# Patient Record
Sex: FEMALE | Race: WHITE | NOT HISPANIC OR LATINO | Employment: OTHER | URBAN - METROPOLITAN AREA
[De-identification: names, ages, dates, MRNs, and addresses within clinical notes are randomized per-mention and may not be internally consistent; named-entity substitution may affect disease eponyms.]

---

## 2017-01-09 ENCOUNTER — TELEPHONE (OUTPATIENT)
Dept: FAMILY MEDICINE | Facility: CLINIC | Age: 82
End: 2017-01-09

## 2017-01-09 DIAGNOSIS — N30.00 ACUTE CYSTITIS WITHOUT HEMATURIA: Primary | ICD-10-CM

## 2017-01-09 RX ORDER — SULFAMETHOXAZOLE/TRIMETHOPRIM 800-160 MG
1 TABLET ORAL 2 TIMES DAILY
Qty: 6 TABLET | Refills: 0 | Status: SHIPPED | OUTPATIENT
Start: 2017-01-09 | End: 2017-12-20

## 2017-01-09 NOTE — TELEPHONE ENCOUNTER
Confirm that symptoms resolved after abx from 12/30/16?  Confirm she tolerated bactrim.  Okay to send another script for bactrim 3 day.  If that doesn't resolve or symptoms get worse, she must go leave a ua somewhere in [place].  Carie Manley MD

## 2017-01-09 NOTE — TELEPHONE ENCOUNTER
Called and spoke to granddaughter, she confirmed with Ashlie that her symptoms did resolve after the last prescripton. RN resent for 3 more days per Dr. Manley instruction. They will go to a local urgent care if not improving.   Hodan Gould RN   January 9, 2017 9:04 AM  [facility] Triage   [phone]

## 2017-01-09 NOTE — TELEPHONE ENCOUNTER
Reason for call:  Patient reporting a symptom    Symptom or request: UTI    Have you been treated for this before? Yes    Additional comments: Patient was treated for this and seen on 12-30. Patient is in [place] and has symptoms again, granddaughter is with patient.    Pharmacy: [facility] [phone]    Phone Number patient can be reached at:  [place] [phone]    Best Time:  Anytime today    Can we leave a detailed message on this number:  YES    Call taken on 1/9/2017 at 7:38 AM by Valerie Escobedo

## 2017-01-09 NOTE — TELEPHONE ENCOUNTER
Spoke to patients Granddaughter, States Dailey was up during the night with symptoms of burning with urination. No frequency ,no fever, no blood, no odor just burning. She is increasing fluids and will try cranberry juice.She is  In [place] and has not established care there. She is wondering if she needs a repeat antibiotic or if she should just monitor symptoms. Routing to PCP for advise.  Lizy Mercado,Clinic Rn  [facility] [place]

## 2017-02-22 DIAGNOSIS — I10 HYPERTENSION GOAL BP (BLOOD PRESSURE) < 140/90: ICD-10-CM

## 2017-04-06 ENCOUNTER — TELEPHONE (OUTPATIENT)
Dept: FAMILY MEDICINE | Facility: CLINIC | Age: 82
End: 2017-04-06

## 2017-04-06 DIAGNOSIS — J84.10 FIBROSIS, LUNG (H): ICD-10-CM

## 2017-04-07 RX ORDER — CODEINE SULFATE 30 MG/1
30 TABLET ORAL 2 TIMES DAILY
Qty: 60 TABLET | Refills: 0 | Status: SHIPPED | OUTPATIENT
Start: 2017-05-07 | End: 2017-07-12

## 2017-04-07 RX ORDER — CODEINE SULFATE 30 MG/1
30 TABLET ORAL 2 TIMES DAILY
Qty: 60 TABLET | Refills: 0 | Status: SHIPPED | OUTPATIENT
Start: 2017-06-06 | End: 2017-09-11

## 2017-04-07 RX ORDER — CODEINE SULFATE 30 MG/1
30 TABLET ORAL
Qty: 60 TABLET | Refills: 0 | Status: SHIPPED | OUTPATIENT
Start: 2017-04-07 | End: 2017-07-10

## 2017-07-10 ENCOUNTER — TELEPHONE (OUTPATIENT)
Dept: FAMILY MEDICINE | Facility: CLINIC | Age: 82
End: 2017-07-10

## 2017-07-10 DIAGNOSIS — J84.10 FIBROSIS, LUNG (H): ICD-10-CM

## 2017-07-10 RX ORDER — CODEINE SULFATE 30 MG/1
30 TABLET ORAL
Qty: 60 TABLET | Refills: 0 | Status: SHIPPED | OUTPATIENT
Start: 2017-07-10 | End: 2017-09-18

## 2017-07-10 NOTE — TELEPHONE ENCOUNTER
Codeine 30mg      Last Written Prescription Date:  4/7/17  Last Fill Quantity: 60,   # refills: 0  Last Office Visit with [facility] prescribing provider: 12/30/16  Future Office visit:       Routing refill request to provider for review/approval because:  Drug not on the [facility] refill protocol or controlled substance      Thank you,  Kasey Khan CPhT  On behalf of [facility]

## 2017-07-12 RX ORDER — CODEINE SULFATE 30 MG/1
30 TABLET ORAL 2 TIMES DAILY
Qty: 60 TABLET | Refills: 0 | Status: SHIPPED | OUTPATIENT
Start: 2017-08-07 | End: 2017-09-18

## 2017-07-12 NOTE — TELEPHONE ENCOUNTER
Walked script for August to our pharmacy next door.    Called patient and spoke to spouse.  Spouse said he would drive patient to clinic and have her schedule an appointment w/ Dr Manley.    Will postpone and see that patient scheduled.    Jayne Porter

## 2017-07-12 NOTE — TELEPHONE ENCOUNTER
Signed script for August as well.  Please schedule her a visit for August/September, chronic care.  Carie Manley MD

## 2017-07-27 NOTE — TELEPHONE ENCOUNTER
Called patient and left a VM message to call clinic and schedule a Chronic Care follow up w/ Dr Manley in August or September.    Jayne Porter

## 2017-08-15 ENCOUNTER — OFFICE VISIT (OUTPATIENT)
Dept: PODIATRY | Facility: CLINIC | Age: 82
End: 2017-08-15
Payer: COMMERCIAL

## 2017-08-15 VITALS
HEIGHT: 58 IN | BODY MASS INDEX: 31.28 KG/M2 | WEIGHT: 149 LBS | RESPIRATION RATE: 13 BRPM | OXYGEN SATURATION: 94 % | HEART RATE: 78 BPM

## 2017-08-15 DIAGNOSIS — M72.2 PLANTAR FASCIAL FIBROMATOSIS: Primary | ICD-10-CM

## 2017-08-15 PROCEDURE — 99203 OFFICE O/P NEW LOW 30 MIN: CPT | Performed by: PODIATRIST

## 2017-08-15 NOTE — LETTER
8/15/2017         RE: Ashlie Brantley  [address] [place] [address]        Dear Colleague,    Thank you for referring your patient, Ashlie Brantley, to the [facility]. Please see a copy of my visit note below.    Subjective:    Patient is seen today as a new pt self referral with a 5 day(s) hx of left heel pain.  Points to the plantarmedial calcaneal tubercle.  Most painful upon rising in a.m. or after prolonged sitting.  Aggravated by activity and relieved by rest.  Has tried changing shoewear, OTC inserts, without much success.  Retired.  Non smoker.  Does water aerobics.  Wears slip on shoes around the house.    ROS:  denies numbness, denies edema, denies weakness. Denies ecchymosis or trauma.       Allergies   Allergen Reactions     Nkda [No Known Drug Allergies]        Current Outpatient Prescriptions   Medication Sig Dispense Refill     codeine 30 MG tablet Take 1 tablet (30 mg) by mouth 2 times daily May fill on or after 8/7/17 60 tablet 0     codeine 30 MG tablet Take 1 tablet (30 mg) by mouth 2 times daily May fill on or after 7/7/17 60 tablet 0     codeine 30 MG tablet Take 1 tablet (30 mg) by mouth 2 times daily May fill on or after 6/6/17 60 tablet 0     sulfamethoxazole-trimethoprim (BACTRIM DS/SEPTRA DS) 800-160 MG per tablet Take 1 tablet by mouth 2 times daily 6 tablet 0     albuterol (PROAIR HFA/PROVENTIL HFA/VENTOLIN HFA) 108 (90 BASE) MCG/ACT Inhaler Inhale 2 puffs into the lungs every 6 hours as needed for shortness of breath / dyspnea 1 Inhaler 3     levothyroxine (SYNTHROID) 88 MCG tablet Take 1 tablet (88 mcg) by mouth daily 90 tablet 3     losartan (COZAAR) 100 MG tablet Take 1 tablet (100 mg) by mouth daily 90 tablet 3     simvastatin (ZOCOR) 40 MG tablet Take 1 tablet (40 mg) by mouth At Bedtime 90 tablet 3     neomycin-polymyxin-dexamethasone (MAXITROL) 3.5-42082-3.1 OINT ophthalmic ointment Place 1 Application into both eyes At Bedtime Apply a small  squirt into each eye at bedtime 1 Tube 6     albuterol (2.5 MG/3ML) 0.083% nebulizer solution Take 1 vial (2.5 mg) by nebulization every 6 hours as needed for shortness of breath / dyspnea or wheezing 1 Box 1     order for DME Equipment being ordered: Oxygen 2 liters NC at night time 1 Device 0     order for DME Equipment being ordered: Please provide night time oxygen at 2L/min via nasal canula 1 Units 0     botulinum toxin type A (BOTOX) 100 UNITS injection Inject into the muscle once       ORDER FOR DME Equipment being ordered: Oxygen 2 liters NC at HS 1 each 0     VITAMIN E COMPLEX PO Take  by mouth.       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     Hoarseness     Laryngeal dystonia     Spasmodic dysphonia     Hyperlipidemia LDL goal <130     Anxiety     Hypertension goal BP (blood pressure) < 140/90     LVH (left ventricular hypertrophy)     Advance Care Planning     Cough     Dermatochalasis     Health Care Home     Trochanteric bursitis - bilateral     PVD (posterior vitreous detachment) OU     Pseudophakia, OU     IPF (idiopathic pulmonary fibrosis) (H)     Hypothyroidism due to acquired atrophy of thyroid     Chronic pain syndrome       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     sees [facility]     Cataract     IOL both     DJD (degenerative joint disease)     KNEES     Elevated cholesterol      GERD (gastroesophageal reflux disease)      Hoarseness      HTN (hypertension)      Hypothyroid      Laryngeal dystonia      LVH (left ventricular hypertrophy) due to hypertensive disease      Osteopenia     MILD      "Urinary stress incontinence        Past Surgical History:   Procedure Laterality Date     C NONSPECIFIC PROCEDURE      BACK SURGERY     C NONSPECIFIC PROCEDURE      VOCAL CORD POLYP     C SHOULDER SURG PROC UNLISTED       C STOMACH SURGERY PROCEDURE UNLISTED       C TOTAL KNEE ARTHROPLASTY      LT 1998  /  RT 2001     HC REMOVAL GALLBLADDER       HERNIA REPAIR, INGUINAL RT/LT      RT     HYSTERECTOMY, CERVIX STATUS UNKNOWN      DUB     PHACOEMULSIFICATION WITH STANDARD INTRAOCULAR LENS IMPLANT  Rt 6/11/09, Lt 8/3/09    both eyes Dr. Barnett     ROTATOR CUFF REPAIR RT/LT      RT       Family History   Problem Relation Age of Onset     DIABETES Mother      C.A.D. Father      CEREBROVASCULAR DISEASE Brother        Social History   Substance Use Topics     Smoking status: Never Smoker     Smokeless tobacco: Never Used     Alcohol use 0.0 oz/week     0 Standard drinks or equivalent per week      Comment: 1-2 drinks/month         Objective:    Pulse 78  Resp 13  Ht 1.473 m (4' 10\")  Wt 67.6 kg (149 lb)  SpO2 94%  BMI 31.14 kg/m2.  Good historian.  A&O X3 Pulses are palpable +2/4 DP & PT bilateral.  CRT < 3 seconds X 10 digits.  Bilateral lower extremity edema or varicosities noted.  Sensation to light touch is intact.  Achilles reflex 2/4 bilaterally.  Skin has normal texture and turgor bilaterally.  Pronated arch with weightbearing.   Muscle strength and ROM is within normal limits.  Negative tinel's sign.  No side to side compression pain of the calcaneus.  Pain upon palpation to the left plantarmedial  of the calcaneus.  No erythema, edema ecchymosis, or subcutaneous masses noted.  No pain on palpation or stressing any tendons.  X-rays normal.  Wearing very poor shoes.        Assessment:  Plantar Fasciitis left foot     Plan:  X-rays from past left foot personally reviewed.  Discussed etiology and treatment options with the patient.  The potential causes and nature of plantar fasciitis were discussed with the " patient.  We reviewed the natural history/prognosis of the condition and risks if left untreated.  These include chronic pain, other sites of pain due to gait changes, and potential plantar fascial rupture.      We discussed possible causes of the condition as it relates to the patients specific situation.      Conservative treatment options were reviewed:  appropriate shoes, avoidance of barefoot walking, inserts/orthoses, stretching, ice, massage, immobilization and NSAIDs.     We also reviewed the options of injection therapy and surgery.  However, it was made clear that surgery is only considered when conservative therapy fails.  The risks and benefits of injection therapy, and surgery were discussed.  Dispensed handout.       After thorough discussion and answering all questions, the patient elected to modifying activities, supportive shoes, ice, stretching, and not going barefoot.  Good house shoes at all times and I made recommendations.    RTC in 4 weeks if not better otherwise prn.     Clif Saldana DPM, FACFAS      Again, thank you for allowing me to participate in the care of your patient.        Sincerely,        Clif Saldana DPM

## 2017-08-15 NOTE — PROGRESS NOTES
Subjective:    Patient is seen today as a new pt self referral with a 5 day(s) hx of left heel pain.  Points to the plantarmedial calcaneal tubercle.  Most painful upon rising in a.m. or after prolonged sitting.  Aggravated by activity and relieved by rest.  Has tried changing shoewear, OTC inserts, without much success.  Retired.  Non smoker.  Does water aerobics.  Wears slip on shoes around the house.    ROS:  denies numbness, denies edema, denies weakness. Denies ecchymosis or trauma.       Allergies   Allergen Reactions     Nkda [No Known Drug Allergies]        Current Outpatient Prescriptions   Medication Sig Dispense Refill     codeine 30 MG tablet Take 1 tablet (30 mg) by mouth 2 times daily May fill on or after 8/7/17 60 tablet 0     codeine 30 MG tablet Take 1 tablet (30 mg) by mouth 2 times daily May fill on or after 7/7/17 60 tablet 0     codeine 30 MG tablet Take 1 tablet (30 mg) by mouth 2 times daily May fill on or after 6/6/17 60 tablet 0     sulfamethoxazole-trimethoprim (BACTRIM DS/SEPTRA DS) 800-160 MG per tablet Take 1 tablet by mouth 2 times daily 6 tablet 0     albuterol (PROAIR HFA/PROVENTIL HFA/VENTOLIN HFA) 108 (90 BASE) MCG/ACT Inhaler Inhale 2 puffs into the lungs every 6 hours as needed for shortness of breath / dyspnea 1 Inhaler 3     levothyroxine (SYNTHROID) 88 MCG tablet Take 1 tablet (88 mcg) by mouth daily 90 tablet 3     losartan (COZAAR) 100 MG tablet Take 1 tablet (100 mg) by mouth daily 90 tablet 3     simvastatin (ZOCOR) 40 MG tablet Take 1 tablet (40 mg) by mouth At Bedtime 90 tablet 3     neomycin-polymyxin-dexamethasone (MAXITROL) 3.5-37591-7.1 OINT ophthalmic ointment Place 1 Application into both eyes At Bedtime Apply a small squirt into each eye at bedtime 1 Tube 6     albuterol (2.5 MG/3ML) 0.083% nebulizer solution Take 1 vial (2.5 mg) by nebulization every 6 hours as needed for shortness of breath / dyspnea or wheezing 1 Box 1     order for DME Equipment being ordered:  Oxygen 2 liters NC at night time 1 Device 0     order for DME Equipment being ordered: Please provide night time oxygen at 2L/min via nasal canula 1 Units 0     botulinum toxin type A (BOTOX) 100 UNITS injection Inject into the muscle once       ORDER FOR DME Equipment being ordered: Oxygen 2 liters NC at HS 1 each 0     VITAMIN E COMPLEX PO Take  by mouth.       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     Hoarseness     Laryngeal dystonia     Spasmodic dysphonia     Hyperlipidemia LDL goal <130     Anxiety     Hypertension goal BP (blood pressure) < 140/90     LVH (left ventricular hypertrophy)     Advance Care Planning     Cough     Dermatochalasis     Health Care Home     Trochanteric bursitis - bilateral     PVD (posterior vitreous detachment) OU     Pseudophakia, OU     IPF (idiopathic pulmonary fibrosis) (H)     Hypothyroidism due to acquired atrophy of thyroid     Chronic pain syndrome       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     sees [facility]     Cataract     IOL both     DJD (degenerative joint disease)     KNEES     Elevated cholesterol      GERD (gastroesophageal reflux disease)      Hoarseness      HTN (hypertension)      Hypothyroid      Laryngeal dystonia      LVH (left ventricular hypertrophy) due to hypertensive disease      Osteopenia     MILD     Urinary stress incontinence        Past Surgical History:   Procedure Laterality Date     C NONSPECIFIC PROCEDURE      BACK SURGERY     C NONSPECIFIC PROCEDURE      VOCAL CORD POLYP     C SHOULDER SURG PROC UNLISTED       C STOMACH SURGERY PROCEDURE  "UNLISTED       C TOTAL KNEE ARTHROPLASTY      LT 1998  /  RT 2001     HC REMOVAL GALLBLADDER       HERNIA REPAIR, INGUINAL RT/LT      RT     HYSTERECTOMY, CERVIX STATUS UNKNOWN      DUB     PHACOEMULSIFICATION WITH STANDARD INTRAOCULAR LENS IMPLANT  Rt 6/11/09, Lt 8/3/09    both eyes Dr. Barnett     ROTATOR CUFF REPAIR RT/LT      RT       Family History   Problem Relation Age of Onset     DIABETES Mother      C.A.D. Father      CEREBROVASCULAR DISEASE Brother        Social History   Substance Use Topics     Smoking status: Never Smoker     Smokeless tobacco: Never Used     Alcohol use 0.0 oz/week     0 Standard drinks or equivalent per week      Comment: 1-2 drinks/month         Objective:    Pulse 78  Resp 13  Ht 1.473 m (4' 10\")  Wt 67.6 kg (149 lb)  SpO2 94%  BMI 31.14 kg/m2.  Good historian.  A&O X3 Pulses are palpable +2/4 DP & PT bilateral.  CRT < 3 seconds X 10 digits.  Bilateral lower extremity edema or varicosities noted.  Sensation to light touch is intact.  Achilles reflex 2/4 bilaterally.  Skin has normal texture and turgor bilaterally.  Pronated arch with weightbearing.   Muscle strength and ROM is within normal limits.  Negative tinel's sign.  No side to side compression pain of the calcaneus.  Pain upon palpation to the left plantarmedial  of the calcaneus.  No erythema, edema ecchymosis, or subcutaneous masses noted.  No pain on palpation or stressing any tendons.  X-rays normal.  Wearing very poor shoes.        Assessment:  Plantar Fasciitis left foot     Plan:  X-rays from past left foot personally reviewed.  Discussed etiology and treatment options with the patient.  The potential causes and nature of plantar fasciitis were discussed with the patient.  We reviewed the natural history/prognosis of the condition and risks if left untreated.  These include chronic pain, other sites of pain due to gait changes, and potential plantar fascial rupture.      We discussed possible causes of the condition " as it relates to the patients specific situation.      Conservative treatment options were reviewed:  appropriate shoes, avoidance of barefoot walking, inserts/orthoses, stretching, ice, massage, immobilization and NSAIDs.     We also reviewed the options of injection therapy and surgery.  However, it was made clear that surgery is only considered when conservative therapy fails.  The risks and benefits of injection therapy, and surgery were discussed.  Dispensed handout.       After thorough discussion and answering all questions, the patient elected to modifying activities, supportive shoes, ice, stretching, and not going barefoot.  Good house shoes at all times and I made recommendations.    RTC in 4 weeks if not better otherwise prn.     Clif Saldana DPM, FACFAS

## 2017-08-15 NOTE — MR AVS SNAPSHOT
After Visit Summary   8/15/2017    Ashlie Brantley    MRN: [id number]           Patient Information     Date Of Birth          2/24/1931        Visit Information        Provider Department      8/15/2017 5:45 PM Clif Saldana, NICOLE [facility]        Today's Diagnoses     Plantar fascial fibromatosis    -  1      Care Instructions    Weight management plan: Patient was referred to their PCP to discuss a diet and exercise plan.            Follow-ups after your visit        Your next 10 appointments already scheduled     Sep 18, 2017 11:20 AM CDT   SHORT with Carie Manley MD   [facility] ([facility]    [address] [place] [id number] [phone]           Your Arrival times is X, We need you to be here at this time to get checked in and have the assistant get you ready for the provider, which can take about 15 minutes. Your appointment time with your provider is at  X. Thank you.              Who to contact     If you have questions or need follow up information about today's clinic visit or your schedule please contact [facility] directly at [phone].  Normal or non-critical lab and imaging results will be communicated to you by MyChart, letter or phone within 4 business days after the clinic has received the results. If you do not hear from us within 7 days, please contact the clinic through [employer] or phone. If you have a critical or abnormal lab result, we will notify you by phone as soon as possible.  Submit refill requests through [employer] or call your pharmacy and they will forward the refill request to us. Please allow 3 business days for your refill to be completed.          Additional Information About Your Visit        MyChart Information     [employer] gives you secure access to your electronic health record. If you see a primary care provider, you can also send messages to your care team and make  "appointments. If you have questions, please call your primary care clinic.  If you do not have a primary care provider, please call [phone] and they will assist you.        Care EveryWhere ID     This is your Care EveryWhere ID. This could be used by other organizations to access your [facility] medical records  [id number]        Your Vitals Were     Pulse Respirations Height Pulse Oximetry BMI (Body Mass Index)       78 13 1.473 m (4' 10\") 94% 31.14 kg/m2        Blood Pressure from Last 3 Encounters:   12/30/16 126/76   12/21/16 128/76   12/02/16 158/83    Weight from Last 3 Encounters:   08/15/17 67.6 kg (149 lb)   12/30/16 71.2 kg (157 lb)   12/21/16 72.1 kg (159 lb)              Today, you had the following     No orders found for display       Primary Care Provider Office Phone # Fax #    Carie Manley [phone]       [facility] [address]        Equal Access to Services     [facility]: Hadii aad ku hadasho Soomaali, waaxda luqadaha, qaybta kaalmada adeegyada, waxay abilio hayfartun bynum . So [facility] [phone].    ATENCIÓN: Si habla español, tiene a leon disposición servicios gratuitos de asistencia lingüística. Llame al [phone].    We comply with applicable federal civil rights laws and [place] laws. We do not discriminate on the basis of race, color, national origin, age, disability sex, sexual orientation or gender identity.            Thank you!     Thank you for choosing [facility]  for your care. Our goal is always to provide you with excellent care. Hearing back from our patients is one way we can continue to improve our services. Please take a few minutes to complete the written survey that you may receive in the mail after your visit with us. Thank you!             Your Updated Medication List - Protect others around you: Learn how to safely use, store and throw away your medicines at www.disposemymeds.org.        "   This list is accurate as of: 8/15/17  6:08 PM.  Always use your most recent med list.                   Brand Name Dispense Instructions for use Diagnosis    * albuterol (2.5 MG/3ML) 0.083% neb solution     1 Box    Take 1 vial (2.5 mg) by nebulization every 6 hours as needed for shortness of breath / dyspnea or wheezing    IPF (idiopathic pulmonary fibrosis) (H), Cough       * albuterol 108 (90 BASE) MCG/ACT Inhaler    PROAIR HFA/PROVENTIL HFA/VENTOLIN HFA    1 Inhaler    Inhale 2 puffs into the lungs every 6 hours as needed for shortness of breath / dyspnea    Fibrosis, lung (H)       aspirin 81 MG tablet     1 Tab    ONE DAILY    HTN (hypertension), Elevated cholesterol       BOTOX 100 UNITS injection   Generic drug:  botulinum toxin type A      Inject into the muscle once        calcium + D 600-200 MG-UNIT Tabs   Generic drug:  calcium carbonate-vitamin D     0    ONE TABLET TWICE A DAY WITH MEALS    Osteopenia       * codeine 30 MG tablet     60 tablet    Take 1 tablet (30 mg) by mouth 2 times daily May fill on or after 6/6/17    Fibrosis, lung (H)       * codeine 30 MG tablet     60 tablet    Take 1 tablet (30 mg) by mouth 2 times daily May fill on or after 7/7/17    Fibrosis, lung (H)       * codeine 30 MG tablet     60 tablet    Take 1 tablet (30 mg) by mouth 2 times daily May fill on or after 8/7/17    Fibrosis, lung (H)       levothyroxine 88 MCG tablet    SYNTHROID    90 tablet    Take 1 tablet (88 mcg) by mouth daily    Hypothyroidism due to acquired atrophy of thyroid       losartan 100 MG tablet    COZAAR    90 tablet    Take 1 tablet (100 mg) by mouth daily    Hypertension goal BP (blood pressure) < 140/90       neomycin-polymyxin-dexamethasone 3.5-64042-9.1 Oint ophthalmic ointment    MAXITROL    1 Tube    Place 1 Application into both eyes At Bedtime Apply a small squirt into each eye at bedtime    Dermatochalasis, unspecified laterality       OMEGA-3 COMPLEX PO      1 capsule daily        order  for DME     1 each    Equipment being ordered: Oxygen 2 liters NC at HS    Oxygen decrease, IPF (idiopathic pulmonary fibrosis) (H)       * order for DME     1 Units    Equipment being ordered: Please provide night time oxygen at 2L/min via nasal canula    IPF (idiopathic pulmonary fibrosis) (H), Hypoxia       * order for DME     1 Device    Equipment being ordered: Oxygen 2 liters NC at night time    IPF (idiopathic pulmonary fibrosis) (H)       simvastatin 40 MG tablet    ZOCOR    90 tablet    Take 1 tablet (40 mg) by mouth At Bedtime    Hyperlipidemia LDL goal <130       sulfamethoxazole-trimethoprim 800-160 MG per tablet    BACTRIM DS/SEPTRA DS    6 tablet    Take 1 tablet by mouth 2 times daily    Acute cystitis without hematuria       * vitamin D3 1000 UNITS Caps      1 CAPSULE DAILY        * vitamin D 2000 UNITS tablet     0    1 tablet daily    Osteopenia       VITAMIN E COMPLEX PO      Take  by mouth.        * Notice:  This list has 9 medication(s) that are the same as other medications prescribed for you. Read the directions carefully, and ask your doctor or other care provider to review them with you.

## 2017-08-15 NOTE — NURSING NOTE
"Chief Complaint   Patient presents with     Musculoskeletal Problem     left foot pain since friday       Initial Pulse 78  Resp 13  Ht 4' 10\" (1.473 m)  Wt 149 lb (67.6 kg)  SpO2 94%  BMI 31.14 kg/m2 Estimated body mass index is 31.14 kg/(m^2) as calculated from the following:    Height as of this encounter: 4' 10\" (1.473 m).    Weight as of this encounter: 149 lb (67.6 kg).  Medication Reconciliation: complete     Paula Francis. MA      "

## 2017-09-11 DIAGNOSIS — J84.10 FIBROSIS, LUNG (H): ICD-10-CM

## 2017-09-11 RX ORDER — CODEINE SULFATE 30 MG/1
30 TABLET ORAL 2 TIMES DAILY
Qty: 60 TABLET | Refills: 0 | Status: CANCELLED | OUTPATIENT
Start: 2017-09-11

## 2017-09-11 RX ORDER — CODEINE SULFATE 30 MG/1
30 TABLET ORAL 2 TIMES DAILY
Qty: 60 TABLET | Refills: 0 | Status: SHIPPED | OUTPATIENT
Start: 2017-09-11 | End: 2017-09-18

## 2017-09-11 NOTE — TELEPHONE ENCOUNTER
Codeine Sulfate 30 mg tablet     Last Written Prescription Date:  08/07/17  Last Fill Quantity: 60,   # refills: 0  Last Office Visit with [facility], [facility] or [facility] prescribing provider: 12/30/16  Future Office visit:    Next 5 appointments (look out 90 days)     Sep 18, 2017 11:20 AM CDT   SHORT with [facility]ie Manley MD   [facility]    [address] [place] [id number] [phone]                   Routing refill request to provider for review/approval because:  Drug not on the [facility], [facility] or [facility] refill protocol or controlled substance    Raine Cotto CPhT  On Behalf of [facility]

## 2017-09-11 NOTE — TELEPHONE ENCOUNTER
Please ask another provider to sign this script in my absence.  Patient seeing me this month.  Carie Manley MD

## 2017-09-18 ENCOUNTER — OFFICE VISIT (OUTPATIENT)
Dept: FAMILY MEDICINE | Facility: CLINIC | Age: 82
End: 2017-09-18
Payer: COMMERCIAL

## 2017-09-18 VITALS
TEMPERATURE: 98.6 F | BODY MASS INDEX: 30.64 KG/M2 | HEIGHT: 59 IN | SYSTOLIC BLOOD PRESSURE: 135 MMHG | WEIGHT: 152 LBS | DIASTOLIC BLOOD PRESSURE: 74 MMHG | HEART RATE: 82 BPM

## 2017-09-18 DIAGNOSIS — E03.4 HYPOTHYROIDISM DUE TO ACQUIRED ATROPHY OF THYROID: ICD-10-CM

## 2017-09-18 DIAGNOSIS — J84.10 FIBROSIS, LUNG (H): Primary | ICD-10-CM

## 2017-09-18 DIAGNOSIS — R32 URINARY INCONTINENCE, UNSPECIFIED TYPE: ICD-10-CM

## 2017-09-18 DIAGNOSIS — E78.5 HYPERLIPIDEMIA LDL GOAL <130: ICD-10-CM

## 2017-09-18 DIAGNOSIS — I10 HYPERTENSION GOAL BP (BLOOD PRESSURE) < 140/90: ICD-10-CM

## 2017-09-18 DIAGNOSIS — F41.9 ANXIETY: ICD-10-CM

## 2017-09-18 PROCEDURE — 99214 OFFICE O/P EST MOD 30 MIN: CPT | Performed by: FAMILY MEDICINE

## 2017-09-18 RX ORDER — CODEINE SULFATE 30 MG/1
30 TABLET ORAL 2 TIMES DAILY
Qty: 60 TABLET | Refills: 0 | Status: SHIPPED | OUTPATIENT
Start: 2017-11-10 | End: 2017-12-20

## 2017-09-18 RX ORDER — CODEINE SULFATE 30 MG/1
30 TABLET ORAL 2 TIMES DAILY
Qty: 60 TABLET | Refills: 0 | Status: SHIPPED | OUTPATIENT
Start: 2017-12-10 | End: 2017-12-20

## 2017-09-18 RX ORDER — CODEINE SULFATE 30 MG/1
30 TABLET ORAL
Qty: 60 TABLET | Refills: 0 | Status: SHIPPED | OUTPATIENT
Start: 2017-10-11 | End: 2017-12-20

## 2017-09-18 NOTE — PROGRESS NOTES
SUBJECTIVE:   Ashlie Brantley is a 86 year old female who presents to clinic today for the following health issues:      Medication Followup of codeine for lung fibrosis    Taking Medication as prescribed: yes    Side Effects:  None    Medication Helping Symptoms:  yes     Is doing okay since her daughter's death.    Urinary incontinence for many years.  She is interested in getting another opinion as it is effecting her daily life. Tried 2 botox injections.    Problem list and histories reviewed & adjusted, as indicated.  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     Hoarseness     Laryngeal dystonia     Spasmodic dysphonia     Hyperlipidemia LDL goal <130     Anxiety     Hypertension goal BP (blood pressure) < 140/90     LVH (left ventricular hypertrophy)     Advance Care Planning     Cough     Dermatochalasis     Health Care Home     Trochanteric bursitis - bilateral     PVD (posterior vitreous detachment) OU     Pseudophakia, OU     IPF (idiopathic pulmonary fibrosis) (H)     Hypothyroidism due to acquired atrophy of thyroid     Chronic pain syndrome     Urinary incontinence, unspecified type     Past Surgical History:   Procedure Laterality Date     C NONSPECIFIC PROCEDURE      BACK SURGERY     C NONSPECIFIC PROCEDURE      VOCAL CORD POLYP     C SHOULDER SURG PROC UNLISTED       C STOMACH SURGERY PROCEDURE UNLISTED       C TOTAL KNEE ARTHROPLASTY      LT 1998  /  RT 2001     HC REMOVAL GALLBLADDER       HERNIA REPAIR, INGUINAL RT/LT      RT     HYSTERECTOMY, CERVIX STATUS UNKNOWN      DUB     PHACOEMULSIFICATION WITH STANDARD INTRAOCULAR LENS IMPLANT  Rt 6/11/09, Lt 8/3/09    both eyes Dr. Barnett     ROTATOR CUFF REPAIR RT/LT      RT       Social History   Substance Use Topics     Smoking status: Never Smoker     Smokeless tobacco: Never Used     Alcohol use 0.0  oz/week     0 Standard drinks or equivalent per week      Comment: 1-2 drinks/month     Family History   Problem Relation Age of Onset     DIABETES Mother      C.A.D. Father      CEREBROVASCULAR DISEASE Brother          Current Outpatient Prescriptions   Medication Sig Dispense Refill     [START ON 12/10/2017] codeine 30 MG tablet Take 1 tablet (30 mg) by mouth 2 times daily May fill on or after 12/10/17 60 tablet 0     [START ON 11/10/2017] codeine 30 MG tablet Take 1 tablet (30 mg) by mouth 2 times daily May fill on or after 11/10/17 60 tablet 0     [START ON 10/11/2017] codeine 30 MG tablet Take 1 tablet (30 mg) by mouth 2 times daily May fill on or after 10/11/17 60 tablet 0     sulfamethoxazole-trimethoprim (BACTRIM DS/SEPTRA DS) 800-160 MG per tablet Take 1 tablet by mouth 2 times daily 6 tablet 0     albuterol (PROAIR HFA/PROVENTIL HFA/VENTOLIN HFA) 108 (90 BASE) MCG/ACT Inhaler Inhale 2 puffs into the lungs every 6 hours as needed for shortness of breath / dyspnea 1 Inhaler 3     levothyroxine (SYNTHROID) 88 MCG tablet Take 1 tablet (88 mcg) by mouth daily 90 tablet 3     losartan (COZAAR) 100 MG tablet Take 1 tablet (100 mg) by mouth daily 90 tablet 3     simvastatin (ZOCOR) 40 MG tablet Take 1 tablet (40 mg) by mouth At Bedtime 90 tablet 3     neomycin-polymyxin-dexamethasone (MAXITROL) 3.5-58931-9.1 OINT ophthalmic ointment Place 1 Application into both eyes At Bedtime Apply a small squirt into each eye at bedtime 1 Tube 6     albuterol (2.5 MG/3ML) 0.083% nebulizer solution Take 1 vial (2.5 mg) by nebulization every 6 hours as needed for shortness of breath / dyspnea or wheezing 1 Box 1     order for DME Equipment being ordered: Oxygen 2 liters NC at night time 1 Device 0     order for DME Equipment being ordered: Please provide night time oxygen at 2L/min via nasal canula 1 Units 0     botulinum toxin type A (BOTOX) 100 UNITS injection Inject into the muscle once       ORDER FOR DME Equipment being  "ordered: Oxygen 2 liters NC at HS 1 each 0     VITAMIN E COMPLEX PO Take  by mouth.       VITAMIN D3 1000 UNIT OR CAPS 1 CAPSULE DAILY       OMEGA-3 COMPLEX OR 1 capsule daily       ASPIRIN 81 MG OR TABS ONE DAILY 1 Tab 0     CALCIUM + D 600-200 MG-UNIT OR TABS ONE TABLET TWICE A DAY WITH MEALS 0 0     VITAMIN D 2000 UNIT OR TABS 1 tablet daily 0 0     Labs reviewed in EPIC      Reviewed and updated as needed this visit by clinical staffTobacco  Allergies  Meds  Med Hx       Reviewed and updated as needed this visit by Provider         ROS:  Constitutional, HEENT, cardiovascular, pulmonary, GI, , musculoskeletal, neuro, skin, endocrine and psych systems are negative, except as otherwise noted.      OBJECTIVE:   /74  Pulse 82  Temp 98.6  F (37  C) (Oral)  Ht 4' 10.54\" (1.487 m)  Wt 152 lb (68.9 kg)  BMI 31.18 kg/m2  Body mass index is 31.18 kg/(m^2).  GENERAL: healthy, alert and no distress  RESP: lungs clear to auscultation - no rales, rhonchi or wheezes  CV: regular rate and rhythm, normal S1 S2, no S3 or S4, no murmur, click or rub, no peripheral edema and peripheral pulses strong  PSYCH: mentation appears normal, affect normal/bright        ASSESSMENT/PLAN:   1. Fibrosis, lung (H)  Chronic, stable, well controlled, continue current medication, refill done if needed  Followed by pulmonology  - codeine 30 MG tablet; Take 1 tablet (30 mg) by mouth 2 times daily May fill on or after 12/10/17  Dispense: 60 tablet; Refill: 0  - codeine 30 MG tablet; Take 1 tablet (30 mg) by mouth 2 times daily May fill on or after 11/10/17  Dispense: 60 tablet; Refill: 0  - codeine 30 MG tablet; Take 1 tablet (30 mg) by mouth 2 times daily May fill on or after 10/11/17  Dispense: 60 tablet; Refill: 0    2. Urinary incontinence, unspecified type    - UROLOGY ADULT REFERRAL    3. Anxiety  Fairly well controlled, has good support system in place to help with grief    4. Hypertension goal BP (blood pressure) < " 140/90  Chronic, stable, well controlled, continue current medication, refill done if needed    - **Basic metabolic panel FUTURE anytime; Future    5. Hypothyroidism due to acquired atrophy of thyroid  Chronic, stable, well controlled, continue current medication, refill done if needed    - **TSH with free T4 reflex FUTURE anytime; Future    6. Hyperlipidemia LDL goal <130  Chronic, stable, well controlled, continue current medication, refill done if needed    - **Lipid panel reflex to direct LDL FUTURE anytime; Future    FUTURE APPOINTMENTS:       - Follow-up visit in December for wellness and fasting labs    Carie Manley MD  [facility]

## 2017-09-18 NOTE — NURSING NOTE
"Chief Complaint   Patient presents with     Recheck Medication       Initial /74  Pulse 82  Temp 98.6  F (37  C) (Oral)  Ht 4' 10.54\" (1.487 m)  Wt 152 lb (68.9 kg)  BMI 31.18 kg/m2 Estimated body mass index is 31.18 kg/(m^2) as calculated from the following:    Height as of this encounter: 4' 10.54\" (1.487 m).    Weight as of this encounter: 152 lb (68.9 kg).  Medication Reconciliation: complete     Alex Butt      "

## 2017-10-05 ENCOUNTER — RADIANT APPOINTMENT (OUTPATIENT)
Dept: MAMMOGRAPHY | Facility: CLINIC | Age: 82
End: 2017-10-05
Payer: COMMERCIAL

## 2017-10-05 DIAGNOSIS — Z12.31 VISIT FOR SCREENING MAMMOGRAM: ICD-10-CM

## 2017-10-05 PROCEDURE — G0202 SCR MAMMO BI INCL CAD: HCPCS | Mod: TC

## 2017-12-06 ENCOUNTER — OFFICE VISIT (OUTPATIENT)
Dept: PULMONOLOGY | Facility: CLINIC | Age: 82
End: 2017-12-06
Attending: INTERNAL MEDICINE
Payer: COMMERCIAL

## 2017-12-06 VITALS
RESPIRATION RATE: 18 BRPM | HEIGHT: 59 IN | OXYGEN SATURATION: 95 % | HEART RATE: 47 BPM | WEIGHT: 151.8 LBS | SYSTOLIC BLOOD PRESSURE: 135 MMHG | DIASTOLIC BLOOD PRESSURE: 82 MMHG | BODY MASS INDEX: 30.6 KG/M2

## 2017-12-06 DIAGNOSIS — J84.112 IPF (IDIOPATHIC PULMONARY FIBROSIS) (H): Primary | ICD-10-CM

## 2017-12-06 PROCEDURE — 99212 OFFICE O/P EST SF 10 MIN: CPT | Mod: ZF

## 2017-12-06 ASSESSMENT — ENCOUNTER SYMPTOMS
FEVER: 0
LOSS OF CONSCIOUSNESS: 0
PND: 0
DYSURIA: 0
BRUISES/BLEEDS EASILY: 0
NAUSEA: 0
STRIDOR: 0
HEARTBURN: 0
CHILLS: 0
HEMOPTYSIS: 0
SPUTUM PRODUCTION: 0
COUGH: 1
EYES NEGATIVE: 1
BACK PAIN: 1
WHEEZING: 0
NECK PAIN: 1
SINUS PAIN: 0
MYALGIAS: 1
SHORTNESS OF BREATH: 0
WEIGHT LOSS: 0

## 2017-12-06 ASSESSMENT — PAIN SCALES - GENERAL: PAINLEVEL: NO PAIN (0)

## 2017-12-06 NOTE — LETTER
12/6/2017       RE: Ashlie Brantley  [address] [place] [address]     Dear Colleague,    Thank you for referring your patient, Ashlie Brantley, to the [facility] at [facility]. Please see a copy of my visit note below.    Pulmonology Clinic Follow up Visit       Ashlie Brantley MRN# [id number]   YOB: 1931 Age: 86 year old       Reason for Visit: Routine ILD FU          Assessment and Plan:     Ashlie is a 86 F with ILD (NSIP), vocal cord dystonia, nocturnal O2 (2LNC) who is her for routine f/u     ## NSIP/ILD. Found incidentally on cxr/ct following URI in 2011. Previous w/u negative (HP panel, rheum panel, exposure Hx). PFT's have declined over the past year, though she has remained asymptomatic from respiratory standpoint other than 2LNC at night. She remains fairly active and is not limited at all by her breathing. CT in 2014 did show 5mm pulm nodule near minor fissure. She has declined to undergo further chest imaging. She is a non-smoker. She continues to be able to walk a flight of stairs and greater than one block without resting. She goes to the gym twice a week for water aerobics and lee at the far end of the parking lot so she can walk farther. Resting sats ok and six min walk ok at last visit.     - Annual followup  - UTD on immunizations  - repeat PFT and 6 MW on FU  - Overnight oxymetry ordered        ## Vocal cord dystonia. Has received speech therapy and tolerates this well. Expiration sounds halted on exam, likely due to vocal cord collapse. She has been extensively evaluated by ENT and speech therapy and has undergone multiple therapies without success. She is not interested in any further evaluation at this time.         RTC: 1 yr    Patient seen and discussed with Dr. Mallory Patel M.D.  Pulmonary & Critical Care Fellow  [phone]          History of Present Illness /  "Interval History:     Ashlie is a 86 F with ILD (NSIP), vocal cord dystonia, nocturnal O2 (2LNC) who is her for routine f/u    She has remained very stable since she was seen last year. No pulmonary exacerbations or pulmonary infections. Per her daughter, the patient and her  keep very busy.  She continues to engage in water aerobics twice a week and walks on a regular basis. She denies any dyspnea, though feels that she is \"slowing down\" overall due to her age.    She continues to use her oxygen overnight, though doesn't remember what the flow rate is.    She has a very rare cough, occasionally with scant yellow sputum.    She denies any changes to her general health and has not had any new skin or joint symptoms to suggest a developing autoimmune disorder (to be associated with the NSIP).            Review of Systems:     Review of Systems   Constitutional: Negative for chills, fever, malaise/fatigue and weight loss.   HENT: Negative for congestion, hearing loss, nosebleeds and sinus pain.    Eyes: Negative.    Respiratory: Positive for cough. Negative for hemoptysis, sputum production, shortness of breath, wheezing and stridor.    Cardiovascular: Negative for chest pain, leg swelling and PND.   Gastrointestinal: Negative for heartburn and nausea.   Genitourinary: Negative for dysuria.   Musculoskeletal: Positive for back pain, joint pain, myalgias and neck pain.   Skin: Negative for itching and rash.   Neurological: Negative for loss of consciousness.   Endo/Heme/Allergies: Does not bruise/bleed easily.              Physical Examination:     /82  Pulse (!) 47  Resp 18  Ht 1.486 m (4' 10.5\")  Wt 68.9 kg (151 lb 12.8 oz)  SpO2 95%  BMI 31.19 kg/m2    Physical Exam   Constitutional: She is oriented to person, place, and time and well-developed, well-nourished, and in no distress.   Speaks in a whisper and uses a writing board when needed.   HENT:   Head: Normocephalic and atraumatic.   Right Ear: " External ear normal.   Left Ear: External ear normal.   Mouth/Throat: Oropharynx is clear and moist.   Eyes: Conjunctivae and EOM are normal. Pupils are equal, round, and reactive to light. No scleral icterus.   Neck: Normal range of motion. No thyromegaly present.   Cardiovascular: Normal rate, regular rhythm and normal heart sounds.    No murmur heard.  Pulmonary/Chest: Effort normal.   Diffuse rales throughout   Abdominal: Soft.   Musculoskeletal: Normal range of motion. She exhibits no edema.   Lymphadenopathy:     She has no cervical adenopathy.   Neurological: She is alert and oriented to person, place, and time. Gait normal.   Skin: Skin is warm and dry. No rash noted.   Vitals reviewed.            Data:     PFT: 12/6/2017      Largely normal, though possible mid-size vessel obstruction. Compared to 12/2/16 the FVC has declined fy nearly 200ml.            Medications:     Current Outpatient Prescriptions   Medication     [START ON 12/10/2017] codeine 30 MG tablet     codeine 30 MG tablet     codeine 30 MG tablet     sulfamethoxazole-trimethoprim (BACTRIM DS/SEPTRA DS) 800-160 MG per tablet     albuterol (PROAIR HFA/PROVENTIL HFA/VENTOLIN HFA) 108 (90 BASE) MCG/ACT Inhaler     levothyroxine (SYNTHROID) 88 MCG tablet     losartan (COZAAR) 100 MG tablet     simvastatin (ZOCOR) 40 MG tablet     neomycin-polymyxin-dexamethasone (MAXITROL) 3.5-58583-8.1 OINT ophthalmic ointment     albuterol (2.5 MG/3ML) 0.083% nebulizer solution     order for DME     order for DME     botulinum toxin type A (BOTOX) 100 UNITS injection     ORDER FOR DME     VITAMIN E COMPLEX PO     VITAMIN D3 1000 UNIT OR CAPS     OMEGA-3 COMPLEX OR     ASPIRIN 81 MG OR TABS     CALCIUM + D 600-200 MG-UNIT OR TABS     VITAMIN D 2000 UNIT OR TABS     No current facility-administered medications for this visit.        Attending statement:    The patient was seen and examined by me with the pulmonary fellow, Dr Patel .  The case was discussed at  length.  Vitals, lab results and imaging from our visit were reviewed.  The note written by Dr Patel  above reflects our joint assessment and plan.    Davonte Tejada MD    Sincerely,    René Patel MD

## 2017-12-06 NOTE — MR AVS SNAPSHOT
After Visit Summary   12/6/2017    Ashlie Brantley    MRN: [id number]           Patient Information     Date Of Birth          2/24/1931        Visit Information        Provider Department      12/6/2017 12:55 PM René Patel MD [facility]        Today's Diagnoses     IPF (idiopathic pulmonary fibrosis) (H)    -  1       Follow-ups after your visit        Follow-up notes from your care team     Return in about 1 year (around 12/6/2018).      Your next 10 appointments already scheduled     Dec 13, 2017  8:45 AM CST   [facility]   [facility]    [address] [place] [id number]   [phone]           Please do not eat 10-12 hours before your appointment if you are coming in fasting for labs on lipids, cholesterol, or glucose (sugar). This does not apply to pregnant women. Water, hot tea and black coffee (with nothing added) are okay. Do not drink other fluids, diet soda or chew gum.            Dec 20, 2017  8:20 AM CST   PHYSICAL with Carie Manley MD   [facility]    [address] [place] [id number]   [phone]              Future tests that were ordered for you today     Open Future Orders        Priority Expected Expires Ordered    OP OXIMETRY (CONTINUOUS) Routine 12/6/2017 1/20/2018 12/6/2017    General PFT Lab (Please always keep checked) Routine 12/1/2018 12/6/2018 12/6/2017    Pulmonary Function Test Routine 12/1/2018 2/22/2027 12/6/2017    6 minute walk test Routine 12/1/2018 12/6/2018 12/6/2017            Who to contact     If you have questions or need follow up information about today's clinic visit or your schedule please contact [facility] directly at [phone].  Normal or non-critical lab and imaging results will be communicated to you by MyChart, letter or phone  "within 4 business days after the clinic has received the results. If you do not hear from us within 7 days, please contact the clinic through [employer] or phone. If you have a critical or abnormal lab result, we will notify you by phone as soon as possible.  Submit refill requests through [employer] or call your pharmacy and they will forward the refill request to us. Please allow 3 business days for your refill to be completed.          Additional Information About Your Visit        [employer] Information     [employer] gives you secure access to your electronic health record. If you see a primary care provider, you can also send messages to your care team and make appointments. If you have questions, please call your primary care clinic.  If you do not have a primary care provider, please call [phone] and they will assist you.        Care EveryWhere ID     This is your Care EveryWhere ID. This could be used by other organizations to access your [facility] medical records  [id number]        Your Vitals Were     Pulse Respirations Height Pulse Oximetry BMI (Body Mass Index)       47 18 1.486 m (4' 10.5\") 95% 31.19 kg/m2        Blood Pressure from Last 3 Encounters:   12/06/17 135/82   09/18/17 135/74   12/30/16 126/76    Weight from Last 3 Encounters:   12/06/17 68.9 kg (151 lb 12.8 oz)   09/18/17 68.9 kg (152 lb)   08/15/17 67.6 kg (149 lb)               Primary Care Provider Office Phone # Fax #    Carie Manley [phone]       [facility] [address]        Equal Access to Services     [facility]: Hadii aad ku hadasho Soomaali, waaxda luqadaha, qaybta kaalmada giovani hodge . So [facility] [phone].    ATENCIÓN: Si habla español, tiene a leon disposición servicios gratuitos de asistencia lingüística. Llame al [phone].    We comply with applicable federal civil rights laws and [place] laws. We do not discriminate on the basis of " race, color, national origin, age, disability, sex, sexual orientation, or gender identity.            Thank you!     Thank you for choosing [facility]  for your care. Our goal is always to provide you with excellent care. Hearing back from our patients is one way we can continue to improve our services. Please take a few minutes to complete the written survey that you may receive in the mail after your visit with us. Thank you!             Your Updated Medication List - Protect others around you: Learn how to safely use, store and throw away your medicines at www.disposemymeds.org.          This list is accurate as of: 12/6/17  1:45 PM.  Always use your most recent med list.                   Brand Name Dispense Instructions for use Diagnosis    * albuterol (2.5 MG/3ML) 0.083% neb solution     1 Box    Take 1 vial (2.5 mg) by nebulization every 6 hours as needed for shortness of breath / dyspnea or wheezing    IPF (idiopathic pulmonary fibrosis) (H), Cough       * albuterol 108 (90 BASE) MCG/ACT Inhaler    PROAIR HFA/PROVENTIL HFA/VENTOLIN HFA    1 Inhaler    Inhale 2 puffs into the lungs every 6 hours as needed for shortness of breath / dyspnea    Fibrosis, lung (H)       aspirin 81 MG tablet     1 Tab    ONE DAILY    HTN (hypertension), Elevated cholesterol       BOTOX 100 UNITS injection   Generic drug:  botulinum toxin type A      Inject into the muscle once        calcium + D 600-200 MG-UNIT Tabs   Generic drug:  calcium carbonate-vitamin D     0    ONE TABLET TWICE A DAY WITH MEALS    Osteopenia       * codeine 30 MG tablet     60 tablet    Take 1 tablet (30 mg) by mouth 2 times daily May fill on or after 10/11/17    Fibrosis, lung (H)       * codeine 30 MG tablet     60 tablet    Take 1 tablet (30 mg) by mouth 2 times daily May fill on or after 11/10/17    Fibrosis, lung (H)       * codeine 30 MG tablet   Start taking on:  12/10/2017     60 tablet    Take 1 tablet (30 mg) by  mouth 2 times daily May fill on or after 12/10/17    Fibrosis, lung (H)       levothyroxine 88 MCG tablet    SYNTHROID    90 tablet    Take 1 tablet (88 mcg) by mouth daily    Hypothyroidism due to acquired atrophy of thyroid       losartan 100 MG tablet    COZAAR    90 tablet    Take 1 tablet (100 mg) by mouth daily    Hypertension goal BP (blood pressure) < 140/90       neomycin-polymyxin-dexamethasone 3.5-40561-2.1 Oint ophthalmic ointment    MAXITROL    1 Tube    Place 1 Application into both eyes At Bedtime Apply a small squirt into each eye at bedtime    Dermatochalasis, unspecified laterality       OMEGA-3 COMPLEX PO      1 capsule daily        order for DME     1 each    Equipment being ordered: Oxygen 2 liters NC at HS    Oxygen decrease, IPF (idiopathic pulmonary fibrosis) (H)       * order for DME     1 Units    Equipment being ordered: Please provide night time oxygen at 2L/min via nasal canula    IPF (idiopathic pulmonary fibrosis) (H), Hypoxia       * order for DME     1 Device    Equipment being ordered: Oxygen 2 liters NC at night time    IPF (idiopathic pulmonary fibrosis) (H)       simvastatin 40 MG tablet    ZOCOR    90 tablet    Take 1 tablet (40 mg) by mouth At Bedtime    Hyperlipidemia LDL goal <130       sulfamethoxazole-trimethoprim 800-160 MG per tablet    BACTRIM DS/SEPTRA DS    6 tablet    Take 1 tablet by mouth 2 times daily    Acute cystitis without hematuria       * vitamin D3 1000 UNITS Caps      1 CAPSULE DAILY        * vitamin D 2000 UNITS tablet     0    1 tablet daily    Osteopenia       VITAMIN E COMPLEX PO      Take  by mouth.        * Notice:  This list has 9 medication(s) that are the same as other medications prescribed for you. Read the directions carefully, and ask your doctor or other care provider to review them with you.

## 2017-12-06 NOTE — NURSING NOTE
Chief Complaint   Patient presents with     RECHECK     Follow up on Ashlie and her lung issues     Bernard Cortez CMA at 12:35 PM on 12/6/2017

## 2017-12-06 NOTE — PROGRESS NOTES
Pulmonology Clinic Follow up Visit       Ashlie Brantley MRN# [id number]   YOB: 1931 Age: 86 year old       Reason for Visit: Routine ILD FU          Assessment and Plan:     Ashlie is a 86 F with ILD (NSIP), vocal cord dystonia, nocturnal O2 (2LNC) who is her for routine f/u     ## NSIP/ILD. Found incidentally on cxr/ct following URI in 2011. Previous w/u negative (HP panel, rheum panel, exposure Hx). PFT's have declined over the past year, though she has remained asymptomatic from respiratory standpoint other than 2LNC at night. She remains fairly active and is not limited at all by her breathing. CT in 2014 did show 5mm pulm nodule near minor fissure. She has declined to undergo further chest imaging. She is a non-smoker. She continues to be able to walk a flight of stairs and greater than one block without resting. She goes to the gym twice a week for water aerobics and lee at the far end of the parking lot so she can walk farther. Resting sats ok and six min walk ok at last visit.     - Annual followup  - UTD on immunizations  - repeat PFT and 6 MW on FU  - Overnight oxymetry ordered        ## Vocal cord dystonia. Has received speech therapy and tolerates this well. Expiration sounds halted on exam, likely due to vocal cord collapse. She has been extensively evaluated by ENT and speech therapy and has undergone multiple therapies without success. She is not interested in any further evaluation at this time.         RTC: 1 yr    Patient seen and discussed with Dr. Mallory Patel M.D.  Pulmonary & Critical Care Fellow  [phone]          History of Present Illness / Interval History:     Ashlie is a 86 F with ILD (NSIP), vocal cord dystonia, nocturnal O2 (2LNC) who is her for routine f/u    She has remained very stable since she was seen last year. No pulmonary exacerbations or pulmonary infections. Per her daughter, the patient and her  keep very busy.  She continues  "to engage in water aerobics twice a week and walks on a regular basis. She denies any dyspnea, though feels that she is \"slowing down\" overall due to her age.    She continues to use her oxygen overnight, though doesn't remember what the flow rate is.    She has a very rare cough, occasionally with scant yellow sputum.    She denies any changes to her general health and has not had any new skin or joint symptoms to suggest a developing autoimmune disorder (to be associated with the NSIP).            Review of Systems:     Review of Systems   Constitutional: Negative for chills, fever, malaise/fatigue and weight loss.   HENT: Negative for congestion, hearing loss, nosebleeds and sinus pain.    Eyes: Negative.    Respiratory: Positive for cough. Negative for hemoptysis, sputum production, shortness of breath, wheezing and stridor.    Cardiovascular: Negative for chest pain, leg swelling and PND.   Gastrointestinal: Negative for heartburn and nausea.   Genitourinary: Negative for dysuria.   Musculoskeletal: Positive for back pain, joint pain, myalgias and neck pain.   Skin: Negative for itching and rash.   Neurological: Negative for loss of consciousness.   Endo/Heme/Allergies: Does not bruise/bleed easily.              Physical Examination:     /82  Pulse (!) 47  Resp 18  Ht 1.486 m (4' 10.5\")  Wt 68.9 kg (151 lb 12.8 oz)  SpO2 95%  BMI 31.19 kg/m2    Physical Exam   Constitutional: She is oriented to person, place, and time and well-developed, well-nourished, and in no distress.   Speaks in a whisper and uses a writing board when needed.   HENT:   Head: Normocephalic and atraumatic.   Right Ear: External ear normal.   Left Ear: External ear normal.   Mouth/Throat: Oropharynx is clear and moist.   Eyes: Conjunctivae and EOM are normal. Pupils are equal, round, and reactive to light. No scleral icterus.   Neck: Normal range of motion. No thyromegaly present.   Cardiovascular: Normal rate, regular rhythm and " normal heart sounds.    No murmur heard.  Pulmonary/Chest: Effort normal.   Diffuse rales throughout   Abdominal: Soft.   Musculoskeletal: Normal range of motion. She exhibits no edema.   Lymphadenopathy:     She has no cervical adenopathy.   Neurological: She is alert and oriented to person, place, and time. Gait normal.   Skin: Skin is warm and dry. No rash noted.   Vitals reviewed.            Data:     PFT: 12/6/2017      Largely normal, though possible mid-size vessel obstruction. Compared to 12/2/16 the FVC has declined fy nearly 200ml.            Medications:     Current Outpatient Prescriptions   Medication     [START ON 12/10/2017] codeine 30 MG tablet     codeine 30 MG tablet     codeine 30 MG tablet     sulfamethoxazole-trimethoprim (BACTRIM DS/SEPTRA DS) 800-160 MG per tablet     albuterol (PROAIR HFA/PROVENTIL HFA/VENTOLIN HFA) 108 (90 BASE) MCG/ACT Inhaler     levothyroxine (SYNTHROID) 88 MCG tablet     losartan (COZAAR) 100 MG tablet     simvastatin (ZOCOR) 40 MG tablet     neomycin-polymyxin-dexamethasone (MAXITROL) 3.5-77266-5.1 OINT ophthalmic ointment     albuterol (2.5 MG/3ML) 0.083% nebulizer solution     order for DME     order for DME     botulinum toxin type A (BOTOX) 100 UNITS injection     ORDER FOR DME     VITAMIN E COMPLEX PO     VITAMIN D3 1000 UNIT OR CAPS     OMEGA-3 COMPLEX OR     ASPIRIN 81 MG OR TABS     CALCIUM + D 600-200 MG-UNIT OR TABS     VITAMIN D 2000 UNIT OR TABS     No current facility-administered medications for this visit.        Attending statement:    The patient was seen and examined by me with the pulmonary fellow, Dr Patel .  The case was discussed at length.  Vitals, lab results and imaging from our visit were reviewed.  The note written by Dr Patel  above reflects our joint assessment and plan.    Davonte Tejada MD

## 2017-12-12 LAB
DLCOUNC-%PRED-PRE: 67 %
DLCOUNC-PRE: 11.07 ML/MIN/MMHG
DLCOUNC-PRED: 16.42 ML/MIN/MMHG
ERV-%PRED-PRE: 135 %
ERV-PRE: 0.16 L
ERV-PRED: 0.12 L
EXPTIME-PRE: 7.07 SEC
FEF2575-%PRED-PRE: 135 %
FEF2575-PRE: 1.57 L/SEC
FEF2575-PRED: 1.16 L/SEC
FEFMAX-%PRED-PRE: 145 %
FEFMAX-PRE: 4.8 L/SEC
FEFMAX-PRED: 3.29 L/SEC
FEV1-%PRED-PRE: 83 %
FEV1-PRE: 1.14 L
FEV1FEV6-PRE: 89 %
FEV1FEV6-PRED: 77 %
FEV1FVC-PRE: 89 %
FEV1FVC-PRED: 78 %
FEV1SVC-PRE: 81 %
FEV1SVC-PRED: 68 %
FIFMAX-PRE: 2.79 L/SEC
FVC-%PRED-PRE: 72 %
FVC-PRE: 1.28 L
FVC-PRED: 1.77 L
IC-%PRED-PRE: 65 %
IC-PRE: 1.23 L
IC-PRED: 1.89 L
VA-%PRED-PRE: 59 %
VA-PRE: 2.47 L
VC-%PRED-PRE: 69 %
VC-PRE: 1.4 L
VC-PRED: 2.01 L

## 2017-12-13 DIAGNOSIS — I10 HYPERTENSION GOAL BP (BLOOD PRESSURE) < 140/90: ICD-10-CM

## 2017-12-13 DIAGNOSIS — E78.5 HYPERLIPIDEMIA LDL GOAL <130: ICD-10-CM

## 2017-12-13 DIAGNOSIS — E03.4 HYPOTHYROIDISM DUE TO ACQUIRED ATROPHY OF THYROID: ICD-10-CM

## 2017-12-13 LAB
ANION GAP SERPL CALCULATED.3IONS-SCNC: 7 MMOL/L (ref 3–14)
BUN SERPL-MCNC: 19 MG/DL (ref 7–30)
CALCIUM SERPL-MCNC: 9.2 MG/DL (ref 8.5–10.1)
CHLORIDE SERPL-SCNC: 104 MMOL/L (ref 94–109)
CHOLEST SERPL-MCNC: 123 MG/DL
CO2 SERPL-SCNC: 29 MMOL/L (ref 20–32)
CREAT SERPL-MCNC: 0.67 MG/DL (ref 0.52–1.04)
GFR SERPL CREATININE-BSD FRML MDRD: 83 ML/MIN/1.7M2
GLUCOSE SERPL-MCNC: 88 MG/DL (ref 70–99)
HDLC SERPL-MCNC: 71 MG/DL
LDLC SERPL CALC-MCNC: 41 MG/DL
NONHDLC SERPL-MCNC: 52 MG/DL
POTASSIUM SERPL-SCNC: 4 MMOL/L (ref 3.4–5.3)
SODIUM SERPL-SCNC: 140 MMOL/L (ref 133–144)
TRIGL SERPL-MCNC: 53 MG/DL
TSH SERPL DL<=0.005 MIU/L-ACNC: 0.4 MU/L (ref 0.4–4)

## 2017-12-13 PROCEDURE — 80061 LIPID PANEL: CPT | Performed by: FAMILY MEDICINE

## 2017-12-13 PROCEDURE — 80048 BASIC METABOLIC PNL TOTAL CA: CPT | Performed by: FAMILY MEDICINE

## 2017-12-13 PROCEDURE — 36415 COLL VENOUS BLD VENIPUNCTURE: CPT | Performed by: FAMILY MEDICINE

## 2017-12-13 PROCEDURE — 84443 ASSAY THYROID STIM HORMONE: CPT | Performed by: FAMILY MEDICINE

## 2017-12-20 ENCOUNTER — OFFICE VISIT (OUTPATIENT)
Dept: FAMILY MEDICINE | Facility: CLINIC | Age: 82
End: 2017-12-20
Payer: COMMERCIAL

## 2017-12-20 VITALS
HEIGHT: 58 IN | TEMPERATURE: 98.3 F | BODY MASS INDEX: 31.91 KG/M2 | SYSTOLIC BLOOD PRESSURE: 130 MMHG | DIASTOLIC BLOOD PRESSURE: 71 MMHG | WEIGHT: 152 LBS | HEART RATE: 87 BPM

## 2017-12-20 DIAGNOSIS — Z00.01 ENCOUNTER FOR PREVENTATIVE ADULT HEALTH CARE EXAM WITH ABNORMAL FINDINGS: Primary | ICD-10-CM

## 2017-12-20 DIAGNOSIS — E78.5 HYPERLIPIDEMIA LDL GOAL <130: ICD-10-CM

## 2017-12-20 DIAGNOSIS — E03.4 HYPOTHYROIDISM DUE TO ACQUIRED ATROPHY OF THYROID: ICD-10-CM

## 2017-12-20 DIAGNOSIS — G47.00 INSOMNIA, UNSPECIFIED TYPE: ICD-10-CM

## 2017-12-20 DIAGNOSIS — I10 HYPERTENSION GOAL BP (BLOOD PRESSURE) < 140/90: ICD-10-CM

## 2017-12-20 DIAGNOSIS — J84.10 FIBROSIS, LUNG (H): ICD-10-CM

## 2017-12-20 DIAGNOSIS — Z91.81 AT RISK FOR FALLING: ICD-10-CM

## 2017-12-20 PROCEDURE — 99397 PER PM REEVAL EST PAT 65+ YR: CPT | Performed by: FAMILY MEDICINE

## 2017-12-20 RX ORDER — CODEINE SULFATE 30 MG/1
30 TABLET ORAL
Qty: 60 TABLET | Refills: 0 | Status: SHIPPED | OUTPATIENT
Start: 2018-01-09 | End: 2018-09-07

## 2017-12-20 RX ORDER — LOSARTAN POTASSIUM 100 MG/1
100 TABLET ORAL DAILY
Qty: 90 TABLET | Refills: 3 | Status: SHIPPED | OUTPATIENT
Start: 2017-12-20 | End: 2019-02-14

## 2017-12-20 RX ORDER — SIMVASTATIN 40 MG
40 TABLET ORAL AT BEDTIME
Qty: 90 TABLET | Refills: 3 | Status: SHIPPED | OUTPATIENT
Start: 2017-12-20 | End: 2018-12-28

## 2017-12-20 RX ORDER — ALBUTEROL SULFATE 90 UG/1
2 AEROSOL, METERED RESPIRATORY (INHALATION) EVERY 6 HOURS PRN
Qty: 1 INHALER | Refills: 3 | Status: CANCELLED | OUTPATIENT
Start: 2017-12-20

## 2017-12-20 RX ORDER — TRAZODONE HYDROCHLORIDE 50 MG/1
25-50 TABLET, FILM COATED ORAL
Qty: 30 TABLET | Refills: 0 | Status: SHIPPED | OUTPATIENT
Start: 2017-12-20 | End: 2018-04-24

## 2017-12-20 RX ORDER — LEVOTHYROXINE SODIUM 88 UG/1
88 TABLET ORAL DAILY
Qty: 90 TABLET | Refills: 3 | Status: SHIPPED | OUTPATIENT
Start: 2017-12-20 | End: 2018-07-10

## 2017-12-20 RX ORDER — CODEINE SULFATE 30 MG/1
30 TABLET ORAL 2 TIMES DAILY
Qty: 60 TABLET | Refills: 0 | Status: SHIPPED | OUTPATIENT
Start: 2018-03-10 | End: 2018-04-17

## 2017-12-20 RX ORDER — CODEINE SULFATE 30 MG/1
30 TABLET ORAL 2 TIMES DAILY
Qty: 60 TABLET | Refills: 0 | Status: SHIPPED | OUTPATIENT
Start: 2018-02-08 | End: 2018-04-24

## 2017-12-20 ASSESSMENT — ACTIVITIES OF DAILY LIVING (ADL)
CURRENT_FUNCTION: TELEPHONE REQUIRES ASSISTANCE
I_NEED_ASSISTANCE_FOR_THE_FOLLOWING_DAILY_ACTIVITIES:: TELEPHONE USE
CURRENT_FUNCTION: NO ASSISTANCE NEEDED
I_NEED_ASSISTANCE_FOR_THE_FOLLOWING_DAILY_ACTIVITIES:: NO ASSISTANCE IS NEEDED

## 2017-12-20 ASSESSMENT — PATIENT HEALTH QUESTIONNAIRE - PHQ9: SUM OF ALL RESPONSES TO PHQ QUESTIONS 1-9: 1

## 2017-12-20 NOTE — PROGRESS NOTES
SUBJECTIVE:   Ashlie Brantley is a 86 year old female who presents for Preventive Visit.      Are you in the first 12 months of your Medicare coverage?  No    Physical   Annual:     Getting at least 3 servings of Calcium per day::  Yes    Bi-annual eye exam::  Yes    Dental care twice a year::  Yes    Sleep apnea or symptoms of sleep apnea::  None    Frequency of exercise::  2-3 days/week    Duration of exercise::  N/A    Taking medications regularly::  Yes    Medication side effects::  None    Additional concerns today::  No    Ability to successfully perform activities of daily living: telephone requires assistance and no assistance needed  Home Safety:  Lack of grab bars in the bathroom  Hearing Impairment: no hearing concerns      Ability to successfully perform activities of daily living: No, needs assistance with: phone  Home safety:  lack of grab bars in the bathroom   Hearing impairment: No      Fall risk:  Fallen 2 or more times in the past year?: No  Any fall with injury in the past year?: No      COGNITIVE SCREEN  1) Repeat 3 items (Banana, Sunrise, Chair)    2) Clock draw: NORMAL  3) 3 item recall: Recalls 3 objects  Results: NORMAL clock, 3 items recalled: COGNITIVE IMPAIRMENT LESS LIKELY    Mini-CogTM Copyright S Jarett. Licensed by the author for use in [facility]; reprinted with permission (elvira@[facility]). All rights reserved.                  Reviewed and updated as needed this visit by clinical staffTobacco  Allergies  Meds  Med Hx  Surg Hx  Fam Hx  Soc Hx        Reviewed and updated as needed this visit by Provider        Social History   Substance Use Topics     Smoking status: Never Smoker     Smokeless tobacco: Never Used     Alcohol use 0.0 oz/week     0 Standard drinks or equivalent per week      Comment: 1-2 drinks/month       Alcohol Use 12/20/2017   If you drink alcohol, do you typically have greater than 3 drinks per day OR greater than 7 drinks per week?   Not  applicable           Today's PHQ-2 Score:   PHQ-2 ( 1999 [employer]) 12/20/2017   Q1: Little interest or pleasure in doing things 0   Q2: Feeling down, depressed or hopeless 0   PHQ-2 Score 0   Q1: Little interest or pleasure in doing things Not at all   Q2: Feeling down, depressed or hopeless Not at all   PHQ-2 Score 0       Do you feel safe in your environment - Yes    Current providers sharing in care for this patient include:   Patient Care Team:  Carie Manley MD as PCP - General  Jackson Camejo MD as MD (Internal Medicine)    The following health maintenance items are reviewed in Epic and correct as of today:  Health Maintenance   Topic Date Due     RASHMI QUESTIONNAIRE 1 YEAR  02/24/1949     PHQ-9 Q1YR  02/24/1949     WELLNESS VISIT Q1 YR  10/31/2015     EYE EXAM Q1 YEAR  10/31/2017     FALL RISK ASSESSMENT  12/21/2017     URINE DRUG SCREEN Q1 YR  12/21/2017     MAMMO Q1 YR  10/05/2018     TSH Q1 YEAR  12/13/2018     BMP Q1 YR  12/13/2018     LIPID MONITORING Q1 YEAR  12/13/2018     TETANUS IMMUNIZATION (SYSTEM ASSIGNED)  01/21/2020     ADVANCE DIRECTIVE PLANNING Q5 YRS  04/20/2020     INFLUENZA VACCINE (SYSTEM ASSIGNED)  Addressed     PNEUMOCOCCAL  Completed     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     Hoarseness     Laryngeal dystonia     Spasmodic dysphonia     Hyperlipidemia LDL goal <130     Anxiety     Hypertension goal BP (blood pressure) < 140/90     LVH (left ventricular hypertrophy)     Advance Care Planning     Cough     Dermatochalasis     Health Care Home     Trochanteric bursitis - bilateral     PVD (posterior vitreous detachment) OU     Pseudophakia, OU     IPF (idiopathic pulmonary fibrosis) (H)     Hypothyroidism due to acquired atrophy of thyroid     Chronic pain syndrome     Urinary incontinence, unspecified type     Past Surgical History:   Procedure Laterality Date      C NONSPECIFIC PROCEDURE      BACK SURGERY     C NONSPECIFIC PROCEDURE      VOCAL CORD POLYP     C SHOULDER SURG PROC UNLISTED       C STOMACH SURGERY PROCEDURE UNLISTED       C TOTAL KNEE ARTHROPLASTY      LT 1998  /  RT 2001     HC REMOVAL GALLBLADDER       HERNIA REPAIR, INGUINAL RT/LT      RT     HYSTERECTOMY, CERVIX STATUS UNKNOWN      DUB     PHACOEMULSIFICATION WITH STANDARD INTRAOCULAR LENS IMPLANT  Rt 6/11/09, Lt 8/3/09    both eyes Dr. Barnett     ROTATOR CUFF REPAIR RT/LT      RT       Social History   Substance Use Topics     Smoking status: Never Smoker     Smokeless tobacco: Never Used     Alcohol use 0.0 oz/week     0 Standard drinks or equivalent per week      Comment: 1-2 drinks/month     Family History   Problem Relation Age of Onset     DIABETES Mother      C.A.D. Father      CEREBROVASCULAR DISEASE Brother          Current Outpatient Prescriptions   Medication Sig Dispense Refill     codeine 30 MG tablet Take 1 tablet (30 mg) by mouth 2 times daily May fill on or after 12/10/17 60 tablet 0     codeine 30 MG tablet Take 1 tablet (30 mg) by mouth 2 times daily May fill on or after 11/10/17 60 tablet 0     codeine 30 MG tablet Take 1 tablet (30 mg) by mouth 2 times daily May fill on or after 10/11/17 60 tablet 0     albuterol (PROAIR HFA/PROVENTIL HFA/VENTOLIN HFA) 108 (90 BASE) MCG/ACT Inhaler Inhale 2 puffs into the lungs every 6 hours as needed for shortness of breath / dyspnea 1 Inhaler 3     levothyroxine (SYNTHROID) 88 MCG tablet Take 1 tablet (88 mcg) by mouth daily 90 tablet 3     losartan (COZAAR) 100 MG tablet Take 1 tablet (100 mg) by mouth daily 90 tablet 3     simvastatin (ZOCOR) 40 MG tablet Take 1 tablet (40 mg) by mouth At Bedtime 90 tablet 3     neomycin-polymyxin-dexamethasone (MAXITROL) 3.5-16618-9.1 OINT ophthalmic ointment Place 1 Application into both eyes At Bedtime Apply a small squirt into each eye at bedtime 1 Tube 6     albuterol (2.5 MG/3ML) 0.083% nebulizer solution Take 1  "vial (2.5 mg) by nebulization every 6 hours as needed for shortness of breath / dyspnea or wheezing 1 Box 1     order for DME Equipment being ordered: Oxygen 2 liters NC at night time 1 Device 0     order for DME Equipment being ordered: Please provide night time oxygen at 2L/min via nasal canula 1 Units 0     ORDER FOR DME Equipment being ordered: Oxygen 2 liters NC at HS 1 each 0     VITAMIN E COMPLEX PO Take  by mouth.       VITAMIN D3 1000 UNIT OR CAPS 1 CAPSULE DAILY       OMEGA-3 COMPLEX OR 1 capsule daily       ASPIRIN 81 MG OR TABS ONE DAILY 1 Tab 0     CALCIUM + D 600-200 MG-UNIT OR TABS ONE TABLET TWICE A DAY WITH MEALS 0 0     VITAMIN D 2000 UNIT OR TABS 1 tablet daily 0 0     botulinum toxin type A (BOTOX) 100 UNITS injection Inject into the muscle once       Allergies   Allergen Reactions     Nkda [No Known Drug Allergies]        Review of Systems  Constitutional, HEENT, cardiovascular, pulmonary, GI, , musculoskeletal, neuro, skin, endocrine and psych systems are negative, except as otherwise noted.      OBJECTIVE:   /71  Pulse 87  Temp 98.3  F (36.8  C) (Oral)  Ht 4' 10.25\" (1.48 m)  Wt 152 lb (68.9 kg)  Breastfeeding? No  BMI 31.5 kg/m2 Estimated body mass index is 31.5 kg/(m^2) as calculated from the following:    Height as of this encounter: 4' 10.25\" (1.48 m).    Weight as of this encounter: 152 lb (68.9 kg).     Physical Exam  GENERAL APPEARANCE: healthy, alert and no distress  HENT: ear canals and TM's normal, nose and mouth without ulcers or lesions, oropharynx clear and oral mucous membranes moist  NECK: no adenopathy, no asymmetry, masses, or scars and thyroid normal to palpation  RESP: lungs clear to auscultation - no rales, rhonchi or wheezes  BREAST: normal without masses, tenderness or nipple discharge and no palpable axillary masses or adenopathy  CV: regular rate and rhythm, normal S1 S2, no S3 or S4, no murmur, click or rub, no peripheral edema and peripheral pulses " strong  ABDOMEN: soft, nontender, no hepatosplenomegaly, no masses and bowel sounds normal  MS: no musculoskeletal defects are noted and gait is age appropriate without ataxia  SKIN: no suspicious lesions or rashes  NEURO: Normal strength and tone, sensory exam grossly normal, mentation intact and speech normal  PSYCH: mentation appears normal and affect normal/bright    ASSESSMENT / PLAN:   1. Encounter for preventative adult health care exam with abnormal findings      2. Fibrosis, lung (H)  Followed by pulmonology  Has been on codeine for chronic cough related to fibrosis  She certainly can try going without to see if it is still helpful  - codeine 30 MG tablet; Take 1 tablet (30 mg) by mouth 2 times daily May fill on or after 3/10/18  Dispense: 60 tablet; Refill: 0  - codeine 30 MG tablet; Take 1 tablet (30 mg) by mouth 2 times daily May fill on or after 2/8/18  Dispense: 60 tablet; Refill: 0  - codeine 30 MG tablet; Take 1 tablet (30 mg) by mouth 2 times daily May fill on or after 1/9/18  Dispense: 60 tablet; Refill: 0  - Drug Abuse Screen Panel 13, Urine (Pain Care Package)    3. Hypothyroidism due to acquired atrophy of thyroid  Chronic, stable, well controlled, continue current medication, refill done if needed    - levothyroxine (SYNTHROID) 88 MCG tablet; Take 1 tablet (88 mcg) by mouth daily  Dispense: 90 tablet; Refill: 3    4. Hypertension goal BP (blood pressure) < 140/90  Chronic, stable, well controlled, continue current medication, refill done if needed    - losartan (COZAAR) 100 MG tablet; Take 1 tablet (100 mg) by mouth daily  Dispense: 90 tablet; Refill: 3    5. Hyperlipidemia LDL goal <130  Chronic, stable, well controlled, continue current medication, refill done if needed    - simvastatin (ZOCOR) 40 MG tablet; Take 1 tablet (40 mg) by mouth At Bedtime  Dispense: 90 tablet; Refill: 3    6. At risk for falling    7.  Insomnia-uses trazodone once in awhile and it can help.  Still has pills left  "over from script done one year ago        End of Life Planning:  Patient currently has an advanced directive: Yes.  Practitioner is supportive of decision.    COUNSELING:  Reviewed preventive health counseling, as reflected in patient instructions       Regular exercise       Healthy diet/nutrition       Vision screening       Hearing screening       Dental care               Osteoporosis Prevention/Bone Health       The ASCVD Risk score (Real SYLVESTER Jr, et al., 2013) failed to calculate for the following reasons:    The 2013 ASCVD risk score is only valid for ages 40 to 79       Advanced Planning           Estimated body mass index is 31.5 kg/(m^2) as calculated from the following:    Height as of this encounter: 4' 10.25\" (1.48 m).    Weight as of this encounter: 152 lb (68.9 kg).  Weight management plan: Discussed healthy diet and exercise guidelines and patient will follow up in 12 months in clinic to re-evaluate.   reports that she has never smoked. She has never used smokeless tobacco.        Appropriate preventive services were discussed with this patient, including applicable screening as appropriate for cardiovascular disease, diabetes, osteopenia/osteoporosis, and glaucoma.  As appropriate for age/gender, discussed screening for colorectal cancer, prostate cancer, breast cancer, and cervical cancer. Checklist reviewing preventive services available has been given to the patient.    Reviewed patients plan of care and provided an AVS. The Basic Care Plan (routine screening as documented in Health Maintenance) for Ashlie meets the Care Plan requirement. This Care Plan has been established and reviewed with the Patient.    Counseling Resources:  ATP IV Guidelines  Pooled Cohorts Equation Calculator  Breast Cancer Risk Calculator  FRAX Risk Assessment  ICSI Preventive Guidelines  Dietary Guidelines for Americans, 2010  USDA's MyPlate  ASA Prophylaxis  Lung CA Screening    Carie Manley MD  [facility] " [facility]AnsSum[facility] Campuss for HPI/ROS submitted by the patient on 12/20/2017   PHQ-2 Score: 0

## 2017-12-20 NOTE — PATIENT INSTRUCTIONS
Preventive Health Recommendations    Female Ages 65 +    Yearly exam:     See your health care provider every year in order to  o Review health changes.   o Discuss preventive care.    o Review your medicines if your doctor has prescribed any.      You no longer need a yearly Pap test unless you've had an abnormal Pap test in the past 10 years. If you have vaginal symptoms, such as bleeding or discharge, be sure to talk with your provider about a Pap test.      Every 1 to 2 years, have a mammogram.  If you are over 69, talk with your health care provider about whether or not you want to continue having screening mammograms.      Every 10 years, have a colonoscopy. Or, have a yearly FIT test (stool test). These exams will check for colon cancer.       Have a cholesterol test every 5 years, or more often if your doctor advises it.       Have a diabetes test (fasting glucose) every three years. If you are at risk for diabetes, you should have this test more often.       At age 65, have a bone density scan (DEXA) to check for osteoporosis (brittle bone disease).    Shots:    Get a flu shot each year.    Get a tetanus shot every 10 years.    Talk to your doctor about your pneumonia vaccines. There are now two you should receive - Pneumovax (PPSV 23) and Prevnar (PCV 13).    Talk to your doctor about the shingles vaccine.    Talk to your doctor about the hepatitis B vaccine.    Nutrition:     Eat at least 5 servings of fruits and vegetables each day.      Eat whole-grain bread, whole-wheat pasta and brown rice instead of white grains and rice.      Talk to your provider about Calcium and Vitamin D.     Lifestyle    Exercise at least 150 minutes a week (30 minutes a day, 5 days a week). This will help you control your weight and prevent disease.      Limit alcohol to one drink per day.      No smoking.       Wear sunscreen to prevent skin cancer.       See your dentist twice a year for an exam and cleaning.      See your  eye doctor every 1 to 2 years to screen for conditions such as glaucoma, macular degeneration and cataracts.    [facility]   Discharged by : Mónica LANDAVERDE CMA ([facility])    Paper scripts provided to patient : Codeine 30 mg (3)     If you have any questions regarding your visit please contact your care team:     Team Gold                Clinic Hours Telephone Number     Dr. Hayley Rogers 7am-7pm  Monday - Thursday   7am-5pm  Fridays  [phone]   (Appointment scheduling available 24/7)     RN Line  [phone] option 2     [facility] [place] [facility] 11[facility]pm Monday-Friday Saturday-Sunday- 9am-5pm     [place] -   5pm-9pm Monday-Friday Saturday-Sunday- 9am-5pm    [phone] - [place]    [phone] - [place]       For a Price Quote for your services, please call our Consumer Price Line at [phone].     What options do I have for visits at the clinic other than the traditional office visit?     To expand how we care for you, many of our providers are utilizing electronic visits (e-visits) and telephone visits, when medically appropriate, for interactions with their patients rather than a visit in the clinic. We also offer nurse visits for many medical concerns. Just like any other service, we will bill your insurance company for this type of visit based on time spent on the phone with your provider. Not all insurance companies cover these visits. Please check with your medical insurance if this type of visit is covered. You will be responsible for any charges that are not paid by your insurance.   E-visits via [employer]: generally incur a $35.00 fee.     Telephone visits:   Time spent on the phone: *charged based on time that is spent on the phone in increments of 10 minutes. Estimated cost:   5-10 mins $30.00   11-20 mins. $59.00   21-30 mins. $85.00     Use MyChart (secure  email communication and access to your chart) to send your primary care provider a message or make an appointment. Ask someone on your Team how to sign up for [employer]â€žÂ¢ Interactive.     As always, Thank you for trusting us with your health care needs!      [facility]:    Scheduling Appointments  [place]  Call: [phone]    [place] [facility]  Call: [phone]    [facility]  Call: [phone]    For Gastroenterology referrals   [facility]enter[facility]   Clinics and Surgery Center, 4th Floor   [address] [place] [address]   Appointments: [phone]    WHERE TO GO FOR CARE?  Clinic    Make an appointment if you:       Are sick (cold, cough, flu, sore throat, earache or in pain).       Have a small injury (sprain, small cut, burn or broken bone).       Need a physical exam, Pap smear, vaccine or prescription refill.       Have questions about your health or medicines.    To reach us:      Call [phone] ([phone]). Open 24 hours every day. (For counseling services, call [phone].)    Log into [employer]Ã¢â€žÂ¢ Interactive at [email or web address]. (Visit [email or web address] to create an account.) Hospital emergency room    An emergency is a serious or life- threatening problem that must be treated right away.    Call 049 or get to the hospital if you have:      Very bad or sudden:            - Chest pain or pressure         - Bleeding         - Head or belly pain         - Dizziness or trouble seeing, walking or                          Speaking      Problems breathing      Blood in your vomit or you are coughing up blood      A major injury (knocked out, loss of a finger or limb, rape, broken bone protruding from skin)    A mental health crisis. (Or call the Mental Health Crisis line at 1-613.951.4994 or Suicide Prevention Hotline at 1-610.573.9990.)    Open 24 hours every day. You don't need an appointment.     Urgent  care    Visit urgent care for sickness or small injuries when the clinic is closed. You don't need an appointment. To check hours or find an urgent care near you, visit [email or web address]. Online care    Get online care from [facility] for more than 70 common problems, like colds, allergies and infections. Open 24 hours every day at:   [email or web address]   Need help deciding?    For advice about where to be seen, you may call your clinic and ask to speak with a nurse. We're here for you 24 hours every day.         If you are deaf or hard of hearing, please let us know. We provide many free services including sign language interpreters, oral interpreters, TTYs, telephone amplifiers, note takers and written materials.

## 2017-12-20 NOTE — MR AVS SNAPSHOT
After Visit Summary   12/20/2017    Ashlie Brantley    MRN: [id number]           Patient Information     Date Of Birth          2/24/1931        Visit Information        Provider Department      12/20/2017 8:20 AM Carie Manley MD [facility] [place]        Today's Diagnoses     Encounter for preventative adult health care exam with abnormal findings    -  1    Fibrosis, lung (H)        Hypothyroidism due to acquired atrophy of thyroid        Hypertension goal BP (blood pressure) < 140/90        Hyperlipidemia LDL goal <130        At risk for falling        Insomnia, unspecified type          Care Instructions      Preventive Health Recommendations    Female Ages 65 +    Yearly exam:     See your health care provider every year in order to  o Review health changes.   o Discuss preventive care.    o Review your medicines if your doctor has prescribed any.      You no longer need a yearly Pap test unless you've had an abnormal Pap test in the past 10 years. If you have vaginal symptoms, such as bleeding or discharge, be sure to talk with your provider about a Pap test.      Every 1 to 2 years, have a mammogram.  If you are over 69, talk with your health care provider about whether or not you want to continue having screening mammograms.      Every 10 years, have a colonoscopy. Or, have a yearly FIT test (stool test). These exams will check for colon cancer.       Have a cholesterol test every 5 years, or more often if your doctor advises it.       Have a diabetes test (fasting glucose) every three years. If you are at risk for diabetes, you should have this test more often.       At age 65, have a bone density scan (DEXA) to check for osteoporosis (brittle bone disease).    Shots:    Get a flu shot each year.    Get a tetanus shot every 10 years.    Talk to your doctor about your pneumonia vaccines. There are now two you should receive - Pneumovax (PPSV 23) and Prevnar (PCV 13).    Talk  to your doctor about the shingles vaccine.    Talk to your doctor about the hepatitis B vaccine.    Nutrition:     Eat at least 5 servings of fruits and vegetables each day.      Eat whole-grain bread, whole-wheat pasta and brown rice instead of white grains and rice.      Talk to your provider about Calcium and Vitamin D.     Lifestyle    Exercise at least 150 minutes a week (30 minutes a day, 5 days a week). This will help you control your weight and prevent disease.      Limit alcohol to one drink per day.      No smoking.       Wear sunscreen to prevent skin cancer.       See your dentist twice a year for an exam and cleaning.      See your eye doctor every 1 to 2 years to screen for conditions such as glaucoma, macular degeneration and cataracts.    [facility]   Discharged by : Mónica LANDAVERDE CMA ([facility])    Paper scripts provided to patient : Codeine 30 mg (3)     If you have any questions regarding your visit please contact your care team:     Team Gold                Clinic Hours Telephone Number     Dr. Hayley Rogers 7am-7pm  Monday - Thursday   7am-5pm  Fridays  [phone]   (Appointment scheduling available 24/7)     RN Line  [phone] option 2     [facility] [place] [facility] - 11am-9pm Monday-Friday Saturday-Sunday- 9am-5pm     [place] -   5pm-9pm Monday-Friday Saturday-Sunday- 9am-5pm    [phone] - [place]    [phone] - [place]       For a Price Quote for your services, please call our Consumer Price Line at [phone].     What options do I have for visits at the clinic other than the traditional office visit?     To expand how we care for you, many of our providers are utilizing electronic visits (e-visits) and telephone visits, when medically appropriate, for interactions with their patients rather than a visit in the clinic. We also offer nurse visits  for many medical concerns. Just like any other service, we will bill your insurance company for this type of visit based on time spent on the phone with your provider. Not all insurance companies cover these visits. Please check with your medical insurance if this type of visit is covered. You will be responsible for any charges that are not paid by your insurance.   E-visits via SoLatinahart: generally incur a $35.00 fee.     Telephone visits:   Time spent on the phone: *charged based on time that is spent on the phone in increments of 10 minutes. Estimated cost:   5-10 mins $30.00   11-20 mins. $59.00   21-30 mins. $85.00     Use Roadhop (secure email communication and access to your chart) to send your primary care provider a message or make an appointment. Ask someone on your Team how to sign up for Roadhop.     As always, Thank you for trusting us with your health care needs!      [facility]:    Scheduling Appointments  [facility]  Call: [phone]    [facility]  Call: [phone]    [facility]  Call: [phone]    For Gastroenterology referrals   [facility] Gastroenterology   Clinics and Surgery Center, 4th Floor   [address] [place] [address]   Appointments: [phone]    WHERE TO GO FOR CARE?  Clinic    Make an appointment if you:       Are sick (cold, cough, flu, sore throat, earache or in pain).       Have a small injury (sprain, small cut, burn or broken bone).       Need a physical exam, Pap smear, vaccine or prescription refill.       Have questions about your health or medicines.    To reach us:      Call [phone] ([phone]). Open 24 hours every day. (For counseling services, call [phone].)    Log into Roadhop at [email or web address]. (Visit [email or web address] to create an account.) Hospital emergency room    An emergency is a serious or life- threatening problem that must be  treated right away.    Call 911 or get to the hospital if you have:      Very bad or sudden:            - Chest pain or pressure         - Bleeding         - Head or belly pain         - Dizziness or trouble seeing, walking or                          Speaking      Problems breathing      Blood in your vomit or you are coughing up blood      A major injury (knocked out, loss of a finger or limb, rape, broken bone protruding from skin)    A mental health crisis. (Or call the Mental Health Crisis line at 1-409.662.8735 or Suicide Prevention Hotline at 1-930.462.4865.)    Open 24 hours every day. You don't need an appointment.     Urgent care    Visit urgent care for sickness or small injuries when the clinic is closed. You don't need an appointment. To check hours or find an urgent care near you, visit [email or web address]. Online care    Get online care from [facility] for more than 70 common problems, like colds, allergies and infections. Open 24 hours every day at:   [email or web address]   Need help deciding?    For advice about where to be seen, you may call your clinic and ask to speak with a nurse. We're here for you 24 hours every day.         If you are deaf or hard of hearing, please let us know. We provide many free services including sign language interpreters, oral interpreters, TTYs, telephone amplifiers, note takers and written materials.                   Follow-ups after your visit        Who to contact     If you have questions or need follow up information about today's clinic visit or your schedule please contact [facility] directly at [phone].  Normal or non-critical lab and imaging results will be communicated to you by MyChart, letter or phone within 4 business days after the clinic has received the results. If you do not hear from us within 7 days, please contact the clinic through MyChart or phone. If you have a critical or abnormal lab result, we will notify you by phone as soon as  "possible.  Submit refill requests through [employer] or call your pharmacy and they will forward the refill request to us. Please allow 3 business days for your refill to be completed.          Additional Information About Your Visit        [employer] Information     [employer] gives you secure access to your electronic health record. If you see a primary care provider, you can also send messages to your care team and make appointments. If you have questions, please call your primary care clinic.  If you do not have a primary care provider, please call [phone] and they will assist you.        Care EveryWhere ID     This is your Care EveryWhere ID. This could be used by other organizations to access your [place] medical records  [id number]        Your Vitals Were     Pulse Temperature Height Breastfeeding? BMI (Body Mass Index)       87 98.3  F (36.8  C) (Oral) 4' 10.25\" (1.48 m) No 31.5 kg/m2        Blood Pressure from Last 3 Encounters:   12/20/17 130/71   12/06/17 135/82   09/18/17 135/74    Weight from Last 3 Encounters:   12/20/17 152 lb (68.9 kg)   12/06/17 151 lb 12.8 oz (68.9 kg)   09/18/17 152 lb (68.9 kg)              We Performed the Following     Drug Abuse Screen Panel 13, Urine (Pain Care Package)          Today's Medication Changes          These changes are accurate as of: 12/20/17  9:02 AM.  If you have any questions, ask your nurse or doctor.               Start taking these medicines.        Dose/Directions    traZODone 50 MG tablet   Commonly known as:  DESYREL   Used for:  Insomnia, unspecified type   Started by:  Carie Manley MD        Dose:  25-50 mg   Take 0.5-1 tablets (25-50 mg) by mouth nightly as needed for sleep   Quantity:  30 tablet   Refills:  0         These medicines have changed or have updated prescriptions.        Dose/Directions    * codeine 30 MG tablet   This may have changed:  additional instructions   Used for:  Fibrosis, lung (H)   Changed by:  Carie Manley MD "        Dose:  30 mg   Start taking on:  1/9/2018   Take 1 tablet (30 mg) by mouth 2 times daily May fill on or after 1/9/18   Quantity:  60 tablet   Refills:  0       * codeine 30 MG tablet   This may have changed:  additional instructions   Used for:  Fibrosis, lung (H)   Changed by:  Carie Manley MD        Dose:  30 mg   Start taking on:  2/8/2018   Take 1 tablet (30 mg) by mouth 2 times daily May fill on or after 2/8/18   Quantity:  60 tablet   Refills:  0       * codeine 30 MG tablet   This may have changed:  additional instructions   Used for:  Fibrosis, lung (H)   Changed by:  Carie Manley MD        Dose:  30 mg   Start taking on:  3/10/2018   Take 1 tablet (30 mg) by mouth 2 times daily May fill on or after 3/10/18   Quantity:  60 tablet   Refills:  0       * Notice:  This list has 3 medication(s) that are the same as other medications prescribed for you. Read the directions carefully, and ask your doctor or other care provider to review them with you.      Stop taking these medicines if you haven't already. Please contact your care team if you have questions.     sulfamethoxazole-trimethoprim 800-160 MG per tablet   Commonly known as:  BACTRIM DS/SEPTRA DS   Stopped by:  Carie Manley MD                Where to get your medicines      These medications were sent to [facility] [address]  [address] [facility] [address]     Phone:  [phone]     levothyroxine 88 MCG tablet    losartan 100 MG tablet    simvastatin 40 MG tablet    traZODone 50 MG tablet         Some of these will need a paper prescription and others can be bought over the counter.  Ask your nurse if you have questions.     Bring a paper prescription for each of these medications     codeine 30 MG tablet    codeine 30 MG tablet    codeine 30 MG tablet                Primary Care Provider Office Phone # Fax #    Carie Manley [phone]  [phone]       [address] [facility] [address]        Equal Access to Services     JUNIOR MURPHY : Hadii aad ku hadnegritomelia Aliciapurnima, wadominicda lualmaadaha, qaybta kaalpeshda drisslissaziza, giovani knox liudmilatylor whitmorevandanadeb muller. So [facility] [phone].    ATENCIÓN: Si habla español, tiene a leon disposición servicios gratuitos de asistencia lingüística. Llame al [phone].    We comply with applicable federal civil rights laws and [place] laws. We do not discriminate on the basis of race, color, national origin, age, disability, sex, sexual orientation, or gender identity.            Thank you!     Thank you for choosing [facility]  for your care. Our goal is always to provide you with excellent care. Hearing back from our patients is one way we can continue to improve our services. Please take a few minutes to complete the written survey that you may receive in the mail after your visit with us. Thank you!             Your Updated Medication List - Protect others around you: Learn how to safely use, store and throw away your medicines at www.disposemymeds.org.          This list is accurate as of: 12/20/17  9:02 AM.  Always use your most recent med list.                   Brand Name Dispense Instructions for use Diagnosis    * albuterol (2.5 MG/3ML) 0.083% neb solution     1 Box    Take 1 vial (2.5 mg) by nebulization every 6 hours as needed for shortness of breath / dyspnea or wheezing    IPF (idiopathic pulmonary fibrosis) (H), Cough       * albuterol 108 (90 BASE) MCG/ACT Inhaler    PROAIR HFA/PROVENTIL HFA/VENTOLIN HFA    1 Inhaler    Inhale 2 puffs into the lungs every 6 hours as needed for shortness of breath / dyspnea    Fibrosis, lung (H)       aspirin 81 MG tablet     1 Tab    ONE DAILY    HTN (hypertension), Elevated cholesterol       BOTOX 100 UNITS injection   Generic drug:  botulinum toxin type A      Inject into the muscle once        calcium + D 600-200 MG-UNIT Tabs   Generic  drug:  calcium carbonate-vitamin D     0    ONE TABLET TWICE A DAY WITH MEALS    Osteopenia       * codeine 30 MG tablet   Start taking on:  1/9/2018     60 tablet    Take 1 tablet (30 mg) by mouth 2 times daily May fill on or after 1/9/18    Fibrosis, lung (H)       * codeine 30 MG tablet   Start taking on:  2/8/2018     60 tablet    Take 1 tablet (30 mg) by mouth 2 times daily May fill on or after 2/8/18    Fibrosis, lung (H)       * codeine 30 MG tablet   Start taking on:  3/10/2018     60 tablet    Take 1 tablet (30 mg) by mouth 2 times daily May fill on or after 3/10/18    Fibrosis, lung (H)       levothyroxine 88 MCG tablet    SYNTHROID    90 tablet    Take 1 tablet (88 mcg) by mouth daily    Hypothyroidism due to acquired atrophy of thyroid       losartan 100 MG tablet    COZAAR    90 tablet    Take 1 tablet (100 mg) by mouth daily    Hypertension goal BP (blood pressure) < 140/90       neomycin-polymyxin-dexamethasone 3.5-71105-6.1 Oint ophthalmic ointment    MAXITROL    1 Tube    Place 1 Application into both eyes At Bedtime Apply a small squirt into each eye at bedtime    Dermatochalasis, unspecified laterality       OMEGA-3 COMPLEX PO      1 capsule daily        order for DME     1 each    Equipment being ordered: Oxygen 2 liters NC at HS    Oxygen decrease, IPF (idiopathic pulmonary fibrosis) (H)       * order for DME     1 Units    Equipment being ordered: Please provide night time oxygen at 2L/min via nasal canula    IPF (idiopathic pulmonary fibrosis) (H), Hypoxia       * order for DME     1 Device    Equipment being ordered: Oxygen 2 liters NC at night time    IPF (idiopathic pulmonary fibrosis) (H)       simvastatin 40 MG tablet    ZOCOR    90 tablet    Take 1 tablet (40 mg) by mouth At Bedtime    Hyperlipidemia LDL goal <130       traZODone 50 MG tablet    DESYREL    30 tablet    Take 0.5-1 tablets (25-50 mg) by mouth nightly as needed for sleep    Insomnia, unspecified type       * vitamin D3 1000  UNITS Caps      1 CAPSULE DAILY        * vitamin D 2000 UNITS tablet     0    1 tablet daily    Osteopenia       VITAMIN E COMPLEX PO      Take  by mouth.        * Notice:  This list has 9 medication(s) that are the same as other medications prescribed for you. Read the directions carefully, and ask your doctor or other care provider to review them with you.

## 2017-12-28 ENCOUNTER — CARE COORDINATION (OUTPATIENT)
Dept: PULMONOLOGY | Facility: CLINIC | Age: 82
End: 2017-12-28

## 2017-12-28 NOTE — PROGRESS NOTES
Spoke with son, they still have not heard from [facility] regarding overnight ox. RN refaxed orders to [facility]. Pt also needs to be scheduled for follow up. Message sent to  to assist with this.   Leatha Moffett RN BSN

## 2018-01-22 LAB — EJECTION FRACTION: 33

## 2018-01-26 ENCOUNTER — TELEPHONE (OUTPATIENT)
Dept: FAMILY MEDICINE | Facility: CLINIC | Age: 83
End: 2018-01-26

## 2018-01-26 NOTE — TELEPHONE ENCOUNTER
This patient was discharged from [facility] on 01/25/2018.    Discharge Diagnosis: acute influenza syndrome with new onset atrial fibrillation and systolic cardiomyopathy    Follow-up instructions: Follow up with PCP within 5 days or less    A follow-up visit has not been scheduled.      Number of ED/ER visits in the last 12 months:  0     Please follow-up with patient.    Jayne Porter

## 2018-01-29 ENCOUNTER — OFFICE VISIT (OUTPATIENT)
Dept: FAMILY MEDICINE | Facility: CLINIC | Age: 83
End: 2018-01-29
Payer: COMMERCIAL

## 2018-01-29 VITALS
DIASTOLIC BLOOD PRESSURE: 74 MMHG | WEIGHT: 145 LBS | TEMPERATURE: 99.5 F | BODY MASS INDEX: 30.44 KG/M2 | HEART RATE: 68 BPM | SYSTOLIC BLOOD PRESSURE: 118 MMHG | HEIGHT: 58 IN

## 2018-01-29 DIAGNOSIS — J10.1 INFLUENZA A: ICD-10-CM

## 2018-01-29 DIAGNOSIS — I48.91 ATRIAL FIBRILLATION, UNSPECIFIED TYPE (H): ICD-10-CM

## 2018-01-29 DIAGNOSIS — Z09 HOSPITAL DISCHARGE FOLLOW-UP: Primary | ICD-10-CM

## 2018-01-29 PROCEDURE — 99495 TRANSJ CARE MGMT MOD F2F 14D: CPT | Performed by: FAMILY MEDICINE

## 2018-01-29 RX ORDER — WARFARIN SODIUM 5 MG/1
5 TABLET ORAL
COMMUNITY
Start: 2018-01-25 | End: 2018-03-26

## 2018-01-29 RX ORDER — OSELTAMIVIR PHOSPHATE 30 MG/1
30 CAPSULE ORAL
COMMUNITY
Start: 2018-01-25 | End: 2018-07-10

## 2018-01-29 RX ORDER — FUROSEMIDE 20 MG
20 TABLET ORAL
COMMUNITY
Start: 2018-01-26

## 2018-01-29 RX ORDER — GUAIFENESIN 600 MG/1
600 TABLET, EXTENDED RELEASE ORAL
COMMUNITY
Start: 2018-01-25 | End: 2018-02-08

## 2018-01-29 RX ORDER — CODEINE PHOSPHATE/GUAIFENESIN 10-100MG/5
5-10 LIQUID (ML) ORAL
COMMUNITY
Start: 2018-01-25 | End: 2018-07-10

## 2018-01-29 NOTE — PROGRESS NOTES
SUBJECTIVE:   Ashlie Brantley is a 86 year old female who presents to clinic today for the following health issues: She presents with her Daughter and     Medication-- Patient's daughter states that the patient went to refill her Vitamin D and was told by her pharmacy that she needs a prescription. Patient is scheduled for her INR tomorrow. Patient has a pill box where she keeps her medications. She denies any side effects to her new medications.     Additional information-- Patient states that she is doing great and she has not been having problem getting around the house or engaging in her daily activities.     ER Visit-- Patient was at the ER due to flu. She is still having lingering cough but her fever has been improved. She is having back ache due to her cough. It was also discovered that she has arterial fibrillation while at the ER and she was started on Coumadin. The patient is schedule to see a cardiologist. She started getting her voice back while she was at the ER. Her daughter states that her voice is usually more clear in the morning and starts breaking as the day goes on.     Hospital Follow-up Visit:    Hospital/Nursing Home/ Rehab Facility: [facility]  Date of Admission: 1/21/18  Date of Discharge: 1/25/18  Reason(s) for Admission: Atrial Fibrillation            Problems taking medications regularly:  None       Medication changes since discharge: None       Problems adhering to non-medication therapy:  None    Summary of hospitalization:  CareEverywhere information obtained and reviewed  Diagnostic Tests/Treatments reviewed.  Follow up needed: none  Other Healthcare Providers Involved in Patient s Care:         Specialist appointment - cardiology tomorrow  Update since discharge: improved.     Post Discharge Medication Reconciliation: discharge medications reconciled, continue medications without change.  Plan of care communicated with patient and family     Coding guidelines for this  visit:  Type of Medical   Decision Making Face-to-Face Visit       within 7 Days of discharge Face-to-Face Visit        within 14 days of discharge   Moderate Complexity [id number]   High Complexity [id number]            Hospital Summary: Ashlie Brantley is 86 y.o. with a past medical history notable for: Known previous pulmonary fibrosis on home oxygen, hypertension, hypothyroidism, dyslipidemia; was admitted because of worsening shortness of breath weakness also some cough productive of purulent sputum. Initial workup including influenza testing was positive for influenza A negative for PE, troponins were negative. BNP was negative. CT of chest was negative for pulmonary embolus notable cardiomegaly with alveolar edema consistent with CHF. Echocardiogram also showed severe systolic cardio myopathy with an EF of 30-35% with mild to moderate mitral regurgitation and tricuspid regurgitation and elevated right ventricular pressures consistent with right heart failure. 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 Patient is also agreeable to warfarin anticoagulation with Lovenox to overlap.  Much improved may will need home care after discharge however., Definitely close outpatient follow-up with cardiology        Problem list and histories reviewed & adjusted, as indicated.  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     Hoarseness     Laryngeal dystonia     Spasmodic dysphonia     Hyperlipidemia LDL goal <130     Anxiety     Hypertension goal BP (blood pressure) < 140/90     LVH (left ventricular hypertrophy)     Advance Care Planning     Cough     Dermatochalasis     Health Care Home     Trochanteric bursitis - bilateral     PVD (posterior vitreous detachment) OU     Pseudophakia, OU     IPF (idiopathic pulmonary  fibrosis) (H)     Hypothyroidism due to acquired atrophy of thyroid     Chronic pain syndrome     Urinary incontinence, unspecified type     Past Surgical History:   Procedure Laterality Date     C NONSPECIFIC PROCEDURE      BACK SURGERY     C NONSPECIFIC PROCEDURE      VOCAL CORD POLYP     C SHOULDER SURG PROC UNLISTED       C STOMACH SURGERY PROCEDURE UNLISTED       C TOTAL KNEE ARTHROPLASTY      LT 1998  /  RT 2001     HC REMOVAL GALLBLADDER       HERNIA REPAIR, INGUINAL RT/LT      RT     HYSTERECTOMY, CERVIX STATUS UNKNOWN      DUB     PHACOEMULSIFICATION WITH STANDARD INTRAOCULAR LENS IMPLANT  Rt 6/11/09, Lt 8/3/09    both eyes Dr. Barnett     ROTATOR CUFF REPAIR RT/LT      RT       Social History   Substance Use Topics     Smoking status: Never Smoker     Smokeless tobacco: Never Used     Alcohol use 0.0 oz/week     0 Standard drinks or equivalent per week      Comment: 1-2 drinks/month     Family History   Problem Relation Age of Onset     DIABETES Mother      C.A.D. Father      CEREBROVASCULAR DISEASE Brother          Current Outpatient Prescriptions   Medication Sig Dispense Refill     guaiFENesin-codeine (GUAIFENESIN AC) 100-10 MG/5ML SYRP syrup Take 5-10 mLs by mouth       guaiFENesin (MUCINEX) 600 MG 12 hr tablet Take 600 mg by mouth       oseltamivir (TAMIFLU) 30 MG capsule Take 30 mg by mouth       warfarin (COUMADIN) 5 MG tablet Take 5 mg by mouth       furosemide (LASIX) 20 MG tablet Take 20 mg by mouth       [START ON 3/10/2018] codeine 30 MG tablet Take 1 tablet (30 mg) by mouth 2 times daily May fill on or after 3/10/18 60 tablet 0     [START ON 2/8/2018] codeine 30 MG tablet Take 1 tablet (30 mg) by mouth 2 times daily May fill on or after 2/8/18 60 tablet 0     codeine 30 MG tablet Take 1 tablet (30 mg) by mouth 2 times daily May fill on or after 1/9/18 60 tablet 0     levothyroxine (SYNTHROID) 88 MCG tablet Take 1 tablet (88 mcg) by mouth daily 90 tablet 3     losartan (COZAAR) 100 MG tablet Take  1 tablet (100 mg) by mouth daily 90 tablet 3     simvastatin (ZOCOR) 40 MG tablet Take 1 tablet (40 mg) by mouth At Bedtime 90 tablet 3     traZODone (DESYREL) 50 MG tablet Take 0.5-1 tablets (25-50 mg) by mouth nightly as needed for sleep 30 tablet 0     albuterol (PROAIR HFA/PROVENTIL HFA/VENTOLIN HFA) 108 (90 BASE) MCG/ACT Inhaler Inhale 2 puffs into the lungs every 6 hours as needed for shortness of breath / dyspnea 1 Inhaler 3     neomycin-polymyxin-dexamethasone (MAXITROL) 3.5-48425-5.1 OINT ophthalmic ointment Place 1 Application into both eyes At Bedtime Apply a small squirt into each eye at bedtime 1 Tube 6     albuterol (2.5 MG/3ML) 0.083% nebulizer solution Take 1 vial (2.5 mg) by nebulization every 6 hours as needed for shortness of breath / dyspnea or wheezing 1 Box 1     order for DME Equipment being ordered: Oxygen 2 liters NC at night time 1 Device 0     order for DME Equipment being ordered: Please provide night time oxygen at 2L/min via nasal canula 1 Units 0     botulinum toxin type A (BOTOX) 100 UNITS injection Inject into the muscle once       ORDER FOR DME Equipment being ordered: Oxygen 2 liters NC at HS 1 each 0     VITAMIN D3 1000 UNIT OR CAPS 1 CAPSULE DAILY       ASPIRIN 81 MG OR TABS ONE DAILY 1 Tab 0     CALCIUM + D 600-200 MG-UNIT OR TABS ONE TABLET TWICE A DAY WITH MEALS 0 0     VITAMIN E COMPLEX PO Take  by mouth.       OMEGA-3 COMPLEX OR 1 capsule daily       VITAMIN D 2000 UNIT OR TABS 1 tablet daily (Patient not taking: No sig reported) 0 0     Labs reviewed in EPIC    Reviewed and updated as needed this visit by clinical staff  Tobacco  Allergies  Meds  Med Hx  Surg Hx  Fam Hx  Soc Hx      Reviewed and updated as needed this visit by Provider       ROS:  Constitutional, HEENT, cardiovascular, pulmonary, GI, , musculoskeletal, neuro, skin, endocrine and psych systems are negative, except as otherwise noted.    This document serves as a record of the services and decisions  "personally performed and made by Carie Starks MD. It was created on her behalf by Rochelle Trimble, a trained medical scribe. The creation of this document is based the provider's statements to the medical scribe.    Dari Trimble  3:21 PM, January 29, 2018    OBJECTIVE:     /74 (Cuff Size: Adult Large)  Pulse 68  Temp 99.5  F (37.5  C) (Oral)  Ht 1.48 m (4' 10.25\")  Wt 65.8 kg (145 lb)  BMI 30.05 kg/m2  Body mass index is 30.05 kg/(m^2).  GENERAL: healthy, alert and no distress  RESP: lungs clear to auscultation - no rales, rhonchi or wheezes. Coughing   CV: Irregularly irregular rate and rhythm, normal S1 S2, no S3 or S4, no murmur, click or rub, no peripheral edema and peripheral pulses strong  PSYCH: mentation appears normal, affect normal/bright    Diagnostic Test Results:  Results for orders placed or performed in visit on 12/13/17   **Lipid panel reflex to direct LDL FUTURE anytime   Result Value Ref Range    Cholesterol 123 <200 mg/dL    Triglycerides 53 <150 mg/dL    HDL Cholesterol 71 >49 mg/dL    LDL Cholesterol Calculated 41 <100 mg/dL    Non HDL Cholesterol 52 <130 mg/dL   **Basic metabolic panel FUTURE anytime   Result Value Ref Range    Sodium 140 133 - 144 mmol/L    Potassium 4.0 3.4 - 5.3 mmol/L    Chloride 104 94 - 109 mmol/L    Carbon Dioxide 29 20 - 32 mmol/L    Anion Gap 7 3 - 14 mmol/L    Glucose 88 70 - 99 mg/dL    Urea Nitrogen 19 7 - 30 mg/dL    Creatinine 0.67 0.52 - 1.04 mg/dL    GFR Estimate 83 >60 mL/min/1.7m2    GFR Estimate If Black >90 >60 mL/min/1.7m2    Calcium 9.2 8.5 - 10.1 mg/dL   **TSH with free T4 reflex FUTURE anytime   Result Value Ref Range    TSH 0.40 0.40 - 4.00 mU/L       ASSESSMENT/PLAN:   1. Hospital discharge follow-up  Reviewed care everywhere discharge summary     2. Atrial fibrillation, unspecified type (H)  Sees cardiologist tomorrow. She is on Coumadin and Beta blocker.  At this time planning on INR's at cardiology clinic.    3. Influenza " A  She will finish Tamiflu, is slowly getting better.      FUTURE APPOINTMENTS:       - Follow-up visit in 3-6 months      The information in this document, created by the medical scribe for me, accurately reflects the services I personally performed and the decisions made by me. I have reviewed and approved this document for accuracy prior to leaving the patient care area.  Carie Manley MD  3:21 PM, 01/29/18     Carie Manley MD  [facility]

## 2018-01-29 NOTE — MR AVS SNAPSHOT
"              After Visit Summary   1/29/2018    Ashlie Brantley    MRN: [id number]           Patient Information     Date Of Birth          2/24/1931        Visit Information        Provider Department      1/29/2018 2:00 PM Carie Manley MD [facility]        Today's Diagnoses     Hospital discharge follow-up    -  1    Atrial fibrillation, unspecified type (H)        Influenza A           Follow-ups after your visit        Who to contact     If you have questions or need follow up information about today's clinic visit or your schedule please contact [facility] directly at [phone].  Normal or non-critical lab and imaging results will be communicated to you by MyChart, letter or phone within 4 business days after the clinic has received the results. If you do not hear from us within 7 days, please contact the clinic through [employer] or phone. If you have a critical or abnormal lab result, we will notify you by phone as soon as possible.  Submit refill requests through [employer] or call your pharmacy and they will forward the refill request to us. Please allow 3 business days for your refill to be completed.          Additional Information About Your Visit        MyChart Information     [employer] gives you secure access to your electronic health record. If you see a primary care provider, you can also send messages to your care team and make appointments. If you have questions, please call your primary care clinic.  If you do not have a primary care provider, please call [phone] and they will assist you.        Care EveryWhere ID     This is your Care EveryWhere ID. This could be used by other organizations to access your [facility] medical records  [id number]        Your Vitals Were     Pulse Temperature Height BMI (Body Mass Index)          68 99.5  F (37.5  C) (Oral) 4' 10.25\" (1.48 m) 30.05 kg/m2         Blood Pressure from Last 3 Encounters:   01/29/18 118/74 "   12/20/17 130/71   12/06/17 135/82    Weight from Last 3 Encounters:   01/29/18 145 lb (65.8 kg)   12/20/17 152 lb (68.9 kg)   12/06/17 151 lb 12.8 oz (68.9 kg)              Today, you had the following     No orders found for display       Primary Care Provider Office Phone # Fax #    Carie Manley [phone]       [address] [facility] [address]        Equal Access to Services     JUNIOR MURPHY : Hadii aad ku hadasho Soomaali, waaxda luqadaha, qaybta kaalmada adeegyada, waxay idiin hayaan ademichele bynum . So [facility] [phone].    ATENCIÓN: Si habla español, tiene a leon disposición servicios gratuitos de asistencia lingüística. Llame al [phone].    We comply with applicable federal civil rights laws and [place] laws. We do not discriminate on the basis of race, color, national origin, age, disability, sex, sexual orientation, or gender identity.            Thank you!     Thank you for choosing [facility]  for your care. Our goal is always to provide you with excellent care. Hearing back from our patients is one way we can continue to improve our services. Please take a few minutes to complete the written survey that you may receive in the mail after your visit with us. Thank you!             Your Updated Medication List - Protect others around you: Learn how to safely use, store and throw away your medicines at www.disposemymeds.org.          This list is accurate as of 1/29/18  3:33 PM.  Always use your most recent med list.                   Brand Name Dispense Instructions for use Diagnosis    * albuterol (2.5 MG/3ML) 0.083% neb solution     1 Box    Take 1 vial (2.5 mg) by nebulization every 6 hours as needed for shortness of breath / dyspnea or wheezing    IPF (idiopathic pulmonary fibrosis) (H), Cough       * albuterol 108 (90 BASE) MCG/ACT Inhaler    PROAIR HFA/PROVENTIL HFA/VENTOLIN HFA    1 Inhaler    Inhale 2 puffs into the lungs every 6  hours as needed for shortness of breath / dyspnea    Fibrosis, lung (H)       aspirin 81 MG tablet     1 Tab    ONE DAILY    HTN (hypertension), Elevated cholesterol       BOTOX 100 UNITS injection   Generic drug:  botulinum toxin type A      Inject into the muscle once        calcium + D 600-200 MG-UNIT Tabs   Generic drug:  calcium carbonate-vitamin D     0    ONE TABLET TWICE A DAY WITH MEALS    Osteopenia       * codeine 30 MG tablet     60 tablet    Take 1 tablet (30 mg) by mouth 2 times daily May fill on or after 1/9/18    Fibrosis, lung (H)       * codeine 30 MG tablet   Start taking on:  2/8/2018     60 tablet    Take 1 tablet (30 mg) by mouth 2 times daily May fill on or after 2/8/18    Fibrosis, lung (H)       * codeine 30 MG tablet   Start taking on:  3/10/2018     60 tablet    Take 1 tablet (30 mg) by mouth 2 times daily May fill on or after 3/10/18    Fibrosis, lung (H)       furosemide 20 MG tablet    LASIX     Take 20 mg by mouth        guaiFENesin 600 MG 12 hr tablet    MUCINEX     Take 600 mg by mouth        guaiFENesin-codeine 100-10 MG/5ML Syrp syrup    guaiFENesin AC     Take 5-10 mLs by mouth        levothyroxine 88 MCG tablet    SYNTHROID    90 tablet    Take 1 tablet (88 mcg) by mouth daily    Hypothyroidism due to acquired atrophy of thyroid       losartan 100 MG tablet    COZAAR    90 tablet    Take 1 tablet (100 mg) by mouth daily    Hypertension goal BP (blood pressure) < 140/90       neomycin-polymyxin-dexamethasone 3.5-09543-7.1 Oint ophthalmic ointment    MAXITROL    1 Tube    Place 1 Application into both eyes At Bedtime Apply a small squirt into each eye at bedtime    Dermatochalasis, unspecified laterality       OMEGA-3 COMPLEX PO      1 capsule daily        order for DME     1 each    Equipment being ordered: Oxygen 2 liters NC at HS    Oxygen decrease, IPF (idiopathic pulmonary fibrosis) (H)       * order for DME     1 Units    Equipment being ordered: Please provide night time  oxygen at 2L/min via nasal canula    IPF (idiopathic pulmonary fibrosis) (H), Hypoxia       * order for DME     1 Device    Equipment being ordered: Oxygen 2 liters NC at night time    IPF (idiopathic pulmonary fibrosis) (H)       oseltamivir 30 MG capsule    TAMIFLU     Take 30 mg by mouth        simvastatin 40 MG tablet    ZOCOR    90 tablet    Take 1 tablet (40 mg) by mouth At Bedtime    Hyperlipidemia LDL goal <130       traZODone 50 MG tablet    DESYREL    30 tablet    Take 0.5-1 tablets (25-50 mg) by mouth nightly as needed for sleep    Insomnia, unspecified type       * vitamin D3 1000 UNITS Caps      1 CAPSULE DAILY        * vitamin D 2000 UNITS tablet     0    1 tablet daily    Osteopenia       VITAMIN E COMPLEX PO      Take  by mouth.        warfarin 5 MG tablet    COUMADIN     Take 5 mg by mouth        * Notice:  This list has 9 medication(s) that are the same as other medications prescribed for you. Read the directions carefully, and ask your doctor or other care provider to review them with you.

## 2018-01-29 NOTE — TELEPHONE ENCOUNTER
"ED/Discharge Protocol    \"Hi, my name is GA DEWITT, a registered nurse, and I am calling on behalf of Dr. Manley's office at [place].  I am calling to follow up and see how things are going for you after your recent visit.\"    \"I see that you were in the (ER/UC/IP) on 1/25/18.    How are you doing now that you are home?\"   \"I'm doing so much better now that the flu is gone.\"    Is patient experiencing symptoms that may require a hospital visit?  No  Difficult phone conversation, as she has had issues with her voice for the past 9 years.  She can only speak in short sentences and for short periods.     Discharge Instructions    \"Let's review your discharge instructions.  What is/are the follow-up recommendations?  She is aware that she needs a f/u appt with PCP and thought that her daughter had made one.  No appt seen in Epic, so writer assisted with scheduling for 2pm today.     \"Were you instructed to make a follow-up appointment?\"  Pt. Response: Yes.  Has appointment been made?   No.  \"Can I help you schedule that appointment?\" Yes; see above.      \"When you see the provider, I would recommend that you bring your discharge instructions with you.    Medications    \"How many new medications are you on since your hospitalization/ED visit?\"    Writer reviewed new meds briefly, but patient states she does not know the names, that her  manages this for her, and he is not home at this time.  He will bring her to f/u appt later today, and they will review any questions/concerns at that time.    Call Summary    \"Do you have any questions or concerns about your condition or care plan at the moment?\"    No  Triage nurse advice given: Instructed on watching for sx such as SOB, fever and swelling in legs with understanding voiced.     Patient was in ER 0 in the past year (assess appropriateness of ER visits.)      \"If you have questions or things don't continue to improve, we encourage you contact us through the " "main clinic number, [phone].  Even if the clinic is not open, triage nurses are available 24/7 to help you.     We would like you to know that our clinic has extended hours (provide information).  We also have urgent care (provide details on closest location and hours/contact info)\"      \"Thank you for your time and take care!\"        "

## 2018-01-30 ENCOUNTER — TRANSFERRED RECORDS (OUTPATIENT)
Dept: HEALTH INFORMATION MANAGEMENT | Facility: CLINIC | Age: 83
End: 2018-01-30

## 2018-01-30 RX ORDER — METOPROLOL TARTRATE 25 MG/1
25 TABLET, FILM COATED ORAL 2 TIMES DAILY
COMMUNITY
Start: 2018-01-25 | End: 2020-01-23 | Stop reason: DRUGHIGH

## 2018-02-21 ENCOUNTER — TELEPHONE (OUTPATIENT)
Dept: FAMILY MEDICINE | Facility: CLINIC | Age: 83
End: 2018-02-21

## 2018-02-21 NOTE — TELEPHONE ENCOUNTER
Reason for Call:  Medication or medication refill:    Do you use a [facility]?  Name of the pharmacy and phone number for the current request:      Name of the medication requested: metoprolol, furosemide     Other request: next appointment with the cardiologist is 03/07/2018    Can we leave a detailed message on this number? YES    Phone number patient can be reached at: Home number on file [phone] (home)    Best Time: anytime    Call taken on 2/21/2018 at 5:59 PM by Felipa Kc

## 2018-02-22 ENCOUNTER — TELEPHONE (OUTPATIENT)
Dept: FAMILY MEDICINE | Facility: CLINIC | Age: 83
End: 2018-02-22

## 2018-02-22 NOTE — TELEPHONE ENCOUNTER
Routing refill request to provider for review/approval because:  Medications are reported/historical    Jarod Weaver RN

## 2018-02-22 NOTE — TELEPHONE ENCOUNTER
Spoke with daughter, Mariela.  Scheduled appointment with Reji on 2-28-18, to establish care and to discuss having [facility] take over INR care.  Would like to discuss having in home INR care at appointment.    Needing refill of 3 meds, pharmacy pended.    Requested Prescriptions   Pending Prescriptions Disp Refills     furosemide (LASIX) 20 MG tablet 30 tablet      Sig: Take 1 tablet (20 mg) by mouth    There is no refill protocol information for this order        metoprolol tartrate (LOPRESSOR) 25 MG tablet 60 tablet      Sig: Take 1 tablet (25 mg) by mouth 2 times daily    There is no refill protocol information for this order        warfarin (COUMADIN) 5 MG tablet 30 tablet      Sig: Take 1 tablet (5 mg) by mouth daily    There is no refill protocol information for this order

## 2018-02-22 NOTE — TELEPHONE ENCOUNTER
Reason for Call:  Other appointment    Detailed comments: Daughter would like to discuss scheduling appointment to review medications    Phone Number Patient can be reached at: Other phone number:    Mariela (DAUGHTER) [phone]         Best Time:     Can we leave a detailed message on this number? Not Applicable    Call taken on 2/22/2018 at 9:18 AM by Kirsty Keller

## 2018-02-23 RX ORDER — METOPROLOL TARTRATE 25 MG/1
25 TABLET, FILM COATED ORAL 2 TIMES DAILY
Qty: 60 TABLET | OUTPATIENT
Start: 2018-02-23

## 2018-02-23 RX ORDER — FUROSEMIDE 20 MG
20 TABLET ORAL
Qty: 30 TABLET | OUTPATIENT
Start: 2018-02-23

## 2018-02-23 RX ORDER — WARFARIN SODIUM 5 MG/1
5 TABLET ORAL DAILY
Qty: 30 TABLET | OUTPATIENT
Start: 2018-02-23

## 2018-02-23 NOTE — TELEPHONE ENCOUNTER
My understanding is these medications are prescribed and followed by cardiology.  I believe it is [facility].  Carie Manley MD

## 2018-02-23 NOTE — TELEPHONE ENCOUNTER
Ally, patient sees [facility] & they are filling meds. Sent denied with note.  Miguelina Dunaway RN

## 2018-02-23 NOTE — TELEPHONE ENCOUNTER
Spoke with patient on the phone briefly, as she has issues with her voice.  Instructed that she should contact her cardiology regarding these medication refills; she voices understanding.   Migdalia Leslie RN

## 2018-02-23 NOTE — TELEPHONE ENCOUNTER
New patient to this provider, has future appoint with me.      patient seen recently 1/18 and pcp noted patient is being managed by cardiology. Please clarify if she has a cardiologist and discuss case with Dr Manley.  Tad Mendoza D.O.  .

## 2018-02-28 ENCOUNTER — OFFICE VISIT (OUTPATIENT)
Dept: FAMILY MEDICINE | Facility: CLINIC | Age: 83
End: 2018-02-28
Payer: COMMERCIAL

## 2018-02-28 VITALS
WEIGHT: 146 LBS | SYSTOLIC BLOOD PRESSURE: 124 MMHG | HEART RATE: 60 BPM | DIASTOLIC BLOOD PRESSURE: 76 MMHG | HEIGHT: 58 IN | TEMPERATURE: 98.2 F | BODY MASS INDEX: 30.64 KG/M2

## 2018-02-28 DIAGNOSIS — I48.20 CHRONIC ATRIAL FIBRILLATION (H): Primary | ICD-10-CM

## 2018-02-28 DIAGNOSIS — Z13.5 SCREENING FOR DIABETIC RETINOPATHY: ICD-10-CM

## 2018-02-28 PROCEDURE — 99214 OFFICE O/P EST MOD 30 MIN: CPT | Performed by: FAMILY MEDICINE

## 2018-02-28 RX ORDER — METOPROLOL TARTRATE 25 MG/1
25 TABLET, FILM COATED ORAL 2 TIMES DAILY
Qty: 60 TABLET | Status: CANCELLED | OUTPATIENT
Start: 2018-02-28

## 2018-02-28 RX ORDER — FUROSEMIDE 20 MG
20 TABLET ORAL
Qty: 30 TABLET | Status: CANCELLED | OUTPATIENT
Start: 2018-02-28

## 2018-02-28 NOTE — MR AVS SNAPSHOT
After Visit Summary   2/28/2018    Ashlie Brantley    MRN: [id number]           Patient Information     Date Of Birth          2/24/1931        Visit Information        Provider Department      2/28/2018 1:20 PM Tad Mendoza DO [facility]        Today's Diagnoses     Screening for diabetic retinopathy    -  1    Chronic atrial fibrillation (H)          Care Instructions    Follow up with INR clinic  Follow up with cards as planned  Tad Mendoza D.O.      [facility]   Discharged by : Lizzie Wright CMA     If you have any questions regarding your visit please contact your care team:     Team Gold                Clinic Hours Telephone Number     Dr. Hayley Rogers 7am-7pm  Monday - Thursday   7am-5pm  Fridays  [phone]   (Appointment scheduling available 24/7)     RN Line  [phone] option 2     Urgent Care - [place] and [place] - 11am-9pm Monday-Friday Saturday-Sunday- 9am-5pm     [place] -   5pm-9pm Monday-Friday Saturday-Sunday- 9am-5pm    [phone] - [place]    [phone] - [place]       For a Price Quote for your services, please call our Consumer Price Line at [phone].     What options do I have for visits at the clinic other than the traditional office visit?     To expand how we care for you, many of our providers are utilizing electronic visits (e-visits) and telephone visits, when medically appropriate, for interactions with their patients rather than a visit in the clinic. We also offer nurse visits for many medical concerns. Just like any other service, we will bill your insurance company for this type of visit based on time spent on the phone with your provider. Not all insurance companies cover these visits. Please check with your medical insurance if this type of visit is covered. You will be  responsible for any charges that are not paid by your insurance.   E-visits via Nfosharehart: generally incur a $35.00 fee.     Telephone visits:   Time spent on the phone: *charged based on time that is spent on the phone in increments of 10 minutes. Estimated cost:   5-10 mins $30.00   11-20 mins. $59.00   21-30 mins. $85.00     Use Nfosharehart (secure email communication and access to your chart) to send your primary care provider a message or make an appointment. Ask someone on your Team how to sign up for Sicubo.     As always, Thank you for trusting us with your health care needs!      [facility]:    Scheduling Appointments  [facility]  Call: [phone]    Macie Hunt [facility]  Call: [phone]    [facility]  Call: [phone]    For Gastroenterology referrals   [facility]   Clinics and Surgery Center, 4th Floor   [address] [place] [address]   Appointments: [phone]    WHERE TO GO FOR CARE?  Clinic    Make an appointment if you:       Are sick (cold, cough, flu, sore throat, earache or in pain).       Have a small injury (sprain, small cut, burn or broken bone).       Need a physical exam, Pap smear, vaccine or prescription refill.       Have questions about your health or medicines.    To reach us:      Call [phone] ([phone]). Open 24 hours every day. (For counseling services, call [phone].)    Log into Sicubo at [email or web address]. (Visit [email or web address] to create an account.) Hospital emergency room    An emergency is a serious or life- threatening problem that must be treated right away.    Call 401 or get to the hospital if you have:      Very bad or sudden:            - Chest pain or pressure         - Bleeding         - Head or belly pain         - Dizziness or trouble seeing, walking or                          Speaking      Problems breathing      Blood in  your vomit or you are coughing up blood      A major injury (knocked out, loss of a finger or limb, rape, broken bone protruding from skin)    A mental health crisis. (Or call the Mental Health Crisis line at 1-574.103.7364 or Suicide Prevention Hotline at 1-728.891.8909.)    Open 24 hours every day. You don't need an appointment.     Urgent care    Visit urgent care for sickness or small injuries when the clinic is closed. You don't need an appointment. To check hours or find an urgent care near you, visit [email or web address]. Online care    Get online care from [employer] for more than 70 common problems, like colds, allergies and infections. Open 24 hours every day at:   [email or web address]   Need help deciding?    For advice about where to be seen, you may call your clinic and ask to speak with a nurse. We're here for you 24 hours every day.         If you are deaf or hard of hearing, please let us know. We provide many free services including sign language interpreters, oral interpreters, TTYs, telephone amplifiers, note takers and written materials.                   Follow-ups after your visit        Additional Services     INR CLINIC REFERRAL       Your provider has referred you to INR Services.    Please be aware that coverage of these services is subject to the terms and limitations of your health insurance plan.  Call member services at your health plan with any benefit or coverage questions.    Indication for Anticoagulation: Atrial Fibrillation  If nonstandard INR is desired, indicate goal range and explanation: 2-3  Expected Duration of Therapy: Lifetime                  Who to contact     If you have questions or need follow up information about today's clinic visit or your schedule please contact [facility] directly at [phone].  Normal or non-critical lab and imaging results will be communicated to you by MyChart, letter or phone within 4 business days after the clinic has received the  "results. If you do not hear from us within 7 days, please contact the clinic through JobOn or phone. If you have a critical or abnormal lab result, we will notify you by phone as soon as possible.  Submit refill requests through JobOn or call your pharmacy and they will forward the refill request to us. Please allow 3 business days for your refill to be completed.          Additional Information About Your Visit        [employer] Information     JobOn gives you secure access to your electronic health record. If you see a primary care provider, you can also send messages to your care team and make appointments. If you have questions, please call your primary care clinic.  If you do not have a primary care provider, please call [phone] and they will assist you.        Care EveryWhere ID     This is your Care EveryWhere ID. This could be used by other organizations to access your [facility] medical records  [id number]        Your Vitals Were     Pulse Temperature Height Breastfeeding? BMI (Body Mass Index)       60 98.2  F (36.8  C) (Oral) 4' 10.25\" (1.48 m) No 30.25 kg/m2        Blood Pressure from Last 3 Encounters:   02/28/18 124/76   01/29/18 118/74   12/20/17 130/71    Weight from Last 3 Encounters:   02/28/18 146 lb (66.2 kg)   01/29/18 145 lb (65.8 kg)   12/20/17 152 lb (68.9 kg)              We Performed the Following     INR CLINIC REFERRAL        Primary Care Provider Office Phone # Fax #    [facility] [phone]       [address] [facility] [address]        Equal Access to Services     [facility]: Hadii viktor gaxiola hadasho Soomaali, waaxda luqadaha, qaybta kaalmada adegiovani castellanos . So [facility] [phone].    ATENCIÓN: Si habla español, tiene a leon disposición servicios gratuitos de asistencia lingüística. Llame al [phone].    We comply with applicable federal civil rights laws and [place] laws. We do not discriminate " on the basis of race, color, national origin, age, disability, sex, sexual orientation, or gender identity.            Thank you!     Thank you for choosing [facility]  for your care. Our goal is always to provide you with excellent care. Hearing back from our patients is one way we can continue to improve our services. Please take a few minutes to complete the written survey that you may receive in the mail after your visit with us. Thank you!             Your Updated Medication List - Protect others around you: Learn how to safely use, store and throw away your medicines at www.disposemymeds.org.          This list is accurate as of 2/28/18  2:07 PM.  Always use your most recent med list.                   Brand Name Dispense Instructions for use Diagnosis    * albuterol (2.5 MG/3ML) 0.083% neb solution     1 Box    Take 1 vial (2.5 mg) by nebulization every 6 hours as needed for shortness of breath / dyspnea or wheezing    IPF (idiopathic pulmonary fibrosis) (H), Cough       * albuterol 108 (90 BASE) MCG/ACT Inhaler    PROAIR HFA/PROVENTIL HFA/VENTOLIN HFA    1 Inhaler    Inhale 2 puffs into the lungs every 6 hours as needed for shortness of breath / dyspnea    Fibrosis, lung (H)       aspirin 81 MG tablet     1 Tab    ONE DAILY    HTN (hypertension), Elevated cholesterol       BOTOX 100 UNITS injection   Generic drug:  botulinum toxin type A      Inject into the muscle once        calcium + D 600-200 MG-UNIT Tabs   Generic drug:  calcium carbonate-vitamin D     0    ONE TABLET TWICE A DAY WITH MEALS    Osteopenia       * codeine 30 MG tablet     60 tablet    Take 1 tablet (30 mg) by mouth 2 times daily May fill on or after 1/9/18    Fibrosis, lung (H)       * codeine 30 MG tablet     60 tablet    Take 1 tablet (30 mg) by mouth 2 times daily May fill on or after 2/8/18    Fibrosis, lung (H)       * codeine 30 MG tablet   Start taking on:  3/10/2018     60 tablet    Take 1 tablet (30 mg) by  mouth 2 times daily May fill on or after 3/10/18    Fibrosis, lung (H)       furosemide 20 MG tablet    LASIX     Take 20 mg by mouth        guaiFENesin-codeine 100-10 MG/5ML Syrp syrup    guaiFENesin AC     Take 5-10 mLs by mouth        levothyroxine 88 MCG tablet    SYNTHROID    90 tablet    Take 1 tablet (88 mcg) by mouth daily    Hypothyroidism due to acquired atrophy of thyroid       losartan 100 MG tablet    COZAAR    90 tablet    Take 1 tablet (100 mg) by mouth daily    Hypertension goal BP (blood pressure) < 140/90       metoprolol tartrate 25 MG tablet    LOPRESSOR     Take 25 mg by mouth 2 times daily        neomycin-polymyxin-dexamethasone 3.5-56578-3.1 Oint ophthalmic ointment    MAXITROL    1 Tube    Place 1 Application into both eyes At Bedtime Apply a small squirt into each eye at bedtime    Dermatochalasis, unspecified laterality       OMEGA-3 COMPLEX PO      1 capsule daily        order for DME     1 each    Equipment being ordered: Oxygen 2 liters NC at HS    Oxygen decrease, IPF (idiopathic pulmonary fibrosis) (H)       * order for DME     1 Units    Equipment being ordered: Please provide night time oxygen at 2L/min via nasal canula    IPF (idiopathic pulmonary fibrosis) (H), Hypoxia       * order for DME     1 Device    Equipment being ordered: Oxygen 2 liters NC at night time    IPF (idiopathic pulmonary fibrosis) (H)       oseltamivir 30 MG capsule    TAMIFLU     Take 30 mg by mouth        simvastatin 40 MG tablet    ZOCOR    90 tablet    Take 1 tablet (40 mg) by mouth At Bedtime    Hyperlipidemia LDL goal <130       traZODone 50 MG tablet    DESYREL    30 tablet    Take 0.5-1 tablets (25-50 mg) by mouth nightly as needed for sleep    Insomnia, unspecified type       * vitamin D3 1000 UNITS Caps      1 CAPSULE DAILY        * vitamin D 2000 UNITS tablet     0    1 tablet daily    Osteopenia       VITAMIN E COMPLEX PO      Take  by mouth.        warfarin 5 MG tablet    COUMADIN     Take 5 mg by  mouth        * Notice:  This list has 9 medication(s) that are the same as other medications prescribed for you. Read the directions carefully, and ask your doctor or other care provider to review them with you.

## 2018-02-28 NOTE — PATIENT INSTRUCTIONS
Follow up with INR clinic  Follow up with cards as planned  Tad Mendoza D.O.      [facility]   Discharged by : Lizzie Wright CMA     If you have any questions regarding your visit please contact your care team:     Team Gold                Clinic Hours Telephone Number     Dr. Hayley Rogers 7am-7pm  Monday - Thursday   7am-5pm  Fridays  [phone]   (Appointment scheduling available 24/7)     RN Line  [phone] option 2     Urgent Care - [place] - 11am-9pm Monday-Friday Saturday-Sunday- 9am-5pm     [place] -   5pm-9pm Monday-Friday Saturday-Sunday- 9am-5pm    [phone] - [place]    [phone] - [place]       For a Price Quote for your services, please call our Consumer Price Line at [phone].     What options do I have for visits at the clinic other than the traditional office visit?     To expand how we care for you, many of our providers are utilizing electronic visits (e-visits) and telephone visits, when medically appropriate, for interactions with their patients rather than a visit in the clinic. We also offer nurse visits for many medical concerns. Just like any other service, we will bill your insurance company for this type of visit based on time spent on the phone with your provider. Not all insurance companies cover these visits. Please check with your medical insurance if this type of visit is covered. You will be responsible for any charges that are not paid by your insurance.   E-visits via [email or web address]: generally incur a $35.00 fee.     Telephone visits:   Time spent on the phone: *charged based on time that is spent on the phone in increments of 10 minutes. Estimated cost:   5-10 mins $30.00   11-20 mins. $59.00   21-30 mins. $85.00     Use [email or web address] (secure email communication and access to your chart) to send your primary care provider a  message or make an appointment. Ask someone on your Team how to sign up for GLSS.     As always, Thank you for trusting us with your health care needs!      [facility]:    Scheduling Appointments  [place]  Call: [phone]    Macie Hunt, [facility]  Call: [phone]    [facility]  Call: [phone]    For Gastroenterology referrals   [facility]   Clinics and Surgery [place], 4th Floor   [address] [place] [address]   Appointments: [phone]    WHERE TO GO FOR CARE?  Clinic    Make an appointment if you:       Are sick (cold, cough, flu, sore throat, earache or in pain).       Have a small injury (sprain, small cut, burn or broken bone).       Need a physical exam, Pap smear, vaccine or prescription refill.       Have questions about your health or medicines.    To reach us:      Call [phone] ([phone]). Open 24 hours every day. (For counseling services, call [phone].)    Log into GLSS at [email or web address]. (Visit [email or web address] to create an account.) Hospital emergency room    An emergency is a serious or life- threatening problem that must be treated right away.    Call [phone] or get to the hospital if you have:      Very bad or sudden:            - Chest pain or pressure         - Bleeding         - Head or belly pain         - Dizziness or trouble seeing, walking or                          Speaking      Problems breathing      Blood in your vomit or you are coughing up blood      A major injury (knocked out, loss of a finger or limb, rape, broken bone protruding from skin)    A mental health crisis. (Or call the Mental Health Crisis line at 1-185.854.1097 or Suicide Prevention Hotline at 1-324.522.8776.)    Open 24 hours every day. You don't need an appointment.     Urgent care    Visit urgent care for sickness or small injuries when the clinic is closed. You  don't need an appointment. To check hours or find an urgent care near you, visit [email or web address]. Online care    Get online care from OnCare for more than 70 common problems, like colds, allergies and infections. Open 24 hours every day at:   [email or web address]   Need help deciding?    For advice about where to be seen, you may call your clinic and ask to speak with a nurse. We're here for you 24 hours every day.         If you are deaf or hard of hearing, please let us know. We provide many free services including sign language interpreters, oral interpreters, TTYs, telephone amplifiers, note takers and written materials.

## 2018-02-28 NOTE — PROGRESS NOTES
SUBJECTIVE:   Ashlie Brantley is a 87 year old female who presents to clinic today for the following health issues:      * establish care   * discuss INR care     1. Hospital discharge follow-up  Reviewed care everywhere discharge summary      2. Atrial fibrillation, unspecified type (H)  Sees cardiologist tomorrow. She is on Coumadin and Beta blocker.  At this time planning on INR's at cardiology clinic.     3. Influenza A  She will finish Tamiflu, is slowly getting better.        FUTURE APPOINTMENTS:       - Follow-up visit in 3-6 months       The information in this document, created by the medical scribe for me, accurately reflects the services I personally performed and the decisions made by me. I have reviewed and approved this document for accuracy prior to leaving the patient care area.  Carie Manley MD  3:21 PM, 01/29/18      Carie Manley MD  [facility]      She has been going to cardiology off and on   Doing well   INR -she would like it done here from now on    Here with her daughter  She is using a board to write her information  No chest pain, no sob, no edema  No dizziness  Doing well with her current meds  Wants to switch inr check here  Sees cards, meds managed by them           Problem list and histories reviewed & adjusted, as indicated.  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     Hoarseness     Laryngeal dystonia     Spasmodic dysphonia     Hyperlipidemia LDL goal <130     Anxiety     Hypertension goal BP (blood pressure) < 140/90     LVH (left ventricular hypertrophy)     Advance Care Planning     Cough     Dermatochalasis     Health Care Home     Trochanteric bursitis - bilateral     PVD (posterior vitreous detachment) OU     Pseudophakia, OU     IPF (idiopathic pulmonary fibrosis) (H)     Hypothyroidism due  "to acquired atrophy of thyroid     Chronic pain syndrome     Urinary incontinence, unspecified type     Past Surgical History:   Procedure Laterality Date     C NONSPECIFIC PROCEDURE      BACK SURGERY     C NONSPECIFIC PROCEDURE      VOCAL CORD POLYP     C SHOULDER SURG PROC UNLISTED       C STOMACH SURGERY PROCEDURE UNLISTED       C TOTAL KNEE ARTHROPLASTY      LT 1998  /  RT 2001     HC REMOVAL GALLBLADDER       HERNIA REPAIR, INGUINAL RT/LT      RT     HYSTERECTOMY, CERVIX STATUS UNKNOWN      DUB     PHACOEMULSIFICATION WITH STANDARD INTRAOCULAR LENS IMPLANT  Rt 6/11/09, Lt 8/3/09    both eyes Dr. Barnett     ROTATOR CUFF REPAIR RT/LT      RT       Social History   Substance Use Topics     Smoking status: Never Smoker     Smokeless tobacco: Never Used     Alcohol use 0.0 oz/week     0 Standard drinks or equivalent per week      Comment: 1-2 drinks/month     Family History   Problem Relation Age of Onset     DIABETES Mother      C.A.D. Father      CEREBROVASCULAR DISEASE Brother            Reviewed and updated as needed this visit by clinical staff  Tobacco  Allergies  Meds  Med Hx  Surg Hx  Fam Hx  Soc Hx      Reviewed and updated as needed this visit by Provider         ROS:  Constitutional, HEENT, cardiovascular, pulmonary, GI, , musculoskeletal, neuro, skin, endocrine and psych systems are negative, except as otherwise noted.    OBJECTIVE:     /76  Pulse 60  Temp 98.2  F (36.8  C) (Oral)  Ht 4' 10.25\" (1.48 m)  Wt 146 lb (66.2 kg)  Breastfeeding? No  BMI 30.25 kg/m2  Body mass index is 30.25 kg/(m^2).  GENERAL: healthy, alert and no distress  NECK: no adenopathy, no asymmetry, masses, or scars and thyroid normal to palpation  RESP: lungs clear to auscultation - no rales, rhonchi or wheezes  CV: regular rate and rhythm, normal S1 S2, no S3 or S4, no murmur, click or rub, no peripheral edema and peripheral pulses strong  ABDOMEN: soft, nontender, no hepatosplenomegaly, no masses and bowel sounds " normal  MS: no gross musculoskeletal defects noted, no edema    Diagnostic Test Results:  none     ASSESSMENT/PLAN:         ICD-10-CM    1. Chronic atrial fibrillation (H) I48.2 INR CLINIC REFERRAL   2. Screening for diabetic retinopathy Z13.5    reviewed chart, notes, labs  cardiology following  Follow up with INR clinic here, referral placed  Follow up with cards as planned    Spent  25 min greater than 50% of counseling and coordination of care for the conditions documented above.      See Patient Instructions    DO MICHAEL Cervantes [facility]

## 2018-03-07 ENCOUNTER — TRANSFERRED RECORDS (OUTPATIENT)
Dept: HEALTH INFORMATION MANAGEMENT | Facility: CLINIC | Age: 83
End: 2018-03-07

## 2018-03-08 ENCOUNTER — ANTICOAGULATION THERAPY VISIT (OUTPATIENT)
Dept: NURSING | Facility: CLINIC | Age: 83
End: 2018-03-08
Payer: COMMERCIAL

## 2018-03-08 DIAGNOSIS — Z79.01 LONG-TERM (CURRENT) USE OF ANTICOAGULANTS: ICD-10-CM

## 2018-03-08 DIAGNOSIS — I48.91 ATRIAL FIBRILLATION (H): ICD-10-CM

## 2018-03-08 LAB — INR POINT OF CARE: 1.2 (ref 0.86–1.14)

## 2018-03-08 PROCEDURE — 36416 COLLJ CAPILLARY BLOOD SPEC: CPT

## 2018-03-08 PROCEDURE — 85610 PROTHROMBIN TIME: CPT | Mod: QW

## 2018-03-08 PROCEDURE — 99207 ZZC NO CHARGE NURSE ONLY: CPT

## 2018-03-08 NOTE — MR AVS SNAPSHOT
Ashlie Brantley   3/8/2018 9:15 AM   Anticoagulation Therapy Visit    Description:  87 year old female   Provider:  NAHEED HUNTER   Department:  Ne Nurse           INR as of 3/8/2018     Today's INR 1.2!      Anticoagulation Summary as of 3/8/2018     INR goal 2.0-3.0   Today's INR 1.2!   Full instructions 3/8: 5 mg; Otherwise 2.5 mg on Tue, Thu, Sat; 5 mg all other days   Next INR check 3/15/2018    Indications   Atrial fibrillation (H) [I48.91] [I48.91]  Long-term (current) use of anticoagulants [Z79.01] [Z79.01]         Your next Anticoagulation Clinic appointment(s)     Mar 15, 2018  8:15 AM CDT   Anticoagulation Visit with NE ANTI COAG   [facility]    [address] [place] [id number] [phone]              Contact Numbers     Please call [phone] to cancel and/or reschedule your appointment.  Please call [phone] with any problems or questions regarding your therapy          March 2018 Details    Sun Mon Tue Wed Thu Fri Sat         1               2               3                 4               5               6               7               8      5 mg   See details      9      5 mg         10      2.5 mg           11      5 mg         12      5 mg         13      2.5 mg         14      5 mg         15            16               17                 18               19               20               21               22               23               24                 25               26               27               28               29               30               31                Date Details   03/08 This INR check       Date of next INR:  3/15/2018         How to take your warfarin dose     To take:  2.5 mg Take 0.5 of a 5 mg tablet.    To take:  5 mg Take 1 of the 5 mg tablets.

## 2018-03-08 NOTE — PROGRESS NOTES
ANTICOAGULATION FOLLOW-UP CLINIC VISIT    Patient Name:  Ashlie Brantley  Date:  3/8/2018  Contact Type:  Face to Face    SUBJECTIVE:     Patient Findings     Positives Change in diet/appetite (Has eaten more greens this week. Discussed what a serving size is. One cup.), Initiation of therapy    Comments Patient has been on warfarin for a few weeks now. It was started by her cardiologist for Atrial Fib. We discussed diet today. She loves green vegetables. I explained to her that a consistent diet is what we are aiming for. Discussed how Vitamin K affects her INR readings.  Ashlie does not have a voice. She communicates with a board. She hears just fine. Her daughter Mariela is with her today.           OBJECTIVE    INR Protime   Date Value Ref Range Status   03/08/2018 1.2 (A) 0.86 - 1.14 Final       ASSESSMENT / PLAN  INR assessment SUB    Recheck INR In: 1 WEEK    INR Location Clinic      Anticoagulation Summary as of 3/8/2018     INR goal 2.0-3.0   Today's INR 1.2!   Maintenance plan 2.5 mg (5 mg x 0.5) on Tue, Thu, Sat; 5 mg (5 mg x 1) all other days   Full instructions 3/8: 5 mg; Otherwise 2.5 mg on Tue, Thu, Sat; 5 mg all other days   Weekly total 27.5 mg   Plan last modified Jarek Stephens RN (3/8/2018)   Next INR check 3/15/2018   Target end date Indefinite    Indications   Atrial fibrillation (H) [I48.91] [I48.91]  Long-term (current) use of anticoagulants [Z79.01] [Z79.01]         Anticoagulation Episode Summary     INR check location     Preferred lab     Send INR reminders to [facility]    Comments       Anticoagulation Care Providers     Provider Role Specialty Phone number    RejiTad lynne DO [facility] [phone]            See the Encounter Report to view Anticoagulation Flowsheet and Dosing Calendar (Go to Encounters tab in chart review, and find the Anticoagulation Therapy Visit)    Dosage adjustment made based on physician directed care plan.    Jarek  NAA Stephens, RN

## 2018-03-15 ENCOUNTER — ANTICOAGULATION THERAPY VISIT (OUTPATIENT)
Dept: NURSING | Facility: CLINIC | Age: 83
End: 2018-03-15
Payer: COMMERCIAL

## 2018-03-15 DIAGNOSIS — Z79.01 LONG-TERM (CURRENT) USE OF ANTICOAGULANTS: ICD-10-CM

## 2018-03-15 DIAGNOSIS — I48.91 ATRIAL FIBRILLATION (H): ICD-10-CM

## 2018-03-15 LAB — INR POINT OF CARE: 1.7 (ref 0.86–1.14)

## 2018-03-15 PROCEDURE — 36416 COLLJ CAPILLARY BLOOD SPEC: CPT

## 2018-03-15 PROCEDURE — 85610 PROTHROMBIN TIME: CPT | Mod: QW

## 2018-03-15 PROCEDURE — 99207 ZZC NO CHARGE NURSE ONLY: CPT

## 2018-03-15 NOTE — PROGRESS NOTES
ANTICOAGULATION FOLLOW-UP CLINIC VISIT    Patient Name:  Ashlie Brantley  Date:  3/15/2018  Contact Type:  Face to Face    SUBJECTIVE:     Patient Findings     Positives Initiation of therapy           OBJECTIVE    INR Protime   Date Value Ref Range Status   03/15/2018 1.7 (A) 0.86 - 1.14 Final       ASSESSMENT / PLAN  INR assessment SUB    Recheck INR In: 1 WEEK    INR Location Clinic      Anticoagulation Summary as of 3/15/2018     INR goal 2.0-3.0   Today's INR 1.7!   Maintenance plan 2.5 mg (5 mg x 0.5) on Tue, Thu; 5 mg (5 mg x 1) all other days   Full instructions 2.5 mg on Tue, Thu; 5 mg all other days   Weekly total 30 mg   Plan last modified Jarek Stephens RN (3/15/2018)   Next INR check 3/22/2018   Target end date Indefinite    Indications   Atrial fibrillation (H) [I48.91] [I48.91]  Long-term (current) use of anticoagulants [Z79.01] [Z79.01]         Anticoagulation Episode Summary     INR check location     Preferred lab     Send INR reminders to [facility]    Comments       Anticoagulation Care Providers     Provider Role Specialty Phone number    Tad Mendoza  [facility] [phone]            See the Encounter Report to view Anticoagulation Flowsheet and Dosing Calendar (Go to Encounters tab in chart review, and find the Anticoagulation Therapy Visit)    Dosage adjustment made based on physician directed care plan.    Jarek Stephens, RN

## 2018-03-15 NOTE — MR AVS SNAPSHOT
Ashlie Brantley   3/15/2018 8:15 AM   Anticoagulation Therapy Visit    Description:  87 year old female   Provider:  NE ANTI ELSA   Department:  Ne Nurse           INR as of 3/15/2018     Today's INR 1.7!      Anticoagulation Summary as of 3/15/2018     INR goal 2.0-3.0   Today's INR 1.7!   Full instructions 2.5 mg on Tue, Thu; 5 mg all other days   Next INR check 3/22/2018    Indications   Atrial fibrillation (H) [I48.91] [I48.91]  Long-term (current) use of anticoagulants [Z79.01] [Z79.01]         Your next Anticoagulation Clinic appointment(s)     Mar 22, 2018  8:45 AM CDT   [facility] with [facility]   [facility]    [address] [place] [id number] [phone]              Contact Numbers     Please call [phone] to cancel and/or reschedule your appointment.  Please call [phone] with any problems or questions regarding your therapy          March 2018 Details    Sun Mon Tue Wed Thu Fri Sat         1               2               3                 4               5               6               7               8               9               10                 11               12               13               14               15      2.5 mg   See details      16      5 mg         17      5 mg           18      5 mg         19      5 mg         20      2.5 mg         21      5 mg         22            23               24                 25               26               27               28               29               30               31                Date Details   03/15 This INR check       Date of next INR:  3/22/2018         How to take your warfarin dose     To take:  2.5 mg Take 0.5 of a 5 mg tablet.    To take:  5 mg Take 1 of the 5 mg tablets.

## 2018-03-22 ENCOUNTER — ANTICOAGULATION THERAPY VISIT (OUTPATIENT)
Dept: NURSING | Facility: CLINIC | Age: 83
End: 2018-03-22
Payer: COMMERCIAL

## 2018-03-22 DIAGNOSIS — I48.91 ATRIAL FIBRILLATION (H): ICD-10-CM

## 2018-03-22 DIAGNOSIS — Z79.01 LONG-TERM (CURRENT) USE OF ANTICOAGULANTS: ICD-10-CM

## 2018-03-22 LAB — INR POINT OF CARE: 1.6 (ref 0.86–1.14)

## 2018-03-22 PROCEDURE — 85610 PROTHROMBIN TIME: CPT | Mod: QW

## 2018-03-22 PROCEDURE — 36416 COLLJ CAPILLARY BLOOD SPEC: CPT

## 2018-03-22 PROCEDURE — 99207 ZZC NO CHARGE NURSE ONLY: CPT

## 2018-03-22 NOTE — PROGRESS NOTES
ANTICOAGULATION FOLLOW-UP CLINIC VISIT    Patient Name:  Ashlie Brantley  Date:  3/22/2018  Contact Type:  Face to Face    SUBJECTIVE:     Patient Findings     Positives Initiation of therapy, No Problem Findings           OBJECTIVE    INR Protime   Date Value Ref Range Status   03/22/2018 1.6 (A) 0.86 - 1.14 Final       ASSESSMENT / PLAN  INR assessment SUB    Recheck INR In: 1 WEEK    INR Location Clinic      Anticoagulation Summary as of 3/22/2018     INR goal 2.0-3.0   Today's INR 1.6!   Maintenance plan 5 mg (5 mg x 1) every day   Full instructions 5 mg every day   Weekly total 35 mg   Plan last modified Jarek Stephens RN (3/22/2018)   Next INR check 3/29/2018   Target end date Indefinite    Indications   Atrial fibrillation (H) [I48.91] [I48.91]  Long-term (current) use of anticoagulants [Z79.01] [Z79.01]         Anticoagulation Episode Summary     INR check location     Preferred lab     Send INR reminders to [facility]    Comments       Anticoagulation Care Providers     Provider Role Specialty Phone number    RejiTad lynne DO [facility] [phone]            See the Encounter Report to view Anticoagulation Flowsheet and Dosing Calendar (Go to Encounters tab in chart review, and find the Anticoagulation Therapy Visit)    Dosage adjustment made based on physician directed care plan.    Jarek Stephens, REJI

## 2018-03-22 NOTE — MR AVS SNAPSHOT
Ashlie Brantley   3/22/2018 10:15 AM   Anticoagulation Therapy Visit    Description:  87 year old female   Provider:  NAHEED HUNTER   Department:  Ne Nurse           INR as of 3/22/2018     Today's INR 1.6!      Anticoagulation Summary as of 3/22/2018     INR goal 2.0-3.0   Today's INR 1.6!   Full instructions 5 mg every day   Next INR check 3/29/2018    Indications   Atrial fibrillation (H) [I48.91] [I48.91]  Long-term (current) use of anticoagulants [Z79.01] [Z79.01]         Your next Anticoagulation Clinic appointment(s)     Mar 29, 2018  7:45 AM CDT   Anticoagulation Visit with NE ANTI COAG   [facility]    [address] [place] [id number] [phone]              Contact Numbers     Please call [phone] to cancel and/or reschedule your appointment.  Please call [phone] with any problems or questions regarding your therapy          March 2018 Details    Sun Mon Tue Wed Thu Fri Sat         1               2               3                 4               5               6               7               8               9               10                 11               12               13               14               15               16               17                 18               19               20               21               22      5 mg   See details      23      5 mg         24      5 mg           25      5 mg         26      5 mg         27      5 mg         28      5 mg         29            30               31                Date Details   03/22 This INR check       Date of next INR:  3/29/2018         How to take your warfarin dose     To take:  5 mg Take 1 of the 5 mg tablets.

## 2018-03-28 ENCOUNTER — OFFICE VISIT (OUTPATIENT)
Dept: PODIATRY | Facility: CLINIC | Age: 83
End: 2018-03-28
Payer: COMMERCIAL

## 2018-03-28 VITALS
BODY MASS INDEX: 31.28 KG/M2 | WEIGHT: 149 LBS | HEART RATE: 56 BPM | HEIGHT: 58 IN | SYSTOLIC BLOOD PRESSURE: 104 MMHG | TEMPERATURE: 98.5 F | DIASTOLIC BLOOD PRESSURE: 72 MMHG

## 2018-03-28 DIAGNOSIS — L84 CORN OR CALLUS: Primary | ICD-10-CM

## 2018-03-28 PROCEDURE — 99213 OFFICE O/P EST LOW 20 MIN: CPT | Performed by: PODIATRIST

## 2018-03-28 NOTE — LETTER
3/28/2018         RE: Ashlie Brantley  [address] [place] [address]        Dear Colleague,    Thank you for referring your patient, Ashlie Brantley, to the [facility]. Please see a copy of my visit note below.    Weight management plan: Patient was referred to their PCP to discuss a diet and exercise plan.      PATIENT HISTORY:  Ashlie Brantley is a 87 year old female who presents to clinic for a callus on her right 5th toe.  It was quite red and irritated, but some of it fell off the other day and it is doing better.  She has been using pads, which can help.  Worse with pressure.  Minimal pain today.  No injury.    Review of Systems:  Patient denies fever, chills, rash, wound, stiffness, limping, numbness, weakness, heart burn, blood in stool, chest pain with activity, calf pain when walking, shortness of breath with activity, chronic cough, easy bleeding/bruising, swelling of ankles, excessive thirst, fatigue, depression, anxiety.      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     sees [facility]     Cataract     IOL both     DJD (degenerative joint disease)     KNEES     Elevated cholesterol      GERD (gastroesophageal reflux disease)      Hoarseness      HTN (hypertension)      Hypothyroid      Laryngeal dystonia      LVH (left ventricular hypertrophy) due to hypertensive disease      Osteopenia     MILD     Urinary stress incontinence         PAST SURGICAL HISTORY:   Past Surgical History:   Procedure Laterality Date     C NONSPECIFIC PROCEDURE      BACK SURGERY     C NONSPECIFIC PROCEDURE      VOCAL CORD POLYP     C SHOULDER SURG PROC UNLISTED       C STOMACH SURGERY PROCEDURE UNLISTED       C TOTAL KNEE ARTHROPLASTY      LT 1998  /  RT 2001     HC REMOVAL GALLBLADDER       HERNIA REPAIR, INGUINAL RT/LT      RT     HYSTERECTOMY, CERVIX STATUS UNKNOWN      DUB     PHACOEMULSIFICATION WITH STANDARD INTRAOCULAR LENS IMPLANT  Rt 6/11/09,  Lt 8/3/09    both eyes Dr. Barnett     ROTATOR CUFF REPAIR RT/LT      RT        MEDICATIONS:   Current Outpatient Prescriptions:      warfarin (COUMADIN) 5 MG tablet, Take 1 tablet (5 mg) by mouth daily, Disp: 90 tablet, Rfl: 1     metoprolol tartrate (LOPRESSOR) 25 MG tablet, Take 25 mg by mouth 2 times daily, Disp: , Rfl:      guaiFENesin-codeine (GUAIFENESIN AC) 100-10 MG/5ML SYRP syrup, Take 5-10 mLs by mouth, Disp: , Rfl:      oseltamivir (TAMIFLU) 30 MG capsule, Take 30 mg by mouth, Disp: , Rfl:      furosemide (LASIX) 20 MG tablet, Take 20 mg by mouth, Disp: , Rfl:      codeine 30 MG tablet, Take 1 tablet (30 mg) by mouth 2 times daily May fill on or after 3/10/18, Disp: 60 tablet, Rfl: 0     codeine 30 MG tablet, Take 1 tablet (30 mg) by mouth 2 times daily May fill on or after 2/8/18, Disp: 60 tablet, Rfl: 0     codeine 30 MG tablet, Take 1 tablet (30 mg) by mouth 2 times daily May fill on or after 1/9/18, Disp: 60 tablet, Rfl: 0     levothyroxine (SYNTHROID) 88 MCG tablet, Take 1 tablet (88 mcg) by mouth daily, Disp: 90 tablet, Rfl: 3     losartan (COZAAR) 100 MG tablet, Take 1 tablet (100 mg) by mouth daily, Disp: 90 tablet, Rfl: 3     simvastatin (ZOCOR) 40 MG tablet, Take 1 tablet (40 mg) by mouth At Bedtime, Disp: 90 tablet, Rfl: 3     traZODone (DESYREL) 50 MG tablet, Take 0.5-1 tablets (25-50 mg) by mouth nightly as needed for sleep, Disp: 30 tablet, Rfl: 0     albuterol (PROAIR HFA/PROVENTIL HFA/VENTOLIN HFA) 108 (90 BASE) MCG/ACT Inhaler, Inhale 2 puffs into the lungs every 6 hours as needed for shortness of breath / dyspnea, Disp: 1 Inhaler, Rfl: 3     neomycin-polymyxin-dexamethasone (MAXITROL) 3.5-77324-7.1 OINT ophthalmic ointment, Place 1 Application into both eyes At Bedtime Apply a small squirt into each eye at bedtime, Disp: 1 Tube, Rfl: 6     albuterol (2.5 MG/3ML) 0.083% nebulizer solution, Take 1 vial (2.5 mg) by nebulization every 6 hours as needed for shortness of breath / dyspnea or  wheezing, Disp: 1 Box, Rfl: 1     order for DME, Equipment being ordered: Oxygen 2 liters NC at night time, Disp: 1 Device, Rfl: 0     order for DME, Equipment being ordered: Please provide night time oxygen at 2L/min via nasal canula, Disp: 1 Units, Rfl: 0     botulinum toxin type A (BOTOX) 100 UNITS injection, Inject into the muscle once, Disp: , Rfl:      ORDER FOR DME, Equipment being ordered: Oxygen 2 liters NC at HS, Disp: 1 each, Rfl: 0     VITAMIN E COMPLEX PO, Take  by mouth., Disp: , Rfl:      VITAMIN D3 1000 UNIT OR CAPS, 1 CAPSULE DAILY, Disp: , Rfl:      OMEGA-3 COMPLEX OR, 1 capsule daily, Disp: , Rfl:      ASPIRIN 81 MG OR TABS, ONE DAILY, Disp: 1 Tab, Rfl: 0     CALCIUM + D 600-200 MG-UNIT OR TABS, ONE TABLET TWICE A DAY WITH MEALS, Disp: 0, Rfl: 0     VITAMIN D 2000 UNIT OR TABS, 1 tablet daily, Disp: 0, Rfl: 0     ALLERGIES:    Allergies   Allergen Reactions     Nkda [No Known Drug Allergies]         SOCIAL HISTORY:   Social History     Social History     Marital status:      Spouse name: N/A     Number of children: 6     Years of education: N/A     Occupational History      Retired     Social History Main Topics     Smoking status: Never Smoker     Smokeless tobacco: Never Used     Alcohol use 0.0 oz/week     0 Standard drinks or equivalent per week      Comment: 1-2 drinks/month     Drug use: No     Sexual activity: Not Currently     Other Topics Concern      Service No     Blood Transfusions No     Caffeine Concern No     Occupational Exposure No     Hobby Hazards No     Sleep Concern No     Stress Concern No     Weight Concern Yes     Special Diet No     Back Care No     Exercise Yes     a litttle     Seat Belt Yes     Self-Exams Yes     Parent/Sibling W/ Cabg, Mi Or Angioplasty Before 65f 55m? No     Social History Narrative        FAMILY HISTORY:   Family History   Problem Relation Age of Onset     DIABETES Mother      C.A.D. Father      CEREBROVASCULAR DISEASE Brother        "  EXAM:Vitals: /72 (Cuff Size: Adult Regular)  Pulse 56  Temp 98.5  F (36.9  C) (Oral)  Ht 4' 10.25\" (1.48 m)  Wt 149 lb (67.6 kg)  BMI 30.87 kg/m2  BMI= Body mass index is 30.87 kg/(m^2).    General appearance: Patient is alert and fully cooperative with history & exam.  No sign of distress is noted during the visit.     Psychiatric: Affect is pleasant & appropriate.  Patient appears motivated to improve health.     Respiratory: Breathing is regular & unlabored while sitting.     HEENT: Hearing is intact to spoken word.  Speech volume is diminished.  Pt uses a tablet to write out questions as needed.  No gross evidence of visual impairment that would impact ambulation.     Dermatologic: Very minimal callusing to dorsolateral right 5th PIPJ.  No paronychia or evidence of soft tissue infection is noted.     Vascular: DP & PT pulses are intact & regular on the right.  Varicosities noted.  CFT and skin temperature are normal.     Neurologic: Lower extremity sensation is intact to light touch.  No evidence of weakness or contracture in the lower extremities.  No evidence of neuropathy.     Musculoskeletal: Patient is ambulatory without assistive device or brace.  No gross ankle deformity noted.  No foot or ankle joint effusion is noted.     ASSESSMENT: Right 5th toe callus     PLAN:  Reviewed patient's chart in epic.  Discussed condition and treatment options including pros and cons.    Discussed causes of calluses/corns.  They are due to areas of increased friction.  Discussed treatments such as using foot file, pumice stone, pads, wider shoes.  Advised avoiding medicated pads.     No debridement needed today.  List of routine foot care resources given.    F/u prn.         Jose Rafael De La Rosa DPM, FACFAS    Weight management plan: Patient was referred to their PCP to discuss a diet and exercise plan.        Again, thank you for allowing me to participate in the care of your patient.  "       Sincerely,        Jose Rafael De La Rosa DPM

## 2018-03-28 NOTE — MR AVS SNAPSHOT
After Visit Summary   3/28/2018    Ashlie Brantley    MRN: [id number]           Patient Information     Date Of Birth          2/24/1931        Visit Information        Provider Department      3/28/2018 7:45 AM Jose Rafael De La Rosa DPM [facility]        Today's Diagnoses     Corn or callus    -  1      Care Instructions    Calluses, Corns, IPKs, Porokeratosis    When there is excessive friction or pressure on the skin, the body responds by making the skin thicker to protect the deeper structures from becoming exposed.  While this works well to protect the deeper structures, the thickened skin can increase pressure and pain.    Flat, diffuse thickening are simple calluses and they are usually caused by friction.  Often these are the result of rubbing on a shoe or going barefoot.    Calluses with a central core between the toes are called corns.  These result from prominent joints on adjacent toes rubbing together.  Theses are a symptom of bone malalignment and will always recur unless the underlying bones are addressed surgically.    Calluses with a central core on the ball of the foot are usually IPKs (intractable plantar keratosis).  These are caused by excessive pressure from the metatarsals, the bones that make up the ball of the foot.  Often one of these bones is too long or too prominent.  Again, these will always recur unless the underlying bone issue is addressed.  There is no cure for these.  They will either go away by themselves, recur, or more could develop.    Regardless of the diagnosis, most of these lesions can be kept comfortable with routine maintenance.   1.This consists of filing them with a pumice stone or callus file a couple of times per week.    2. Lotion can be applied to soften the callus. A urea based cream such as Kerasal or Vanicream or thicker cream with shea butter are good options.  3. Toe spacers or toe covers can be used for corns, gel pads can be  used for other lesions on the bottom of the foot.   If there is a surgical pathology noted, such as a prominent bone, often this needs to be addressed surgically to minimize recurrence. However, sometimes the lesion simply migrates to another spot after surgery, so it is not a guaranteed cure.     Please call with any additional questions.         FOOT CARE NURSES  If you are interested in having a foot care nurse come out to your   home, please call one of these contacts for more information:  [facility]  [phone] [facility]  [phone]   [facility]  [phone]  [facility] [phone]  [place]   [facility]  [phone]  At [facility] [phone]                 Body Mass Index (BMI)  Many things can cause foot and ankle problems. Foot structure, activity level, foot mechanics and injuries are common causes of pain.  One very important issue that often goes unmentioned is body weight. Extra weight can cause increased stress on muscles, ligaments, bones and tendons. Sometimes just a few extra pounds is all it takes to put one over her/his threshold. Without reducing that stress, it can be difficult to alleviate pain.   Some people are uncomfortable addressing this issue, but we feel it is important for you to think about it. As Foot & Ankle specialists, our job is addressing the lower extremity problem and possible causes.   Regarding extra body weight, we encourage patients to discuss diet and weight management plans with their primary care doctors. It is this team approach that gives you the best opportunity for pain relief and getting you back on your feet.             Follow-ups after your visit        Follow-up notes from your care team     Return if symptoms worsen or fail to improve.      Your next 10 appointments already scheduled     Mar 29, 2018  7:45 AM CDT   Antico[facility] with [facility] ANTI COAG  "  [facility]    [address] [facility] [address] [phone]            Apr 04, 2018  8:15 AM CDT   [facility] René [facility]   [facility]    [address] [place] [address]  Maria Fernanda RALPH [id number] [phone]              Who to contact     If you have questions or need follow up information about today's clinic visit or your schedule please contact [facility] directly at [phone].  Normal or non-critical lab and imaging results will be communicated to you by [employer], letter or phone within 4 business days after the clinic has received the results. If you do not hear from us within 7 days, please contact the clinic through [employer] or phone. If you have a critical or abnormal lab result, we will notify you by phone as soon as possible.  Submit refill requests through [employer] or call your pharmacy and they will forward the refill request to us. Please allow 3 business days for your refill to be completed.          Additional Information About Your Visit        [employer] Information     [employer] gives you secure access to your electronic health record. If you see a primary care provider, you can also send messages to your care team and make appointments. If you have questions, please call your primary care clinic.  If you do not have a primary care provider, please call [phone] and they will assist you.        Care EveryWhere ID     This is your Care EveryWhere ID. This could be used by other organizations to access your [place] medical records  [id number]        Your Vitals Were     Pulse Temperature Height BMI (Body Mass Index)          56 98.5  F (36.9  C) (Oral) 4' 10.25\" (1.48 m) 30.87 kg/m2         Blood Pressure from Last 3 Encounters:   03/28/18 104/72   02/28/18 124/76   01/29/18 118/74    Weight from Last 3 Encounters:   03/28/18 149 lb (67.6 kg)   02/28/18 146 lb " (66.2 kg)   01/29/18 145 lb (65.8 kg)              Today, you had the following     No orders found for display       Primary Care Provider Office Phone # Fax #    Tad Mendoza [phone]       [address] [facility] [address]        Equal Access to Services     JUNIOR MURPHY : Hadii aad ku hadasho Soomaali, waaxda luqadaha, qaybta kaalmada adeegyada, waxay idiin hayaan adeeg khvandanash lavarunn . So [facility] [phone].    ATENCIÓN: Si habla español, tiene a leon disposición servicios gratuitos de asistencia lingüística. Llame al [phone].    We comply with applicable federal civil rights laws and [place] laws. We do not discriminate on the basis of race, color, national origin, age, disability, sex, sexual orientation, or gender identity.            Thank you!     Thank you for choosing [facility]  for your care. Our goal is always to provide you with excellent care. Hearing back from our patients is one way we can continue to improve our services. Please take a few minutes to complete the written survey that you may receive in the mail after your visit with us. Thank you!             Your Updated Medication List - Protect others around you: Learn how to safely use, store and throw away your medicines at www.disposemymeds.org.          This list is accurate as of 3/28/18  8:02 AM.  Always use your most recent med list.                   Brand Name Dispense Instructions for use Diagnosis    * albuterol (2.5 MG/3ML) 0.083% neb solution     1 Box    Take 1 vial (2.5 mg) by nebulization every 6 hours as needed for shortness of breath / dyspnea or wheezing    IPF (idiopathic pulmonary fibrosis) (H), Cough       * albuterol 108 (90 BASE) MCG/ACT Inhaler    PROAIR HFA/PROVENTIL HFA/VENTOLIN HFA    1 Inhaler    Inhale 2 puffs into the lungs every 6 hours as needed for shortness of breath / dyspnea    Fibrosis, lung (H)       aspirin 81 MG tablet     1 Tab    ONE DAILY     HTN (hypertension), Elevated cholesterol       BOTOX 100 UNITS injection   Generic drug:  botulinum toxin type A      Inject into the muscle once        calcium + D 600-200 MG-UNIT Tabs   Generic drug:  calcium carbonate-vitamin D     0    ONE TABLET TWICE A DAY WITH MEALS    Osteopenia       * codeine 30 MG tablet     60 tablet    Take 1 tablet (30 mg) by mouth 2 times daily May fill on or after 1/9/18    Fibrosis, lung (H)       * codeine 30 MG tablet     60 tablet    Take 1 tablet (30 mg) by mouth 2 times daily May fill on or after 2/8/18    Fibrosis, lung (H)       * codeine 30 MG tablet     60 tablet    Take 1 tablet (30 mg) by mouth 2 times daily May fill on or after 3/10/18    Fibrosis, lung (H)       furosemide 20 MG tablet    LASIX     Take 20 mg by mouth        guaiFENesin-codeine 100-10 MG/5ML Syrp syrup    guaiFENesin AC     Take 5-10 mLs by mouth        levothyroxine 88 MCG tablet    SYNTHROID    90 tablet    Take 1 tablet (88 mcg) by mouth daily    Hypothyroidism due to acquired atrophy of thyroid       losartan 100 MG tablet    COZAAR    90 tablet    Take 1 tablet (100 mg) by mouth daily    Hypertension goal BP (blood pressure) < 140/90       metoprolol tartrate 25 MG tablet    LOPRESSOR     Take 25 mg by mouth 2 times daily        neomycin-polymyxin-dexamethasone 3.5-29883-7.1 Oint ophthalmic ointment    MAXITROL    1 Tube    Place 1 Application into both eyes At Bedtime Apply a small squirt into each eye at bedtime    Dermatochalasis, unspecified laterality       OMEGA-3 COMPLEX PO      1 capsule daily        order for DME     1 each    Equipment being ordered: Oxygen 2 liters NC at HS    Oxygen decrease, IPF (idiopathic pulmonary fibrosis) (H)       * order for DME     1 Units    Equipment being ordered: Please provide night time oxygen at 2L/min via nasal canula    IPF (idiopathic pulmonary fibrosis) (H), Hypoxia       * order for DME     1 Device    Equipment being ordered: Oxygen 2 liters NC  at night time    IPF (idiopathic pulmonary fibrosis) (H)       oseltamivir 30 MG capsule    TAMIFLU     Take 30 mg by mouth        simvastatin 40 MG tablet    ZOCOR    90 tablet    Take 1 tablet (40 mg) by mouth At Bedtime    Hyperlipidemia LDL goal <130       traZODone 50 MG tablet    DESYREL    30 tablet    Take 0.5-1 tablets (25-50 mg) by mouth nightly as needed for sleep    Insomnia, unspecified type       * vitamin D3 1000 UNITS Caps      1 CAPSULE DAILY        * vitamin D 2000 UNITS tablet     0    1 tablet daily    Osteopenia       VITAMIN E COMPLEX PO      Take  by mouth.        warfarin 5 MG tablet    COUMADIN    90 tablet    Take 1 tablet (5 mg) by mouth daily    Persistent atrial fibrillation (H)       * Notice:  This list has 9 medication(s) that are the same as other medications prescribed for you. Read the directions carefully, and ask your doctor or other care provider to review them with you.

## 2018-03-28 NOTE — PATIENT INSTRUCTIONS
Calluses, Corns, IPKs, Porokeratosis    When there is excessive friction or pressure on the skin, the body responds by making the skin thicker to protect the deeper structures from becoming exposed.  While this works well to protect the deeper structures, the thickened skin can increase pressure and pain.    Flat, diffuse thickening are simple calluses and they are usually caused by friction.  Often these are the result of rubbing on a shoe or going barefoot.    Calluses with a central core between the toes are called corns.  These result from prominent joints on adjacent toes rubbing together.  Theses are a symptom of bone malalignment and will always recur unless the underlying bones are addressed surgically.    Calluses with a central core on the ball of the foot are usually IPKs (intractable plantar keratosis).  These are caused by excessive pressure from the metatarsals, the bones that make up the ball of the foot.  Often one of these bones is too long or too prominent.  Again, these will always recur unless the underlying bone issue is addressed.  There is no cure for these.  They will either go away by themselves, recur, or more could develop.    Regardless of the diagnosis, most of these lesions can be kept comfortable with routine maintenance.   1.This consists of filing them with a pumice stone or callus file a couple of times per week.    2. Lotion can be applied to soften the callus. A urea based cream such as Kerasal or Vanicream or thicker cream with shea butter are good options.  3. Toe spacers or toe covers can be used for corns, gel pads can be used for other lesions on the bottom of the foot.   If there is a surgical pathology noted, such as a prominent bone, often this needs to be addressed surgically to minimize recurrence. However, sometimes the lesion simply migrates to another spot after surgery, so it is not a guaranteed cure.     Please call with any additional questions.         FOOT CARE  NURSES  If you are interested in having a foot care nurse come out to your   home, please call one of these contacts for more information:  [facility]  [phone] [facility]  [phone]   [facility]  [phone]  [facility] [phone]  [place]   [facility] [facility]  [phone]  At [facility] [phone]                 Body Mass Index (BMI)  Many things can cause foot and ankle problems. Foot structure, activity level, foot mechanics and injuries are common causes of pain.  One very important issue that often goes unmentioned is body weight. Extra weight can cause increased stress on muscles, ligaments, bones and tendons. Sometimes just a few extra pounds is all it takes to put one over her/his threshold. Without reducing that stress, it can be difficult to alleviate pain.   Some people are uncomfortable addressing this issue, but we feel it is important for you to think about it. As Foot & Ankle specialists, our job is addressing the lower extremity problem and possible causes.   Regarding extra body weight, we encourage patients to discuss diet and weight management plans with their primary care doctors. It is this team approach that gives you the best opportunity for pain relief and getting you back on your feet.

## 2018-03-28 NOTE — PROGRESS NOTES
PATIENT HISTORY:  Ashlie Brantley is a 87 year old female who presents to clinic for a callus on her right 5th toe.  It was quite red and irritated, but some of it fell off the other day and it is doing better.  She has been using pads, which can help.  Worse with pressure.  Minimal pain today.  No injury.    Review of Systems:  Patient denies fever, chills, rash, wound, stiffness, limping, numbness, weakness, heart burn, blood in stool, chest pain with activity, calf pain when walking, shortness of breath with activity, chronic cough, easy bleeding/bruising, swelling of ankles, excessive thirst, fatigue, depression, anxiety.      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     sees [facility]     Cataract     IOL both     DJD (degenerative joint disease)     KNEES     Elevated cholesterol      GERD (gastroesophageal reflux disease)      Hoarseness      HTN (hypertension)      Hypothyroid      Laryngeal dystonia      LVH (left ventricular hypertrophy) due to hypertensive disease      Osteopenia     MILD     Urinary stress incontinence         PAST SURGICAL HISTORY:   Past Surgical History:   Procedure Laterality Date     C NONSPECIFIC PROCEDURE      BACK SURGERY     C NONSPECIFIC PROCEDURE      VOCAL CORD POLYP     C SHOULDER SURG PROC UNLISTED       C STOMACH SURGERY PROCEDURE UNLISTED       C TOTAL KNEE ARTHROPLASTY      LT 1998  /  RT 2001     HC REMOVAL GALLBLADDER       HERNIA REPAIR, INGUINAL RT/LT      RT     HYSTERECTOMY, CERVIX STATUS UNKNOWN      DUB     PHACOEMULSIFICATION WITH STANDARD INTRAOCULAR LENS IMPLANT  Rt 6/11/09, Lt 8/3/09    both eyes Dr. Barnett     ROTATOR CUFF REPAIR RT/LT      RT        MEDICATIONS:   Current Outpatient Prescriptions:      warfarin (COUMADIN) 5 MG tablet, Take 1 tablet (5 mg) by mouth daily, Disp: 90 tablet, Rfl: 1     metoprolol tartrate (LOPRESSOR) 25 MG tablet, Take 25 mg by mouth 2 times daily, Disp: , Rfl:      guaiFENesin-codeine  (GUAIFENESIN AC) 100-10 MG/5ML SYRP syrup, Take 5-10 mLs by mouth, Disp: , Rfl:      oseltamivir (TAMIFLU) 30 MG capsule, Take 30 mg by mouth, Disp: , Rfl:      furosemide (LASIX) 20 MG tablet, Take 20 mg by mouth, Disp: , Rfl:      codeine 30 MG tablet, Take 1 tablet (30 mg) by mouth 2 times daily May fill on or after 3/10/18, Disp: 60 tablet, Rfl: 0     codeine 30 MG tablet, Take 1 tablet (30 mg) by mouth 2 times daily May fill on or after 2/8/18, Disp: 60 tablet, Rfl: 0     codeine 30 MG tablet, Take 1 tablet (30 mg) by mouth 2 times daily May fill on or after 1/9/18, Disp: 60 tablet, Rfl: 0     levothyroxine (SYNTHROID) 88 MCG tablet, Take 1 tablet (88 mcg) by mouth daily, Disp: 90 tablet, Rfl: 3     losartan (COZAAR) 100 MG tablet, Take 1 tablet (100 mg) by mouth daily, Disp: 90 tablet, Rfl: 3     simvastatin (ZOCOR) 40 MG tablet, Take 1 tablet (40 mg) by mouth At Bedtime, Disp: 90 tablet, Rfl: 3     traZODone (DESYREL) 50 MG tablet, Take 0.5-1 tablets (25-50 mg) by mouth nightly as needed for sleep, Disp: 30 tablet, Rfl: 0     albuterol (PROAIR HFA/PROVENTIL HFA/VENTOLIN HFA) 108 (90 BASE) MCG/ACT Inhaler, Inhale 2 puffs into the lungs every 6 hours as needed for shortness of breath / dyspnea, Disp: 1 Inhaler, Rfl: 3     neomycin-polymyxin-dexamethasone (MAXITROL) 3.5-33335-0.1 OINT ophthalmic ointment, Place 1 Application into both eyes At Bedtime Apply a small squirt into each eye at bedtime, Disp: 1 Tube, Rfl: 6     albuterol (2.5 MG/3ML) 0.083% nebulizer solution, Take 1 vial (2.5 mg) by nebulization every 6 hours as needed for shortness of breath / dyspnea or wheezing, Disp: 1 Box, Rfl: 1     order for DME, Equipment being ordered: Oxygen 2 liters NC at night time, Disp: 1 Device, Rfl: 0     order for DME, Equipment being ordered: Please provide night time oxygen at 2L/min via nasal canula, Disp: 1 Units, Rfl: 0     botulinum toxin type A (BOTOX) 100 UNITS injection, Inject into the muscle once, Disp: ,  "Rfl:      ORDER FOR DME, Equipment being ordered: Oxygen 2 liters NC at HS, Disp: 1 each, Rfl: 0     VITAMIN E COMPLEX PO, Take  by mouth., Disp: , Rfl:      VITAMIN D3 1000 UNIT OR CAPS, 1 CAPSULE DAILY, Disp: , Rfl:      OMEGA-3 COMPLEX OR, 1 capsule daily, Disp: , Rfl:      ASPIRIN 81 MG OR TABS, ONE DAILY, Disp: 1 Tab, Rfl: 0     CALCIUM + D 600-200 MG-UNIT OR TABS, ONE TABLET TWICE A DAY WITH MEALS, Disp: 0, Rfl: 0     VITAMIN D 2000 UNIT OR TABS, 1 tablet daily, Disp: 0, Rfl: 0     ALLERGIES:    Allergies   Allergen Reactions     Nkda [No Known Drug Allergies]         SOCIAL HISTORY:   Social History     Social History     Marital status:      Spouse name: N/A     Number of children: 6     Years of education: N/A     Occupational History      Retired     Social History Main Topics     Smoking status: Never Smoker     Smokeless tobacco: Never Used     Alcohol use 0.0 oz/week     0 Standard drinks or equivalent per week      Comment: 1-2 drinks/month     Drug use: No     Sexual activity: Not Currently     Other Topics Concern      Service No     Blood Transfusions No     Caffeine Concern No     Occupational Exposure No     Hobby Hazards No     Sleep Concern No     Stress Concern No     Weight Concern Yes     Special Diet No     Back Care No     Exercise Yes     a litttle     Seat Belt Yes     Self-Exams Yes     Parent/Sibling W/ Cabg, Mi Or Angioplasty Before 65f 55m? No     Social History Narrative        FAMILY HISTORY:   Family History   Problem Relation Age of Onset     DIABETES Mother      C.A.D. Father      CEREBROVASCULAR DISEASE Brother         EXAM:Vitals: /72 (Cuff Size: Adult Regular)  Pulse 56  Temp 98.5  F (36.9  C) (Oral)  Ht 4' 10.25\" (1.48 m)  Wt 149 lb (67.6 kg)  BMI 30.87 kg/m2  BMI= Body mass index is 30.87 kg/(m^2).    General appearance: Patient is alert and fully cooperative with history & exam.  No sign of distress is noted during the visit.     Psychiatric: " Affect is pleasant & appropriate.  Patient appears motivated to improve health.     Respiratory: Breathing is regular & unlabored while sitting.     HEENT: Hearing is intact to spoken word.  Speech volume is diminished.  Pt uses a tablet to write out questions as needed.  No gross evidence of visual impairment that would impact ambulation.     Dermatologic: Very minimal callusing to dorsolateral right 5th PIPJ.  No paronychia or evidence of soft tissue infection is noted.     Vascular: DP & PT pulses are intact & regular on the right.  Varicosities noted.  CFT and skin temperature are normal.     Neurologic: Lower extremity sensation is intact to light touch.  No evidence of weakness or contracture in the lower extremities.  No evidence of neuropathy.     Musculoskeletal: Patient is ambulatory without assistive device or brace.  No gross ankle deformity noted.  No foot or ankle joint effusion is noted.     ASSESSMENT: Right 5th toe callus     PLAN:  Reviewed patient's chart in epic.  Discussed condition and treatment options including pros and cons.    Discussed causes of calluses/corns.  They are due to areas of increased friction.  Discussed treatments such as using foot file, pumice stone, pads, wider shoes.  Advised avoiding medicated pads.     No debridement needed today.  List of routine foot care resources given.    F/u prn.         Jose Rafael De La Rosa DPM, FACFAS    Weight management plan: Patient was referred to their PCP to discuss a diet and exercise plan.

## 2018-03-29 ENCOUNTER — ANTICOAGULATION THERAPY VISIT (OUTPATIENT)
Dept: NURSING | Facility: CLINIC | Age: 83
End: 2018-03-29
Payer: COMMERCIAL

## 2018-03-29 DIAGNOSIS — I48.91 ATRIAL FIBRILLATION (H): ICD-10-CM

## 2018-03-29 DIAGNOSIS — Z79.01 LONG-TERM (CURRENT) USE OF ANTICOAGULANTS: ICD-10-CM

## 2018-03-29 LAB — INR POINT OF CARE: 2 (ref 0.86–1.14)

## 2018-03-29 PROCEDURE — 36416 COLLJ CAPILLARY BLOOD SPEC: CPT

## 2018-03-29 PROCEDURE — 99207 ZZC NO CHARGE NURSE ONLY: CPT

## 2018-03-29 PROCEDURE — 85610 PROTHROMBIN TIME: CPT | Mod: QW

## 2018-03-29 NOTE — PROGRESS NOTES
ANTICOAGULATION FOLLOW-UP CLINIC VISIT    Patient Name:  Ashlie Brantley  Date:  3/29/2018  Contact Type:  Face to Face    SUBJECTIVE:     Patient Findings     Positives No Problem Findings           OBJECTIVE    INR Protime   Date Value Ref Range Status   03/29/2018 2.0 (A) 0.86 - 1.14 Final       ASSESSMENT / PLAN  INR assessment THER    Recheck INR In: 1 WEEK    INR Location Clinic      Anticoagulation Summary as of 3/29/2018     INR goal 2.0-3.0   Today's INR 2.0   Maintenance plan 5 mg (5 mg x 1) every day   Full instructions 5 mg every day   Weekly total 35 mg   No change documented Jarek Stephens RN   Plan last modified Jarek Stephens RN (3/22/2018)   Next INR check 4/5/2018   Target end date Indefinite    Indications   Atrial fibrillation (H) [I48.91] [I48.91]  Long-term (current) use of anticoagulants [Z79.01] [Z79.01]         Anticoagulation Episode Summary     INR check location     Preferred lab     Send INR reminders to [facility]    Comments       Anticoagulation Care Providers     Provider Role Specialty Phone number    RejiTad DO [facility] [phone]            See the Encounter Report to view Anticoagulation Flowsheet and Dosing Calendar (Go to Encounters tab in chart review, and find the Anticoagulation Therapy Visit)        Jarek Stephens, REJI

## 2018-03-29 NOTE — MR AVS SNAPSHOT
Ashlie Brantley   3/29/2018 7:45 AM   Anticoagulation Therapy Visit    Description:  87 year old female   Provider:  NAHEED HUNTER   Department:  Ne Nurse           INR as of 3/29/2018     Today's INR 2.0      Anticoagulation Summary as of 3/29/2018     INR goal 2.0-3.0   Today's INR 2.0   Full instructions 5 mg every day   Next INR check 4/5/2018    Indications   Atrial fibrillation (H) [I48.91] [I48.91]  Long-term (current) use of anticoagulants [Z79.01] [Z79.01]         Your next Anticoagulation Clinic appointment(s)     Apr 05, 2018 11:45 AM CDT   [facility] with [facility]   [facility]    [address] [place] [id number] [phone]              Contact Numbers     Please call [phone] to cancel and/or reschedule your appointment.  Please call [phone] with any problems or questions regarding your therapy          March 2018 Details    Sun Mon Tue Wed u Fri Sat         1               2               3                 4               5               6               7               8               9               10                 11               12               13               14               15               16               17                 18               19               20               21               22               23               24                 25               26               27               28               29      5 mg   See details      30      5 mg         31      5 mg          Date Details   03/29 This INR check               How to take your warfarin dose     To take:  5 mg Take 1 of the 5 mg tablets.           April 2018 Details    Sun Mon Tue Wed Thu Fri Sat     1      5 mg         2      5 mg         3      5 mg         4      5 mg         5            6               7                 8               9               10               11               12               13                14                 15               16               17               18               19               20               21                 22               23               24               25               26               27               28                 29               30                     Date Details   No additional details    Date of next INR:  4/5/2018         How to take your warfarin dose     To take:  5 mg Take 1 of the 5 mg tablets.

## 2018-04-05 ENCOUNTER — ANTICOAGULATION THERAPY VISIT (OUTPATIENT)
Dept: NURSING | Facility: CLINIC | Age: 83
End: 2018-04-05
Payer: COMMERCIAL

## 2018-04-05 DIAGNOSIS — I48.91 ATRIAL FIBRILLATION (H): ICD-10-CM

## 2018-04-05 DIAGNOSIS — Z79.01 LONG-TERM (CURRENT) USE OF ANTICOAGULANTS: ICD-10-CM

## 2018-04-05 LAB — INR POINT OF CARE: 2.3 (ref 0.86–1.14)

## 2018-04-05 PROCEDURE — 85610 PROTHROMBIN TIME: CPT | Mod: QW

## 2018-04-05 PROCEDURE — 99207 ZZC NO CHARGE NURSE ONLY: CPT

## 2018-04-05 PROCEDURE — 36416 COLLJ CAPILLARY BLOOD SPEC: CPT

## 2018-04-05 NOTE — MR AVS SNAPSHOT
Ashlie Brantley   4/5/2018 11:45 AM   Anticoagulation Therapy Visit    Description:  87 year old female   Provider:  NE ANTI ELSA   Department:  Ne Nurse           INR as of 4/5/2018     Today's INR 2.3      Anticoagulation Summary as of 4/5/2018     INR goal 2.0-3.0   Today's INR 2.3   Full instructions 5 mg every day   Next INR check 4/19/2018    Indications   Atrial fibrillation (H) [I48.91] [I48.91]  Long-term (current) use of anticoagulants [Z79.01] [Z79.01]         Your next Anticoagulation Clinic appointment(s)     Apr 19, 2018 11:45 AM CDT   [facility]agulation [facility] with NE ANTI COAG   [facility]    [address] [place] [id number] [phone]              Contact Numbers     Please call [phone] to cancel and/or reschedule your appointment.  Please call [phone] with any problems or questions regarding your therapy          April 2018 Details    Sun Mon Tue Wed Thu Fri Sat     1               2               3               4               5      5 mg   See details      6      5 mg         7      5 mg           8      5 mg         9      5 mg         10      5 mg         11      5 mg         12      5 mg         13      5 mg         14      5 mg           15      5 mg         16      5 mg         17      5 mg         18      5 mg         19            20               21                 22               23               24               25               26               27               28                 29               30                     Date Details   04/05 This INR check       Date of next INR:  4/19/2018         How to take your warfarin dose     To take:  5 mg Take 1 of the 5 mg tablets.

## 2018-04-05 NOTE — PROGRESS NOTES
ANTICOAGULATION FOLLOW-UP CLINIC VISIT    Patient Name:  Ashlie Brantley  Date:  4/5/2018  Contact Type:  Face to Face    SUBJECTIVE:     Patient Findings     Positives No Problem Findings           OBJECTIVE    INR Protime   Date Value Ref Range Status   04/05/2018 2.3 (A) 0.86 - 1.14 Final       ASSESSMENT / PLAN  INR assessment THER    Recheck INR In: 2 WEEKS    INR Location Clinic      Anticoagulation Summary as of 4/5/2018     INR goal 2.0-3.0   Today's INR 2.3   Maintenance plan 5 mg (5 mg x 1) every day   Full instructions 5 mg every day   Weekly total 35 mg   No change documented Jarek Stephens RN   Plan last modified Jarek Stephens RN (3/22/2018)   Next INR check 4/19/2018   Target end date Indefinite    Indications   Atrial fibrillation (H) [I48.91] [I48.91]  Long-term (current) use of anticoagulants [Z79.01] [Z79.01]         Anticoagulation Episode Summary     INR check location     Preferred lab     Send INR reminders to [facility]    Comments       Anticoagulation Care Providers     Provider Role Specialty Phone number    RejiTad DO [facility] [phone]            See the Encounter Report to view Anticoagulation Flowsheet and Dosing Calendar (Go to Encounters tab in chart review, and find the Anticoagulation Therapy Visit)        Jarek Stephens, REJI

## 2018-04-17 ENCOUNTER — TELEPHONE (OUTPATIENT)
Dept: FAMILY MEDICINE | Facility: CLINIC | Age: 83
End: 2018-04-17

## 2018-04-17 DIAGNOSIS — J84.10 FIBROSIS, LUNG (H): ICD-10-CM

## 2018-04-17 RX ORDER — CODEINE SULFATE 30 MG/1
30 TABLET ORAL 2 TIMES DAILY
Qty: 60 TABLET | Refills: 0 | Status: SHIPPED | OUTPATIENT
Start: 2018-04-17 | End: 2018-09-07

## 2018-04-17 NOTE — TELEPHONE ENCOUNTER
We have not talked about this med, refilled by PCP previously  Will refill for now, advised follow up in 3 months to review all her other meds and labs  Tad Mendoza D.O.

## 2018-04-17 NOTE — TELEPHONE ENCOUNTER
Patients elyse called and asked if we could get 3 months of this as her Grandmother can't speak so it's hard for her to call it in.

## 2018-04-17 NOTE — TELEPHONE ENCOUNTER
codeine 30 MG tablet      Last Written Prescription Date:  3/10/2018  Last Fill Quantity: 60 tablet,   # refills: 0  Last Office Visit: 2/28/2018  w/ ERON Mendoza    Future Office visit:       Routing refill request to provider for review/approval because:  Drug not on the [facility], [facility] or [facility] refill protocol or controlled substance

## 2018-04-18 NOTE — TELEPHONE ENCOUNTER
Called patient and talked to patients . Relayed Dr Mendoza message below.  He will discuss with patient and they will schedule sometime in the future.    Jayne Porter

## 2018-04-19 ENCOUNTER — ANTICOAGULATION THERAPY VISIT (OUTPATIENT)
Dept: NURSING | Facility: CLINIC | Age: 83
End: 2018-04-19
Payer: COMMERCIAL

## 2018-04-19 DIAGNOSIS — Z79.01 LONG-TERM (CURRENT) USE OF ANTICOAGULANTS: ICD-10-CM

## 2018-04-19 DIAGNOSIS — I48.91 ATRIAL FIBRILLATION (H): ICD-10-CM

## 2018-04-19 LAB — INR POINT OF CARE: 2.2 (ref 0.86–1.14)

## 2018-04-19 PROCEDURE — 36416 COLLJ CAPILLARY BLOOD SPEC: CPT

## 2018-04-19 PROCEDURE — 85610 PROTHROMBIN TIME: CPT | Mod: QW

## 2018-04-19 PROCEDURE — 99207 ZZC NO CHARGE NURSE ONLY: CPT

## 2018-04-19 NOTE — PROGRESS NOTES
ANTICOAGULATION FOLLOW-UP CLINIC VISIT    Patient Name:  Ashlie Brantley  Date:  4/19/2018  Contact Type:  Face to Face    SUBJECTIVE:     Patient Findings     Positives No Problem Findings           OBJECTIVE    INR Protime   Date Value Ref Range Status   04/19/2018 2.2 (A) 0.86 - 1.14 Final       ASSESSMENT / PLAN  INR assessment THER    Recheck INR In: 3 WEEKS    INR Location Clinic      Anticoagulation Summary as of 4/19/2018     INR goal 2.0-3.0   Today's INR 2.2   Maintenance plan 5 mg (5 mg x 1) every day   Full instructions 5 mg every day   Weekly total 35 mg   No change documented Jarek Stephens RN   Plan last modified Jarek Stephens RN (3/22/2018)   Next INR check 5/10/2018   Target end date Indefinite    Indications   Atrial fibrillation (H) [I48.91] [I48.91]  Long-term (current) use of anticoagulants [Z79.01] [Z79.01]         Anticoagulation Episode Summary     INR check location     Preferred lab     Send INR reminders to [facility]    Comments       Anticoagulation Care Providers     Provider Role Specialty Phone number    RejiTad DO [facility] [phone]            See the Encounter Report to view Anticoagulation Flowsheet and Dosing Calendar (Go to Encounters tab in chart review, and find the Anticoagulation Therapy Visit)        Jarek Stephens, REJI

## 2018-04-19 NOTE — MR AVS SNAPSHOT
Ashlie Brantley   4/19/2018 11:45 AM   Anticoagulation Therapy Visit    Description:  87 year old female   Provider:  NE ANTI ELSA   Department:  Ne Nurse           INR as of 4/19/2018     Today's INR 2.2      Anticoagulation Summary as of 4/19/2018     INR goal 2.0-3.0   Today's INR 2.2   Full instructions 5 mg every day   Next INR check 5/10/2018    Indications   Atrial fibrillation (H) [I48.91] [I48.91]  Long-term (current) use of anticoagulants [Z79.01] [Z79.01]         Your next Anticoagulation Clinic appointment(s)     May 10, 2018 11:30 AM CDT   Anticoagulation Visit with NE ANTI COAG   [facility]    [address] [place] [id number] [phone]              Contact Numbers     Please call [phone] to cancel and/or reschedule your appointment.  Please call [phone] with any problems or questions regarding your therapy          April 2018 Details    Sun Mon Tue Wed Thu Fri Sat     1               2               3               4               5               6               7                 8               9               10               11               12               13               14                 15               16               17               18               19      5 mg   See details      20      5 mg         21      5 mg           22      5 mg         23      5 mg         24      5 mg         25      5 mg         26      5 mg         27      5 mg         28      5 mg           29      5 mg         30      5 mg               Date Details   04/19 This INR check               How to take your warfarin dose     To take:  5 mg Take 1 of the 5 mg tablets.           May 2018 Details    Sun Mon Tue Wed Thu Fri Sat       1      5 mg         2      5 mg         3      5 mg         4      5 mg         5      5 mg           6      5 mg         7      5 mg         8      5 mg         9      5 mg         10            11                12                 13               14               15               16               17               18               19                 20               21               22               23               24               25               26                 27               28               29               30               31                  Date Details   No additional details    Date of next INR:  5/10/2018         How to take your warfarin dose     To take:  5 mg Take 1 of the 5 mg tablets.

## 2018-04-24 ENCOUNTER — OFFICE VISIT (OUTPATIENT)
Dept: FAMILY MEDICINE | Facility: CLINIC | Age: 83
End: 2018-04-24
Payer: COMMERCIAL

## 2018-04-24 VITALS
WEIGHT: 151 LBS | HEART RATE: 76 BPM | BODY MASS INDEX: 31.7 KG/M2 | DIASTOLIC BLOOD PRESSURE: 70 MMHG | HEIGHT: 58 IN | SYSTOLIC BLOOD PRESSURE: 105 MMHG | TEMPERATURE: 98.2 F

## 2018-04-24 DIAGNOSIS — J38.3 SPASMODIC DYSPHONIA: ICD-10-CM

## 2018-04-24 DIAGNOSIS — K21.9 GASTROESOPHAGEAL REFLUX DISEASE WITHOUT ESOPHAGITIS: ICD-10-CM

## 2018-04-24 DIAGNOSIS — E03.4 HYPOTHYROIDISM DUE TO ACQUIRED ATROPHY OF THYROID: ICD-10-CM

## 2018-04-24 DIAGNOSIS — Z13.5 SCREENING FOR DIABETIC RETINOPATHY: ICD-10-CM

## 2018-04-24 DIAGNOSIS — I48.91 ATRIAL FIBRILLATION, UNSPECIFIED TYPE (H): ICD-10-CM

## 2018-04-24 DIAGNOSIS — J84.10 FIBROSIS, LUNG (H): ICD-10-CM

## 2018-04-24 DIAGNOSIS — J38.3 LARYNGEAL DYSTONIA: ICD-10-CM

## 2018-04-24 DIAGNOSIS — I10 HYPERTENSION GOAL BP (BLOOD PRESSURE) < 140/90: Primary | ICD-10-CM

## 2018-04-24 LAB — HGB BLD-MCNC: 12.1 G/DL (ref 11.7–15.7)

## 2018-04-24 PROCEDURE — 85018 HEMOGLOBIN: CPT | Performed by: FAMILY MEDICINE

## 2018-04-24 PROCEDURE — 84443 ASSAY THYROID STIM HORMONE: CPT | Performed by: FAMILY MEDICINE

## 2018-04-24 PROCEDURE — 80048 BASIC METABOLIC PNL TOTAL CA: CPT | Performed by: FAMILY MEDICINE

## 2018-04-24 PROCEDURE — 99214 OFFICE O/P EST MOD 30 MIN: CPT | Performed by: FAMILY MEDICINE

## 2018-04-24 PROCEDURE — 36415 COLL VENOUS BLD VENIPUNCTURE: CPT | Performed by: FAMILY MEDICINE

## 2018-04-24 RX ORDER — CODEINE SULFATE 30 MG/1
30 TABLET ORAL 2 TIMES DAILY
Qty: 60 TABLET | Refills: 0 | Status: SHIPPED | OUTPATIENT
Start: 2018-04-24 | End: 2018-06-22

## 2018-04-24 NOTE — PROGRESS NOTES
SUBJECTIVE:   Ashlie Brantley is a 87 year old female who presents to clinic today for the following health issues:      Hypertension Follow-up      Outpatient blood pressures are not being checked.    Low Salt Diet: no added salt    Hypothyroidism Follow-up      Since last visit, patient describes the following symptoms: Weight stable, no hair loss, no skin changes, no constipation, no loose stools      Amount of exercise or physical activity: 2-3 days/week for an average of 45-60 minutes    Problems taking medications regularly: No    Medication side effects: none    Diet: regular (no restrictions)      Medication Followup of Codeine     Taking Medication as prescribed: yes    Side Effects:  None    Medication Helping Symptoms:  yes     No history of pain, has been on codeine 2 at night for many years for pulmonary fibrosis  No sob, no wheezes    History of pulm fibrosis-has o2 NC at night only 2 liters, does not use inhlaers at all    Cad/afib-sees cards, stable on coumadin and meds, she changed INR to do it here                Problem list and histories reviewed & adjusted, as indicated.  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     Hoarseness     Laryngeal dystonia     Spasmodic dysphonia     Hyperlipidemia LDL goal <130     Anxiety     Hypertension goal BP (blood pressure) < 140/90     LVH (left ventricular hypertrophy)     Advance Care Planning     Cough     Dermatochalasis     Health Care Home     Trochanteric bursitis - bilateral     PVD (posterior vitreous detachment) OU     Pseudophakia, OU     IPF (idiopathic pulmonary fibrosis) (H)     Hypothyroidism due to acquired atrophy of thyroid     Chronic pain syndrome     Urinary incontinence, unspecified type     Atrial fibrillation (H) [I48.91]     Long-term (current) use of anticoagulants [Z79.01]     Past  "Surgical History:   Procedure Laterality Date     C NONSPECIFIC PROCEDURE      BACK SURGERY     C NONSPECIFIC PROCEDURE      VOCAL CORD POLYP     C SHOULDER SURG PROC UNLISTED       C STOMACH SURGERY PROCEDURE UNLISTED       C TOTAL KNEE ARTHROPLASTY      LT 1998  /  RT 2001     HC REMOVAL GALLBLADDER       HERNIA REPAIR, INGUINAL RT/LT      RT     HYSTERECTOMY, CERVIX STATUS UNKNOWN      DUB     PHACOEMULSIFICATION WITH STANDARD INTRAOCULAR LENS IMPLANT  Rt 6/11/09, Lt 8/3/09    both eyes Dr. Barnett     ROTATOR CUFF REPAIR RT/LT      RT       Social History   Substance Use Topics     Smoking status: Never Smoker     Smokeless tobacco: Never Used     Alcohol use 0.0 oz/week     0 Standard drinks or equivalent per week      Comment: 1-2 drinks/month     Family History   Problem Relation Age of Onset     DIABETES Mother      C.A.D. Father      CEREBROVASCULAR DISEASE Brother            Reviewed and updated as needed this visit by clinical staff  Tobacco  Allergies  Meds  Med Hx  Surg Hx  Fam Hx  Soc Hx      Reviewed and updated as needed this visit by Provider         ROS:  Constitutional, HEENT, cardiovascular, pulmonary, GI, , musculoskeletal, neuro, skin, endocrine and psych systems are negative, except as otherwise noted.    OBJECTIVE:     /70  Pulse 76  Temp 98.2  F (36.8  C) (Oral)  Ht 4' 10.25\" (1.48 m)  Wt 151 lb (68.5 kg)  Breastfeeding? No  BMI 31.29 kg/m2  Body mass index is 31.29 kg/(m^2).  GENERAL: healthy, alert and no distress  NECK: no adenopathy, no asymmetry, masses, or scars and thyroid normal to palpation  RESP: lungs clear to auscultation - no rales, rhonchi or wheezes  CV: regular rate and rhythm, normal S1 S2, no S3 or S4, no murmur, click or rub, no peripheral edema and peripheral pulses strong  ABDOMEN: soft, nontender, no hepatosplenomegaly, no masses and bowel sounds normal  MS: no gross musculoskeletal defects noted, no edema    Diagnostic Test Results:  Results for orders " placed or performed in visit on 04/24/18 (from the past 24 hour(s))   Hemoglobin   Result Value Ref Range    Hemoglobin 12.1 11.7 - 15.7 g/dL       ASSESSMENT/PLAN:       ICD-10-CM    1. Hypertension goal BP (blood pressure) < 140/90 I10 Basic metabolic panel     Hemoglobin   2. Atrial fibrillation, unspecified type (H) I48.91    3. Hypothyroidism due to acquired atrophy of thyroid E03.4 TSH with free T4 reflex   4. Fibrosis, lung (H) J84.10 codeine 30 MG tablet   5. Gastroesophageal reflux disease without esophagitis K21.9    6. Laryngeal dystonia J38.3    7. Spasmodic dysphonia J38.3    8. Screening for diabetic retinopathy Z13.5      Relatively new to this provider  Reviewed history and meds  Hypothyroidism-stable,cont meds,check labs  A fib/htn-follows up with cards, stable , check labs  Lung fibrosis-progressive, sees pulm, per patient she was started on codeine for this condition but no records found for this, will send her pulmonary team a message, advised weaning if pulm agrees  laryngeal dystonia-worked up by ENT, she does not want more workup          See Patient Instructions    Tad Mendoza DO  [facility]

## 2018-04-24 NOTE — PATIENT INSTRUCTIONS
Get labs done today  Will contact you once I hear from pulmonary team.    [facility]   Discharged by : Mónica LANDAVERDE CMA (AAMA)    Paper scripts provided to patient : Codeine 30 mg     If you have any questions regarding your visit please contact your care team:     Team Gold                Clinic Hours Telephone Number     Dr. Hayley Miner NP 7am-7pm  Monday - Thursday   7am-5pm  Fridays  [phone]   (Appointment scheduling available 24/7)     RN Line  [phone] option 2     [facility] [place] [facility] - 11am-9pm Monday-Friday Saturday-Sunday- 9am-5pm     [place] -   5pm-9pm Monday-Friday Saturday-Sunday- 9am-5pm    [phone] - [place]    [phone] - [place]       For a Price Quote for your services, please call our Consumer Price Line at [phone].     What options do I have for visits at the clinic other than the traditional office visit?     To expand how we care for you, many of our providers are utilizing electronic visits (e-visits) and telephone visits, when medically appropriate, for interactions with their patients rather than a visit in the clinic. We also offer nurse visits for many medical concerns. Just like any other service, we will bill your insurance company for this type of visit based on time spent on the phone with your provider. Not all insurance companies cover these visits. Please check with your medical insurance if this type of visit is covered. You will be responsible for any charges that are not paid by your insurance.   E-visits via [facility]: generally incur a $35.00 fee.     Telephone visits:  Time spent on the phone: *charged based on time that is spent on the phone in increments of 10 minutes. Estimated cost:   5-10 mins $30.00   11-20 mins. $59.00   21-30 mins. $85.00       Use [facility] (secure email communication and access to your  chart) to send your primary care provider a message or make an appointment. Ask someone on your Team how to sign up for [employer].     As always, Thank you for trusting us with your health care needs!      [facility]:    Scheduling Appointments  [place] [place]  Call: [phone]    [facility] Hunt, [facility]  Call: [phone]    [facility]  Call: [phone]    For Gastroenterology referrals   [facility]   Clinics and Surgery [place], 4th Floor   [address] [place] [address]   Appointments: [phone]    WHERE TO GO FOR CARE?  Clinic    Make an appointment if you:       Are sick (cold, cough, flu, sore throat, earache or in pain).       Have a small injury (sprain, small cut, burn or broken bone).       Need a physical exam, Pap smear, vaccine or prescription refill.       Have questions about your health or medicines.    To reach us:      Call [phone] ([phone]). Open 24 hours every day. (For counseling services, call [phone].)    Log into [employer] at [email or web address]. (Visit [email or web address] to create an account.) Hospital emergency room    An emergency is a serious or life- threatening problem that must be treated right away.    Call 566 or get to the hospital if you have:      Very bad or sudden:            - Chest pain or pressure         - Bleeding         - Head or belly pain         - Dizziness or trouble seeing, walking or                          Speaking      Problems breathing      Blood in your vomit or you are coughing up blood      A major injury (knocked out, loss of a finger or limb, rape, broken bone protruding from skin)    A mental health crisis. (Or call the Mental Health Crisis line at 1-945.416.8730 or Suicide Prevention Hotline at 1-909.566.1920.)    Open 24 hours every day. You don't need an appointment.     Urgent care    Visit urgent care for sickness or  small injuries when the clinic is closed. You don't need an appointment. To check hours or find an urgent care near you, visit [email or web address]. Online care    Get online care from [facility] for more than 70 common problems, like colds, allergies and infections. Open 24 hours every day at:   [email or web address]   Need help deciding?    For advice about where to be seen, you may call your clinic and ask to speak with a nurse. We're here for you 24 hours every day.         If you are deaf or hard of hearing, please let us know. We provide many free services including sign language interpreters, oral interpreters, TTYs, telephone amplifiers, note takers and written materials.

## 2018-04-24 NOTE — MR AVS SNAPSHOT
After Visit Summary   4/24/2018    Ashlie Brantley    MRN: [id number]           Patient Information     Date Of Birth          2/24/1931        Visit Information        Provider Department      4/24/2018 1:00 PM Tad Mendoza DO [facility]        Today's Diagnoses     Screening for diabetic retinopathy    -  1    Hypertension goal BP (blood pressure) < 140/90        Atrial fibrillation, unspecified type (H)        Fibrosis, lung (H)        Gastroesophageal reflux disease without esophagitis        Laryngeal dystonia        Spasmodic dysphonia        Hypothyroidism due to acquired atrophy of thyroid          Care Instructions    Get labs done today  Will contact you once I hear from pulmonary team.    [facility]   Discharged by : Mónica LANDAVERDE CMA (AAMA)    Paper scripts provided to patient : Codeine 30 mg     If you have any questions regarding your visit please contact your care team:     Team Gold                Clinic Hours Telephone Number     Dr. Hayley Miner NP 7am-7pm  Monday - Thursday   7am-5pm  Fridays  [phone]   (Appointment scheduling available 24/7)     RN Line  [phone] option 2     Urgent Care - [place] - 11am-9pm Monday-Friday Saturday-Sunday- 9am-5pm     [place] -   5pm-9pm Monday-Friday Saturday-Sunday- 9am-5pm    [phone] - [place]    [phone] - [place]       For a Price Quote for your services, please call our Consumer Price Line at [phone].     What options do I have for visits at the clinic other than the traditional office visit?     To expand how we care for you, many of our providers are utilizing electronic visits (e-visits) and telephone visits, when medically appropriate, for interactions with their patients rather than a visit in the clinic. We also offer nurse visits for many  medical concerns. Just like any other service, we will bill your insurance company for this type of visit based on time spent on the phone with your provider. Not all insurance companies cover these visits. Please check with your medical insurance if this type of visit is covered. You will be responsible for any charges that are not paid by your insurance.   E-visits via [employer]art: generally incur a $35.00 fee.     Telephone visits:  Time spent on the phone: *charged based on time that is spent on the phone in increments of 10 minutes. Estimated cost:   5-10 mins $30.00   11-20 mins. $59.00   21-30 mins. $85.00       Use [employer] (secure email communication and access to your chart) to send your primary care provider a message or make an appointment. Ask someone on your Team how to sign up for [employer].     As always, Thank you for trusting us with your health care needs!      [facility]:    Scheduling Appointments  [facility]  Call: [phone]    [facility]  Call: [phone]    [facility]  Call: [phone]    For Gastroenterology referrals   [facility]   Clinics and Surgery Center, 4th Floor   [address] [place] [address]   Appointments: [phone]    WHERE TO GO FOR CARE?  Clinic    Make an appointment if you:       Are sick (cold, cough, flu, sore throat, earache or in pain).       Have a small injury (sprain, small cut, burn or broken bone).       Need a physical exam, Pap smear, vaccine or prescription refill.       Have questions about your health or medicines.    To reach us:      Call [phone] ([phone]). Open 24 hours every day. (For counseling services, call [phone].)    Log into [employer] at [email or web address]. (Visit [email or web address] to create an account.) Hospital emergency room    An emergency is a serious or life- threatening problem that must be treated  right away.    Call 911 or get to the hospital if you have:      Very bad or sudden:            - Chest pain or pressure         - Bleeding         - Head or belly pain         - Dizziness or trouble seeing, walking or                          Speaking      Problems breathing      Blood in your vomit or you are coughing up blood      A major injury (knocked out, loss of a finger or limb, rape, broken bone protruding from skin)    A mental health crisis. (Or call the Mental Health Crisis line at 1-746.513.4006 or Suicide Prevention Hotline at 1-596.350.9784.)    Open 24 hours every day. You don't need an appointment.     Urgent care    Visit urgent care for sickness or small injuries when the clinic is closed. You don't need an appointment. To check hours or find an urgent care near you, visit [email or web address]. Online care    Get online care from [facility] for more than 70 common problems, like colds, allergies and infections. Open 24 hours every day at:   [email or web address]   Need help deciding?    For advice about where to be seen, you may call your clinic and ask to speak with a nurse. We're here for you 24 hours every day.         If you are deaf or hard of hearing, please let us know. We provide many free services including sign language interpreters, oral interpreters, TTYs, telephone amplifiers, note takers and written materials.                   Follow-ups after your visit        Your next 10 appointments already scheduled     May 10, 2018 11:30 AM CDT   Anticoagulation Visit with NE ANTI COAG   [facility]    [address] [place] [address] [phone]              Who to contact     If you have questions or need follow up information about today's clinic visit or your schedule please contact [facility] directly at [phone].  Normal or non-critical lab and imaging results will be communicated to you by MyChart, letter or  "phone within 4 business days after the clinic has received the results. If you do not hear from us within 7 days, please contact the clinic through [employer]i or phone. If you have a critical or abnormal lab result, we will notify you by phone as soon as possible.  Submit refill requests through [employer] or call your pharmacy and they will forward the refill request to us. Please allow 3 business days for your refill to be completed.          Additional Information About Your Visit        [employer] Information     [employer] gives you secure access to your electronic health record. If you see a primary care provider, you can also send messages to your care team and make appointments. If you have questions, please call your primary care clinic.  If you do not have a primary care provider, please call [phone] and they will assist you.        Care EveryWhere ID     This is your Care EveryWhere ID. This could be used by other organizations to access your [facility] medical records  [id number]        Your Vitals Were     Pulse Temperature Height Breastfeeding? BMI (Body Mass Index)       76 98.2  F (36.8  C) (Oral) 4' 10.25\" (1.48 m) No 31.29 kg/m2        Blood Pressure from Last 3 Encounters:   04/24/18 105/70   03/28/18 104/72   02/28/18 124/76    Weight from Last 3 Encounters:   04/24/18 151 lb (68.5 kg)   03/28/18 149 lb (67.6 kg)   02/28/18 146 lb (66.2 kg)              We Performed the Following     Basic metabolic panel     Hemoglobin     TSH with free T4 reflex          Where to get your medicines      Some of these will need a paper prescription and others can be bought over the counter.  Ask your nurse if you have questions.     Bring a paper prescription for each of these medications     codeine 30 MG tablet         Information about OPIOIDS     PRESCRIPTION OPIOIDS: WHAT YOU NEED TO KNOW   You have a prescription for an opioid (narcotic) pain medicine. Opioids can cause addiction. If you have a history of " chemical dependency of any type, you are at a higher risk of becoming addicted to opioids. Only take this medicine after all other options have been tried. Take it for as short a time and as few doses as possible.     Do not:    Drive. If you drive while taking these medicines, you could be arrested for driving under the influence (DUI).    Operate heavy machinery    Do any other dangerous activities while taking these medicines.     Drink any alcohol while taking these medicines.      Take with any other medicines that contain acetaminophen. Read all labels carefully. Look for the word  acetaminophen  or  Tylenol.  Ask your pharmacist if you have questions or are unsure.    Store your pills in a secure place, locked if possible. We will not replace any lost or stolen medicine. If you don t finish your medicine, please throw away (dispose) as directed by your pharmacist. The [place] Pollution Control Agency has more information about safe disposal: https://www.pca.Counts include 234 beds at the [facility].[place].us/living-green/managing-unwanted-medications    All opioids tend to cause constipation. Drink plenty of water and eat foods that have a lot of fiber, such as fruits, vegetables, prune juice, apple juice and high-fiber cereal. Take a laxative (Miralax, milk of magnesia, Colace, Senna) if you don t move your bowels at least every other day.          Primary Care Provider Office Phone # Fax #    Tad MendozaDO [phone]       [facility] [address]        Equal Access to Services     JUNIOR MURPHY AH: Hadii viktor kenneyo Sopurnima, waaxda luqadaha, qaybta kaalmada ademicheleyada, giovani muller. So [facility] [phone].    ATENCIÓN: Si habla español, tiene a leon disposición servicios gratuitos de asistencia lingüística. Llame al [phone].    We comply with applicable federal civil rights laws and [place] laws. We do not discriminate on the basis of race, color, national origin,  age, disability, sex, sexual orientation, or gender identity.            Thank you!     Thank you for choosing [facility]  for your care. Our goal is always to provide you with excellent care. Hearing back from our patients is one way we can continue to improve our services. Please take a few minutes to complete the written survey that you may receive in the mail after your visit with us. Thank you!             Your Updated Medication List - Protect others around you: Learn how to safely use, store and throw away your medicines at www.disposemymeds.org.          This list is accurate as of 4/24/18  1:40 PM.  Always use your most recent med list.                   Brand Name Dispense Instructions for use Diagnosis    aspirin 81 MG tablet     1 Tab    ONE DAILY    HTN (hypertension), Elevated cholesterol       BOTOX 100 units injection   Generic drug:  botulinum toxin type A      Inject into the muscle once        calcium + D 600-200 MG-UNIT Tabs   Generic drug:  calcium carbonate-vitamin D     0    ONE TABLET TWICE A DAY WITH MEALS    Osteopenia       * codeine 30 MG tablet     60 tablet    Take 1 tablet (30 mg) by mouth 2 times daily May fill on or after 1/9/18    Fibrosis, lung (H)       * codeine 30 MG tablet     60 tablet    Take 1 tablet (30 mg) by mouth 2 times daily May fill on or after 3/10/18    Fibrosis, lung (H)       * codeine 30 MG tablet     60 tablet    Take 1 tablet (30 mg) by mouth 2 times daily May fill on or after 2/8/18    Fibrosis, lung (H)       furosemide 20 MG tablet    LASIX     Take 20 mg by mouth        guaiFENesin-codeine 100-10 MG/5ML Syrp syrup    guaiFENesin AC     Take 5-10 mLs by mouth        levothyroxine 88 MCG tablet    SYNTHROID    90 tablet    Take 1 tablet (88 mcg) by mouth daily    Hypothyroidism due to acquired atrophy of thyroid       losartan 100 MG tablet    COZAAR    90 tablet    Take 1 tablet (100 mg) by mouth daily    Hypertension goal BP (blood  pressure) < 140/90       metoprolol tartrate 25 MG tablet    LOPRESSOR     Take 25 mg by mouth 2 times daily        neomycin-polymyxin-dexamethasone 3.5-95314-9.1 Oint ophthalmic ointment    MAXITROL    1 Tube    Place 1 Application into both eyes At Bedtime Apply a small squirt into each eye at bedtime    Dermatochalasis, unspecified laterality       OMEGA-3 COMPLEX PO      1 capsule daily        order for DME     1 each    Equipment being ordered: Oxygen 2 liters NC at HS    Oxygen decrease, IPF (idiopathic pulmonary fibrosis) (H)       * order for DME     1 Units    Equipment being ordered: Please provide night time oxygen at 2L/min via nasal canula    IPF (idiopathic pulmonary fibrosis) (H), Hypoxia       * order for DME     1 Device    Equipment being ordered: Oxygen 2 liters NC at night time    IPF (idiopathic pulmonary fibrosis) (H)       oseltamivir 30 MG capsule    TAMIFLU     Take 30 mg by mouth        simvastatin 40 MG tablet    ZOCOR    90 tablet    Take 1 tablet (40 mg) by mouth At Bedtime    Hyperlipidemia LDL goal <130       * vitamin D3 1000 units Caps      1 CAPSULE DAILY        * vitamin D 2000 units tablet     0    1 tablet daily    Osteopenia       VITAMIN E COMPLEX PO      Take  by mouth.        warfarin 5 MG tablet    COUMADIN    90 tablet    Take 1 tablet (5 mg) by mouth daily    Persistent atrial fibrillation (H)       * Notice:  This list has 7 medication(s) that are the same as other medications prescribed for you. Read the directions carefully, and ask your doctor or other care provider to review them with you.

## 2018-04-24 NOTE — LETTER
[address] [address] [phone]                                                                                                May 15, 2018    Ashile Brantley  [address] [place] [place] [address]        Dear Ms. Brantley,    Your recent lab results were within normal limits. A copy of those results are included with this letter.      Sincerely,      Tad Mendoza, DO/hl    Results for orders placed or performed in visit on 04/24/18   TSH with free T4 reflex   Result Value Ref Range    TSH 0.42 0.40 - 4.00 mU/L   Basic metabolic panel   Result Value Ref Range    Sodium 139 133 - 144 mmol/L    Potassium 4.1 3.4 - 5.3 mmol/L    Chloride 101 94 - 109 mmol/L    Carbon Dioxide 34 (H) 20 - 32 mmol/L    Anion Gap 4 3 - 14 mmol/L    Glucose 97 70 - 99 mg/dL    Urea Nitrogen 29 7 - 30 mg/dL    Creatinine 0.77 0.52 - 1.04 mg/dL    GFR Estimate 71 >60 mL/min/1.7m2    GFR Estimate If Black 86 >60 mL/min/1.7m2    Calcium 9.0 8.5 - 10.1 mg/dL   Hemoglobin   Result Value Ref Range    Hemoglobin 12.1 11.7 - 15.7 g/dL

## 2018-04-25 LAB
ANION GAP SERPL CALCULATED.3IONS-SCNC: 4 MMOL/L (ref 3–14)
BUN SERPL-MCNC: 29 MG/DL (ref 7–30)
CALCIUM SERPL-MCNC: 9 MG/DL (ref 8.5–10.1)
CHLORIDE SERPL-SCNC: 101 MMOL/L (ref 94–109)
CO2 SERPL-SCNC: 34 MMOL/L (ref 20–32)
CREAT SERPL-MCNC: 0.77 MG/DL (ref 0.52–1.04)
GFR SERPL CREATININE-BSD FRML MDRD: 71 ML/MIN/1.7M2
GLUCOSE SERPL-MCNC: 97 MG/DL (ref 70–99)
POTASSIUM SERPL-SCNC: 4.1 MMOL/L (ref 3.4–5.3)
SODIUM SERPL-SCNC: 139 MMOL/L (ref 133–144)
TSH SERPL DL<=0.005 MIU/L-ACNC: 0.42 MU/L (ref 0.4–4)

## 2018-05-08 ENCOUNTER — CARE COORDINATION (OUTPATIENT)
Dept: PHYSICAL THERAPY | Facility: CLINIC | Age: 83
End: 2018-05-08
Payer: COMMERCIAL

## 2018-05-09 NOTE — PROGRESS NOTES
René Patel MD Dessie, Tad Chavarria, DO                     Hello,     I have only seen this patient a few times after I took over her care from another fellow in our clinic, but I was not aware of our service having started the codeine.   Codeine is not going to provide any therapeutic benefit for pulmonary fibrosis other than the obvious cough relief. Is she complaining of much cough? When I have seen her, she hasn't mentioned a cough.   So, as far as the pulmonary fibrosis goes, if she has trouble with cough, it's not an unreasonable med for symptom releife, but there is no other therapeutic reason to continue it.     I hope that answers your question, if not, feel free to shoot me another message or page me.     Thanks,     René Patel M.D.   Pulmonary & Critical Care Fellow   Pager [phone]             I told patient I will contact her after we hear from her pulmonary team  Please call patient and ask if she has cough as a symptom for her pulmonary fibrosis. If so can continue codeine.  Otherwise we can try to wean her codeine  Tad Mendoza D.O.

## 2018-05-10 ENCOUNTER — ANTICOAGULATION THERAPY VISIT (OUTPATIENT)
Dept: NURSING | Facility: CLINIC | Age: 83
End: 2018-05-10
Payer: COMMERCIAL

## 2018-05-10 DIAGNOSIS — Z79.01 LONG-TERM (CURRENT) USE OF ANTICOAGULANTS: ICD-10-CM

## 2018-05-10 DIAGNOSIS — I48.91 ATRIAL FIBRILLATION, UNSPECIFIED TYPE (H): ICD-10-CM

## 2018-05-10 LAB — INR POINT OF CARE: 2.1 (ref 0.86–1.14)

## 2018-05-10 PROCEDURE — 36416 COLLJ CAPILLARY BLOOD SPEC: CPT

## 2018-05-10 PROCEDURE — 99207 ZZC NO CHARGE NURSE ONLY: CPT

## 2018-05-10 PROCEDURE — 85610 PROTHROMBIN TIME: CPT | Mod: QW

## 2018-05-10 NOTE — MR AVS SNAPSHOT
Ashlie Brantley   5/10/2018 11:30 AM   Anticoagulation Therapy Visit    Description:  87 year old female   Provider:  NE ANTI COAG   Department:  Ne Nurse           INR as of 5/10/2018     Today's INR 2.1      Anticoagulation Summary as of 5/10/2018     INR goal 2.0-3.0   Today's INR 2.1   Full instructions 7.5 mg on Sun; 5 mg all other days   Next INR check 5/31/2018    Indications   Atrial fibrillation (H) [I48.91] [I48.91]  Long-term (current) use of anticoagulants [Z79.01] [Z79.01]         Instructions    Eat greens         Your next Anticoagulation Clinic appointment(s)     May 31, 2018 11:45 AM CDT   Anticoagulation Visit with NE ANTI COAG   [facility]    [address] [place] [address] [phone]              Contact Numbers     Please call [phone] to cancel and/or reschedule your appointment.  Please call [phone] with any problems or questions regarding your therapy          May 2018 Details    Sun Mon Tue Wed Thu Fri Sat       1               2               3               4               5                 6               7               8               9               10      5 mg   See details      11      5 mg         12      5 mg           13      7.5 mg         14      5 mg         15      5 mg         16      5 mg         17      5 mg         18      5 mg         19      5 mg           20      7.5 mg         21      5 mg         22      5 mg         23      5 mg         24      5 mg         25      5 mg         26      5 mg           27      7.5 mg         28      5 mg         29      5 mg         30      5 mg         31               Date Details   05/10 This INR check       Date of next INR:  5/31/2018         How to take your warfarin dose     To take:  5 mg Take 1 of the 5 mg tablets.    To take:  7.5 mg Take 1.5 of the 5 mg tablets.

## 2018-05-10 NOTE — PROGRESS NOTES
ANTICOAGULATION FOLLOW-UP CLINIC VISIT    Patient Name:  Ashlie Brantley  Date:  5/10/2018  Contact Type:  Face to Face    SUBJECTIVE:     Patient Findings     Positives Change in diet/appetite (wants to eat a few more green vegetables. Will increase her coumadin a little.), No Problem Findings           OBJECTIVE    INR Protime   Date Value Ref Range Status   05/10/2018 2.1 (A) 0.86 - 1.14 Final       ASSESSMENT / PLAN  INR assessment THER    Recheck INR In: 3 WEEKS    INR Location Clinic      Anticoagulation Summary as of 5/10/2018     INR goal 2.0-3.0   Today's INR 2.1   Maintenance plan 7.5 mg (5 mg x 1.5) on Sun; 5 mg (5 mg x 1) all other days   Full instructions 7.5 mg on Sun; 5 mg all other days   Weekly total 37.5 mg   Plan last modified Jarek Stephens RN (5/10/2018)   Next INR check 5/31/2018   Target end date Indefinite    Indications   Atrial fibrillation (H) [I48.91] [I48.91]  Long-term (current) use of anticoagulants [Z79.01] [Z79.01]         Anticoagulation Episode Summary     INR check location     Preferred lab     Send INR reminders to [facility]    Comments       Anticoagulation Care Providers     Provider Role Specialty Phone number    RejiTad lynne Aura,  [facility] [phone]            See the Encounter Report to view Anticoagulation Flowsheet and Dosing Calendar (Go to Encounters tab in chart review, and find the Anticoagulation Therapy Visit)    Dosage adjustment made based on physician directed care plan.    Jarek Stephens, RN

## 2018-05-31 ENCOUNTER — ANTICOAGULATION THERAPY VISIT (OUTPATIENT)
Dept: NURSING | Facility: CLINIC | Age: 83
End: 2018-05-31
Payer: COMMERCIAL

## 2018-05-31 DIAGNOSIS — I48.91 ATRIAL FIBRILLATION, UNSPECIFIED TYPE (H): ICD-10-CM

## 2018-05-31 DIAGNOSIS — Z79.01 LONG-TERM (CURRENT) USE OF ANTICOAGULANTS: ICD-10-CM

## 2018-05-31 LAB — INR POINT OF CARE: 2.8 (ref 0.86–1.14)

## 2018-05-31 PROCEDURE — 85610 PROTHROMBIN TIME: CPT | Mod: QW

## 2018-05-31 PROCEDURE — 99207 ZZC NO CHARGE NURSE ONLY: CPT

## 2018-05-31 PROCEDURE — 36416 COLLJ CAPILLARY BLOOD SPEC: CPT

## 2018-05-31 RX ORDER — WARFARIN SODIUM 7.5 MG/1
TABLET ORAL
Qty: 14 TABLET | Refills: 3 | Status: SHIPPED | OUTPATIENT
Start: 2018-05-31 | End: 2018-09-11

## 2018-05-31 NOTE — MR AVS SNAPSHOT
Ashlie Brantley   5/31/2018 11:45 AM   Anticoagulation Therapy Visit    Description:  87 year old female   Provider:  NE ANTI ELSA   Department:  Ne Nurse           INR as of 5/31/2018     Today's INR 2.8      Anticoagulation Summary as of 5/31/2018     INR goal 2.0-3.0   Today's INR 2.8   Full warfarin instructions 7.5 mg on Sun; 5 mg all other days   Next INR check 6/28/2018    Indications   Atrial fibrillation (H) [I48.91] [I48.91]  Long-term (current) use of anticoagulants [Z79.01] [Z79.01]         Your next Anticoagulation Clinic appointment(s)     Jun 28, 2018 11:45 AM CDT   [facility] NE ANTI [facility]AG   [facility]    [address] [place] [address] [phone]              Contact Numbers     Please call [phone] to cancel and/or reschedule your appointment.  Please call [phone] with any problems or questions regarding your therapy          May 2018 Details    Sun Mon Tue Wed Thu Fri Sat       1               2               3               4               5                 6               7               8               9               10               11               12                 13               14               15               16               17               18               19                 20               21               22               23               24               25               26                 27               28               29               30               31      5 mg   See details         Date Details   05/31 This INR check               How to take your warfarin dose     To take:  5 mg Take 1 of the 5 mg tablets.           June 2018 Details    Sun Mon Tue Wed Thu Fri Sat          1      5 mg         2      5 mg           3      7.5 mg         4      5 mg         5      5 mg         6      5 mg         7      5 mg         8      5 mg         9      5 mg           10      7.5  mg         11      5 mg         12      5 mg         13      5 mg         14      5 mg         15      5 mg         16      5 mg           17      7.5 mg         18      5 mg         19      5 mg         20      5 mg         21      5 mg         22      5 mg         23      5 mg           24      7.5 mg         25      5 mg         26      5 mg         27      5 mg         28            29               30                Date Details   No additional details    Date of next INR:  6/28/2018         How to take your warfarin dose     To take:  5 mg Take 1 of the 5 mg tablets.    To take:  7.5 mg Take 1.5 of the 5 mg tablets.

## 2018-05-31 NOTE — PROGRESS NOTES
ANTICOAGULATION FOLLOW-UP CLINIC VISIT    Patient Name:  Ashlie Brantley  Date:  5/31/2018  Contact Type:  Face to Face    SUBJECTIVE:     Patient Findings     Positives No Problem Findings           OBJECTIVE    INR Protime   Date Value Ref Range Status   05/31/2018 2.8 (A) 0.86 - 1.14 Final       ASSESSMENT / PLAN  INR assessment THER    Recheck INR In: 4 WEEKS    INR Location Clinic      Anticoagulation Summary as of 5/31/2018     INR goal 2.0-3.0   Today's INR 2.8   Warfarin maintenance plan 7.5 mg (5 mg x 1.5) on Sun; 5 mg (5 mg x 1) all other days   Full warfarin instructions 7.5 mg on Sun; 5 mg all other days   Weekly warfarin total 37.5 mg   No change documented Jarek Stephens RN   Plan last modified Jarek Stephens RN (5/10/2018)   Next INR check 6/28/2018   Target end date Indefinite    Indications   Atrial fibrillation (H) [I48.91] [I48.91]  Long-term (current) use of anticoagulants [Z79.01] [Z79.01]         Anticoagulation Episode Summary     INR check location     Preferred lab     Send INR reminders to [facility]    Comments       Anticoagulation Care Providers     Provider Role Specialty Phone number    RejiFede lynnemaximbrandt DO Aura [facility] [phone]            See the Encounter Report to view Anticoagulation Flowsheet and Dosing Calendar (Go to Encounters tab in chart review, and find the Anticoagulation Therapy Visit)        Jarek Stephens, REJI

## 2018-06-11 ENCOUNTER — TRANSFERRED RECORDS (OUTPATIENT)
Dept: HEALTH INFORMATION MANAGEMENT | Facility: CLINIC | Age: 83
End: 2018-06-11

## 2018-06-14 ENCOUNTER — OFFICE VISIT (OUTPATIENT)
Dept: UROLOGY | Facility: CLINIC | Age: 83
End: 2018-06-14
Payer: COMMERCIAL

## 2018-06-14 VITALS — DIASTOLIC BLOOD PRESSURE: 80 MMHG | OXYGEN SATURATION: 97 % | SYSTOLIC BLOOD PRESSURE: 139 MMHG | HEART RATE: 72 BPM

## 2018-06-14 DIAGNOSIS — N32.81 OAB (OVERACTIVE BLADDER): Primary | ICD-10-CM

## 2018-06-14 LAB
ALBUMIN UR-MCNC: NEGATIVE MG/DL
APPEARANCE UR: CLEAR
BILIRUB UR QL STRIP: NEGATIVE
COLOR UR AUTO: YELLOW
GLUCOSE UR STRIP-MCNC: NEGATIVE MG/DL
HGB UR QL STRIP: NEGATIVE
KETONES UR STRIP-MCNC: NEGATIVE MG/DL
LEUKOCYTE ESTERASE UR QL STRIP: ABNORMAL
NITRATE UR QL: NEGATIVE
PH UR STRIP: 6 PH (ref 5–7)
RBC #/AREA URNS AUTO: NORMAL /HPF
SOURCE: ABNORMAL
SP GR UR STRIP: 1.01 (ref 1–1.03)
UROBILINOGEN UR STRIP-ACNC: 0.2 EU/DL (ref 0.2–1)
WBC #/AREA URNS AUTO: NORMAL /HPF

## 2018-06-14 PROCEDURE — 52000 CYSTOURETHROSCOPY: CPT | Performed by: UROLOGY

## 2018-06-14 PROCEDURE — 99214 OFFICE O/P EST MOD 30 MIN: CPT | Mod: 25 | Performed by: UROLOGY

## 2018-06-14 PROCEDURE — 81001 URINALYSIS AUTO W/SCOPE: CPT | Performed by: UROLOGY

## 2018-06-14 RX ORDER — TOLTERODINE 4 MG/1
4 CAPSULE, EXTENDED RELEASE ORAL DAILY
Qty: 30 CAPSULE | Refills: 1 | Status: SHIPPED | OUTPATIENT
Start: 2018-06-14 | End: 2018-07-30

## 2018-06-14 NOTE — MR AVS SNAPSHOT
After Visit Summary   6/14/2018    Ashlie Brantley    MRN: [id number]           Patient Information     Date Of Birth          2/24/1931        Visit Information        Provider Department      6/14/2018 9:00 AM René Warner MD [facility]        Today's Diagnoses     OAB (overactive bladder)    -  1       Follow-ups after your visit        Follow-up notes from your care team     Return in about 2 months (around 8/14/2018).      Your next 10 appointments already scheduled     Jun 28, 2018 11:45 AM CDT   Anticoagulation Visit with NE ANTI COAG   [facility] ([facility]    [address] [place] [id number] [phone]            Aug 16, 2018  9:00 AM CDT   Return Visit with René Warner MD   [facility] ([facility]    [address] [place] [address] [place] [id number] [phone]              Who to contact     If you have questions or need follow up information about today's clinic visit or your schedule please contact [facility] directly at [phone].  Normal or non-critical lab and imaging results will be communicated to you by MyChart, letter or phone within 4 business days after the clinic has received the results. If you do not hear from us within 7 days, please contact the clinic through MyChart or phone. If you have a critical or abnormal lab result, we will notify you by phone as soon as possible.  Submit refill requests through [employer] or call your pharmacy and they will forward the refill request to us. Please allow 3 business days for your refill to be completed.          Additional Information About Your Visit        MyChart Information     [employer] gives you secure access to your electronic health record. If you see a primary care provider, you can also send messages to your care team and make appointments. If you have questions, please call your primary care clinic.   If you do not have a primary care provider, please call [phone] and they will assist you.        Care EveryWhere ID     This is your Care EveryWhere ID. This could be used by other organizations to access your [facility] medical records  [id number]        Your Vitals Were     Pulse Pulse Oximetry                72 97%           Blood Pressure from Last 3 Encounters:   06/14/18 139/80   04/24/18 105/70   03/28/18 104/72    Weight from Last 3 Encounters:   04/24/18 68.5 kg (151 lb)   03/28/18 67.6 kg (149 lb)   02/28/18 66.2 kg (146 lb)              We Performed the Following     CYSTOURETHROSCOPY (65554)     UA reflex to Microscopic     Urine Microscopic          Today's Medication Changes          These changes are accurate as of 6/14/18  9:57 AM.  If you have any questions, ask your nurse or doctor.               Start taking these medicines.        Dose/Directions    tolterodine 4 MG 24 hr capsule   Commonly known as:  DETROL LA   Started by:  René Warner MD        Dose:  4 mg   Take 1 capsule (4 mg) by mouth daily   Quantity:  30 capsule   Refills:  1            Where to get your medicines      These medications were sent to [facility] [address]  [address] [place] [address], [place] [address]     Phone:  [phone]     tolterodine 4 MG 24 hr capsule                Primary Care Provider Office Phone # Fax Marissa Mendoza [phone]       [address] [place] [address]        Equal Access to Services     JUNIOR MURPHY AH: Hadii viktor kenneyo Soamadoali, waaxda luqadaha, qaybta kaalmada adeegyada, giovani muller. So [facility] [phone].    ATENCIÓN: Si habla español, tiene a leon disposición servicios gratuitos de asistencia lingüística. Llame al [phone].    We comply with applicable federal civil rights laws and [place] laws. We do not discriminate on the basis of race, color,  national origin, age, disability, sex, sexual orientation, or gender identity.            Thank you!     Thank you for choosing [facility]  for your care. Our goal is always to provide you with excellent care. Hearing back from our patients is one way we can continue to improve our services. Please take a few minutes to complete the written survey that you may receive in the mail after your visit with us. Thank you!             Your Updated Medication List - Protect others around you: Learn how to safely use, store and throw away your medicines at www.disposemymeds.org.          This list is accurate as of 6/14/18  9:57 AM.  Always use your most recent med list.                   Brand Name Dispense Instructions for use Diagnosis    aspirin 81 MG tablet     1 Tab    ONE DAILY    HTN (hypertension), Elevated cholesterol       BOTOX 100 units injection   Generic drug:  botulinum toxin type A      Inject into the muscle once        calcium + D 600-200 MG-UNIT Tabs   Generic drug:  calcium carbonate-vitamin D     0    ONE TABLET TWICE A DAY WITH MEALS    Osteopenia       * codeine 30 MG tablet     60 tablet    Take 1 tablet (30 mg) by mouth 2 times daily May fill on or after 1/9/18    Fibrosis, lung (H)       * codeine 30 MG tablet     60 tablet    Take 1 tablet (30 mg) by mouth 2 times daily May fill on or after 3/10/18    Fibrosis, lung (H)       * codeine 30 MG tablet     60 tablet    Take 1 tablet (30 mg) by mouth 2 times daily May fill on or after 2/8/18    Fibrosis, lung (H)       furosemide 20 MG tablet    LASIX     Take 20 mg by mouth        guaiFENesin-codeine 100-10 MG/5ML Syrp syrup    guaiFENesin AC     Take 5-10 mLs by mouth        levothyroxine 88 MCG tablet    SYNTHROID    90 tablet    Take 1 tablet (88 mcg) by mouth daily    Hypothyroidism due to acquired atrophy of thyroid       losartan 100 MG tablet    COZAAR    90 tablet    Take 1 tablet (100 mg) by mouth daily    Hypertension goal BP  (blood pressure) < 140/90       metoprolol tartrate 25 MG tablet    LOPRESSOR     Take 25 mg by mouth 2 times daily        neomycin-polymyxin-dexamethasone 3.5-38655-7.1 Oint ophthalmic ointment    MAXITROL    1 Tube    Place 1 Application into both eyes At Bedtime Apply a small squirt into each eye at bedtime    Dermatochalasis, unspecified laterality       OMEGA-3 COMPLEX PO      1 capsule daily        order for DME     1 each    Equipment being ordered: Oxygen 2 liters NC at HS    Oxygen decrease, IPF (idiopathic pulmonary fibrosis) (H)       * order for DME     1 Units    Equipment being ordered: Please provide night time oxygen at 2L/min via nasal canula    IPF (idiopathic pulmonary fibrosis) (H), Hypoxia       * order for DME     1 Device    Equipment being ordered: Oxygen 2 liters NC at night time    IPF (idiopathic pulmonary fibrosis) (H)       oseltamivir 30 MG capsule    TAMIFLU     Take 30 mg by mouth        simvastatin 40 MG tablet    ZOCOR    90 tablet    Take 1 tablet (40 mg) by mouth At Bedtime    Hyperlipidemia LDL goal <130       tolterodine 4 MG 24 hr capsule    DETROL LA    30 capsule    Take 1 capsule (4 mg) by mouth daily        * vitamin D3 1000 units Caps      1 CAPSULE DAILY        * vitamin D 2000 units tablet     0    1 tablet daily    Osteopenia       VITAMIN E COMPLEX PO      Take  by mouth.        * warfarin 5 MG tablet    COUMADIN    90 tablet    Take 1 tablet (5 mg) by mouth daily    Persistent atrial fibrillation (H)       * warfarin 7.5 MG tablet    COUMADIN    14 tablet    Take 7.5 mg every Sunday. 5mg ROW    Atrial fibrillation, unspecified type (H), Long-term (current) use of anticoagulants       * Notice:  This list has 9 medication(s) that are the same as other medications prescribed for you. Read the directions carefully, and ask your doctor or other care provider to review them with you.

## 2018-06-14 NOTE — PROGRESS NOTES
Ashlie Brantley is a 87 year old female seen in consultation for incont. Consult from Tad Mendoza.    Seen by Dr Hendrickson in 11/15 for hypertonic bladder, underwent Botox x 1, not sure if it helped. Also underwent sling in the past, not certain if that helped.    Presently wears both pad and Depend, both day and nite. Feels as though urge component is greater bother than stress.    Denies dysuria, gross hematuria. Voids about q 90 minutes during the day, noc x 1.     No UTI's in many yrs. No other bladder surgeries. Hx 6 vag deliveries. Not on HRT. Denies fecal incont.    Drinks one caf coffee, one tea, 3 [place] per day. (Did not complete voiding diary). Retired.    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     sees [facility]     Cataract     IOL both     DJD (degenerative joint disease)     KNEES     Elevated cholesterol      GERD (gastroesophageal reflux disease)      Hoarseness      HTN (hypertension)      Hypothyroid      Laryngeal dystonia      LVH (left ventricular hypertrophy) due to hypertensive disease      Osteopenia     MILD     Urinary stress incontinence        Past Surgical History:   Procedure Laterality Date     C NONSPECIFIC PROCEDURE      BACK SURGERY     C NONSPECIFIC PROCEDURE      VOCAL CORD POLYP     C SHOULDER SURG PROC UNLISTED       C STOMACH SURGERY PROCEDURE UNLISTED       C TOTAL KNEE ARTHROPLASTY      LT 1998  /  RT 2001     HC REMOVAL GALLBLADDER       HERNIA REPAIR, INGUINAL RT/LT      RT     HYSTERECTOMY, CERVIX STATUS UNKNOWN      DUB     PHACOEMULSIFICATION WITH STANDARD INTRAOCULAR LENS IMPLANT  Rt 6/11/09, Lt 8/3/09    both eyes Dr. Barnett     ROTATOR CUFF REPAIR RT/LT      RT       Social History     Social History     Marital status:      Spouse name: N/A     Number of children: 6     Years of education: N/A     Occupational History       Retired     Social History Main Topics     Smoking status: Never Smoker     Smokeless tobacco: Never Used     Alcohol use 0.0 oz/week     0 Standard drinks or equivalent per week      Comment: 1-2 drinks/month     Drug use: No     Sexual activity: Not Currently     Other Topics Concern      Service No     Blood Transfusions No     Caffeine Concern No     Occupational Exposure No     Hobby Hazards No     Sleep Concern No     Stress Concern No     Weight Concern Yes     Special Diet No     Back Care No     Exercise Yes     a litttle     Seat Belt Yes     Self-Exams Yes     Parent/Sibling W/ Cabg, Mi Or Angioplasty Before 65f 55m? No     Social History Narrative       Current Outpatient Prescriptions   Medication Sig Dispense Refill     ASPIRIN 81 MG OR TABS ONE DAILY 1 Tab 0     botulinum toxin type A (BOTOX) 100 UNITS injection Inject into the muscle once       CALCIUM + D 600-200 MG-UNIT OR TABS ONE TABLET TWICE A DAY WITH MEALS 0 0     codeine 30 MG tablet Take 1 tablet (30 mg) by mouth 2 times daily May fill on or after 2/8/18 60 tablet 0     codeine 30 MG tablet Take 1 tablet (30 mg) by mouth 2 times daily May fill on or after 3/10/18 60 tablet 0     codeine 30 MG tablet Take 1 tablet (30 mg) by mouth 2 times daily May fill on or after 1/9/18 60 tablet 0     furosemide (LASIX) 20 MG tablet Take 20 mg by mouth       guaiFENesin-codeine (GUAIFENESIN AC) 100-10 MG/5ML SYRP syrup Take 5-10 mLs by mouth       levothyroxine (SYNTHROID) 88 MCG tablet Take 1 tablet (88 mcg) by mouth daily 90 tablet 3     losartan (COZAAR) 100 MG tablet Take 1 tablet (100 mg) by mouth daily 90 tablet 3     metoprolol tartrate (LOPRESSOR) 25 MG tablet Take 25 mg by mouth 2 times daily       neomycin-polymyxin-dexamethasone (MAXITROL) 3.5-69786-5.1 OINT ophthalmic ointment Place 1 Application into both eyes At Bedtime Apply a small squirt into each eye at bedtime 1 Tube 6     OMEGA-3 COMPLEX OR 1 capsule daily       order for DME  Equipment being ordered: Oxygen 2 liters NC at night time 1 Device 0     order for DME Equipment being ordered: Please provide night time oxygen at 2L/min via nasal canula 1 Units 0     ORDER FOR DME Equipment being ordered: Oxygen 2 liters NC at HS 1 each 0     oseltamivir (TAMIFLU) 30 MG capsule Take 30 mg by mouth       simvastatin (ZOCOR) 40 MG tablet Take 1 tablet (40 mg) by mouth At Bedtime 90 tablet 3     VITAMIN D 2000 UNIT OR TABS 1 tablet daily 0 0     VITAMIN D3 1000 UNIT OR CAPS 1 CAPSULE DAILY       VITAMIN E COMPLEX PO Take  by mouth.       warfarin (COUMADIN) 5 MG tablet Take 1 tablet (5 mg) by mouth daily 90 tablet 1     warfarin (COUMADIN) 7.5 MG tablet Take 7.5 mg every Sunday. 5mg ROW 14 tablet 3       Physical Exam:    GENL: NAD. Difficulty speaking.   ABD: Soft, non-tender, no masses.    EG: Poorly-estrogenized, no masses.    VAGINA: Poorly-estrogenized, no masses, forshortened.    BN HYPERMOBILITY: None.    CYSTOCELE: None.    APICAL PROLAPSE: None.    RECTOCELE: None.    BIMANUAL: No mass or tenderness.    Cysto:    (Informed consent obtained. Pause for cause performed)   Sterile prep.    17 Fr scope inserted through urethra. Systematic examination w 70 degree lens.   PVR: 5 cc   MUCOSA: Normal without lesion   ORIFICES: Normal location and morphology   CAPACITY: 250 cc; no pain with filling   Scope withdrawn without untoward effect.    Valsalva:   Minimal hypermobility, minimal leakage noted.    (Pt tolerated procedure without difficulty).        Results for orders placed or performed in visit on 06/14/18   UA reflex to Microscopic   Result Value Ref Range    Color Urine Yellow     Appearance Urine Clear     Glucose Urine Negative NEG^Negative mg/dL    Bilirubin Urine Negative NEG^Negative    Ketones Urine Negative NEG^Negative mg/dL    Specific Gravity Urine 1.010 1.003 - 1.035    Blood Urine Negative NEG^Negative    pH Urine 6.0 5.0 - 7.0 pH    Protein Albumin Urine Negative NEG^Negative  mg/dL    Urobilinogen Urine 0.2 0.2 - 1.0 EU/dL    Nitrite Urine Negative NEG^Negative    Leukocyte Esterase Urine Small (A) NEG^Negative    Source Midstream Urine    Urine Microscopic   Result Value Ref Range    WBC Urine 0 - 5 OTO5^0 - 5 /HPF    RBC Urine O - 2 OTO2^O - 2 /HPF       IMP:  1. OAB wet, very modest bladder capacity  2. BETO without hypermobility, looks like ISD component, s/p sling  3. Advanced maturity, other medical conditions      PLAN:  1. Discussed situation with patient in detail.  2. Consider trial Detrol LA4; discussed rationale, mech of action, potential side effect, etc; pt elects trial  3. RTC 2 mos to review progress  4. Set realistic expectations  5. 60 minutes spent with patient, more than 50% in counseling and coordination of care for OAB/BETO, which did not include time spent for the procedure.  6. Copy

## 2018-06-16 ENCOUNTER — MYC MEDICAL ADVICE (OUTPATIENT)
Dept: FAMILY MEDICINE | Facility: CLINIC | Age: 83
End: 2018-06-16

## 2018-06-16 DIAGNOSIS — G47.00 INSOMNIA, UNSPECIFIED TYPE: ICD-10-CM

## 2018-06-18 RX ORDER — TRAZODONE HYDROCHLORIDE 50 MG/1
25-50 TABLET, FILM COATED ORAL
Qty: 90 TABLET | Refills: 2 | Status: CANCELLED | OUTPATIENT
Start: 2018-06-18

## 2018-06-18 NOTE — TELEPHONE ENCOUNTER
Trazodone was stopped at 4/24 OV, but I am unsure about more information about it. Do you want an e-visit to restart?   Miguelina Dunaway RN

## 2018-06-18 NOTE — TELEPHONE ENCOUNTER
Left a VM stating a phone visit would be best at 11:20 and that we will hold the appt for a few minutes to give them a chance to call back and then will need to release the appt.   Miguelina Dunaway RN

## 2018-06-18 NOTE — TELEPHONE ENCOUNTER
Needs a visit as this is a new complaint for this provider and don't see med as active med. Telephone visit is better than e vist or office visit  Thanks  Tad Mendoza D.O.

## 2018-06-19 NOTE — TELEPHONE ENCOUNTER
Called  Bill since patient doesn't have a voice. Daughter Mariela usually helps with medical matters but her  is in the hospital right now. He will talk to her and call me back to schedule a phone visit.  Miguelina Dunaway RN

## 2018-06-22 ENCOUNTER — TELEPHONE (OUTPATIENT)
Dept: FAMILY MEDICINE | Facility: CLINIC | Age: 83
End: 2018-06-22

## 2018-06-22 DIAGNOSIS — J84.10 FIBROSIS, LUNG (H): ICD-10-CM

## 2018-06-22 RX ORDER — CODEINE SULFATE 30 MG/1
30 TABLET ORAL 2 TIMES DAILY
Qty: 60 TABLET | Refills: 0 | Status: SHIPPED | OUTPATIENT
Start: 2018-06-22 | End: 2018-08-03

## 2018-06-22 NOTE — TELEPHONE ENCOUNTER
Called patient back and spoke with  while patient was there.  Offered some same day appointments on 6/26/18 but patient wanted to keep her appointment on 07/10/2018 instead.    Jayne Porter

## 2018-06-22 NOTE — TELEPHONE ENCOUNTER
Attempt #3. Patient/family was instructed to return call to [facility] RN directly on the RN Call back line at [phone].   Miguelina Dunaway RN

## 2018-06-22 NOTE — TELEPHONE ENCOUNTER
Reason for Call:  Same Day Appointment, Requested Provider:  Tad Mendoza DO    PCP: Tad Mendoza    Reason for visit: med check    Duration of symptoms:     Have you been treated for this in the past? Yes    Additional comments: Patient's daughter came in to set up an appointment for her mom , but there was nothing available till 07/10/18.. The patient wants to be seen next week if she can be fitted in    Can we leave a detailed message on this number? YES    Phone number patient can be reached at: Home number on file [phone] (home)    Best Time: anytime    Call taken on 6/22/2018 at 12:07 PM by Felipa Kc

## 2018-06-28 ENCOUNTER — ANTICOAGULATION THERAPY VISIT (OUTPATIENT)
Dept: NURSING | Facility: CLINIC | Age: 83
End: 2018-06-28
Payer: COMMERCIAL

## 2018-06-28 ENCOUNTER — TELEPHONE (OUTPATIENT)
Dept: FAMILY MEDICINE | Facility: CLINIC | Age: 83
End: 2018-06-28

## 2018-06-28 DIAGNOSIS — Z79.01 LONG-TERM (CURRENT) USE OF ANTICOAGULANTS: ICD-10-CM

## 2018-06-28 DIAGNOSIS — I48.91 ATRIAL FIBRILLATION, UNSPECIFIED TYPE (H): ICD-10-CM

## 2018-06-28 LAB
INR POINT OF CARE: 6.3 (ref 0.86–1.14)
INR PPP: 5.24 (ref 0.86–1.14)

## 2018-06-28 PROCEDURE — 85610 PROTHROMBIN TIME: CPT | Performed by: FAMILY MEDICINE

## 2018-06-28 PROCEDURE — 36415 COLL VENOUS BLD VENIPUNCTURE: CPT | Performed by: FAMILY MEDICINE

## 2018-06-28 PROCEDURE — 99207 ZZC NO CHARGE NURSE ONLY: CPT

## 2018-06-28 NOTE — MR AVS SNAPSHOT
Ashlie Brantley   6/28/2018 11:45 AM   Anticoagulation Therapy Visit    Description:  87 year old female   Provider:  NAHEED HUNTER   Department:  [facility]           INR as of 6/28/2018     Today's INR 6.3!      Anticoagulation Summary as of 6/28/2018     INR goal 2.0-3.0   Today's INR 6.3!   Full warfarin instructions 6/28: Hold; 6/29: Hold; 7/1: 5 mg; 7/8: 5 mg; Otherwise 7.5 mg on Sun; 5 mg all other days   Next INR check 7/10/2018    Indications   Atrial fibrillation (H) [I48.91] [I48.91]  Long-term (current) use of anticoagulants [Z79.01] [Z79.01]         Your next Anticoagulation Clinic appointment(s)     Jul 10, 2018 11:00 AM CDT   Anticoagulation Visit with NE ANTI COAG   [facility] ([facility]    [address] [place] [id number] [phone]              Contact Numbers     Please call [phone] to cancel and/or reschedule your appointment.  Please call [phone] with any problems or questions regarding your therapy          June 2018 Details    Sun Mon Tue Wed Thu Fri Sat          1               2                 3               4               5               6               7               8               9                 10               11               12               13               14               15               16                 17               18               19               20               21               22               23                 24               25               26               27               28      Hold   See details      29      Hold         30      5 mg          Date Details   06/28 This INR check               How to take your warfarin dose     To take:  5 mg Take 1 of the 5 mg tablets.    Hold Do not take your warfarin dose. See the Details table to the right for additional instructions.                July 2018 Details    Sun Mon Tue Wed Thu Fri Sat     1      5 mg         2      5 mg         3       5 mg         4      5 mg         5      5 mg         6      5 mg         7      5 mg           8      5 mg         9      5 mg         10            11               12               13               14                 15               16               17               18               19               20               21                 22               23               24               25               26               27               28                 29               30               31                    Date Details   No additional details    Date of next INR:  7/10/2018         How to take your warfarin dose     To take:  5 mg Take 1 of the 5 mg tablets.

## 2018-06-28 NOTE — TELEPHONE ENCOUNTER
Lab notified writer that INR=5.24. Anticoagulation note from today says that INR=6.3 and patient was to hold her dose tomorrow and was given instructions for next week. Close.   Miguelina Dunaway RN

## 2018-06-28 NOTE — PROGRESS NOTES
ANTICOAGULATION FOLLOW-UP CLINIC VISIT    Patient Name:  Ashlie Brantley  Date:  6/28/2018  Contact Type:  Face to Face    SUBJECTIVE:     Patient Findings     Positives Change in medications (Started on Detrol 4 mg from urology.), Change in diet/appetite (Thinks that her Vitamin K intake has been low.), Unexplained INR or factor level change (Today's INR was 6.3 with [employer] machine. Patient will have a venous draw today to compare numbers.)           OBJECTIVE    INR Protime   Date Value Ref Range Status   06/28/2018 6.3 (A) 0.86 - 1.14 Final       ASSESSMENT / PLAN  INR assessment SUPRA    Recheck INR In: 10 DAYS Unable to come in next week because she is going on vacation.   INR Location Clinic      Anticoagulation Summary as of 6/28/2018     INR goal 2.0-3.0   Today's INR 6.3!   Warfarin maintenance plan 7.5 mg (5 mg x 1.5) on Sun; 5 mg (5 mg x 1) all other days   Full warfarin instructions 6/28: Hold; 6/29: Hold; 7/1: 5 mg; 7/8: 5 mg; Otherwise 7.5 mg on Sun; 5 mg all other days   Weekly warfarin total 37.5 mg   Plan last modified Jarek Stephens RN (5/10/2018)   Next INR check 7/10/2018   Target end date Indefinite    Indications   Atrial fibrillation (H) [I48.91] [I48.91]  Long-term (current) use of anticoagulants [Z79.01] [Z79.01]         Anticoagulation Episode Summary     INR check location     Preferred lab     Send INR reminders to [facility]    Comments       Anticoagulation Care Providers     Provider Role Specialty Phone number    RejiTad lynne DO [facility] [phone]            See the Encounter Report to view Anticoagulation Flowsheet and Dosing Calendar (Go to Encounters tab in chart review, and find the Anticoagulation Therapy Visit)    Dosage adjustment made based on physician directed care plan.    Jarek Stephens, RN

## 2018-07-10 ENCOUNTER — ANTICOAGULATION THERAPY VISIT (OUTPATIENT)
Dept: NURSING | Facility: CLINIC | Age: 83
End: 2018-07-10
Payer: COMMERCIAL

## 2018-07-10 ENCOUNTER — OFFICE VISIT (OUTPATIENT)
Dept: FAMILY MEDICINE | Facility: CLINIC | Age: 83
End: 2018-07-10
Payer: COMMERCIAL

## 2018-07-10 VITALS
SYSTOLIC BLOOD PRESSURE: 124 MMHG | WEIGHT: 145.6 LBS | HEART RATE: 76 BPM | HEIGHT: 58 IN | DIASTOLIC BLOOD PRESSURE: 76 MMHG | BODY MASS INDEX: 30.56 KG/M2 | TEMPERATURE: 98.6 F | OXYGEN SATURATION: 97 %

## 2018-07-10 DIAGNOSIS — H93.8X1 EAR FULLNESS, RIGHT: Primary | ICD-10-CM

## 2018-07-10 DIAGNOSIS — R32 URINARY INCONTINENCE, UNSPECIFIED TYPE: ICD-10-CM

## 2018-07-10 DIAGNOSIS — I10 HYPERTENSION GOAL BP (BLOOD PRESSURE) < 140/90: ICD-10-CM

## 2018-07-10 DIAGNOSIS — I48.91 ATRIAL FIBRILLATION (H): ICD-10-CM

## 2018-07-10 DIAGNOSIS — E03.4 HYPOTHYROIDISM DUE TO ACQUIRED ATROPHY OF THYROID: ICD-10-CM

## 2018-07-10 DIAGNOSIS — I48.20 CHRONIC ATRIAL FIBRILLATION (H): ICD-10-CM

## 2018-07-10 DIAGNOSIS — J84.112 IPF (IDIOPATHIC PULMONARY FIBROSIS) (H): ICD-10-CM

## 2018-07-10 DIAGNOSIS — F41.9 ANXIETY: ICD-10-CM

## 2018-07-10 DIAGNOSIS — Z79.01 LONG-TERM (CURRENT) USE OF ANTICOAGULANTS: ICD-10-CM

## 2018-07-10 DIAGNOSIS — G47.09 OTHER INSOMNIA: ICD-10-CM

## 2018-07-10 LAB — INR POINT OF CARE: 1 (ref 0.86–1.14)

## 2018-07-10 PROCEDURE — 85610 PROTHROMBIN TIME: CPT | Mod: QW

## 2018-07-10 PROCEDURE — 99207 ZZC NO CHARGE NURSE ONLY: CPT

## 2018-07-10 PROCEDURE — 69209 REMOVE IMPACTED EAR WAX UNI: CPT | Mod: RT | Performed by: FAMILY MEDICINE

## 2018-07-10 PROCEDURE — 36416 COLLJ CAPILLARY BLOOD SPEC: CPT

## 2018-07-10 PROCEDURE — 99214 OFFICE O/P EST MOD 30 MIN: CPT | Mod: 25 | Performed by: FAMILY MEDICINE

## 2018-07-10 RX ORDER — TOLTERODINE 4 MG/1
4 CAPSULE, EXTENDED RELEASE ORAL DAILY
Qty: 30 CAPSULE | Refills: 1 | Status: CANCELLED | OUTPATIENT
Start: 2018-07-10

## 2018-07-10 RX ORDER — LEVOTHYROXINE SODIUM 88 UG/1
88 TABLET ORAL DAILY
Qty: 90 TABLET | Refills: 3 | Status: SHIPPED | OUTPATIENT
Start: 2018-07-10 | End: 2019-01-03

## 2018-07-10 RX ORDER — TRAZODONE HYDROCHLORIDE 50 MG/1
50 TABLET, FILM COATED ORAL
Qty: 30 TABLET | Refills: 1 | Status: SHIPPED | OUTPATIENT
Start: 2018-07-10 | End: 2019-08-15

## 2018-07-10 ASSESSMENT — ANXIETY QUESTIONNAIRES
6. BECOMING EASILY ANNOYED OR IRRITABLE: NOT AT ALL
3. WORRYING TOO MUCH ABOUT DIFFERENT THINGS: SEVERAL DAYS
IF YOU CHECKED OFF ANY PROBLEMS ON THIS QUESTIONNAIRE, HOW DIFFICULT HAVE THESE PROBLEMS MADE IT FOR YOU TO DO YOUR WORK, TAKE CARE OF THINGS AT HOME, OR GET ALONG WITH OTHER PEOPLE: NOT DIFFICULT AT ALL
2. NOT BEING ABLE TO STOP OR CONTROL WORRYING: SEVERAL DAYS
1. FEELING NERVOUS, ANXIOUS, OR ON EDGE: SEVERAL DAYS
7. FEELING AFRAID AS IF SOMETHING AWFUL MIGHT HAPPEN: SEVERAL DAYS
GAD7 TOTAL SCORE: 6
5. BEING SO RESTLESS THAT IT IS HARD TO SIT STILL: SEVERAL DAYS

## 2018-07-10 ASSESSMENT — PATIENT HEALTH QUESTIONNAIRE - PHQ9: 5. POOR APPETITE OR OVEREATING: SEVERAL DAYS

## 2018-07-10 NOTE — PATIENT INSTRUCTIONS
Continue all meds  Restart trazadone  Follow up with specialist as planned  Wellness visit in December    [facility]   Discharged by : Lizzie Wright CMA  Paper scripts provided to patient : no     If you have any questions regarding your visit please contact your care team:     Team Gold                Clinic Hours Telephone Number     Dr. Hayley Ly  Anay Miner, CNP 7am-7pm  Monday - Thursday   7am-5pm  Fridays  [phone]   (Appointment scheduling available 24/7)     RN Line  [phone] option 2     [facility] 11am-9pm Monday-Friday Saturday-Sunday- 9am-5pm     [place] -   5pm-9pm Monday-Friday Saturday-Sunday- 9am-5pm    [phone] - [place] Medrano    [phone] - [place]       For a Price Quote for your services, please call our Consumer Price Line at [phone].     What options do I have for visits at the clinic other than the traditional office visit?     To expand how we care for you, many of our providers are utilizing electronic visits (e-visits) and telephone visits, when medically appropriate, for interactions with their patients rather than a visit in the clinic. We also offer nurse visits for many medical concerns. Just like any other service, we will bill your insurance company for this type of visit based on time spent on the phone with your provider. Not all insurance companies cover these visits. Please check with your medical insurance if this type of visit is covered. You will be responsible for any charges that are not paid by your insurance.   E-visits via [employer]: generally incur a $35.00 fee.     Telephone visits:  Time spent on the phone: *charged based on time that is spent on the phone in increments of 10 minutes. Estimated cost:   5-10 mins $30.00   11-20 mins. $59.00   21-30 mins. $85.00       Use [employer] (secure email communication and access to your  chart) to send your primary care provider a message or make an appointment. Ask someone on your Team how to sign up for [employer].     As always, Thank you for trusting us with your health care needs!      [facility]:    Scheduling Appointments  [place]  Call: [phone]    Macie Hunt, [facility]  Call: [phone]    [facility]  Call: [phone]    For Gastroenterology referrals   [facility]   Clinics and [facility] [place], 4th Floor   [address] [place] [address]   Appointments: [phone]    WHERE TO GO FOR CARE?  Clinic    Make an appointment if you:       Are sick (cold, cough, flu, sore throat, earache or in pain).       Have a small injury (sprain, small cut, burn or broken bone).       Need a physical exam, Pap smear, vaccine or prescription refill.       Have questions about your health or medicines.    To reach us:      Call [phone] ([phone]). Open 24 hours every day. (For counseling services, call [phone].)    Log into [employer] at [email or web address]. (Visit [email or web address] to create an account.) Hospital emergency room    An emergency is a serious or life- threatening problem that must be treated right away.    Call 540 or get to the hospital if you have:      Very bad or sudden:            - Chest pain or pressure         - Bleeding         - Head or belly pain         - Dizziness or trouble seeing, walking or                          Speaking      Problems breathing      Blood in your vomit or you are coughing up blood      A major injury (knocked out, loss of a finger or limb, rape, broken bone protruding from skin)    A mental health crisis. (Or call the Mental Health Crisis line at 1-988.768.8033 or Suicide Prevention Hotline at 1-183.832.4631.)    Open 24 hours every day. You don't need an appointment.     Urgent care    Visit urgent care for sickness or  small injuries when the clinic is closed. You don't need an appointment. To check hours or find an urgent care near you, visit [email or web address]. Online care    Get online care from [facility] for more than 70 common problems, like colds, allergies and infections. Open 24 hours every day at:   [email or web address]   Need help deciding?    For advice about where to be seen, you may call your clinic and ask to speak with a nurse. We're here for you 24 hours every day.         If you are deaf or hard of hearing, please let us know. We provide many free services including sign language interpreters, oral interpreters, TTYs, telephone amplifiers, note takers and written materials.

## 2018-07-10 NOTE — PROGRESS NOTES
SUBJECTIVE:   Ashlie Brantley is a 87 year old female who presents to clinic today for the following health issues:      Hyperlipidemia Follow-Up      Rate your low fat/cholesterol diet?: good    Taking statin?  Yes, no muscle aches from statin    Other lipid medications/supplements?:  none    Hypertension Follow-up      Outpatient blood pressures are not being checked.    Low Salt Diet: no added salt    Anxiety Follow-Up    Status since last visit: No change    Other associated symptoms:None    Complicating factors:   Significant life event: No   Current substance abuse: None  Depression symptoms: No  RASHMI-7 SCORE 7/10/2018   Total Score 6       RASHMI-7  Hypothyroidism Follow-up      Since last visit, patient describes the following symptoms: Weight stable, no hair loss, no skin changes, no constipation, no loose stools      Amount of exercise or physical activity: 2-3 days/week for an average of greater than 60 minutes    Problems taking medications regularly: No    Medication side effects: none    Diet: regular (no restrictions)    New to this provider  Reviewed meds and discussed with pulmonary  René Patel MD Dessie, DO Ricardo Moraes,     I have only seen this patient a few times after I took over her care from another fellow in our clinic, but I was not aware of our service having started the codeine.   Codeine is not going to provide any therapeutic benefit for pulmonary fibrosis other than the obvious cough relief. Is she complaining of much cough? When I have seen her, she hasn't mentioned a cough.   So, as far as the pulmonary fibrosis goes, if she has trouble with cough, it's not an unreasonable med for symptom releife, but there is no other therapeutic reason to continue it.     I hope that answers your question, if not, feel free to shoot me another message or page me.     Thanks,     René Patel M.D.   Pulmonary & Critical Care Fellow   Pager [phone]                  Problem list and histories reviewed & adjusted, as indicated.  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     Hoarseness     Laryngeal dystonia     Spasmodic dysphonia     Hyperlipidemia LDL goal <130     Anxiety     Hypertension goal BP (blood pressure) < 140/90     LVH (left ventricular hypertrophy)     Advance Care Planning     Cough     Dermatochalasis     Health Care Home     Trochanteric bursitis - bilateral     PVD (posterior vitreous detachment) OU     Pseudophakia, OU     IPF (idiopathic pulmonary fibrosis) (H)     Hypothyroidism due to acquired atrophy of thyroid     Chronic pain syndrome     Urinary incontinence, unspecified type     Atrial fibrillation (H) [I48.91]     Long-term (current) use of anticoagulants [Z79.01]     Past Surgical History:   Procedure Laterality Date     C NONSPECIFIC PROCEDURE      BACK SURGERY     C NONSPECIFIC PROCEDURE      VOCAL CORD POLYP     C SHOULDER SURG PROC UNLISTED       C STOMACH SURGERY PROCEDURE UNLISTED       C TOTAL KNEE ARTHROPLASTY      LT 1998  /  RT 2001     HC REMOVAL GALLBLADDER       HERNIA REPAIR, INGUINAL RT/LT      RT     HYSTERECTOMY, CERVIX STATUS UNKNOWN      DUB     PHACOEMULSIFICATION WITH STANDARD INTRAOCULAR LENS IMPLANT  Rt 6/11/09, Lt 8/3/09    both eyes Dr. Barnett     ROTATOR CUFF REPAIR RT/LT      RT       Social History   Substance Use Topics     Smoking status: Never Smoker     Smokeless tobacco: Never Used     Alcohol use 0.0 oz/week     0 Standard drinks or equivalent per week      Comment: 1-2 drinks/month     Family History   Problem Relation Age of Onset     Diabetes Mother      C.A.D. Father      Cerebrovascular Disease Brother            Reviewed and updated as needed this visit by clinical staff  Tobacco  Allergies  Med Hx  Surg Hx  Fam Hx  Soc Hx      Reviewed and  "updated as needed this visit by Provider         ROS:  Constitutional, HEENT, cardiovascular, pulmonary, GI, , musculoskeletal, neuro, skin, endocrine and psych systems are negative, except as otherwise noted.    OBJECTIVE:     /76 (BP Location: Right arm, Patient Position: Sitting, Cuff Size: Adult Regular)  Pulse 76  Temp 98.6  F (37  C) (Oral)  Ht 4' 10.25\" (1.48 m)  Wt 145 lb 9.6 oz (66 kg)  SpO2 97%  BMI 30.17 kg/m2  Body mass index is 30.17 kg/(m^2).  GENERAL: healthy, alert and no distress  HEENT-cerumen impacted, could not be easily removed with curette, unbable to remove with curret,  Removed with irrigation  With success and no side effects.   RESP: lungs clear to auscultation - no rales, rhonchi or wheezes  CV: regular rate and rhythm, normal S1 S2, no S3 or S4, no murmur, click or rub, no peripheral edema and peripheral pulses strong  ABDOMEN: soft, nontender, no hepatosplenomegaly, no masses and bowel sounds normal  MS: no gross musculoskeletal defects noted, no edema    Diagnostic Test Results:  none     ASSESSMENT/PLAN:       ICD-10-CM    1. Ear fullness, right H93.8X1 REMOVE IMPACTED CERUMEN   2. Hypothyroidism due to acquired atrophy of thyroid E03.4 levothyroxine (SYNTHROID) 88 MCG tablet   3. Hypertension goal BP (blood pressure) < 140/90 I10    4. Anxiety F41.9    5. Other insomnia G47.09 traZODone (DESYREL) 50 MG tablet   6. Chronic atrial fibrillation (H) I48.2    7. Urinary incontinence, unspecified type R32    8. IPF (idiopathic pulmonary fibrosis) (H) J84.112      Ear fullness-wax impacted, removed. Resolved sx   Hypothyroidism-stable, stable, cont meds  Htn/cad-stable, managed by cards  Incontinence-managed by urology, follow up with them  Anxiety -no meds, stable  Insomnia-per patient history of trazodone now, close follow up   pulm fibrosis-per pulmonary        See Patient Instructions    Tad Mendoza DO  [facility]    "

## 2018-07-10 NOTE — MR AVS SNAPSHOT
Ashlie Brantley   7/10/2018 11:00 AM   Anticoagulation Therapy Visit    Description:  87 year old female   Provider:  NAHEED HUNTER   Department:  [facility] Nurse           INR as of 7/10/2018     Today's INR 1.0!      Anticoagulation Summary as of 7/10/2018     INR goal 2.0-3.0   Today's INR 1.0!   Full warfarin instructions 7/10: 10 mg; 7/11: 10 mg; Otherwise 7.5 mg on Sun; 5 mg all other days   Next INR check 7/13/2018    Indications   Atrial fibrillation (H) [I48.91] [I48.91]  Long-term (current) use of anticoagulants [Z79.01] [Z79.01]         Your next Anticoagulation Clinic appointment(s)     Jul 13, 2018 10:45 AM CDT   [facility] with [facility]   [facility]    [address] [place] [id number] [phone]              Contact Numbers     Please call [phone] to cancel and/or reschedule your appointment.  Please call [phone] with any problems or questions regarding your therapy          July 2018 Details    Sun Mon Tue Wed Thu Fri Sat     1               2               3               4               5               6               7                 8               9               10      10 mg   See details      11      10 mg         12      5 mg         13            14                 15               16               17               18               19               20               21                 22               23               24               25               26               27               28                 29               30               31                    Date Details   07/10 This INR check       Date of next INR:  7/13/2018         How to take your warfarin dose     To take:  5 mg Take 1 of the 5 mg tablets.    To take:  10 mg Take 2 of the 5 mg tablets.

## 2018-07-10 NOTE — MR AVS SNAPSHOT
After Visit Summary   7/10/2018    Ashlie Brantley    MRN: [id number]           Patient Information     Date Of Birth          2/24/1931        Visit Information        Provider Department      7/10/2018 12:00 PM Tad Mendoza DO [facility]        Today's Diagnoses     Ear fullness, right    -  1    Hypothyroidism due to acquired atrophy of thyroid        Hypertension goal BP (blood pressure) < 140/90        Anxiety        Other insomnia          Care Instructions    Continue all meds  Restart trazadone  Follow up with specialist as planned  Wellness visit in December    [facility]   Discharged by : Lizzie Wright CMA  Paper scripts provided to patient : no     If you have any questions regarding your visit please contact your care team:     Team Gold                Clinic Hours Telephone Number     Dr. Hayley Miner, CNP 7am-7pm  Monday - Thursday   7am-5pm  Fridays  [phone]   (Appointment scheduling available 24/7)     RN Line  [phone] option 2     Urgent Care - [place] - 11am-9pm Monday-Friday Saturday-Sunday- 9am-5pm     [place] -   5pm-9pm Monday-Friday Saturday-Sunday- 9am-5pm    [phone] - [place]    [phone] - [place]       For a Price Quote for your services, please call our Consumer Price Line at [phone].     What options do I have for visits at the clinic other than the traditional office visit?     To expand how we care for you, many of our providers are utilizing electronic visits (e-visits) and telephone visits, when medically appropriate, for interactions with their patients rather than a visit in the clinic. We also offer nurse visits for many medical concerns. Just like any other service, we will bill your insurance company for this type of visit based on time spent on the phone with your provider. Not all  insurance companies cover these visits. Please check with your medical insurance if this type of visit is covered. You will be responsible for any charges that are not paid by your insurance.   E-visits via [employer]: generally incur a $35.00 fee.     Telephone visits:  Time spent on the phone: *charged based on time that is spent on the phone in increments of 10 minutes. Estimated cost:   5-10 mins $30.00   11-20 mins. $59.00   21-30 mins. $85.00       Use Track the Bet (secure email communication and access to your chart) to send your primary care provider a message or make an appointment. Ask someone on your Team how to sign up for Track the Bet.     As always, Thank you for trusting us with your health care needs!      [facility]:    Scheduling Appointments  [facility]  Call: [phone]    [facility]  Call: [phone]    [facility]  Call: [phone]    For Gastroenterology referrals   [facility] Gastroenterology   Clinics and Surgery Center, 4th Floor   [address] [place] [address]   Appointments: [phone]    WHERE TO GO FOR CARE?  Clinic    Make an appointment if you:       Are sick (cold, cough, flu, sore throat, earache or in pain).       Have a small injury (sprain, small cut, burn or broken bone).       Need a physical exam, Pap smear, vaccine or prescription refill.       Have questions about your health or medicines.    To reach us:      Call [phone] ([phone]). Open 24 hours every day. (For counseling services, call [phone].)    Log into Track the Bet at [email or web address]. (Visit [email or web address] to create an account.) Hospital emergency room    An emergency is a serious or life- threatening problem that must be treated right away.    Call 584 or get to the hospital if you have:      Very bad or sudden:            - Chest pain or pressure         - Bleeding         - Head or belly  pain         - Dizziness or trouble seeing, walking or                          Speaking      Problems breathing      Blood in your vomit or you are coughing up blood      A major injury (knocked out, loss of a finger or limb, rape, broken bone protruding from skin)    A mental health crisis. (Or call the Mental Health Crisis line at 1-305.289.5261 or Suicide Prevention Hotline at 1-495.330.1000.)    Open 24 hours every day. You don't need an appointment.     Urgent care    Visit urgent care for sickness or small injuries when the clinic is closed. You don't need an appointment. To check hours or find an urgent care near you, visit [email or web address]. Online care    Get online care from [facility] for more than 70 common problems, like colds, allergies and infections. Open 24 hours every day at:   [email or web address]   Need help deciding?    For advice about where to be seen, you may call your clinic and ask to speak with a nurse. We're here for you 24 hours every day.         If you are deaf or hard of hearing, please let us know. We provide many free services including sign language interpreters, oral interpreters, TTYs, telephone amplifiers, note takers and written materials.                   Follow-ups after your visit        Your next 10 appointments already scheduled     Jul 13, 2018 10:45 AM CDT   Anticoagulation Visit with NE ANTI COAG   [facility]    [address] [place] [address] [phone]            Aug 16, 2018  9:00 AM CDT   Return Visit with René Warner MD   [facility] ([address] [place] [id number] [phone]              Who to contact     If you have questions or need follow up information about today's clinic visit or your schedule please contact [facility] directly at [phone].  Normal or non-critical lab and imaging results will be  "communicated to you by [employer], letter or phone within 4 business days after the clinic has received the results. If you do not hear from us within 7 days, please contact the clinic through [employer] or phone. If you have a critical or abnormal lab result, we will notify you by phone as soon as possible.  Submit refill requests through [employer] or call your pharmacy and they will forward the refill request to us. Please allow 3 business days for your refill to be completed.          Additional Information About Your Visit        Ion Core Information     [employer]on [employer] gives you secure access to your electronic health record. If you see a primary care provider, you can also send messages to your care team and make appointments. If you have questions, please call your primary care clinic.  If you do not have a primary care provider, please call [phone] and they will assist you.        Care EveryWhere ID     This is your Care EveryWhere ID. This could be used by other organizations to access your [place] medical records  [id number]        Your Vitals Were     Pulse Temperature Height Pulse Oximetry BMI (Body Mass Index)       76 98.6  F (37  C) (Oral) 4' 10.25\" (1.48 m) 97% 30.17 kg/m2        Blood Pressure from Last 3 Encounters:   07/10/18 124/76   06/14/18 139/80   04/24/18 105/70    Weight from Last 3 Encounters:   07/10/18 145 lb 9.6 oz (66 kg)   04/24/18 151 lb (68.5 kg)   03/28/18 149 lb (67.6 kg)              We Performed the Following     PAF COMPLETED     REMOVE YOLETTE KUHN          Today's Medication Changes          These changes are accurate as of 7/10/18 12:16 PM.  If you have any questions, ask your nurse or doctor.               Start taking these medicines.        Dose/Directions    traZODone 50 MG tablet   Commonly known as:  DESYREL   Used for:  Other insomnia   Started by:  Tad Mendoza DO        Dose:  50 mg   Take 1 tablet (50 mg) by mouth nightly as needed for sleep   Quantity:  30 " tablet   Refills:  1            Where to get your medicines      These medications were [place] [facility] [place] [address]  [address] [facility] [address]     Phone:  [phone]     levothyroxine 88 MCG tablet    traZODone 50 MG tablet                Primary Care Provider Office Phone # Fax #    Tad Mendoza [phone]       [address] [place] [address]        Equal Access to Services     JUNIOR MURPHY : Hadii aad ku hadasho Soomaali, waaxda luqadaha, qaybta kaalmada adeegyada, waxay florencein haymartinan driss bynum . So [facility] [phone].    ATENCIÓN: Si habla español, tiene a leon disposición servicios gratuitos de asistencia lingüística. Justiname al [phone].    We comply with applicable federal civil rights laws and [place] laws. We do not discriminate on the basis of race, color, national origin, age, disability, sex, sexual orientation, or gender identity.            Thank you!     Thank you for choosing [facility]  for your care. Our goal is always to provide you with excellent care. Hearing back from our patients is one way we can continue to improve our services. Please take a few minutes to complete the written survey that you may receive in the mail after your visit with us. Thank you!             Your Updated Medication List - Protect others around you: Learn how to safely use, store and throw away your medicines at www.disposemymeds.org.          This list is accurate as of 7/10/18 12:16 PM.  Always use your most recent med list.                   Brand Name Dispense Instructions for use Diagnosis    aspirin 81 MG tablet     1 Tab    ONE DAILY    HTN (hypertension), Elevated cholesterol       calcium + D 600-200 MG-UNIT Tabs   Generic drug:  calcium carbonate-vitamin D     0    ONE TABLET TWICE A DAY WITH MEALS    Osteopenia       * codeine 30 MG tablet     60 tablet    Take 1  tablet (30 mg) by mouth 2 times daily May fill on or after 1/9/18    Fibrosis, lung (H)       * codeine 30 MG tablet     60 tablet    Take 1 tablet (30 mg) by mouth 2 times daily May fill on or after 3/10/18    Fibrosis, lung (H)       * codeine 30 MG tablet     60 tablet    Take 1 tablet (30 mg) by mouth 2 times daily May fill on or after 2/8/18    Fibrosis, lung (H)       furosemide 20 MG tablet    LASIX     Take 20 mg by mouth        levothyroxine 88 MCG tablet    SYNTHROID    90 tablet    Take 1 tablet (88 mcg) by mouth daily    Hypothyroidism due to acquired atrophy of thyroid       losartan 100 MG tablet    COZAAR    90 tablet    Take 1 tablet (100 mg) by mouth daily    Hypertension goal BP (blood pressure) < 140/90       metoprolol tartrate 25 MG tablet    LOPRESSOR     Take 25 mg by mouth 2 times daily        neomycin-polymyxin-dexamethasone 3.5-35071-0.1 Oint ophthalmic ointment    MAXITROL    1 Tube    Place 1 Application into both eyes At Bedtime Apply a small squirt into each eye at bedtime    Dermatochalasis, unspecified laterality       OMEGA-3 COMPLEX PO      1 capsule daily        order for DME     1 each    Equipment being ordered: Oxygen 2 liters NC at HS    Oxygen decrease, IPF (idiopathic pulmonary fibrosis) (H)       * order for DME     1 Units    Equipment being ordered: Please provide night time oxygen at 2L/min via nasal canula    IPF (idiopathic pulmonary fibrosis) (H), Hypoxia       * order for DME     1 Device    Equipment being ordered: Oxygen 2 liters NC at night time    IPF (idiopathic pulmonary fibrosis) (H)       simvastatin 40 MG tablet    ZOCOR    90 tablet    Take 1 tablet (40 mg) by mouth At Bedtime    Hyperlipidemia LDL goal <130       tolterodine 4 MG 24 hr capsule    DETROL LA    30 capsule    Take 1 capsule (4 mg) by mouth daily        traZODone 50 MG tablet    DESYREL    30 tablet    Take 1 tablet (50 mg) by mouth nightly as needed for sleep    Other insomnia       * vitamin  D3 1000 units Caps      1 CAPSULE DAILY        * vitamin D 2000 units tablet     0    1 tablet daily    Osteopenia       VITAMIN E COMPLEX PO      Take  by mouth.        * warfarin 5 MG tablet    COUMADIN    90 tablet    Take 1 tablet (5 mg) by mouth daily    Persistent atrial fibrillation (H)       * warfarin 7.5 MG tablet    COUMADIN    14 tablet    Take 7.5 mg every Sunday. 5mg ROW    Atrial fibrillation, unspecified type (H), Long-term (current) use of anticoagulants       * Notice:  This list has 9 medication(s) that are the same as other medications prescribed for you. Read the directions carefully, and ask your doctor or other care provider to review them with you.

## 2018-07-10 NOTE — PROGRESS NOTES
ANTICOAGULATION FOLLOW-UP CLINIC VISIT    Patient Name:  Ashlie Brantley  Date:  7/10/2018  Contact Type:  Face to Face    SUBJECTIVE: has been eating lots of greens due to last time she had elevated INR she will back off the greens gave her 2 day bump and will recheck Friday.     Patient Findings     Positives Change in medications (taking detrol for bladder symptoms fairly new medication for her), Change in diet/appetite (eating lots of greens since she had elevated level 12 days ago)           OBJECTIVE    INR Protime   Date Value Ref Range Status   07/10/2018 1.0 0.86 - 1.14 Final       ASSESSMENT / PLAN  INR assessment SUB    Recheck INR In: 3 DAYS    INR Location Clinic      Anticoagulation Summary as of 7/10/2018     INR goal 2.0-3.0   Today's INR 1.0!   Warfarin maintenance plan 7.5 mg (5 mg x 1.5) on Sun; 5 mg (5 mg x 1) all other days   Full warfarin instructions 7/10: 10 mg; 7/11: 10 mg; Otherwise 7.5 mg on Sun; 5 mg all other days   Weekly warfarin total 37.5 mg   Plan last modified Jarek Stephens RN (5/10/2018)   Next INR check 7/13/2018   Target end date Indefinite    Indications   Atrial fibrillation (H) [I48.91] [I48.91]  Long-term (current) use of anticoagulants [Z79.01] [Z79.01]         Anticoagulation Episode Summary     INR check location     Preferred lab     Send INR reminders to [facility] CLINIC    Comments       Anticoagulation Care Providers     Provider Role Specialty Phone number    RejiFede lynnealberto Chavarria DO [facility] [phone]            See the Encounter Report to view Anticoagulation Flowsheet and Dosing Calendar (Go to Encounters tab in chart review, and find the Anticoagulation Therapy Visit)        Lizy Mercado, REJI

## 2018-07-11 ASSESSMENT — ANXIETY QUESTIONNAIRES: GAD7 TOTAL SCORE: 6

## 2018-07-13 ENCOUNTER — ANTICOAGULATION THERAPY VISIT (OUTPATIENT)
Dept: NURSING | Facility: CLINIC | Age: 83
End: 2018-07-13
Payer: COMMERCIAL

## 2018-07-13 DIAGNOSIS — I48.19 PERSISTENT ATRIAL FIBRILLATION (H): ICD-10-CM

## 2018-07-13 DIAGNOSIS — I48.20 CHRONIC ATRIAL FIBRILLATION (H): ICD-10-CM

## 2018-07-13 DIAGNOSIS — Z79.01 LONG-TERM (CURRENT) USE OF ANTICOAGULANTS: ICD-10-CM

## 2018-07-13 LAB — INR POINT OF CARE: 2 (ref 0.86–1.14)

## 2018-07-13 PROCEDURE — 36416 COLLJ CAPILLARY BLOOD SPEC: CPT

## 2018-07-13 PROCEDURE — 99207 ZZC NO CHARGE NURSE ONLY: CPT

## 2018-07-13 PROCEDURE — 85610 PROTHROMBIN TIME: CPT | Mod: QW

## 2018-07-13 RX ORDER — WARFARIN SODIUM 5 MG/1
TABLET ORAL
Qty: 90 TABLET | Refills: 1 | Status: SHIPPED | OUTPATIENT
Start: 2018-07-13 | End: 2018-12-28

## 2018-07-13 NOTE — PROGRESS NOTES
"  ANTICOAGULATION FOLLOW-UP CLINIC VISIT    Patient Name:  Ashlie Brantley  Date:  7/13/2018  Contact Type:  Face to Face    SUBJECTIVE:     Patient Findings     Positives Change in diet/appetite (Discussed at length how Vit K affects the INR. She will try to be better about her intake. She mentioned something about a \"green vegetable powder\" and I advised her not to use it. Vit K should be only from foods.), Unexplained INR or factor level change           OBJECTIVE    INR Protime   Date Value Ref Range Status   07/13/2018 2.0 (A) 0.86 - 1.14 Final       ASSESSMENT / PLAN  INR assessment THER    Recheck INR In: 10 DAYS    INR Location Clinic      Anticoagulation Summary as of 7/13/2018     INR goal 2.0-3.0   Today's INR 2.0   Warfarin maintenance plan 7.5 mg (5 mg x 1.5) on Sun; 5 mg (5 mg x 1) all other days   Full warfarin instructions 7.5 mg on Sun; 5 mg all other days   Weekly warfarin total 37.5 mg   No change documented Jarek Stephens RN   Plan last modified Jarek Stephens RN (5/10/2018)   Next INR check 7/24/2018   Target end date Indefinite    Indications   Atrial fibrillation (H) [I48.91] [I48.91]  Long-term (current) use of anticoagulants [Z79.01] [Z79.01]         Anticoagulation Episode Summary     INR check location     Preferred lab     Send INR reminders to [facility] CL[facility]    Comments       Anticoagulation Care Providers     Provider Role Specialty Phone number    Tad MendozaDO [facility] [phone]            See the Encounter Report to view Anticoagulation Flowsheet and Dosing Calendar (Go to Encounters tab in chart review, and find the Anticoagulation Therapy Visit)    Dosage adjustment made based on physician directed care plan.    Jarek Stephens RN               "

## 2018-07-13 NOTE — MR AVS SNAPSHOT
Ashlie Brantley   7/13/2018 10:45 AM   Anticoagulation Therapy Visit    Description:  87 year old female   Provider:  NE ANTI ELSA   Department:  Ne Nurse           INR as of 7/13/2018     Today's INR 2.0      Anticoagulation Summary as of 7/13/2018     INR goal 2.0-3.0   Today's INR 2.0   Full warfarin instructions 7.5 mg on Sun; 5 mg all other days   Next INR check 7/24/2018    Indications   Atrial fibrillation (H) [I48.91] [I48.91]  Long-term (current) use of anticoagulants [Z79.01] [Z79.01]         Your next Anticoagulation Clinic appointment(s)     Jul 24, 2018  7:30 AM CDT   [facility]    [address] [place] [address] [phone]              Contact Numbers     Please call [phone] to cancel and/or reschedule your appointment.  Please call [phone] with any problems or questions regarding your therapy          July 2018 Details    Sun Mon Tue Wed Thu Fri Sat     1               2               3               4               5               6               7                 8               9               10               11               12               13      5 mg   See details      14      5 mg           15      7.5 mg         16      5 mg         17      5 mg         18      5 mg         19      5 mg         20      5 mg         21      5 mg           22      7.5 mg         23      5 mg         24            25               26               27               28                 29               30               31                    Date Details   07/13 This INR check       Date of next INR:  7/24/2018         How to take your warfarin dose     To take:  5 mg Take 1 of the 5 mg tablets.    To take:  7.5 mg Take 1.5 of the 5 mg tablets.

## 2018-07-24 ENCOUNTER — ANTICOAGULATION THERAPY VISIT (OUTPATIENT)
Dept: NURSING | Facility: CLINIC | Age: 83
End: 2018-07-24
Payer: COMMERCIAL

## 2018-07-24 DIAGNOSIS — I48.91 ATRIAL FIBRILLATION (H): ICD-10-CM

## 2018-07-24 DIAGNOSIS — Z79.01 LONG-TERM (CURRENT) USE OF ANTICOAGULANTS: ICD-10-CM

## 2018-07-24 LAB — INR POINT OF CARE: 4.1 (ref 0.86–1.14)

## 2018-07-24 PROCEDURE — 85610 PROTHROMBIN TIME: CPT | Mod: QW

## 2018-07-24 PROCEDURE — 36416 COLLJ CAPILLARY BLOOD SPEC: CPT

## 2018-07-24 PROCEDURE — 99207 ZZC NO CHARGE NURSE ONLY: CPT

## 2018-07-24 NOTE — PROGRESS NOTES
ANTICOAGULATION FOLLOW-UP CLINIC VISIT    Patient Name:  Ashlie Brantley  Date:  7/24/2018  Contact Type:  Face to Face    SUBJECTIVE:  INR is all over up and down she is trying to eat consistent with Greens. She is on Detrol for past month that has moderate risk in bleeding with warfarin. Took her extra half dose away on Sunday to see if we can get closer to mid range goal.     Patient Findings     Positives Unexplained INR or factor level change           OBJECTIVE    INR Protime   Date Value Ref Range Status   07/24/2018 4.1 (A) 0.86 - 1.14 Final       ASSESSMENT / PLAN  INR assessment SUPRA    Recheck INR In: 1 WEEK    INR Location Clinic      Anticoagulation Summary as of 7/24/2018     INR goal 2.0-3.0   Today's INR 4.1!   Warfarin maintenance plan 7.5 mg (5 mg x 1.5) on Sun; 5 mg (5 mg x 1) all other days   Full warfarin instructions 7/24: Hold; 7/29: 5 mg; Otherwise 7.5 mg on Sun; 5 mg all other days   Weekly warfarin total 37.5 mg   Plan last modified Jarek Stephens RN (5/10/2018)   Next INR check 8/3/2018   Target end date Indefinite    Indications   Atrial fibrillation (H) [I48.91] [I48.91]  Long-term (current) use of anticoagulants [Z79.01] [Z79.01]         Anticoagulation Episode Summary     INR check location     Preferred lab     Send INR reminders to [facility] CL[facility]    Comments       Anticoagulation Care Providers     Provider Role Specialty Phone number    Tad MendozaDO [facility] [phone]            See the Encounter Report to view Anticoagulation Flowsheet and Dosing Calendar (Go to Encounters tab in chart review, and find the Anticoagulation Therapy Visit)        Lizy Mercado RN

## 2018-07-24 NOTE — MR AVS SNAPSHOT
Ashlie Brantley   7/24/2018 7:30 AM   Anticoagulation Therapy Visit    Description:  87 year old female   Provider:  NAHEED HUNTER   Department:  Ne Nurse           INR as of 7/24/2018     Today's INR 4.1!      Anticoagulation Summary as of 7/24/2018     INR goal 2.0-3.0   Today's INR 4.1!   Full warfarin instructions 7/24: Hold; 7/29: 5 mg; Otherwise 7.5 mg on Sun; 5 mg all other days   Next INR check 8/3/2018    Indications   Atrial fibrillation (H) [I48.91] [I48.91]  Long-term (current) use of anticoagulants [Z79.01] [Z79.01]         Your next Anticoagulation Clinic appointment(s)     Aug 03, 2018 10:00 AM CDT   Anticoagulation Visit with NE ANTI COAG   [facility]    [address] [place] [address] [phone]              Contact Numbers     Please call [phone] to cancel and/or reschedule your appointment.  Please call [phone] with any problems or questions regarding your therapy          July 2018 Details    Sun Mon Tue Wed Thu Fri Sat     1               2               3               4               5               6               7                 8               9               10               11               12               13               14                 15               16               17               18               19               20               21                 22               23               24      Hold   See details      25      5 mg         26      5 mg         27      5 mg         28      5 mg           29      5 mg         30      5 mg         31      5 mg              Date Details   07/24 This INR check               How to take your warfarin dose     To take:  5 mg Take 1 of the 5 mg tablets.    Hold Do not take your warfarin dose. See the Details table to the right for additional instructions.                August 2018 Details    Sun Mon Tue Wed Thu Fri Sat        1      5 mg         2       5 mg         3            4                 5               6               7               8               9               10               11                 12               13               14               15               16               17               18                 19               20               21               22               23               24               25                 26               27               28               29               30               31                 Date Details   No additional details    Date of next INR:  8/3/2018         How to take your warfarin dose     To take:  5 mg Take 1 of the 5 mg tablets.

## 2018-07-30 DIAGNOSIS — N32.81 OVERACTIVE BLADDER: Primary | ICD-10-CM

## 2018-07-30 DIAGNOSIS — J84.10 FIBROSIS, LUNG (H): ICD-10-CM

## 2018-07-30 RX ORDER — TOLTERODINE 4 MG/1
4 CAPSULE, EXTENDED RELEASE ORAL DAILY
Qty: 30 CAPSULE | Refills: 1 | Status: SHIPPED | OUTPATIENT
Start: 2018-07-30 | End: 2019-08-15

## 2018-07-30 RX ORDER — CODEINE SULFATE 30 MG/1
30 TABLET ORAL
Qty: 60 TABLET | Refills: 0 | Status: CANCELLED | OUTPATIENT
Start: 2018-07-30

## 2018-07-30 NOTE — TELEPHONE ENCOUNTER
Signed Prescriptions:                        Disp   Refills    tolterodine (DETROL LA) 4 MG 24 hr capsule 30 cap*1        Sig: Take 1 capsule (4 mg) by mouth daily  Authorizing Provider: BEATRIZ RUIZ  Ordering User: MELISSA PÉREZ    Medication filled per [facility] protocol.     Anthony Pérez RN....7/30/2018 9:53 AM

## 2018-08-02 ENCOUNTER — OFFICE VISIT (OUTPATIENT)
Dept: OPHTHALMOLOGY | Facility: CLINIC | Age: 83
End: 2018-08-02
Payer: COMMERCIAL

## 2018-08-02 DIAGNOSIS — Z96.1 PSEUDOPHAKIA: ICD-10-CM

## 2018-08-02 DIAGNOSIS — Z01.01 ENCOUNTER FOR EXAMINATION OF EYES AND VISION WITH ABNORMAL FINDINGS: Primary | ICD-10-CM

## 2018-08-02 DIAGNOSIS — H52.12 MYOPIA OF LEFT EYE: ICD-10-CM

## 2018-08-02 DIAGNOSIS — H52.203 ASTIGMATISM OF BOTH EYES, UNSPECIFIED TYPE: ICD-10-CM

## 2018-08-02 DIAGNOSIS — H02.839 DERMATOCHALASIS, UNSPECIFIED LATERALITY: ICD-10-CM

## 2018-08-02 DIAGNOSIS — H43.813 PVD (POSTERIOR VITREOUS DETACHMENT), BILATERAL: ICD-10-CM

## 2018-08-02 DIAGNOSIS — H52.4 PRESBYOPIA: ICD-10-CM

## 2018-08-02 PROCEDURE — 92015 DETERMINE REFRACTIVE STATE: CPT | Performed by: STUDENT IN AN ORGANIZED HEALTH CARE EDUCATION/TRAINING PROGRAM

## 2018-08-02 PROCEDURE — 92014 COMPRE OPH EXAM EST PT 1/>: CPT | Performed by: STUDENT IN AN ORGANIZED HEALTH CARE EDUCATION/TRAINING PROGRAM

## 2018-08-02 ASSESSMENT — REFRACTION_WEARINGRX
OS_CYLINDER: SPHERE
OS_SPHERE: +1.50
OD_SPHERE: +1.50
SPECS_TYPE: OTC
OD_CYLINDER: SPHERE

## 2018-08-02 ASSESSMENT — VISUAL ACUITY
OS_CC: 2+
OD_SC+: -2
OD_CC: 2
OS_SC: 20/40
OD_SC: 20/20
OS_SC+: -2
METHOD: SNELLEN - LINEAR
OS_PH_SC: 25-2

## 2018-08-02 ASSESSMENT — EXTERNAL EXAM - RIGHT EYE: OD_EXAM: NORMAL

## 2018-08-02 ASSESSMENT — REFRACTION_MANIFEST
OD_SPHERE: -0.25
OS_SPHERE: -1.00
OS_CYLINDER: +1.25
OD_CYLINDER: +0.50
OD_AXIS: 167
OS_AXIS: 008
OD_ADD: +2.75
OS_ADD: +2.75

## 2018-08-02 ASSESSMENT — EXTERNAL EXAM - LEFT EYE: OS_EXAM: NORMAL

## 2018-08-02 ASSESSMENT — CUP TO DISC RATIO
OS_RATIO: 0.2
OD_RATIO: 0.3

## 2018-08-02 ASSESSMENT — TONOMETRY
OD_IOP_MMHG: 16
OS_IOP_MMHG: 15
IOP_METHOD: APPLANATION

## 2018-08-02 ASSESSMENT — SLIT LAMP EXAM - LIDS
COMMENTS: 3+ DERMATOCHALASIS - UPPER LID
COMMENTS: 3+ DERMATOCHALASIS - UPPER LID

## 2018-08-02 ASSESSMENT — CONF VISUAL FIELD
OD_NORMAL: 1
OS_NORMAL: 1

## 2018-08-02 NOTE — PATIENT INSTRUCTIONS
Ok to continue over the counter readers (increase the power to around +2.25 if small print gets harder to read)  Ok to continue Maxitrol ointment at bedtime as needed     Sarah Ryan MD  [phone]

## 2018-08-02 NOTE — PROGRESS NOTES
Current Eye Medications:  maxitrol ointment as needed at night for itching.      Subjective:  Comprehensive Eye Exam.  No vision changes or concerns.  She wears over-the-counter readers which are working well.   Patient is unable to speak, she is able to whisper, and she has a board that she writes on, to communicate.      Objective:  See Ophthalmology Exam.      Assessment:  Ashlie Brantley is a 87 year old female who presents with:     Pseudophakia, OU      PVD (posterior vitreous detachment), bilateral      Dermatochalasis, unspecified laterality      Plan:  Ok to continue over the counter readers (increase the power to around +2.25 if small print gets harder to read)  Ok to continue Maxitrol ointment at bedtime as needed     Sarah Ryan MD  [phone]

## 2018-08-02 NOTE — MR AVS SNAPSHOT
After Visit Summary   8/2/2018    Ashlie Brantley    MRN: [id number]           Patient Information     Date Of Birth          2/24/1931        Visit Information        Provider Department      8/2/2018 2:00 PM Sarah Ryan MD [facility]        Today's Diagnoses     Encounter for examination of eyes and vision with abnormal findings    -  1    Presbyopia        Astigmatism of both eyes, unspecified type        Myopia of left eye        Pseudophakia, OU        PVD (posterior vitreous detachment), bilateral        Dermatochalasis, unspecified laterality          Care Instructions    Ok to continue over the counter readers (increase the power to around +2.25 if small print gets harder to read)  Ok to continue Maxitrol ointment at bedtime as needed     Sarah Ryan MD  [phone]            Follow-ups after your visit        Follow-up notes from your care team     Return in about 1 year (around 8/2/2019) for Complete Exam.      Your next 10 appointments already scheduled     Aug 03, 2018 10:00 AM CDT   [facility] with NE ANTI COAG   [facility]    [address] [facility] [address] [phone]            Aug 09, 2018 11:15 AM CDT   Return Visit with eRné Warner MD   [facility] ([address] [address] [phone]            Aug 17, 2018  3:00 PM CDT   PFT VISIT with  PFL B   [facility] Pulmonary Function Testing ([facility])    [address]  3rd Floor  [facility] [place] [place] [address] [phone]            Aug 17, 2018  3:40 PM CDT   (Arrive by 3:25 PM)   Return Visit with Noemi Genao MD   [facility] ([facility])    [address]  [address] [place] [address] [phone]              Who to contact     If you have  questions or need follow up information about today's clinic visit or your schedule please contact [facility] directly at [phone].  Normal or non-critical lab and imaging results will be communicated to you by MyChart, letter or phone within 4 business days after the clinic has received the results. If you do not hear from us within 7 days, please contact the clinic through MyChart or phone. If you have a critical or abnormal lab result, we will notify you by phone as soon as possible.  Submit refill requests through [employer] or call your pharmacy and they will forward the refill request to us. Please allow 3 business days for your refill to be completed.          Additional Information About Your Visit        [employer] Information     [employer] gives you secure access to your electronic health record. If you see a primary care provider, you can also send messages to your care team and make appointments. If you have questions, please call your primary care clinic.  If you do not have a primary care provider, please call [phone] and they will assist you.        Care EveryWhere ID     This is your Care EveryWhere ID. This could be used by other organizations to access your [place] medical records  [id number]         Blood Pressure from Last 3 Encounters:   07/10/18 124/76   06/14/18 139/80   04/24/18 105/70    Weight from Last 3 Encounters:   07/10/18 66 kg (145 lb 9.6 oz)   04/24/18 68.5 kg (151 lb)   03/28/18 67.6 kg (149 lb)              We Performed the Following     EYE EXAM (SIMPLE-NONBILLABLE)     REFRACTIVE STATUS        Primary Care Provider Office Phone # Fax #    Tad Mendoza [phone]       [address] [facility] [facility] [address]        Equal Access to Services     JUNIOR MURPHY AH: Jose Mead, walaverne rosenbaum, qaybta kaalnahomy hodge, giovani Vargas [facility] [phone].    ATENCIÓN: Susan vang,  tiene a leon disposición servicios gratuitos de asistencia lingüística. Lynette yoo [phone].    We comply with applicable federal civil rights laws and [place] laws. We do not discriminate on the basis of race, color, national origin, age, disability, sex, sexual orientation, or gender identity.            Thank you!     Thank you for choosing [facility]  for your care. Our goal is always to provide you with excellent care. Hearing back from our patients is one way we can continue to improve our services. Please take a few minutes to complete the written survey that you may receive in the mail after your visit with us. Thank you!             Your Updated Medication List - Protect others around you: Learn how to safely use, store and throw away your medicines at www.disposemymeds.org.          This list is accurate as of 8/2/18  2:43 PM.  Always use your most recent med list.                   Brand Name Dispense Instructions for use Diagnosis    aspirin 81 MG tablet     1 Tab    ONE DAILY    HTN (hypertension), Elevated cholesterol       calcium + D 600-200 MG-UNIT Tabs   Generic drug:  calcium carbonate-vitamin D     0    ONE TABLET TWICE A DAY WITH MEALS    Osteopenia       * codeine 30 MG tablet     60 tablet    Take 1 tablet (30 mg) by mouth 2 times daily May fill on or after 1/9/18    Fibrosis, lung (H)       * codeine 30 MG tablet     60 tablet    Take 1 tablet (30 mg) by mouth 2 times daily May fill on or after 3/10/18    Fibrosis, lung (H)       * codeine 30 MG tablet     60 tablet    Take 1 tablet (30 mg) by mouth 2 times daily May fill on or after 2/8/18    Fibrosis, lung (H)       furosemide 20 MG tablet    LASIX     Take 20 mg by mouth        levothyroxine 88 MCG tablet    SYNTHROID    90 tablet    Take 1 tablet (88 mcg) by mouth daily    Hypothyroidism due to acquired atrophy of thyroid       losartan 100 MG tablet    COZAAR    90 tablet    Take 1 tablet (100 mg) by mouth daily     Hypertension goal BP (blood pressure) < 140/90       metoprolol tartrate 25 MG tablet    LOPRESSOR     Take 25 mg by mouth 2 times daily        neomycin-polymyxin-dexamethasone 3.5-97758-1.1 Oint ophthalmic ointment    MAXITROL    1 Tube    Place 1 Application into both eyes At Bedtime Apply a small squirt into each eye at bedtime    Dermatochalasis, unspecified laterality       OMEGA-3 COMPLEX PO      1 capsule daily        order for DME     1 each    Equipment being ordered: Oxygen 2 liters NC at HS    Oxygen decrease, IPF (idiopathic pulmonary fibrosis) (H)       * order for DME     1 Units    Equipment being ordered: Please provide night time oxygen at 2L/min via nasal canula    IPF (idiopathic pulmonary fibrosis) (H), Hypoxia       * order for DME     1 Device    Equipment being ordered: Oxygen 2 liters NC at night time    IPF (idiopathic pulmonary fibrosis) (H)       simvastatin 40 MG tablet    ZOCOR    90 tablet    Take 1 tablet (40 mg) by mouth At Bedtime    Hyperlipidemia LDL goal <130       tolterodine 4 MG 24 hr capsule    DETROL LA    30 capsule    Take 1 capsule (4 mg) by mouth daily    Overactive bladder       traZODone 50 MG tablet    DESYREL    30 tablet    Take 1 tablet (50 mg) by mouth nightly as needed for sleep    Other insomnia       * vitamin D3 1000 units Caps      1 CAPSULE DAILY        * vitamin D 2000 units tablet     0    1 tablet daily    Osteopenia       VITAMIN E COMPLEX PO      Take  by mouth.        * warfarin 7.5 MG tablet    COUMADIN    14 tablet    Take 7.5 mg every Sunday. 5mg ROW    Atrial fibrillation, unspecified type (H), Long-term (current) use of anticoagulants       * warfarin 5 MG tablet    COUMADIN    90 tablet    Take as directed by ACC. Current dose is 7.5 mg on Sunday and 5 mg the rest of the week.    Persistent atrial fibrillation (H)       * Notice:  This list has 9 medication(s) that are the same as other medications prescribed for you. Read the directions  carefully, and ask your doctor or other care provider to review them with you.

## 2018-08-02 NOTE — LETTER
8/2/2018         RE: Ashlie Brantley  [address] [place] [address]        Dear Colleague,    Thank you for referring your patient, Ashlie Brantley, to the [facility].    Her eye exam is stable.   Please see a copy of my visit note below.     Current Eye Medications:  maxitrol ointment as needed at night for itching.      Subjective:  Comprehensive Eye Exam.  No vision changes or concerns.  She wears over-the-counter readers which are working well.   Patient is unable to speak, she is able to whisper, and she has a board that she writes on, to communicate.      Objective:  See Ophthalmology Exam.      Assessment:  sAhlie Brantley is a 87 year old female who presents with:     Pseudophakia, OU      PVD (posterior vitreous detachment), bilateral      Dermatochalasis, unspecified laterality      Plan:  Ok to continue over the counter readers (increase the power to around +2.25 if small print gets harder to read)  Ok to continue Maxitrol ointment at bedtime as needed     Sarah Ryan MD  [phone]             Again, thank you for allowing me to participate in the care of your patient.        Sincerely,        Sarah Ryan MD

## 2018-08-03 ENCOUNTER — TELEPHONE (OUTPATIENT)
Dept: FAMILY MEDICINE | Facility: CLINIC | Age: 83
End: 2018-08-03

## 2018-08-03 ENCOUNTER — OFFICE VISIT (OUTPATIENT)
Dept: FAMILY MEDICINE | Facility: CLINIC | Age: 83
End: 2018-08-03
Payer: COMMERCIAL

## 2018-08-03 ENCOUNTER — ANTICOAGULATION THERAPY VISIT (OUTPATIENT)
Dept: NURSING | Facility: CLINIC | Age: 83
End: 2018-08-03
Payer: COMMERCIAL

## 2018-08-03 VITALS
WEIGHT: 145 LBS | HEART RATE: 72 BPM | TEMPERATURE: 98.2 F | SYSTOLIC BLOOD PRESSURE: 128 MMHG | HEIGHT: 59 IN | BODY MASS INDEX: 29.23 KG/M2 | DIASTOLIC BLOOD PRESSURE: 74 MMHG

## 2018-08-03 DIAGNOSIS — I48.91 ATRIAL FIBRILLATION (H): ICD-10-CM

## 2018-08-03 DIAGNOSIS — Z79.01 LONG-TERM (CURRENT) USE OF ANTICOAGULANTS: ICD-10-CM

## 2018-08-03 DIAGNOSIS — I48.19 PERSISTENT ATRIAL FIBRILLATION (H): ICD-10-CM

## 2018-08-03 DIAGNOSIS — R30.0 DYSURIA: Primary | ICD-10-CM

## 2018-08-03 DIAGNOSIS — R31.9 URINARY TRACT INFECTION WITH HEMATURIA, SITE UNSPECIFIED: ICD-10-CM

## 2018-08-03 DIAGNOSIS — N39.0 URINARY TRACT INFECTION WITH HEMATURIA, SITE UNSPECIFIED: ICD-10-CM

## 2018-08-03 DIAGNOSIS — L57.0 AK (ACTINIC KERATOSIS): ICD-10-CM

## 2018-08-03 LAB
ALBUMIN UR-MCNC: ABNORMAL MG/DL
APPEARANCE UR: ABNORMAL
BACTERIA #/AREA URNS HPF: ABNORMAL /HPF
BILIRUB UR QL STRIP: NEGATIVE
COLOR UR AUTO: YELLOW
GLUCOSE UR STRIP-MCNC: NEGATIVE MG/DL
HGB UR QL STRIP: ABNORMAL
INR POINT OF CARE: 4 (ref 0.86–1.14)
KETONES UR STRIP-MCNC: NEGATIVE MG/DL
LEUKOCYTE ESTERASE UR QL STRIP: ABNORMAL
NITRATE UR QL: POSITIVE
NON-SQ EPI CELLS #/AREA URNS LPF: ABNORMAL /LPF
PH UR STRIP: 6 PH (ref 5–7)
RBC #/AREA URNS AUTO: ABNORMAL /HPF
SOURCE: ABNORMAL
SP GR UR STRIP: 1.01 (ref 1–1.03)
UROBILINOGEN UR STRIP-ACNC: 0.2 EU/DL (ref 0.2–1)
WBC #/AREA URNS AUTO: >100 /HPF

## 2018-08-03 PROCEDURE — 36416 COLLJ CAPILLARY BLOOD SPEC: CPT

## 2018-08-03 PROCEDURE — 85610 PROTHROMBIN TIME: CPT | Mod: QW

## 2018-08-03 PROCEDURE — 87086 URINE CULTURE/COLONY COUNT: CPT | Performed by: FAMILY MEDICINE

## 2018-08-03 PROCEDURE — 99207 ZZC NO CHARGE NURSE ONLY: CPT

## 2018-08-03 PROCEDURE — 81001 URINALYSIS AUTO W/SCOPE: CPT | Performed by: FAMILY MEDICINE

## 2018-08-03 PROCEDURE — 99214 OFFICE O/P EST MOD 30 MIN: CPT | Performed by: FAMILY MEDICINE

## 2018-08-03 PROCEDURE — 87186 SC STD MICRODIL/AGAR DIL: CPT | Performed by: FAMILY MEDICINE

## 2018-08-03 PROCEDURE — 87088 URINE BACTERIA CULTURE: CPT | Performed by: FAMILY MEDICINE

## 2018-08-03 RX ORDER — SULFAMETHOXAZOLE/TRIMETHOPRIM 800-160 MG
1 TABLET ORAL 2 TIMES DAILY
Qty: 10 TABLET | Refills: 0 | Status: SHIPPED | OUTPATIENT
Start: 2018-08-03 | End: 2018-08-08

## 2018-08-03 NOTE — MR AVS SNAPSHOT
Ashlie Brantley   8/3/2018 10:00 AM   Anticoagulation Therapy Visit    Description:  87 year old female   Provider:  NAHEED HUNTER   Department:  Ne Nurse           INR as of 8/3/2018     Today's INR 4.0!      Anticoagulation Summary as of 8/3/2018     INR goal 2.0-3.0   Today's INR 4.0!   Full warfarin instructions 8/3: Hold; 8/4: Hold; 8/5: 5 mg; Otherwise 7.5 mg on Sun; 5 mg all other days   Next INR check 8/7/2018    Indications   Atrial fibrillation (H) [I48.91] [I48.91]  Long-term (current) use of anticoagulants [Z79.01] [Z79.01]         Your next Anticoagulation Clinic appointment(s)     Aug 03, 2018 10:00 AM CDT   Anticoagulation Visit with NE ANTI COAURBANO   [facility] ([address] [address] [phone]            Aug 06, 2018  9:00 AM CDT   Anticoagulation Visit with NE ANTI COAG   [facility] ([address] [address] [phone]              Contact Numbers     Please call [phone] to cancel and/or reschedule your appointment.  Please call [phone] with any problems or questions regarding your therapy          August 2018 Details    Sun Mon Tue Wed Thu Fri Sat        1               2               3      Hold   See details      4      Hold           5      5 mg         6      5 mg         7            8               9               10               11                 12               13               14               15               16               17               18                 19               20               21               22               23               24               25                 26               27               28               29               30               31                 Date Details   08/03 This INR check       Date of next INR:  8/7/2018         How to take your warfarin dose     To take:  5 mg  Take 1 of the 5 mg tablets.    Hold Do not take your warfarin dose. See the Details table to the right for additional instructions.

## 2018-08-03 NOTE — PATIENT INSTRUCTIONS
Begin antibiotics for UTI and return to clinic recheck in 6 weeks on Monday.   INR on monday  Follow up sooner if UTI symptoms persist or worsen .    Freezing of lesions on face today. Please follow up with INR recheck in 6 weeks for recheck on lesions.

## 2018-08-03 NOTE — PROGRESS NOTES
ANTICOAGULATION FOLLOW-UP CLINIC VISIT    Patient Name:  Ashlie Brantley  Date:  8/3/2018  Contact Type:  Face to Face    SUBJECTIVE:     Patient Findings     Positives Inflammation (dx UTI today), Antibiotic use or infection (Bactrim x 5 days (8/8/18))           OBJECTIVE    INR Protime   Date Value Ref Range Status   08/03/2018 4.0 (A) 0.86 - 1.14 Final       ASSESSMENT / PLAN  INR assessment SUPRA    Recheck INR In: 4 DAYS    INR Location Clinic      Anticoagulation Summary as of 8/3/2018     INR goal 2.0-3.0   Today's INR 4.0!   Warfarin maintenance plan 7.5 mg (5 mg x 1.5) on Sun; 5 mg (5 mg x 1) all other days   Full warfarin instructions 8/3: Hold; 8/4: Hold; 8/5: 5 mg; Otherwise 7.5 mg on Sun; 5 mg all other days   Weekly warfarin total 37.5 mg   Plan last modified Jarek Stephens RN (5/10/2018)   Next INR check 8/7/2018   Target end date Indefinite    Indications   Atrial fibrillation (H) [I48.91] [I48.91]  Long-term (current) use of anticoagulants [Z79.01] [Z79.01]         Anticoagulation Episode Summary     INR check location     Preferred lab     Send INR reminders to [facility]    Comments       Anticoagulation Care Providers     Provider Role Specialty Phone number    Tad Mendoza DO [facility] [phone]            See the Encounter Report to view Anticoagulation Flowsheet and Dosing Calendar (Go to Encounters tab in chart review, and find the Anticoagulation Therapy Visit)    Dosage adjustment made based on physician directed care plan.    Jarek Stephens, RJEI

## 2018-08-03 NOTE — PROGRESS NOTES
SUBJECTIVE:   Ashlie Brantley is a 87 year old female who presents to clinic today for the following health issues:      URINARY TRACT SYMPTOMS      Duration: a few days    Description  Painful and she is going often     Intensity:  Moderate to severe, she reports that she is miseranle    Accompanying signs and symptoms:  Fever/chills: no   Flank pain no   Nausea and vomiting: no   Vaginal symptoms: none  Abdominal/Pelvic Pain: NO    History  History of frequent UTI's: no   History of kidney stones: no   Sexually Active: no   Possibility of pregnancy: No    Precipitating or alleviating factors: None    Denies abdominal or back pain. She is experiencing dysuria.    Patient stated that Bactrim worked in the past.       Skin   Multiple scaly patches on face that is reported to be chronic located on the face.             Problem list and histories reviewed & adjusted, as indicated.  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     Hoarseness     Laryngeal dystonia     Spasmodic dysphonia     Hyperlipidemia LDL goal <130     Anxiety     Hypertension goal BP (blood pressure) < 140/90     LVH (left ventricular hypertrophy)     Advance Care Planning     Cough     Dermatochalasis     Health Care Home     Trochanteric bursitis - bilateral     PVD (posterior vitreous detachment) OU     Pseudophakia, OU     IPF (idiopathic pulmonary fibrosis) (H)     Hypothyroidism due to acquired atrophy of thyroid     Chronic pain syndrome     Urinary incontinence, unspecified type     Atrial fibrillation (H) [I48.91]     Long-term (current) use of anticoagulants [Z79.01]     Past Surgical History:   Procedure Laterality Date     C NONSPECIFIC PROCEDURE      BACK SURGERY     C NONSPECIFIC PROCEDURE      VOCAL CORD POLYP     C SHOULDER SURG PROC UNLISTED       C STOMACH SURGERY PROCEDURE  UNLISTED       C TOTAL KNEE ARTHROPLASTY      LT 1998  /  RT 2001     CATARACT IOL, RT/LT       HC REMOVAL GALLBLADDER       HERNIA REPAIR, INGUINAL RT/LT      RT     HYSTERECTOMY, CERVIX STATUS UNKNOWN      DUB     PHACOEMULSIFICATION WITH STANDARD INTRAOCULAR LENS IMPLANT  Rt 6/11/09, Lt 8/3/09    both eyes Dr. Barnett     ROTATOR CUFF REPAIR RT/LT      RT       Social History   Substance Use Topics     Smoking status: Never Smoker     Smokeless tobacco: Never Used     Alcohol use 0.0 oz/week     0 Standard drinks or equivalent per week      Comment: 1-2 drinks/month     Family History   Problem Relation Age of Onset     Diabetes Mother      C.A.D. Father      Cerebrovascular Disease Brother          Current Outpatient Prescriptions   Medication Sig Dispense Refill     ASPIRIN 81 MG OR TABS ONE DAILY 1 Tab 0     CALCIUM + D 600-200 MG-UNIT OR TABS ONE TABLET TWICE A DAY WITH MEALS 0 0     codeine 30 MG tablet Take 1 tablet (30 mg) by mouth 2 times daily May fill on or after 2/8/18 60 tablet 0     codeine 30 MG tablet Take 1 tablet (30 mg) by mouth 2 times daily May fill on or after 3/10/18 60 tablet 0     codeine 30 MG tablet Take 1 tablet (30 mg) by mouth 2 times daily May fill on or after 1/9/18 60 tablet 0     furosemide (LASIX) 20 MG tablet Take 20 mg by mouth       levothyroxine (SYNTHROID) 88 MCG tablet Take 1 tablet (88 mcg) by mouth daily 90 tablet 3     losartan (COZAAR) 100 MG tablet Take 1 tablet (100 mg) by mouth daily 90 tablet 3     metoprolol tartrate (LOPRESSOR) 25 MG tablet Take 25 mg by mouth 2 times daily       neomycin-polymyxin-dexamethasone (MAXITROL) 3.5-52358-1.1 OINT ophthalmic ointment Place 1 Application into both eyes At Bedtime Apply a small squirt into each eye at bedtime 1 Tube 6     OMEGA-3 COMPLEX OR 1 capsule daily       order for DME Equipment being ordered: Oxygen 2 liters NC at night time 1 Device 0     order for DME Equipment being ordered: Please provide night time oxygen at  2L/min via nasal canula 1 Units 0     ORDER FOR DME Equipment being ordered: Oxygen 2 liters NC at HS 1 each 0     simvastatin (ZOCOR) 40 MG tablet Take 1 tablet (40 mg) by mouth At Bedtime 90 tablet 3     tolterodine (DETROL LA) 4 MG 24 hr capsule Take 1 capsule (4 mg) by mouth daily 30 capsule 1     traZODone (DESYREL) 50 MG tablet Take 1 tablet (50 mg) by mouth nightly as needed for sleep 30 tablet 1     VITAMIN D 2000 UNIT OR TABS 1 tablet daily 0 0     VITAMIN D3 1000 UNIT OR CAPS 1 CAPSULE DAILY       VITAMIN E COMPLEX PO Take  by mouth.       warfarin (COUMADIN) 5 MG tablet Take as directed by ACC. Current dose is 7.5 mg on Sunday and 5 mg the rest of the week. 90 tablet 1     warfarin (COUMADIN) 7.5 MG tablet Take 7.5 mg every Sunday. 5mg ROW 14 tablet 3     Allergies   Allergen Reactions     Nkda [No Known Drug Allergies]      Recent Labs   Lab Test  04/24/18   1342  12/13/17   0852  12/21/16   0840  12/09/15   0917   03/26/12   1137   03/24/11   1028   LDL   --   41  53  74   < >  68   --   85   HDL   --   71  63  65   < >  48*   --   55   TRIG   --   53  101  82   < >  102   --   94   ALT   --    --    --    --    --   <6   --   15   CR  0.77  0.67  0.76  0.70   < >  0.83   < >  0.74   GFRESTIMATED  71  83  72  79   < >  66   < >  76   GFRESTBLACK  86  >90  87  >90   GFR Calc     < >  80   < >  >90   POTASSIUM  4.1  4.0  4.3  3.9   < >  3.8   < >  3.8   TSH  0.42  0.40  1.23  0.96   < >  0.47   --   0.62    < > = values in this interval not displayed.      BP Readings from Last 3 Encounters:   08/03/18 128/74   07/10/18 124/76   06/14/18 139/80    Wt Readings from Last 3 Encounters:   08/03/18 65.8 kg (145 lb)   07/10/18 66 kg (145 lb 9.6 oz)   04/24/18 68.5 kg (151 lb)                  Labs reviewed in EPIC    Reviewed and updated as needed this visit by clinical staff       Reviewed and updated as needed this visit by Provider         ROS:  Constitutional, HEENT, cardiovascular,  "pulmonary, GI, , musculoskeletal, neuro, skin, endocrine and psych systems are negative, except as otherwise noted.    This document serves as a record of the services and decisions personally performed and made by Tad Mendoza D.O. It was created on her behalf by Aaron Means, a trained medical scribe. The creation of this document is based on the provider's statements to the medical scribe.  Aaron Means August 3, 2018 10:06 AM      OBJECTIVE:     /74  Pulse 72  Temp 98.2  F (36.8  C) (Oral)  Ht 1.486 m (4' 10.5\")  Wt 65.8 kg (145 lb)  BMI 29.79 kg/m2  Body mass index is 29.79 kg/(m^2).  GENERAL: healthy, alert and no distress  EYES: Eyes grossly normal to inspection, PERRL and conjunctivae and sclerae normal  HENT: ear canals and TM's normal, nose and mouth without ulcers or lesions  NECK: no adenopathy, no asymmetry, masses, or scars and thyroid normal to palpation  RESP: lungs clear to auscultation - no rales, rhonchi or wheezes  CV: regular rate and rhythm, normal S1 S2, no S3 or S4, no murmur, click or rub, no peripheral edema and peripheral pulses strong  ABDOMEN: soft, nontender, no hepatosplenomegaly, no masses and bowel sounds normal  MS: no gross musculoskeletal defects noted, no edema  SKIN: 5 scaly  lesions on face. Freezing of lesions X 3 times  today with liquid nitrogen without complication.   PSYCH: mentation appears normal, affect normal/bright    Diagnostic Test Results:  Results for orders placed or performed in visit on 08/03/18 (from the past 24 hour(s))   *UA reflex to Microscopic   Result Value Ref Range    Color Urine Yellow     Appearance Urine Cloudy     Glucose Urine Negative NEG^Negative mg/dL    Bilirubin Urine Negative NEG^Negative    Ketones Urine Negative NEG^Negative mg/dL    Specific Gravity Urine 1.010 1.003 - 1.035    Blood Urine Small (A) NEG^Negative    pH Urine 6.0 5.0 - 7.0 pH    Protein Albumin Urine Trace (A) NEG^Negative mg/dL    Urobilinogen Urine 0.2 " "0.2 - 1.0 EU/dL    Nitrite Urine Positive (A) NEG^Negative    Leukocyte Esterase Urine Large (A) NEG^Negative    Source Midstream Urine    Urine Microscopic   Result Value Ref Range    WBC Urine >100 (A) OTO5^0 - 5 /HPF    RBC Urine 2-5 (A) OTO2^O - 2 /HPF    Squamous Epithelial /LPF Urine Few FEW^Few /LPF    Bacteria Urine Many (A) NEG^Negative /HPF     Results for orders placed or performed in visit on 08/03/18   *UA reflex to Microscopic   Result Value Ref Range    Color Urine Yellow     Appearance Urine Cloudy     Glucose Urine Negative NEG^Negative mg/dL    Bilirubin Urine Negative NEG^Negative    Ketones Urine Negative NEG^Negative mg/dL    Specific Gravity Urine 1.010 1.003 - 1.035    Blood Urine Small (A) NEG^Negative    pH Urine 6.0 5.0 - 7.0 pH    Protein Albumin Urine Trace (A) NEG^Negative mg/dL    Urobilinogen Urine 0.2 0.2 - 1.0 EU/dL    Nitrite Urine Positive (A) NEG^Negative    Leukocyte Esterase Urine Large (A) NEG^Negative    Source Midstream Urine    Urine Microscopic   Result Value Ref Range    WBC Urine >100 (A) OTO5^0 - 5 /HPF    RBC Urine 2-5 (A) OTO2^O - 2 /HPF    Squamous Epithelial /LPF Urine Few FEW^Few /LPF    Bacteria Urine Many (A) NEG^Negative /HPF       ASSESSMENT/PLAN:     Tobacco Cessation:   reports that she has never smoked. She has never used smokeless tobacco.      BMI:   Estimated body mass index is 29.79 kg/(m^2) as calculated from the following:    Height as of this encounter: 1.486 m (4' 10.5\").    Weight as of this encounter: 65.8 kg (145 lb).         1. Dysuria  Abnormal findings on lab. Patient will begin Bactrim as directed.   - *UA reflex to Microscopic  - Urine Microscopic    2. Urinary tract infection with hematuria, site unspecified  Abnormal findings on lab. Patient will begin Bactrim as directed. Patient will return to clinic for INR recheck in 6 weeks or sooner if symptoms persist or worsen .  - Urine Culture Aerobic Bacterial  - sulfamethoxazole-trimethoprim " (BACTRIM DS/SEPTRA DS) 800-160 MG per tablet; Take 1 tablet by mouth 2 times daily for 5 days  Dispense: 10 tablet; Refill: 0    3. AK (actinic keratosis)  Patient reports AK is chronic. Freezing of lesions on face today with liquid nitrogen. Patient will follow up in 6 weeks for recheck.     4. Long-term (current) use of anticoagulants [Z79.01]  5. Persistent atrial fibrillation (H)  INR today was 4. Patient will return to clinic for INR recheck in 6 weeks due to beginning antibiotics.   Patient will continue taking all medications as directed for Persistent atrial fibrillation.           Patient instructions discussed with patient    The information in this document, created by the medical scribe for me, accurately reflects the services I personally performed and the decisions made by me. I have reviewed and approved this document for accuracy prior to leaving the patient care area.  August 3, 2018 10:19 AM      Tad Mendoza DO  [facility]

## 2018-08-03 NOTE — MR AVS SNAPSHOT
After Visit Summary   8/3/2018    Ashlie Brantley    MRN: [id number]           Patient Information     Date Of Birth          2/24/1931        Visit Information        Provider Department      8/3/2018 10:20 AM Tad Mendoza DO [facility]        Today's Diagnoses     Dysuria    -  1    Urinary tract infection with hematuria, site unspecified        AK (actinic keratosis)        Long-term (current) use of anticoagulants [Z79.01]        Persistent atrial fibrillation (H)          Care Instructions    Begin antibiotics for UTI and return to clinic recheck in 6 weeks on Monday.   INR on monday  Follow up sooner if UTI symptoms persist or worsen .    Freezing of lesions on face today. Please follow up with INR recheck in 6 weeks for recheck on lesions.           Follow-ups after your visit        Your next 10 appointments already scheduled     Aug 06, 2018  9:00 AM CDT   Anticoagulation Visit with NE ANTI COAG   [facility] ([facility])    [address] [id number] [phone]            Aug 09, 2018 11:15 AM CDT   Return Visit with René Warner MD   [facility] ([facility])    [address] [place] [address]   [phone]            Aug 17, 2018  3:00 PM CDT   PFT VISIT with  PFL RODNEY   [facility] ([facility] [place])    [address]  3rd Floor  [facility] [id number] [phone]            Aug 17, 2018  3:40 PM CDT   (Arrive by 3:25 PM)   Return Visit with Noemi Genao MD   [facility] ([facility])    [address]  [address] [place] [id number] [phone]              Who to contact     If you have questions or need follow up information about today's clinic visit or your schedule please contact [facility] directly  "at [phone].  Normal or non-critical lab and imaging results will be communicated to you by [employer], letter or phone within 4 business days after the clinic has received the results. If you do not hear from us within 7 days, please contact the clinic through [email or web address] or phone. If you have a critical or abnormal lab result, we will notify you by phone as soon as possible.  Submit refill requests through [email or web address] or call your pharmacy and they will forward the refill request to us. Please allow 3 business days for your refill to be completed.          Additional Information About Your Visit        [employer] Information     [email or web address] gives you secure access to your electronic health record. If you see a primary care provider, you can also send messages to your care team and make appointments. If you have questions, please call your primary care clinic.  If you do not have a primary care provider, please call [phone] and they will assist you.        Care EveryWhere ID     This is your Care EveryWhere ID. This could be used by other organizations to access your [facility] medical records  [id number]        Your Vitals Were     Pulse Temperature Height BMI (Body Mass Index)          72 98.2  F (36.8  C) (Oral) 4' 10.5\" (1.486 m) 29.79 kg/m2         Blood Pressure from Last 3 Encounters:   08/03/18 128/74   07/10/18 124/76   06/14/18 139/80    Weight from Last 3 Encounters:   08/03/18 145 lb (65.8 kg)   07/10/18 145 lb 9.6 oz (66 kg)   04/24/18 151 lb (68.5 kg)              We Performed the Following     *UA reflex to Microscopic     Urine Culture Aerobic Bacterial     Urine Microscopic          Today's Medication Changes          These changes are accurate as of 8/3/18 10:21 AM.  If you have any questions, ask your nurse or doctor.               Start taking these medicines.        Dose/Directions    sulfamethoxazole-trimethoprim 800-160 MG per tablet   Commonly known as:  BACTRIM DS/SEPTRA DS   Used for:  Urinary " tract infection with hematuria, site unspecified   Started by:  Tad Mendoza DO        Dose:  1 tablet   Take 1 tablet by mouth 2 times daily for 5 days   Quantity:  10 tablet   Refills:  0            Where to get your medicines      These medications [place] [facility] [place] [address]  [address] [facility] [address]     Phone:  [phone]     sulfamethoxazole-trimethoprim 800-160 MG per tablet                Primary Care Provider Office Phone # Fax #    [place]t Aura Mendoza [phone]       [address] [facility] [address]        Equal Access to Services     JUNIOR MURPHY : Jose Mead, walaverne rosenbaum, qaybta kaalmada ayesha, giovani muller. So [facility] [phone].    ATENCIÓN: Si habla español, tiene a leon disposición servicios gratuitos de asistencia lingüística. Llame al [phone].    We comply with applicable federal civil rights laws and [place] laws. We do not discriminate on the basis of race, color, national origin, age, disability, sex, sexual orientation, or gender identity.            Thank you!     Thank you for choosing [facility]  for your care. Our goal is always to provide you with excellent care. Hearing back from our patients is one way we can continue to improve our services. Please take a few minutes to complete the written survey that you may receive in the mail after your visit with us. Thank you!             Your Updated Medication List - Protect others around you: Learn how to safely use, store and throw away your medicines at www.disposemymeds.org.          This list is accurate as of 8/3/18 10:21 AM.  Always use your most recent med list.                   Brand Name Dispense Instructions for use Diagnosis    aspirin 81 MG tablet     1 Tab    ONE DAILY    HTN (hypertension), Elevated cholesterol       calcium + D  600-200 MG-UNIT Tabs   Generic drug:  calcium carbonate-vitamin D     0    ONE TABLET TWICE A DAY WITH MEALS    Osteopenia       * codeine 30 MG tablet     60 tablet    Take 1 tablet (30 mg) by mouth 2 times daily May fill on or after 1/9/18    Fibrosis, lung (H)       * codeine 30 MG tablet     60 tablet    Take 1 tablet (30 mg) by mouth 2 times daily May fill on or after 3/10/18    Fibrosis, lung (H)       * codeine 30 MG tablet     60 tablet    Take 1 tablet (30 mg) by mouth 2 times daily May fill on or after 2/8/18    Fibrosis, lung (H)       furosemide 20 MG tablet    LASIX     Take 20 mg by mouth        levothyroxine 88 MCG tablet    SYNTHROID    90 tablet    Take 1 tablet (88 mcg) by mouth daily    Hypothyroidism due to acquired atrophy of thyroid       losartan 100 MG tablet    COZAAR    90 tablet    Take 1 tablet (100 mg) by mouth daily    Hypertension goal BP (blood pressure) < 140/90       metoprolol tartrate 25 MG tablet    LOPRESSOR     Take 25 mg by mouth 2 times daily        neomycin-polymyxin-dexamethasone 3.5-57810-4.1 Oint ophthalmic ointment    MAXITROL    1 Tube    Place 1 Application into both eyes At Bedtime Apply a small squirt into each eye at bedtime    Dermatochalasis, unspecified laterality       OMEGA-3 COMPLEX PO      1 capsule daily        order for DME     1 each    Equipment being ordered: Oxygen 2 liters NC at HS    Oxygen decrease, IPF (idiopathic pulmonary fibrosis) (H)       * order for DME     1 Units    Equipment being ordered: Please provide night time oxygen at 2L/min via nasal canula    IPF (idiopathic pulmonary fibrosis) (H), Hypoxia       * order for DME     1 Device    Equipment being ordered: Oxygen 2 liters NC at night time    IPF (idiopathic pulmonary fibrosis) (H)       simvastatin 40 MG tablet    ZOCOR    90 tablet    Take 1 tablet (40 mg) by mouth At Bedtime    Hyperlipidemia LDL goal <130       sulfamethoxazole-trimethoprim 800-160 MG per tablet    BACTRIM DS/SEPTRA  DS    10 tablet    Take 1 tablet by mouth 2 times daily for 5 days    Urinary tract infection with hematuria, site unspecified       tolterodine 4 MG 24 hr capsule    DETROL LA    30 capsule    Take 1 capsule (4 mg) by mouth daily    Overactive bladder       traZODone 50 MG tablet    DESYREL    30 tablet    Take 1 tablet (50 mg) by mouth nightly as needed for sleep    Other insomnia       * vitamin D3 1000 units Caps      1 CAPSULE DAILY        * vitamin D 2000 units tablet     0    1 tablet daily    Osteopenia       VITAMIN E COMPLEX PO      Take  by mouth.        * warfarin 7.5 MG tablet    COUMADIN    14 tablet    Take 7.5 mg every Sunday. 5mg ROW    Atrial fibrillation, unspecified type (H), Long-term (current) use of anticoagulants       * warfarin 5 MG tablet    COUMADIN    90 tablet    Take as directed by [facility]. Current dose is 7.5 mg on Sunday and 5 mg the rest of the week.    Persistent atrial fibrillation (H)       * Notice:  This list has 9 medication(s) that are the same as other medications prescribed for you. Read the directions carefully, and ask your doctor or other care provider to review them with you.

## 2018-08-04 LAB
BACTERIA SPEC CULT: ABNORMAL
SPECIMEN SOURCE: ABNORMAL

## 2018-08-05 ENCOUNTER — TELEPHONE (OUTPATIENT)
Dept: FAMILY MEDICINE | Facility: CLINIC | Age: 83
End: 2018-08-05

## 2018-08-05 DIAGNOSIS — R31.9 URINARY TRACT INFECTION WITH HEMATURIA, SITE UNSPECIFIED: Primary | ICD-10-CM

## 2018-08-05 DIAGNOSIS — N39.0 URINARY TRACT INFECTION WITH HEMATURIA, SITE UNSPECIFIED: Primary | ICD-10-CM

## 2018-08-05 RX ORDER — CODEINE SULFATE 30 MG/1
30 TABLET ORAL 2 TIMES DAILY
Qty: 60 TABLET | Refills: 0 | Status: SHIPPED | OUTPATIENT
Start: 2018-08-05 | End: 2018-09-07

## 2018-08-05 RX ORDER — CIPROFLOXACIN 250 MG/1
250 TABLET, FILM COATED ORAL 2 TIMES DAILY
Qty: 14 TABLET | Refills: 0 | Status: SHIPPED | OUTPATIENT
Start: 2018-08-05 | End: 2018-08-12

## 2018-08-06 ENCOUNTER — TELEPHONE (OUTPATIENT)
Dept: FAMILY MEDICINE | Facility: CLINIC | Age: 83
End: 2018-08-06

## 2018-08-06 ENCOUNTER — ANTICOAGULATION THERAPY VISIT (OUTPATIENT)
Dept: NURSING | Facility: CLINIC | Age: 83
End: 2018-08-06
Payer: COMMERCIAL

## 2018-08-06 DIAGNOSIS — Z79.01 LONG-TERM (CURRENT) USE OF ANTICOAGULANTS: ICD-10-CM

## 2018-08-06 DIAGNOSIS — I48.91 ATRIAL FIBRILLATION (H): ICD-10-CM

## 2018-08-06 LAB — INR POINT OF CARE: 2.2 (ref 0.86–1.14)

## 2018-08-06 PROCEDURE — 99207 ZZC NO CHARGE NURSE ONLY: CPT

## 2018-08-06 PROCEDURE — 36416 COLLJ CAPILLARY BLOOD SPEC: CPT

## 2018-08-06 PROCEDURE — 85610 PROTHROMBIN TIME: CPT | Mod: QW

## 2018-08-06 NOTE — TELEPHONE ENCOUNTER
Please call and let her know that her antibiotics is resistant to the antibiotics  We have to switch her antibiotics today

## 2018-08-06 NOTE — PROGRESS NOTES
ANTICOAGULATION FOLLOW-UP CLINIC VISIT    Patient Name:  Ashlie Brantley  Date:  8/6/2018  Contact Type:  Face to Face    SUBJECTIVE: has uti was on abx dulce held her 2 days. Abx changed per culture growth. Coumadin adjusted down to cover abx use. Will check in one week     Patient Findings     Positives Change in medications (will start cipro per ABX for positive UTI), No Problem Findings           OBJECTIVE    INR Protime   Date Value Ref Range Status   08/06/2018 2.2 (A) 0.86 - 1.14 Final       ASSESSMENT / PLAN  INR assessment THER    Recheck INR In: 1 WEEK    INR Location Clinic      Anticoagulation Summary as of 8/6/2018     INR goal 2.0-3.0   Today's INR 2.2   Warfarin maintenance plan 7.5 mg (5 mg x 1.5) on Sun; 5 mg (5 mg x 1) all other days   Full warfarin instructions 8/7: 2.5 mg; 8/9: 2.5 mg; 8/11: 2.5 mg; 8/12: 5 mg; Otherwise 7.5 mg on Sun; 5 mg all other days   Weekly warfarin total 37.5 mg   Plan last modified Dulce Stephens RN (5/10/2018)   Next INR check 8/13/2018   Target end date Indefinite    Indications   Atrial fibrillation (H) [I48.91] [I48.91]  Long-term (current) use of anticoagulants [Z79.01] [Z79.01]         Anticoagulation Episode Summary     INR check location     Preferred lab     Send INR reminders to [facility]    Comments       Anticoagulation Care Providers     Provider Role Specialty Phone number    RejiFede lynnealberto Chavarria DO [facility] [phone]            See the Encounter Report to view Anticoagulation Flowsheet and Dosing Calendar (Go to Encounters tab in chart review, and find the Anticoagulation Therapy Visit)        Lizy Mercado, RN

## 2018-08-06 NOTE — TELEPHONE ENCOUNTER
Patient/family was instructed to return call to [facility] RN directly on the RN Call back line at [phone].   Miguelina Dunaway RN

## 2018-08-06 NOTE — TELEPHONE ENCOUNTER
Reason for call:  Patient reporting a symptom    Symptom or request: UTI    Duration (how long have symptoms been present): since last week    Have you been treated for this before? Yes    Additional comments: Patient is here for her INR and stating her antibiotics is not helping her,    Phone Number patient can be reached at:  Home number on file [phone] (home)    Best Time:  anytime    Can we leave a detailed message on this number:  YES    Call taken on 8/6/2018 at 8:44 AM by Felipa Kc

## 2018-08-06 NOTE — MR AVS SNAPSHOT
Ashlie Brantley   8/6/2018 9:00 AM   Anticoagulation Therapy Visit    Description:  87 year old female   Provider:  NAHEED HUNTER   Department:  [facility] Nurse           INR as of 8/6/2018     Today's INR 2.2      Anticoagulation Summary as of 8/6/2018     INR goal 2.0-3.0   Today's INR 2.2   Full warfarin instructions 8/7: 2.5 mg; 8/9: 2.5 mg; 8/11: 2.5 mg; 8/12: 5 mg; Otherwise 7.5 mg on Sun; 5 mg all other days   Next INR check 8/13/2018    Indications   Atrial fibrillation (H) [I48.91] [I48.91]  Long-term (current) use of anticoagulants [Z79.01] [Z79.01]         Your next Anticoagulation Clinic appointment(s)     Aug 13, 2018 10:45 AM CDT   Anticoagulation Visit with NE ANTI COAG   [facility]    [address] [place] [id number] [phone]              Contact Numbers     Please call [phone] to cancel and/or reschedule your appointment.  Please call [phone] with any problems or questions regarding your therapy          August 2018 Details    Sun Mon Tue Wed Thu Fri Sat        1               2               3               4                 5               6      5 mg   See details      7      2.5 mg         8      5 mg         9      2.5 mg         10      5 mg         11      2.5 mg           12      5 mg         13            14               15               16               17               18                 19               20               21               22               23               24               25                 26               27               28               29               30               31                 Date Details   08/06 This INR check       Date of next INR:  8/13/2018         How to take your warfarin dose     To take:  2.5 mg Take 0.5 of a 5 mg tablet.    To take:  5 mg Take 1 of the 5 mg tablets.

## 2018-08-06 NOTE — TELEPHONE ENCOUNTER
See other encounter. Gave patient information verbally prior to INR appt and patient verbalized understanding.    Miguelina Dunaway RN

## 2018-08-09 ENCOUNTER — OFFICE VISIT (OUTPATIENT)
Dept: UROLOGY | Facility: CLINIC | Age: 83
End: 2018-08-09
Payer: COMMERCIAL

## 2018-08-09 VITALS — OXYGEN SATURATION: 95 % | SYSTOLIC BLOOD PRESSURE: 125 MMHG | DIASTOLIC BLOOD PRESSURE: 69 MMHG | HEART RATE: 58 BPM

## 2018-08-09 DIAGNOSIS — N32.81 OAB (OVERACTIVE BLADDER): Primary | ICD-10-CM

## 2018-08-09 LAB

## 2018-08-09 PROCEDURE — 99213 OFFICE O/P EST LOW 20 MIN: CPT | Performed by: UROLOGY

## 2018-08-09 PROCEDURE — 81001 URINALYSIS AUTO W/SCOPE: CPT | Performed by: UROLOGY

## 2018-08-09 NOTE — PROGRESS NOTES
"F/u OAB wet, 250 bladder cap, BETO, s/p sling, Detrol LA 4, lasix 20    Compliant.    States that she is doing \"really well.\"     Reports marked reduction in nocturia, now x2 (down from 4-5).    Denies dysuria, gross hematuria, frequency, UTI, drug side effect. Very pleased.        Current Outpatient Prescriptions   Medication     ASPIRIN 81 MG OR TABS     CALCIUM + D 600-200 MG-UNIT OR TABS     ciprofloxacin (CIPRO) 250 MG tablet     codeine 30 MG tablet     codeine 30 MG tablet     codeine 30 MG tablet     furosemide (LASIX) 20 MG tablet     levothyroxine (SYNTHROID) 88 MCG tablet     losartan (COZAAR) 100 MG tablet     metoprolol tartrate (LOPRESSOR) 25 MG tablet     neomycin-polymyxin-dexamethasone (MAXITROL) 3.5-57275-8.1 OINT ophthalmic ointment     OMEGA-3 COMPLEX OR     order for DME     order for DME     ORDER FOR DME     simvastatin (ZOCOR) 40 MG tablet     tolterodine (DETROL LA) 4 MG 24 hr capsule     traZODone (DESYREL) 50 MG tablet     VITAMIN D 2000 UNIT OR TABS     VITAMIN D3 1000 UNIT OR CAPS     VITAMIN E COMPLEX PO     warfarin (COUMADIN) 5 MG tablet     warfarin (COUMADIN) 7.5 MG tablet     No current facility-administered medications for this visit.        Results for orders placed or performed in visit on 08/09/18   UA reflex to Microscopic   Result Value Ref Range    Color Urine Yellow     Appearance Urine Clear     Glucose Urine Negative NEG^Negative mg/dL    Bilirubin Urine Negative NEG^Negative    Ketones Urine Negative NEG^Negative mg/dL    Specific Gravity Urine 1.010 1.003 - 1.035    Blood Urine Negative NEG^Negative    pH Urine 6.0 5.0 - 7.0 pH    Protein Albumin Urine Negative NEG^Negative mg/dL    Urobilinogen Urine 0.2 0.2 - 1.0 EU/dL    Nitrite Urine Negative NEG^Negative    Leukocyte Esterase Urine Trace (A) NEG^Negative    Source Midstream Urine    Urine Microscopic   Result Value Ref Range    WBC Urine 0 - 5 OTO5^0 - 5 /HPF    RBC Urine O - 2 OTO2^O - 2 /HPF    Squamous Epithelial " /LPF Urine Few FEW^Few /LPF    Bacteria Urine Few (A) NEG^Negative /HPF       IMP:  1. OAB wet, doing very well      PLAN:  1. Discussed situation with patient in detail.  2. Continue Detrol LA4  3. RTC 1 yr or sooner prn  4. 15 minutes spent with patient, more than 50% spent in counseling and coordination of care for OAB  5. Copy

## 2018-08-09 NOTE — MR AVS SNAPSHOT
After Visit Summary   8/9/2018    Ashlie Brantley    MRN: [id number]           Patient Information     Date Of Birth          2/24/1931        Visit Information        Provider Department      8/9/2018 11:15 AM René Warner MD [facility]        Today's Diagnoses     OAB (overactive bladder)    -  1       Follow-ups after your visit        Follow-up notes from your care team     Return in about 1 year (around 8/9/2019).      Your next 10 appointments already scheduled     Aug 13, 2018 10:45 AM CDT   Antico[facility] Visit with [facility] ANTI [facility]AG   [facility]    [address] [id number] [phone]            Aug 17, 2018  3:00 PM CDT   PFT VISIT with  PFL B   [facility] ([facility])    [address]  3rd Floor  [facility] [id number] [phone]            Aug 17, 2018  3:40 PM CDT   (Arrive by 3:25 PM)   Return Visit with Noemi Genao MD   [facility] ([facility])    [address]  [address] [place] [id number] [phone]              Who to contact     If you have questions or need follow up information about today's clinic visit or your schedule please contact [facility] directly at [phone].  Normal or non-critical lab and imaging results will be communicated to you by MyChart, letter or phone within 4 business days after the clinic has received the results. If you do not hear from us within 7 days, please contact the clinic through MyChart or phone. If you have a critical or abnormal lab result, we will notify you by phone as soon as possible.  Submit refill requests through [employer] or call your pharmacy and they will forward the refill request to us. Please allow 3 business days for your refill to be completed.          Additional Information About  Your Visit        [employer] Information     [employer] gives you secure access to your electronic health record. If you see a primary care provider, you can also send messages to your care team and make appointments. If you have questions, please call your primary care clinic.  If you do not have a primary care provider, please call [phone] and they will assist you.        Care EveryWhere ID     This is your Care EveryWhere ID. This could be used by other organizations to access your [facility] medical records  [id number]        Your Vitals Were     Pulse Pulse Oximetry                58 95%           Blood Pressure from Last 3 Encounters:   08/09/18 125/69   08/03/18 128/74   07/10/18 124/76    Weight from Last 3 Encounters:   08/03/18 65.8 kg (145 lb)   07/10/18 66 kg (145 lb 9.6 oz)   04/24/18 68.5 kg (151 lb)              We Performed the Following     UA reflex to Microscopic     Urine Microscopic          Today's Medication Changes          These changes are accurate as of 8/9/18 11:59 PM.  If you have any questions, ask your nurse or doctor.               These medicines have changed or have updated prescriptions.        Dose/Directions    * tolterodine 4 MG 24 hr capsule   Commonly known as:  DETROL LA   This may have changed:  Another medication with the same name was added. Make sure you understand how and when to take each.   Used for:  Overactive bladder   Changed by:  René Warner MD        Dose:  4 mg   Take 1 capsule (4 mg) by mouth daily   Quantity:  30 capsule   Refills:  1       * tolterodine 4 MG 24 hr capsule   Commonly known as:  DETROL LA   This may have changed:  You were already taking a medication with the same name, and this prescription was added. Make sure you understand how and when to take each.   Used for:  OAB (overactive bladder)   Changed by:  René Warner MD        Dose:  4 mg   Take 1 capsule (4 mg) by mouth daily   Quantity:  90 capsule   Refills:  3       *  Notice:  This list has 2 medication(s) that are the same as other medications prescribed for you. Read the directions carefully, and ask your doctor or other care provider to review them with you.         Where to get your medicines      These medications were sent [place] [facility] [place] [address]  [address] [facility] [address]     Phone:  [phone]     tolterodine 4 MG 24 hr capsule                Primary Care Provider Office Phone # Fax #    Tad Mendoza [phone]       [address] [address]        Equal Access to Services     CHANELL MURPHY : Hadii viktor kenneyo Sopurnima, waaxda luqadaha, qaybta kaalmada adejasmin, giovani muller. So [facility] [phone].    ATENCIÓN: Si habla español, tiene a leon disposición servicios gratuitos de asistencia lingüística. [facility] [phone].    We comply with applicable federal civil rights laws and [place] laws. We do not discriminate on the basis of race, color, national origin, age, disability, sex, sexual orientation, or gender identity.            Thank you!     Thank you for choosing [facility]  for your care. Our goal is always to provide you with excellent care. Hearing back from our patients is one way we can continue to improve our services. Please take a few minutes to complete the written survey that you may receive in the mail after your visit with us. Thank you!             Your Updated Medication List - Protect others around you: Learn how to safely use, store and throw away your medicines at www.disposemymeds.org.          This list is accurate as of 8/9/18 11:59 PM.  Always use your most recent med list.                   Brand Name Dispense Instructions for use Diagnosis    aspirin 81 MG tablet     1 Tab    ONE DAILY    HTN (hypertension), Elevated cholesterol       calcium + D 600-200 MG-UNIT Tabs   Generic  drug:  calcium carbonate-vitamin D     0    ONE TABLET TWICE A DAY WITH MEALS    Osteopenia       ciprofloxacin 250 MG tablet    CIPRO    14 tablet    Take 1 tablet (250 mg) by mouth 2 times daily for 7 days    Urinary tract infection with hematuria, site unspecified       * codeine 30 MG tablet     60 tablet    Take 1 tablet (30 mg) by mouth 2 times daily May fill on or after 1/9/18    Fibrosis, lung (H)       * codeine 30 MG tablet     60 tablet    Take 1 tablet (30 mg) by mouth 2 times daily May fill on or after 3/10/18    Fibrosis, lung (H)       * codeine 30 MG tablet     60 tablet    Take 1 tablet (30 mg) by mouth 2 times daily May fill on or after 2/8/18    Fibrosis, lung (H)       furosemide 20 MG tablet    LASIX     Take 20 mg by mouth        levothyroxine 88 MCG tablet    SYNTHROID    90 tablet    Take 1 tablet (88 mcg) by mouth daily    Hypothyroidism due to acquired atrophy of thyroid       losartan 100 MG tablet    COZAAR    90 tablet    Take 1 tablet (100 mg) by mouth daily    Hypertension goal BP (blood pressure) < 140/90       metoprolol tartrate 25 MG tablet    LOPRESSOR     Take 25 mg by mouth 2 times daily        neomycin-polymyxin-dexamethasone 3.5-01515-6.1 Oint ophthalmic ointment    MAXITROL    1 Tube    Place 1 Application into both eyes At Bedtime Apply a small squirt into each eye at bedtime    Dermatochalasis, unspecified laterality       OMEGA-3 COMPLEX PO      1 capsule daily        order for DME     1 each    Equipment being ordered: Oxygen 2 liters NC at HS    Oxygen decrease, IPF (idiopathic pulmonary fibrosis) (H)       * order for DME     1 Units    Equipment being ordered: Please provide night time oxygen at 2L/min via nasal canula    IPF (idiopathic pulmonary fibrosis) (H), Hypoxia       * order for DME     1 Device    Equipment being ordered: Oxygen 2 liters NC at night time    IPF (idiopathic pulmonary fibrosis) (H)       simvastatin 40 MG tablet    ZOCOR    90 tablet    Take 1  tablet (40 mg) by mouth At Bedtime    Hyperlipidemia LDL goal <130       * tolterodine 4 MG 24 hr capsule    DETROL LA    30 capsule    Take 1 capsule (4 mg) by mouth daily    Overactive bladder       * tolterodine 4 MG 24 hr capsule    DETROL LA    90 capsule    Take 1 capsule (4 mg) by mouth daily    OAB (overactive bladder)       traZODone 50 MG tablet    DESYREL    30 tablet    Take 1 tablet (50 mg) by mouth nightly as needed for sleep    Other insomnia       * vitamin D3 1000 units Caps      1 CAPSULE DAILY        * vitamin D 2000 units tablet     0    1 tablet daily    Osteopenia       VITAMIN E COMPLEX PO      Take  by mouth.        * warfarin 7.5 MG tablet    COUMADIN    14 tablet    Take 7.5 mg every Sunday. 5mg ROW    Atrial fibrillation, unspecified type (H), Long-term (current) use of anticoagulants       * warfarin 5 MG tablet    COUMADIN    90 tablet    Take as directed by ACC. Current dose is 7.5 mg on Sunday and 5 mg the rest of the week.    Persistent atrial fibrillation (H)       * Notice:  This list has 11 medication(s) that are the same as other medications prescribed for you. Read the directions carefully, and ask your doctor or other care provider to review them with you.

## 2018-08-10 RX ORDER — TOLTERODINE 4 MG/1
4 CAPSULE, EXTENDED RELEASE ORAL DAILY
Qty: 90 CAPSULE | Refills: 3 | Status: SHIPPED | OUTPATIENT
Start: 2018-08-10 | End: 2018-09-09

## 2018-08-13 ENCOUNTER — ANTICOAGULATION THERAPY VISIT (OUTPATIENT)
Dept: NURSING | Facility: CLINIC | Age: 83
End: 2018-08-13
Payer: COMMERCIAL

## 2018-08-13 DIAGNOSIS — I48.91 ATRIAL FIBRILLATION (H): ICD-10-CM

## 2018-08-13 DIAGNOSIS — Z79.01 LONG-TERM (CURRENT) USE OF ANTICOAGULANTS: ICD-10-CM

## 2018-08-13 LAB — INR POINT OF CARE: 2 (ref 0.86–1.14)

## 2018-08-13 PROCEDURE — 85610 PROTHROMBIN TIME: CPT | Mod: QW

## 2018-08-13 PROCEDURE — 36416 COLLJ CAPILLARY BLOOD SPEC: CPT

## 2018-08-13 PROCEDURE — 99207 ZZC NO CHARGE NURSE ONLY: CPT

## 2018-08-13 NOTE — MR AVS SNAPSHOT
Ashlie Brantley   8/13/2018 10:45 AM   Anticoagulation Therapy Visit    Description:  87 year old female   Provider:  NE ANTI COAURBANO   Department:  Ne Nurse           INR as of 8/13/2018     Today's INR 2.0      Anticoagulation Summary as of 8/13/2018     INR goal 2.0-3.0   Today's INR 2.0   Full warfarin instructions 7.5 mg on Sun; 5 mg all other days   Next INR check 9/4/2018    Indications   Atrial fibrillation (H) [I48.91] [I48.91]  Long-term (current) use of anticoagulants [Z79.01] [Z79.01]         Your next Anticoagulation Clinic appointment(s)     Sep 04, 2018  9:15 AM CDT   Anticoagulation Visit with NE ANTI COAG   [facility] ([facility])    [address] [place] [id number] [phone]              Contact Numbers     Please call [phone] to cancel and/or reschedule your appointment.  Please call [phone] with any problems or questions regarding your therapy          August 2018 Details    Sun Mon Tue Wed Thu Fri Sat        1               2               3               4                 5               6               7               8               9               10               11                 12               13      5 mg   See details      14      5 mg         15      5 mg         16      5 mg         17      5 mg         18      5 mg           19      7.5 mg         20      5 mg         21      5 mg         22      5 mg         23      5 mg         24      5 mg         25      5 mg           26      7.5 mg         27      5 mg         28      5 mg         29      5 mg         30      5 mg         31      5 mg           Date Details   08/13 This INR check               How to take your warfarin dose     To take:  5 mg Take 1 of the 5 mg tablets.    To take:  7.5 mg Take 1.5 of the 5 mg tablets.           September 2018 Details    Sun Mon Tue Wed Thu Fri Sat           1      5 mg           2      7.5 mg         3      5 mg          4            5               6               7               8                 9               10               11               12               13               14               15                 16               17               18               19               20               21               22                 23               24               25               26               27               28               29                 30                      Date Details   No additional details    Date of next INR:  9/4/2018         How to take your warfarin dose     To take:  5 mg Take 1 of the 5 mg tablets.    To take:  7.5 mg Take 1.5 of the 5 mg tablets.

## 2018-08-13 NOTE — PROGRESS NOTES
ANTICOAGULATION FOLLOW-UP CLINIC VISIT    Patient Name:  Ashlie Brantley  Date:  8/13/2018  Contact Type:  Face to Face    SUBJECTIVE: uti resolved symptoms gone. Last dose abx yesterday. She will go make on maintenance and recheck in a couple weeks.       Patient Findings     Positives No Problem Findings           OBJECTIVE    INR Protime   Date Value Ref Range Status   08/13/2018 2.0 (A) 0.86 - 1.14 Final       ASSESSMENT / PLAN  INR assessment THER    Recheck INR In: 4 WEEKS    INR Location Clinic      Anticoagulation Summary as of 8/13/2018     INR goal 2.0-3.0   Today's INR 2.0   Warfarin maintenance plan 7.5 mg (5 mg x 1.5) on Sun; 5 mg (5 mg x 1) all other days   Full warfarin instructions 7.5 mg on Sun; 5 mg all other days   Weekly warfarin total 37.5 mg   No change documented Lizy Mercado RN   Plan last modified Jarek Stephens RN (5/10/2018)   Next INR check 9/4/2018   Target end date Indefinite    Indications   Atrial fibrillation (H) [I48.91] [I48.91]  Long-term (current) use of anticoagulants [Z79.01] [Z79.01]         Anticoagulation Episode Summary     INR check location     Preferred lab     Send INR reminders to [facility]    Comments       Anticoagulation Care Providers     Provider Role Specialty Phone number    RejiTad DO [facility] [phone]            See the Encounter Report to view Anticoagulation Flowsheet and Dosing Calendar (Go to Encounters tab in chart review, and find the Anticoagulation Therapy Visit)        Lizy Mercado RN

## 2018-08-17 ENCOUNTER — OFFICE VISIT (OUTPATIENT)
Dept: PULMONOLOGY | Facility: CLINIC | Age: 83
End: 2018-08-17
Payer: COMMERCIAL

## 2018-08-17 VITALS
BODY MASS INDEX: 30.96 KG/M2 | RESPIRATION RATE: 18 BRPM | HEIGHT: 58 IN | HEART RATE: 61 BPM | OXYGEN SATURATION: 94 % | SYSTOLIC BLOOD PRESSURE: 138 MMHG | DIASTOLIC BLOOD PRESSURE: 82 MMHG | WEIGHT: 147.5 LBS

## 2018-08-17 DIAGNOSIS — J84.112 IPF (IDIOPATHIC PULMONARY FIBROSIS) (H): ICD-10-CM

## 2018-08-17 DIAGNOSIS — J84.112 IPF (IDIOPATHIC PULMONARY FIBROSIS) (H): Primary | ICD-10-CM

## 2018-08-17 DIAGNOSIS — J84.9 ILD (INTERSTITIAL LUNG DISEASE) (H): Primary | ICD-10-CM

## 2018-08-17 DIAGNOSIS — J84.9 ILD (INTERSTITIAL LUNG DISEASE) (H): ICD-10-CM

## 2018-08-17 LAB
6 MIN WALK (FT): 580 FT
6 MIN WALK (M): 177 M

## 2018-08-17 PROCEDURE — G0463 HOSPITAL OUTPT CLINIC VISIT: HCPCS | Mod: ZF

## 2018-08-17 ASSESSMENT — PAIN SCALES - GENERAL: PAINLEVEL: NO PAIN (0)

## 2018-08-17 NOTE — NURSING NOTE
Chief Complaint   Patient presents with     Consult     Follow up on Marco Cortez CMA at 3:20 PM on 8/17/2018

## 2018-08-17 NOTE — PROGRESS NOTES
[facility]    Name: Ashlie Brantley MRN: [id number]     Age: 87 year old   YOB: 1931       Reason for Followup:  ILD followup          HPI:   Ashlie Brantley is a 87 year old female with history of ILD (NSIP) on nocturnal O2 and vocal cord dystonia who presents for routine followup. Presents with her daughter today.    Patient was last seen 12/2017, patient was hospitalized at [facility] in January 2018 for acute shortness of breath.  Was found to have influenza and atrial fibrillation.  Patient was discharged after 5 days.  Did not require intubation.  Since then, feels that her breathing has not quite returned back to baseline.  Has really improved but is now only able to walk half a block compared to the one block in December.  Has some cough.  Feels like she is more tired more easily.  Is still doing water aerobics 2 times a week for an hour each but with less intensity than prior.  Is using oxygen with sleep at night and with napping during the day.    Patient does share that while she was in the hospital in January, there was several days where she was able to vocalize with increased volume.  This also occurred once before in her life when her daughter passed away from ALS.  They inquire if there is a known cause for this.    Patient is also wondering if she would qualify for portable oxygen concentrator.    10 point ROS performed and negative except for as noted in HPI.         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     sees [facility]     Cataract     IOL both     DJD (degenerative joint disease)     KNEES     Elevated cholesterol      GERD (gastroesophageal reflux disease)      Hoarseness      HTN (hypertension)      Hypothyroid      Laryngeal dystonia      LVH (left ventricular hypertrophy) due to hypertensive disease      Osteopenia     MILD     Urinary stress incontinence              Past Surgical History:      Past  Surgical History:   Procedure Laterality Date     C NONSPECIFIC PROCEDURE      BACK SURGERY     C NONSPECIFIC PROCEDURE      VOCAL CORD POLYP     C SHOULDER SURG PROC UNLISTED       C STOMACH SURGERY PROCEDURE UNLISTED       C TOTAL KNEE ARTHROPLASTY      LT 1998  /  RT 2001     CATARACT IOL, RT/LT       HC REMOVAL GALLBLADDER       HERNIA REPAIR, INGUINAL RT/LT      RT     HYSTERECTOMY, CERVIX STATUS UNKNOWN      DUB     PHACOEMULSIFICATION WITH STANDARD INTRAOCULAR LENS IMPLANT  Rt 6/11/09, Lt 8/3/09    both eyes Dr. Barnett     ROTATOR CUFF REPAIR RT/LT      RT             Social History:     Social History     Social History     Marital status:      Spouse name: N/A     Number of children: 6     Years of education: N/A     Occupational History      Retired     Social History Main Topics     Smoking status: Never Smoker     Smokeless tobacco: Never Used     Alcohol use 0.0 oz/week     0 Standard drinks or equivalent per week      Comment: 1-2 drinks/month     Drug use: No     Sexual activity: Not Currently     Other Topics Concern      Service No     Blood Transfusions No     Caffeine Concern No     Occupational Exposure No     Hobby Hazards No     Sleep Concern No     Stress Concern No     Weight Concern Yes     Special Diet No     Back Care No     Exercise Yes     a litttle     Seat Belt Yes     Self-Exams Yes     Parent/Sibling W/ Cabg, Mi Or Angioplasty Before 65f 55m? No     Social History Narrative            Family History:     Family History   Problem Relation Age of Onset     Diabetes Mother      C.A.D. Father      Cerebrovascular Disease Brother              Allergies:     Allergies   Allergen Reactions     Nkda [No Known Drug Allergies]              Medications:     Current Outpatient Prescriptions on File Prior to Visit:  ASPIRIN 81 MG OR TABS ONE DAILY   CALCIUM + D 600-200 MG-UNIT OR TABS ONE TABLET TWICE A DAY WITH MEALS   codeine 30 MG tablet Take 1 tablet (30 mg) by mouth 2 times  "daily May fill on or after 2/8/18   codeine 30 MG tablet Take 1 tablet (30 mg) by mouth 2 times daily May fill on or after 3/10/18   codeine 30 MG tablet Take 1 tablet (30 mg) by mouth 2 times daily May fill on or after 1/9/18   furosemide (LASIX) 20 MG tablet Take 20 mg by mouth   levothyroxine (SYNTHROID) 88 MCG tablet Take 1 tablet (88 mcg) by mouth daily   losartan (COZAAR) 100 MG tablet Take 1 tablet (100 mg) by mouth daily   metoprolol tartrate (LOPRESSOR) 25 MG tablet Take 25 mg by mouth 2 times daily   neomycin-polymyxin-dexamethasone (MAXITROL) 3.5-94248-6.1 OINT ophthalmic ointment Place 1 Application into both eyes At Bedtime Apply a small squirt into each eye at bedtime   OMEGA-3 COMPLEX OR 1 capsule daily   order for DME Equipment being ordered: Oxygen 2 liters NC at night time   order for DME Equipment being ordered: Please provide night time oxygen at 2L/min via nasal canula   ORDER FOR DME Equipment being ordered: Oxygen 2 liters NC at HS   simvastatin (ZOCOR) 40 MG tablet Take 1 tablet (40 mg) by mouth At Bedtime   tolterodine (DETROL LA) 4 MG 24 hr capsule Take 1 capsule (4 mg) by mouth daily   tolterodine (DETROL LA) 4 MG 24 hr capsule Take 1 capsule (4 mg) by mouth daily   traZODone (DESYREL) 50 MG tablet Take 1 tablet (50 mg) by mouth nightly as needed for sleep   VITAMIN D 2000 UNIT OR TABS 1 tablet daily   VITAMIN D3 1000 UNIT OR CAPS 1 CAPSULE DAILY   VITAMIN E COMPLEX PO Take  by mouth.   warfarin (COUMADIN) 5 MG tablet Take as directed by ACC. Current dose is 7.5 mg on Sunday and 5 mg the rest of the week.   warfarin (COUMADIN) 7.5 MG tablet Take 7.5 mg every Sunday. 5mg ROW     No current facility-administered medications on file prior to visit.            Exam:   /82  Pulse 61  Resp 18  Ht 1.473 m (4' 10\")  Wt 66.9 kg (147 lb 8 oz)  SpO2 94%  BMI 30.83 kg/m2 on RA  General: Sitting in a chair, no acute distress  HEENT: No scleral icterus  Heart: Normal rate, irregular " rhythm.  Lungs: Bilateral inspiratory crackles, expiratory phase is halted.  Skin: No rashes in exposed skin.    Psych: Normal affect  Neuro: Patient is unable to audibly vocalize her words above a bare whisper.         Data:     PFT: 8/17/18  FVC 1.34 L 76% predicted  FEV1 1.15 L 85% predicted  FEV1/FVC 86%  DLCO 57% predicted         Assessment and Plan:     Ashlie is a 86 F with ILD (NSIP) on nocturnal O2 (2L) and vocal cord dystonia who is here for routine followup.      NSIP  Found incidentally on cxr/ct following URI in 2011. Previous w/u negative (HP panel, rheum panel, exposure Hx). PFT's have slowly declined previously, but is stable in the past year despite hospitalization for influenza and increase in her shortness of breath. Remains active. CT in 2014 did show 5mm pulm nodule near minor fissure. She has declined to undergo further chest imaging. She is a non-smoker.   --6 minute walk test today to see if she qualifies for exertional oxygen.  --UTD on immunizations  --RTC in 6 months with PFTs.     Vocal Cord Dystonia   She has been extensively evaluated by ENT and speech therapy and has undergone multiple therapies without success. It's unknown why she was able to vocalize for several days while hospitalized. Encouraged patient to discuss with ENT or speech if interested in seeing if further treatment modalities are available.    Patient staffed with Dr. Ramos.     Noemi Genao  Pulmonary and Critical Care Fellow    Addendum: 6 minute walk test completed. No hypoxia at rest or with exertion noted. No need for portable oxygen with exertion.      Attending note:  Patient seen, examined and discussed with Dr. Genao. All data reviewed. Agree with assessment and plan as outlined in the above note.  History of NSIP and vocal cord dystonia.  Will obtain 6 minute walk test to assess for desaturation.    Samira Ramos MD  [phone]

## 2018-08-17 NOTE — MR AVS SNAPSHOT
After Visit Summary   8/17/2018    Ashlie Brantley    MRN: [id number]           Patient Information     Date Of Birth          2/24/1931        Visit Information        Provider Department      8/17/2018 3:40 PM Noemi Genao MD [facility]        Today's Diagnoses     ILD (interstitial lung disease) (H)    -  1       Follow-ups after your visit        Follow-up notes from your care team     Return in about 6 months (around 2/17/2019) for with PFT.      Your next 10 appointments already scheduled     Aug 17, 2018  4:30 PM CDT   Six Minute Walk with UC PFL 6 MINUTE WALK 1   [facility] Pulmonary Function Testing ([facility] [place] [facility])    [address]  3rd Floor  [facility] [id number] [phone]            Sep 04, 2018  9:15 AM CDT   Anticoagulation Visit with NE ANTI COAG   [facility] ([facility])    [address] [id number] [phone]            Feb 15, 2019  1:00 PM CST   (Arrive by 12:45 PM)   Return Visit with Noemi Genao MD   [facility] ([facility])    [address]  [address] [place] [id number] [phone]              Future tests that were ordered for you today     Open Future Orders        Priority Expected Expires Ordered    General PFT Lab (Please always keep checked) Routine 2/17/2019 8/17/2019 8/17/2018    Pulmonary Function Test Routine  8/17/2019 8/17/2018            Who to contact     If you have questions or need follow up information about today's clinic visit or your schedule please contact [facility] directly at [phone].  Normal or non-critical lab and imaging results will be communicated to you by MyChart, letter or phone within 4 business days after the clinic has received the results. If you do not hear from us within 7 days, please  "contact the clinic through [email or web address] or phone. If you have a critical or abnormal lab result, we will notify you by phone as soon as possible.  Submit refill requests through [email or web address] or call your pharmacy and they will forward the refill request to us. Please allow 3 business days for your refill to be completed.          Additional Information About Your Visit        [employer] Information     [email or web address] gives you secure access to your electronic health record. If you see a primary care provider, you can also send messages to your care team and make appointments. If you have questions, please call your primary care clinic.  If you do not have a primary care provider, please call [phone] and they will assist you.        Care EveryWhere ID     This is your Care EveryWhere ID. This could be used by other organizations to access your [facility] medical records  [id number]        Your Vitals Were     Pulse Respirations Height Pulse Oximetry BMI (Body Mass Index)       61 18 1.473 m (4' 10\") 94% 30.83 kg/m2        Blood Pressure from Last 3 Encounters:   08/17/18 138/82   08/09/18 125/69   08/03/18 128/74    Weight from Last 3 Encounters:   08/17/18 66.9 kg (147 lb 8 oz)   08/03/18 65.8 kg (145 lb)   07/10/18 66 kg (145 lb 9.6 oz)               Primary Care Provider Office Phone # Fax #    Tad Mendoza [phone]       [address] [facility] [address]        Equal Access to Services     JUNIOR MURPHY AH: Hadii aad ku hadasho Soomaali, waaxda luqadaha, qaybta kaalmada adeegyada, waxjerry abilio bynum . So [facility] [phone].    ATENCIÓN: Si habla español, tiene a leon disposición servicios gratuitos de asistencia lingüística. Llame al [phone].    We comply with applicable federal civil rights laws and [place] laws. We do not discriminate on the basis of race, color, national origin, age, disability, sex, sexual orientation, or gender identity.            Thank you!  "    Thank you for choosing [facility]  for your care. Our goal is always to provide you with excellent care. Hearing back from our patients is one way we can continue to improve our services. Please take a few minutes to complete the written survey that you may receive in the mail after your visit with us. Thank you!             Your Updated Medication List - Protect others around you: Learn how to safely use, store and throw away your medicines at www.disposemymeds.org.          This list is accurate as of 8/17/18  3:51 PM.  Always use your most recent med list.                   Brand Name Dispense Instructions for use Diagnosis    aspirin 81 MG tablet     1 Tab    ONE DAILY    HTN (hypertension), Elevated cholesterol       calcium + D 600-200 MG-UNIT Tabs   Generic drug:  calcium carbonate-vitamin D     0    ONE TABLET TWICE A DAY WITH MEALS    Osteopenia       * codeine 30 MG tablet     60 tablet    Take 1 tablet (30 mg) by mouth 2 times daily May fill on or after 1/9/18    Fibrosis, lung (H)       * codeine 30 MG tablet     60 tablet    Take 1 tablet (30 mg) by mouth 2 times daily May fill on or after 3/10/18    Fibrosis, lung (H)       * codeine 30 MG tablet     60 tablet    Take 1 tablet (30 mg) by mouth 2 times daily May fill on or after 2/8/18    Fibrosis, lung (H)       furosemide 20 MG tablet    LASIX     Take 20 mg by mouth        levothyroxine 88 MCG tablet    SYNTHROID    90 tablet    Take 1 tablet (88 mcg) by mouth daily    Hypothyroidism due to acquired atrophy of thyroid       losartan 100 MG tablet    COZAAR    90 tablet    Take 1 tablet (100 mg) by mouth daily    Hypertension goal BP (blood pressure) < 140/90       metoprolol tartrate 25 MG tablet    LOPRESSOR     Take 25 mg by mouth 2 times daily        neomycin-polymyxin-dexamethasone 3.5-37824-6.1 Oint ophthalmic ointment    MAXITROL    1 Tube    Place 1 Application into both eyes At Bedtime Apply a small squirt  into each eye at bedtime    Dermatochalasis, unspecified laterality       OMEGA-3 COMPLEX PO      1 capsule daily        order for DME     1 each    Equipment being ordered: Oxygen 2 liters NC at HS    Oxygen decrease, IPF (idiopathic pulmonary fibrosis) (H)       * order for DME     1 Units    Equipment being ordered: Please provide night time oxygen at 2L/min via nasal canula    IPF (idiopathic pulmonary fibrosis) (H), Hypoxia       * order for DME     1 Device    Equipment being ordered: Oxygen 2 liters NC at night time    IPF (idiopathic pulmonary fibrosis) (H)       simvastatin 40 MG tablet    ZOCOR    90 tablet    Take 1 tablet (40 mg) by mouth At Bedtime    Hyperlipidemia LDL goal <130       * tolterodine 4 MG 24 hr capsule    DETROL LA    30 capsule    Take 1 capsule (4 mg) by mouth daily    Overactive bladder       * tolterodine 4 MG 24 hr capsule    DETROL LA    90 capsule    Take 1 capsule (4 mg) by mouth daily    OAB (overactive bladder)       traZODone 50 MG tablet    DESYREL    30 tablet    Take 1 tablet (50 mg) by mouth nightly as needed for sleep    Other insomnia       * vitamin D3 1000 units Caps      1 CAPSULE DAILY        * vitamin D 2000 units tablet     0    1 tablet daily    Osteopenia       VITAMIN E COMPLEX PO      Take  by mouth.        * warfarin 7.5 MG tablet    COUMADIN    14 tablet    Take 7.5 mg every Sunday. 5mg ROW    Atrial fibrillation, unspecified type (H), Long-term (current) use of anticoagulants       * warfarin 5 MG tablet    COUMADIN    90 tablet    Take as directed by [facility]. Current dose is 7.5 mg on Sunday and 5 mg the rest of the week.    Persistent atrial fibrillation (H)       * Notice:  This list has 11 medication(s) that are the same as other medications prescribed for you. Read the directions carefully, and ask your doctor or other care provider to review them with you.

## 2018-08-22 ENCOUNTER — TELEPHONE (OUTPATIENT)
Dept: PULMONOLOGY | Facility: CLINIC | Age: 83
End: 2018-08-22

## 2018-08-28 ENCOUNTER — TELEPHONE (OUTPATIENT)
Dept: PULMONOLOGY | Facility: CLINIC | Age: 83
End: 2018-08-28

## 2018-08-28 ENCOUNTER — CARE COORDINATION (OUTPATIENT)
Dept: PULMONOLOGY | Facility: CLINIC | Age: 83
End: 2018-08-28

## 2018-08-28 NOTE — PROGRESS NOTES
Contacted pt's daughter, Malaika, as requested to discuss pt's oxygen.  Pt would like portable oxygen concentrator for use outside of the home as she becomes very short of breath on occasion.  Explained that pt only qualifies for nocturnal oxygen, 6MWT on 8/17 returned normal results, therefore insurance would not cover cost of portable concentrator.  Pt will investigate options for private purchase and return call with details, order will be placed subject to provider approval.

## 2018-08-28 NOTE — TELEPHONE ENCOUNTER
Rec'd MERVIN with pt's signature for daughter Malaika to communicate regarding Mother's care.  Sent to scanning.  Gregoria Laboy LPN

## 2018-09-04 ENCOUNTER — ANTICOAGULATION THERAPY VISIT (OUTPATIENT)
Dept: NURSING | Facility: CLINIC | Age: 83
End: 2018-09-04
Payer: COMMERCIAL

## 2018-09-04 DIAGNOSIS — Z79.01 LONG-TERM (CURRENT) USE OF ANTICOAGULANTS: ICD-10-CM

## 2018-09-04 DIAGNOSIS — I48.91 ATRIAL FIBRILLATION (H): ICD-10-CM

## 2018-09-04 LAB — INR POINT OF CARE: 5.9 (ref 0.86–1.14)

## 2018-09-04 PROCEDURE — 85610 PROTHROMBIN TIME: CPT | Mod: QW

## 2018-09-04 PROCEDURE — 99207 ZZC NO CHARGE NURSE ONLY: CPT

## 2018-09-04 PROCEDURE — 36416 COLLJ CAPILLARY BLOOD SPEC: CPT

## 2018-09-04 NOTE — MR AVS SNAPSHOT
Ashlie Brantley   9/4/2018 7:30 AM   Anticoagulation Therapy Visit    Description:  87 year old female   Provider:  NAHEED HUNTER   Department:  Ne Nurse           INR as of 9/4/2018     Today's INR 5.9!      Anticoagulation Summary as of 9/4/2018     INR goal 2.0-3.0   Today's INR 5.9!   Full warfarin instructions 9/4: Hold; 9/5: Hold; Otherwise 7.5 mg on Sun; 5 mg all other days   Next INR check 9/10/2018    Indications   Atrial fibrillation (H) [I48.91] [I48.91]  Long-term (current) use of anticoagulants [Z79.01] [Z79.01]         Your next Anticoagulation Clinic appointment(s)     Sep 10, 2018  9:00 AM CDT   Anticoagulation Visit with NE ANTI COAG   [facility] ([facility]    [address] [place] [id number] [phone]              Contact Numbers     Please call [phone] to cancel and/or reschedule your appointment.  Please call [phone] with any problems or questions regarding your therapy          September 2018 Details    Sun Mon Tue Wed Thu Fri Sat           1                 2               3               4      Hold   See details      5      Hold         6      5 mg         7      5 mg         8      5 mg           9      7.5 mg         10            11               12               13               14               15                 16               17               18               19               20               21               22                 23               24               25               26               27               28               29                 30                      Date Details   09/04 This INR check       Date of next INR:  9/10/2018         How to take your warfarin dose     To take:  5 mg Take 1 of the 5 mg tablets.    To take:  7.5 mg Take 1.5 of the 5 mg tablets.    Hold Do not take your warfarin dose. See the Details table to the right for additional instructions.

## 2018-09-04 NOTE — PROGRESS NOTES
ANTICOAGULATION FOLLOW-UP CLINIC VISIT    Patient Name:  Ashlie Brantley  Date:  9/4/2018  Contact Type:  Face to Face    SUBJECTIVE:     Patient Findings     Positives Inflammation (respitory inflamation had a bad weekend), Unexplained INR or factor level change           OBJECTIVE    INR Protime   Date Value Ref Range Status   09/04/2018 5.9 (A) 0.86 - 1.14 Final       ASSESSMENT / PLAN  INR assessment SUPRA    Recheck INR In: 1 WEEK    INR Location Clinic      Anticoagulation Summary as of 9/4/2018     INR goal 2.0-3.0   Today's INR 5.9!   Warfarin maintenance plan 7.5 mg (5 mg x 1.5) on Sun; 5 mg (5 mg x 1) all other days   Full warfarin instructions 9/4: Hold; 9/5: Hold; Otherwise 7.5 mg on Sun; 5 mg all other days   Weekly warfarin total 37.5 mg   Plan last modified Jarek Stephens RN (5/10/2018)   Next INR check 9/10/2018   Target end date Indefinite    Indications   Atrial fibrillation (H) [I48.91] [I48.91]  Long-term (current) use of anticoagulants [Z79.01] [Z79.01]         Anticoagulation Episode Summary     INR check location     Preferred lab     Send INR reminders to [facility]    Comments       Anticoagulation Care Providers     Provider Role Specialty Phone number    RejiTad lynne DO [facility] [phone]            See the Encounter Report to view Anticoagulation Flowsheet and Dosing Calendar (Go to Encounters tab in chart review, and find the Anticoagulation Therapy Visit)        Lizy Mercado RN

## 2018-09-07 ENCOUNTER — TELEPHONE (OUTPATIENT)
Dept: FAMILY MEDICINE | Facility: CLINIC | Age: 83
End: 2018-09-07

## 2018-09-07 ENCOUNTER — OFFICE VISIT (OUTPATIENT)
Dept: FAMILY MEDICINE | Facility: CLINIC | Age: 83
End: 2018-09-07
Payer: COMMERCIAL

## 2018-09-07 ENCOUNTER — RADIANT APPOINTMENT (OUTPATIENT)
Dept: GENERAL RADIOLOGY | Facility: CLINIC | Age: 83
End: 2018-09-07
Attending: FAMILY MEDICINE
Payer: COMMERCIAL

## 2018-09-07 VITALS
WEIGHT: 144 LBS | HEIGHT: 58 IN | SYSTOLIC BLOOD PRESSURE: 96 MMHG | OXYGEN SATURATION: 93 % | TEMPERATURE: 97.4 F | BODY MASS INDEX: 30.23 KG/M2 | DIASTOLIC BLOOD PRESSURE: 62 MMHG | HEART RATE: 74 BPM

## 2018-09-07 DIAGNOSIS — J98.01 ACUTE BRONCHOSPASM: ICD-10-CM

## 2018-09-07 DIAGNOSIS — J18.9 PNEUMONIA OF BOTH LUNGS DUE TO INFECTIOUS ORGANISM, UNSPECIFIED PART OF LUNG: ICD-10-CM

## 2018-09-07 DIAGNOSIS — R05.9 COUGH: ICD-10-CM

## 2018-09-07 DIAGNOSIS — J84.112 IPF (IDIOPATHIC PULMONARY FIBROSIS) (H): Primary | ICD-10-CM

## 2018-09-07 DIAGNOSIS — J18.9 PNEUMONIA DUE TO INFECTIOUS ORGANISM, UNSPECIFIED LATERALITY, UNSPECIFIED PART OF LUNG: ICD-10-CM

## 2018-09-07 DIAGNOSIS — J84.10 FIBROSIS, LUNG (H): ICD-10-CM

## 2018-09-07 DIAGNOSIS — R06.00 DYSPNEA, UNSPECIFIED TYPE: ICD-10-CM

## 2018-09-07 DIAGNOSIS — Z79.01 LONG TERM CURRENT USE OF ANTICOAGULANT THERAPY: ICD-10-CM

## 2018-09-07 PROCEDURE — 71046 X-RAY EXAM CHEST 2 VIEWS: CPT | Mod: FY

## 2018-09-07 PROCEDURE — 94640 AIRWAY INHALATION TREATMENT: CPT | Performed by: FAMILY MEDICINE

## 2018-09-07 PROCEDURE — 99214 OFFICE O/P EST MOD 30 MIN: CPT | Mod: 25 | Performed by: FAMILY MEDICINE

## 2018-09-07 RX ORDER — ALBUTEROL SULFATE 0.83 MG/ML
2.5 SOLUTION RESPIRATORY (INHALATION) EVERY 6 HOURS PRN
Qty: 1 BOX | Refills: 1
Start: 2018-09-07 | End: 2018-09-07

## 2018-09-07 RX ORDER — ALBUTEROL SULFATE 0.83 MG/ML
2.5 SOLUTION RESPIRATORY (INHALATION) EVERY 6 HOURS PRN
Qty: 3 ML | Refills: 0
Start: 2018-09-07 | End: 2020-11-06

## 2018-09-07 RX ORDER — ALBUTEROL SULFATE 0.83 MG/ML
2.5 SOLUTION RESPIRATORY (INHALATION) EVERY 6 HOURS PRN
Qty: 1 BOX | Refills: 1 | Status: SHIPPED | OUTPATIENT
Start: 2018-09-07 | End: 2019-12-04

## 2018-09-07 RX ORDER — CODEINE SULFATE 30 MG/1
30 TABLET ORAL 2 TIMES DAILY
Qty: 60 TABLET | Refills: 0 | Status: SHIPPED | OUTPATIENT
Start: 2018-09-07 | End: 2018-10-10

## 2018-09-07 RX ORDER — CEPHALEXIN 500 MG/1
500 CAPSULE ORAL 2 TIMES DAILY
Qty: 20 CAPSULE | Refills: 0 | Status: SHIPPED | OUTPATIENT
Start: 2018-09-07 | End: 2018-12-10

## 2018-09-07 NOTE — TELEPHONE ENCOUNTER
Reason for call:  Patient reporting a symptom    Symptom or request: trouble breathing/bronchitis/coughing a lot    Duration (how long have symptoms been present): for a couple of days    Have you been treated for this before? Yes    Additional comments: Please have an RN call Mariela to discuss and advise.    Phone Number patient can be reached at:  [phone]    Best Time:  anytime    Can we leave a detailed message on this number:  YES    Call taken on 9/7/2018 at 8:27 AM by Soha Petersen

## 2018-09-07 NOTE — PROGRESS NOTES
SUBJECTIVE:   Ashlie Brantley is a 87 year old female who presents to clinic today for the following health issues:      ENT Symptoms             Symptoms: cc Present Absent Comment   Fever/Chills  x  chills   Fatigue  x     Muscle Aches   x    Eye Irritation   x    Sneezing   x    Nasal Bear/Drg   x    Sinus Pressure/Pain   x    Loss of smell   x    Dental pain   x    Sore Throat   x    Swollen Glands   x    Ear Pain/Fullness   x    Cough x      Wheeze x      Chest Pain   x    Shortness of breath x      Rash   x    Other         Symptom duration:  maybe 1 week   Symptom severity:  moderate   Treatments tried:  none   Contacts:  none     Patient reports productive cough that feels as if it is coming from her chest. Patient reports she has not been talking for about 10 years. She denies fever, but reports some chills. Patient has pulmonary fibrosis, for which she saw pulmonology for on 8/17. They reported that she was doing well, and that she has vocal cord dystonia. Lungs were stable at that time. She is on oxygen at night. She denies being around any illness. Patient never smoked.     Patient's last INR 2 days ago was 5.9.     Problem list and histories reviewed & adjusted, as indicated.  Additional history: as documented    BP Readings from Last 3 Encounters:   09/07/18 96/62   08/17/18 138/82   08/09/18 125/69    Wt Readings from Last 3 Encounters:   09/07/18 65.3 kg (144 lb)   08/17/18 66.9 kg (147 lb 8 oz)   08/03/18 65.8 kg (145 lb)                    Reviewed and updated as needed this visit by clinical staff  Tobacco  Allergies  Meds  Med Hx  Surg Hx  Fam Hx  Soc Hx      Reviewed and updated as needed this visit by Provider         ROS:  Constitutional, HEENT, cardiovascular, pulmonary, gi and gu systems are negative, except as otherwise noted.    This document serves as a record of the services and decisions personally performed by TIFFANIE TANNER. It was created on his/her behalf by brandt Felix  "trained medical scribe. The creation of this document is based on the provider's statements to the medical scribe. Anny Hudson, September 7, 2018 12:36 PM    OBJECTIVE:     BP 96/62 (Cuff Size: Adult Regular)  Pulse 74  Temp 97.4  F (36.3  C) (Oral)  Ht 1.473 m (4' 10\")  Wt 65.3 kg (144 lb)  SpO2 93%  BMI 30.1 kg/m2  Body mass index is 30.1 kg/(m^2).  GENERAL: healthy, alert and no distress  HENT: ear canals and TM's normal, nose and mouth without ulcers or lesions  NECK: no adenopathy, no asymmetry, masses, or scars and thyroid normal to palpation  RESP: inspiratory and expiratory wheeze  CV: regular rate and rhythm, normal S1 S2, no S3 or S4, no murmur, click or rub, no peripheral edema and peripheral pulses strong  PSYCH: mentation appears normal, affect normal/bright    Diagnostic Test Results:  No results found for this or any previous visit (from the past 24 hour(s)).    ASSESSMENT/PLAN:       ICD-10-CM    1. IPF (idiopathic pulmonary fibrosis) (H) J84.112    2. Cough R05 XR Chest 2 Views   3. Acute bronchospasm J98.01 albuterol (2.5 MG/3ML) 0.083% neb solution     XR Chest 2 Views     ALBUTEROL UNIT DOSE, 1 MG   4. Dyspnea, unspecified type R06.00    5. Long term current use of anticoagulant therapy Z79.01    6. Pneumonia of both lungs due to infectious organism, unspecified part of lung J18.9    7. Pneumonia due to infectious organism, unspecified laterality, unspecified part of lung J18.9 cephALEXin (KEFLEX) 500 MG capsule     albuterol (2.5 MG/3ML) 0.083% neb solution       I prescribed the patient cephalexin for pneumonia and notified her INR nurses of this, they will contact the patient with a plan for her coumadin. I prescribed the patient albuterol nebulizer as she had one in clinic today and this increased airflow. I advised her to contact her pulmonologist as I believe she will need to be seen due to her IPF.     Patient Instructions   Let your lung doctor know that this is going on, she may " want to see you.     Use the albuterol nebulizer.     INR will contact you if changes in coumadin are needed due to starting antibiotic.    If you can, it is helpful if you fill out a survey about your clinic visit if it is mailed to your house in a few weeks.      Myah Rush, DO  [facility]    The information in this document, created by the medical scribe Anyn Hudson for me, accurately reflects the services I personally performed and the decisions made by me. I have reviewed and approved this document for accuracy prior to leaving the patient care area.

## 2018-09-07 NOTE — TELEPHONE ENCOUNTER
codeine 30 MG tablet      Last Written Prescription Date:  8/5/2018  Last Fill Quantity: 60 tablet,   # refills: 0  Last Office Visit:  8/3/2018  w/ ERON Mendoza    Future Office visit:       Routing refill request to provider for review/approval because:  Drug not on the [facility], [facility] or [facility] refill protocol or controlled substance

## 2018-09-07 NOTE — NURSING NOTE
The following nebulizer treatment was given:     MEDICATION: Albuterol Sulfate 2.5 mg  : [employer]  LOT #: [id number]  EXPIRATION DATE:  09/2019  NDC # 6070-1112-60   Nebulizer Start Time:  1:04pm  See Vital Signs Flowsheet  Alda Dunaway CMA

## 2018-09-07 NOTE — TELEPHONE ENCOUNTER
Patient's  is at  because he says that pharmacy never received nebulizer order. Reviewed chart and it looks like it was ordered as 'no print out'. Confirmed with Dr. Rush that this should be ordered to pharmacy. Re-ordered.    Monster Mora RN

## 2018-09-07 NOTE — MR AVS SNAPSHOT
After Visit Summary   9/7/2018    Ashlie Brantley    MRN: [id number]           Patient Information     Date Of Birth          2/24/1931        Visit Information        Provider Department      9/7/2018 12:20 PM Myah Rush DO [facility]        Today's Diagnoses     IPF (idiopathic pulmonary fibrosis) (H)    -  1    Cough        Acute bronchospasm        Dyspnea, unspecified type        Long term current use of anticoagulant therapy        Pneumonia of both lungs due to infectious organism, unspecified part of lung        Pneumonia due to infectious organism, unspecified laterality, unspecified part of lung          Care Instructions    Let your lung doctor know that this is going on, she may want to see you.     Use the albuterol nebulizer.     INR will contact you if changes in coumadin are needed due to starting antibiotic.    If you can, it is helpful if you fill out a survey about your clinic visit if it is mailed to your house in a few weeks.          Follow-ups after your visit        Your next 10 appointments already scheduled     Sep 24, 2018  9:15 AM CDT   Anticoagulation Visit with NE ANTI COAG   [facility]    [address] [address]   [phone]            Feb 15, 2019 12:30 PM CST   PFT VISIT with  PFL KENNETH   [facility]monary [facility] ([facility])    [address] [place] [id number] [phone]            Feb 15, 2019  1:00 PM CST   (Arrive by 12:45 PM)   Return Visit with Noemi Genao MD   [facility] ([facility])    [address] [place] [id number] [phone]              Who to contact     If you have questions or need follow up information about today's clinic visit or your schedule please contact [facility] directly at  "[phone].  Normal or non-critical lab and imaging results will be communicated to you by [employer], letter or phone within 4 business days after the clinic has received the results. If you do not hear from us within 7 days, please contact the clinic through [employer] or phone. If you have a critical or abnormal lab result, we will notify you by phone as soon as possible.  Submit refill requests through [employer] or call your pharmacy and they will forward the refill request to us. Please allow 3 business days for your refill to be completed.          Additional Information About Your Visit        [employer] Information     [employer] gives you secure access to your electronic health record. If you see a primary care provider, you can also send messages to your care team and make appointments. If you have questions, please call your primary care clinic.  If you do not have a primary care provider, please call [phone] and they will assist you.        Care EveryWhere ID     This is your Care EveryWhere ID. This could be used by other organizations to access your [facility] medical records  [id number]        Your Vitals Were     Pulse Temperature Height Pulse Oximetry BMI (Body Mass Index)       74 97.4  F (36.3  C) (Oral) 4' 10\" (1.473 m) 93% 30.1 kg/m2        Blood Pressure from Last 3 Encounters:   09/07/18 96/62   08/17/18 138/82   08/09/18 125/69    Weight from Last 3 Encounters:   09/07/18 144 lb (65.3 kg)   08/17/18 147 lb 8 oz (66.9 kg)   08/03/18 145 lb (65.8 kg)              We Performed the Following     ALBUTEROL UNIT DOSE, 1 MG          Today's Medication Changes          These changes are accurate as of 9/7/18 11:59 PM.  If you have any questions, ask your nurse or doctor.               Start taking these medicines.        Dose/Directions    * albuterol (2.5 MG/3ML) 0.083% neb solution   Used for:  Acute bronchospasm   Started by:  Myah Rush DO        Dose:  2.5 mg   Take 1 vial (2.5 mg) by nebulization " every 6 hours as needed for shortness of breath / dyspnea or wheezing   Quantity:  3 mL   Refills:  0       * albuterol (2.5 MG/3ML) 0.083% neb solution   Used for:  Pneumonia due to infectious organism, unspecified laterality, unspecified part of lung   Started by:  Myah Rush DO        Dose:  2.5 mg   Take 1 vial (2.5 mg) by nebulization every 6 hours as needed for shortness of breath / dyspnea or wheezing   Quantity:  1 Box   Refills:  1       cephALEXin 500 MG capsule   Commonly known as:  KEFLEX   Used for:  Pneumonia due to infectious organism, unspecified laterality, unspecified part of lung   Started by:  Myah Rush DO        Dose:  500 mg   Take 1 capsule (500 mg) by mouth 2 times daily   Quantity:  20 capsule   Refills:  0       * Notice:  This list has 2 medication(s) that are the same as other medications prescribed for you. Read the directions carefully, and ask your doctor or other care provider to review them with you.         Where to get your medicines      These medications were sent to [facility] [address].  [address] [place] [address]     Phone:  [phone]     albuterol (2.5 MG/3ML) 0.083% neb solution    cephALEXin 500 MG capsule         Some of these will need a paper prescription and others can be bought over the counter.  Ask your nurse if you have questions.     Bring a paper prescription for each of these medications     codeine 30 MG tablet       You don't need a prescription for these medications     albuterol (2.5 MG/3ML) 0.083% neb solution                Primary Care Provider Office Phone # Fax #    Tad Gilliamjaswinder Mendoza [phone]       [address] [place] [address]        Equal Access to Services     JUNIOR MURPHY : Jose Mead, xavi rosenbaum, giovani contreras [facility] [phone].    ATENCIÓN: Susan murrieta  español, tiene a leon disposición servicios gratuitos de asistencia lingüística. Lynette yoo [phone].    We comply with applicable federal civil rights laws and [place] laws. We do not discriminate on the basis of race, color, national origin, age, disability, sex, sexual orientation, or gender identity.            Thank you!     Thank you for choosing [facility]  for your care. Our goal is always to provide you with excellent care. Hearing back from our patients is one way we can continue to improve our services. Please take a few minutes to complete the written survey that you may receive in the mail after your visit with us. Thank you!             Your Updated Medication List - Protect others around you: Learn how to safely use, store and throw away your medicines at www.disposemymeds.org.          This list is accurate as of 9/7/18 11:59 PM.  Always use your most recent med list.                   Brand Name Dispense Instructions for use Diagnosis    * albuterol (2.5 MG/3ML) 0.083% neb solution     3 mL    Take 1 vial (2.5 mg) by nebulization every 6 hours as needed for shortness of breath / dyspnea or wheezing    Acute bronchospasm       * albuterol (2.5 MG/3ML) 0.083% neb solution     1 Box    Take 1 vial (2.5 mg) by nebulization every 6 hours as needed for shortness of breath / dyspnea or wheezing    Pneumonia due to infectious organism, unspecified laterality, unspecified part of lung       aspirin 81 MG tablet     1 Tab    ONE DAILY    HTN (hypertension), Elevated cholesterol       calcium + D 600-200 MG-UNIT Tabs   Generic drug:  calcium carbonate-vitamin D     0    ONE TABLET TWICE A DAY WITH MEALS    Osteopenia       cephALEXin 500 MG capsule    KEFLEX    20 capsule    Take 1 capsule (500 mg) by mouth 2 times daily    Pneumonia due to infectious organism, unspecified laterality, unspecified part of lung       codeine 30 MG tablet     60 tablet    Take 1 tablet (30 mg) by mouth 2  times daily May fill on or after 2/8/18    Fibrosis, lung (H)       furosemide 20 MG tablet    LASIX     Take 20 mg by mouth        levothyroxine 88 MCG tablet    SYNTHROID    90 tablet    Take 1 tablet (88 mcg) by mouth daily    Hypothyroidism due to acquired atrophy of thyroid       losartan 100 MG tablet    COZAAR    90 tablet    Take 1 tablet (100 mg) by mouth daily    Hypertension goal BP (blood pressure) < 140/90       metoprolol tartrate 25 MG tablet    LOPRESSOR     Take 25 mg by mouth 2 times daily        neomycin-polymyxin-dexamethasone 3.5-67659-2.1 Oint ophthalmic ointment    MAXITROL    1 Tube    Place 1 Application into both eyes At Bedtime Apply a small squirt into each eye at bedtime    Dermatochalasis, unspecified laterality       OMEGA-3 COMPLEX PO      1 capsule daily        order for DME     1 each    Equipment being ordered: Oxygen 2 liters NC at HS    Oxygen decrease, IPF (idiopathic pulmonary fibrosis) (H)       * order for DME     1 Units    Equipment being ordered: Please provide night time oxygen at 2L/min via nasal canula    IPF (idiopathic pulmonary fibrosis) (H), Hypoxia       * order for DME     1 Device    Equipment being ordered: Oxygen 2 liters NC at night time    IPF (idiopathic pulmonary fibrosis) (H)       simvastatin 40 MG tablet    ZOCOR    90 tablet    Take 1 tablet (40 mg) by mouth At Bedtime    Hyperlipidemia LDL goal <130       * tolterodine 4 MG 24 hr capsule    DETROL LA    30 capsule    Take 1 capsule (4 mg) by mouth daily    Overactive bladder       * tolterodine 4 MG 24 hr capsule    DETROL LA    90 capsule    Take 1 capsule (4 mg) by mouth daily    OAB (overactive bladder)       traZODone 50 MG tablet    DESYREL    30 tablet    Take 1 tablet (50 mg) by mouth nightly as needed for sleep    Other insomnia       * vitamin D3 1000 units Caps      1 CAPSULE DAILY        * vitamin D 2000 units tablet     0    1 tablet daily    Osteopenia       VITAMIN E COMPLEX PO      Take   by mouth.        warfarin 5 MG tablet    COUMADIN    90 tablet    Take as directed by [facility]. Current dose is 7.5 mg on Sunday and 5 mg the rest of the week.    Persistent atrial fibrillation (H)       * Notice:  This list has 8 medication(s) that are the same as other medications prescribed for you. Read the directions carefully, and ask your doctor or other care provider to review them with you.

## 2018-09-07 NOTE — PATIENT INSTRUCTIONS
Let your lung doctor know that this is going on, she may want to see you.     Use the albuterol nebulizer.     INR will contact you if changes in coumadin are needed due to starting antibiotic.    If you can, it is helpful if you fill out a survey about your clinic visit if it is mailed to your house in a few weeks.

## 2018-09-07 NOTE — TELEPHONE ENCOUNTER
Huddled, PCP is unable to see patient today. Scheduled with another provider today.  Miguelina Dunaway RN

## 2018-09-07 NOTE — TELEPHONE ENCOUNTER
Ashlie Brantley is a 87 year old female who calls with cough. Only wants to see PCP and denied an appt at .     NURSING ASSESSMENT:  Description: a little SOB, wheezing, congested, green sputum. Has oxygen for sleeping and is using that now.    Onset/duration:  3 days  Precip. factors:  No one else is sick  Associated symptoms:  Denies fever. Patient doesn't talk on the phone so she is unsure of other details.   Improves/worsens symptoms:  Rest, but coughs when she lies down.     Allergies:   Allergies   Allergen Reactions     Nkda [No Known Drug Allergies]        NURSING PLAN: Routed to provider Yes    RECOMMENDED DISPOSITION:    Will comply with recommendation: Yes  If further questions/concerns or if symptoms do not improve, worsen or new symptoms develop, call your PCP or [facility] as soon as possible.    Guideline used:  Telephone Triage Protocols for Nurses, Fifth Edition, Tanisha Dunaway RN

## 2018-09-10 DIAGNOSIS — Z79.01 LONG TERM CURRENT USE OF ANTICOAGULANT THERAPY: ICD-10-CM

## 2018-09-10 DIAGNOSIS — I51.7 LVH (LEFT VENTRICULAR HYPERTROPHY): Primary | ICD-10-CM

## 2018-09-10 DIAGNOSIS — I48.91 ATRIAL FIBRILLATION (H): ICD-10-CM

## 2018-09-10 LAB — INR PPP: 2.6 (ref 0.86–1.14)

## 2018-09-10 PROCEDURE — 36416 COLLJ CAPILLARY BLOOD SPEC: CPT | Performed by: FAMILY MEDICINE

## 2018-09-10 PROCEDURE — 85610 PROTHROMBIN TIME: CPT | Performed by: FAMILY MEDICINE

## 2018-09-11 ENCOUNTER — TELEPHONE (OUTPATIENT)
Dept: FAMILY MEDICINE | Facility: CLINIC | Age: 83
End: 2018-09-11

## 2018-09-11 NOTE — TELEPHONE ENCOUNTER
Reason for Call:  Other call back    Detailed comments: Mariela, the patients daughter called stating her mom had her INR done at the lab on 08/10/18 and would like to talk to the INR nurse .    Phone Number Patient can be reached at: Other phone number:  [phone]    Best Time: anytime    Can we leave a detailed message on this number? YES    Call taken on 9/11/2018 at 2:58 PM by Felipa Kc

## 2018-09-24 ENCOUNTER — ANTICOAGULATION THERAPY VISIT (OUTPATIENT)
Dept: NURSING | Facility: CLINIC | Age: 83
End: 2018-09-24
Payer: COMMERCIAL

## 2018-09-24 DIAGNOSIS — I48.91 ATRIAL FIBRILLATION (H): ICD-10-CM

## 2018-09-24 DIAGNOSIS — Z79.01 LONG TERM CURRENT USE OF ANTICOAGULANT THERAPY: ICD-10-CM

## 2018-09-24 LAB — INR POINT OF CARE: 2.6 (ref 0.86–1.14)

## 2018-09-24 PROCEDURE — 85610 PROTHROMBIN TIME: CPT | Mod: QW

## 2018-09-24 PROCEDURE — 36416 COLLJ CAPILLARY BLOOD SPEC: CPT

## 2018-09-24 PROCEDURE — 99207 ZZC NO CHARGE NURSE ONLY: CPT

## 2018-09-24 NOTE — PROGRESS NOTES
ANTICOAGULATION FOLLOW-UP CLINIC VISIT    Patient Name:  Ashlie Brantley  Date:  9/24/2018  Contact Type:  Face to Face    SUBJECTIVE:     Patient Findings     Positives No Problem Findings           OBJECTIVE    INR Protime   Date Value Ref Range Status   09/24/2018 2.6 (A) 0.86 - 1.14 Final       ASSESSMENT / PLAN  INR assessment THER    Recheck INR In: 4 WEEKS    INR Location Clinic      Anticoagulation Summary as of 9/24/2018     INR goal 2.0-3.0   Today's INR 2.6   Warfarin maintenance plan 7.5 mg (5 mg x 1.5) on Sun; 5 mg (5 mg x 1) all other days   Full warfarin instructions 7.5 mg on Sun; 5 mg all other days   Weekly warfarin total 37.5 mg   No change documented Lizy Mercado RN   Plan last modified Jarek Stephens RN (5/10/2018)   Next INR check 10/22/2018   Target end date Indefinite    Indications   Atrial fibrillation (H) [I48.91] [I48.91]  Long term current use of anticoagulant therapy [Z79.01]         Anticoagulation Episode Summary     INR check location     Preferred lab     Send INR reminders to [facility]    Comments       Anticoagulation Care Providers     Provider Role Specialty Phone number    Tad Mendoza CemaudreyDO [facility] [phone]            See the Encounter Report to view Anticoagulation Flowsheet and Dosing Calendar (Go to Encounters tab in chart review, and find the Anticoagulation Therapy Visit)        Lizy Mercado RN

## 2018-09-24 NOTE — MR AVS SNAPSHOT
Ashlie Brantley   9/24/2018 9:15 AM   Anticoagulation Therapy Visit    Description:  87 year old female   Provider:  NAHEED HUNTER   Department:  Ne Nurse           INR as of 9/24/2018     Today's INR 2.6      Anticoagulation Summary as of 9/24/2018     INR goal 2.0-3.0   Today's INR 2.6   Full warfarin instructions 7.5 mg on Sun; 5 mg all other days   Next INR check 10/22/2018    Indications   Atrial fibrillation (H) [I48.91] [I48.91]  Long term current use of anticoagulant therapy [Z79.01]         Your next Anticoagulation Clinic appointment(s)     Oct 22, 2018 11:00 AM CDT   Anticoagulation Visit with NE ANTI COAG   [facility]    [address] [place] [id number] [phone]              Contact Numbers     Please call [phone] to cancel and/or reschedule your appointment.  Please call [phone] with any problems or questions regarding your therapy          September 2018 Details    Sun Mon Tue Wed Thu Fri Sat           1                 2               3               4               5               6               7               8                 9               10               11               12               13               14               15                 16               17               18               19               20               21               22                 23               24      5 mg   See details      25      5 mg         26      5 mg         27      5 mg         28      5 mg         29      5 mg           30      7.5 mg                Date Details   09/24 This INR check               How to take your warfarin dose     To take:  5 mg Take 1 of the 5 mg tablets.    To take:  7.5 mg Take 1.5 of the 5 mg tablets.           October 2018 Details    Sun Mon Tue Wed Thu Fri Sat      1      5 mg         2      5 mg         3      5 mg         4      5 mg         5      5 mg         6      5 mg           7       7.5 mg         8      5 mg         9      5 mg         10      5 mg         11      5 mg         12      5 mg         13      5 mg           14      7.5 mg         15      5 mg         16      5 mg         17      5 mg         18      5 mg         19      5 mg         20      5 mg           21      7.5 mg         22            23               24               25               26               27                 28               29               30               31                   Date Details   No additional details    Date of next INR:  10/22/2018         How to take your warfarin dose     To take:  5 mg Take 1 of the 5 mg tablets.    To take:  7.5 mg Take 1.5 of the 5 mg tablets.

## 2018-09-25 ENCOUNTER — PATIENT OUTREACH (OUTPATIENT)
Dept: PULMONOLOGY | Facility: CLINIC | Age: 83
End: 2018-09-25

## 2018-09-25 DIAGNOSIS — R09.02 HYPOXIA: Primary | ICD-10-CM

## 2018-09-25 DIAGNOSIS — J84.9 ILD (INTERSTITIAL LUNG DISEASE) (H): ICD-10-CM

## 2018-09-25 LAB
DLCOUNC-%PRED-PRE: 57 %
DLCOUNC-PRE: 9.4 ML/MIN/MMHG
DLCOUNC-PRED: 16.34 ML/MIN/MMHG
ERV-%PRED-PRE: 90 %
ERV-PRE: 0.14 L
ERV-PRED: 0.15 L
EXPTIME-PRE: 7.96 SEC
FEF2575-%PRED-PRE: 117 %
FEF2575-PRE: 1.34 L/SEC
FEF2575-PRED: 1.14 L/SEC
FEFMAX-%PRED-PRE: 140 %
FEFMAX-PRE: 4.53 L/SEC
FEFMAX-PRED: 3.21 L/SEC
FEV1-%PRED-PRE: 85 %
FEV1-PRE: 1.15 L
FEV1FEV6-PRE: 85 %
FEV1FEV6-PRED: 76 %
FEV1FVC-PRE: 86 %
FEV1FVC-PRED: 72 %
FEV1SVC-PRE: 88 %
FEV1SVC-PRED: 68 %
FIFMAX-PRE: 2.59 L/SEC
FIO2-PRE: 21 %
FVC-%PRED-PRE: 76 %
FVC-PRE: 1.34 L
FVC-PRED: 1.76 L
IC-%PRED-PRE: 62 %
IC-PRE: 1.16 L
IC-PRED: 1.85 L
VA-%PRED-PRE: 56 %
VA-PRE: 2.34 L
VC-%PRED-PRE: 64 %
VC-PRE: 1.3 L
VC-PRED: 2 L

## 2018-09-25 NOTE — PROGRESS NOTES
[facility]:  Care Coordination Note     SITUATION   Ashlie Brantley is a 87 year old female who is receiving support for:  Clinic Care Coordination - Follow-up (Portable oxygen)  .    BACKGROUND     Contacted by pt's daughter, Malaika, to follow up on possibility of pt obtaining POC for ambulatory oxygen needs.  Pt is currently using 2L/min nocturnal, she does not qualify for daytime oxygen use, per 6MWT 8/17/18.  Pt's daughter states pt becomes very fatigued with exertion and would benefit from oxygen to have on hand, particularly when taking trips out side of the home.  In addition she was recently seen in Urgent care for symptoms of URI that have exacerbated her report of dyspnea. She is scheduled to follow up with us in clinic 2/15.                 PLAN     Follow-up plan: Will review with MD for repeat 6MWT at follow up appointment, and possibility of providing prescription for exertional oxygen if pt decides to purchase POC out of pocket.  Writer will return call to Malaika with update.    Following review with Dr. Genao, Malaika is advised that she does not recommend as needed oxygen in the absence of hypoxia on 6MWT.  It has not been shown to help with symptoms and can be burdensome and increase falls with equipment. It is OK to repeat 6MWT at follow up appointment.  If pt feels symptoms are not back to baseline after recent URI, and has concerns about de satting now, she can be seen in clinic sooner than scheduled follow up, with 6MWT at this time.  Malaika reports that pt was advised to follow up with Pulmonologist at her visit with urgent care, and would like to proceed with sooner appointment - Message will be sent to schedulers for sooner follow up.  Order placed for 6MWT per Dr. Genao.

## 2018-10-10 DIAGNOSIS — J84.10 FIBROSIS, LUNG (H): ICD-10-CM

## 2018-10-10 RX ORDER — CODEINE SULFATE 30 MG/1
30 TABLET ORAL 2 TIMES DAILY
Qty: 60 TABLET | Refills: 0 | Status: SHIPPED | OUTPATIENT
Start: 2018-10-10 | End: 2018-10-29

## 2018-10-10 NOTE — TELEPHONE ENCOUNTER
Medication Detail      Disp Refills Start End LIZBETH   codeine 30 MG tablet 60 tablet 0 9/7/2018  No   Sig - Route: Take 1 tablet (30 mg) by mouth 2 times daily May fill on or after 2/8/18 - Oral   Class: Local Print   Order: [id number]       Last Office Visit: 9/7/2018  Future Office visit:       Routing refill request to provider for review/approval because:  Drug not on the [facility] refill protocol or controlled substance

## 2018-10-15 ENCOUNTER — OFFICE VISIT (OUTPATIENT)
Dept: FAMILY MEDICINE | Facility: CLINIC | Age: 83
End: 2018-10-15
Payer: COMMERCIAL

## 2018-10-15 ENCOUNTER — RADIANT APPOINTMENT (OUTPATIENT)
Dept: GENERAL RADIOLOGY | Facility: CLINIC | Age: 83
End: 2018-10-15
Attending: INTERNAL MEDICINE
Payer: COMMERCIAL

## 2018-10-15 ENCOUNTER — TELEPHONE (OUTPATIENT)
Dept: FAMILY MEDICINE | Facility: CLINIC | Age: 83
End: 2018-10-15

## 2018-10-15 VITALS
HEART RATE: 95 BPM | RESPIRATION RATE: 16 BRPM | TEMPERATURE: 97.7 F | BODY MASS INDEX: 31.35 KG/M2 | OXYGEN SATURATION: 95 % | DIASTOLIC BLOOD PRESSURE: 58 MMHG | WEIGHT: 150 LBS | SYSTOLIC BLOOD PRESSURE: 106 MMHG

## 2018-10-15 DIAGNOSIS — M10.9 ACUTE GOUTY ARTHRITIS: ICD-10-CM

## 2018-10-15 DIAGNOSIS — Z23 NEED FOR PROPHYLACTIC VACCINATION AND INOCULATION AGAINST INFLUENZA: ICD-10-CM

## 2018-10-15 DIAGNOSIS — M10.9 ACUTE GOUTY ARTHRITIS: Primary | ICD-10-CM

## 2018-10-15 LAB
BASOPHILS # BLD AUTO: 0 10E9/L (ref 0–0.2)
BASOPHILS NFR BLD AUTO: 0.3 %
DIFFERENTIAL METHOD BLD: NORMAL
EOSINOPHIL # BLD AUTO: 0.4 10E9/L (ref 0–0.7)
EOSINOPHIL NFR BLD AUTO: 4.7 %
ERYTHROCYTE [DISTWIDTH] IN BLOOD BY AUTOMATED COUNT: 14.9 % (ref 10–15)
ERYTHROCYTE [SEDIMENTATION RATE] IN BLOOD BY WESTERGREN METHOD: 15 MM/H (ref 0–30)
HCT VFR BLD AUTO: 36.9 % (ref 35–47)
HGB BLD-MCNC: 12 G/DL (ref 11.7–15.7)
LYMPHOCYTES # BLD AUTO: 2.4 10E9/L (ref 0.8–5.3)
LYMPHOCYTES NFR BLD AUTO: 28.1 %
MCH RBC QN AUTO: 31.4 PG (ref 26.5–33)
MCHC RBC AUTO-ENTMCNC: 32.5 G/DL (ref 31.5–36.5)
MCV RBC AUTO: 97 FL (ref 78–100)
MONOCYTES # BLD AUTO: 0.8 10E9/L (ref 0–1.3)
MONOCYTES NFR BLD AUTO: 9.3 %
NEUTROPHILS # BLD AUTO: 5 10E9/L (ref 1.6–8.3)
NEUTROPHILS NFR BLD AUTO: 57.6 %
PLATELET # BLD AUTO: 180 10E9/L (ref 150–450)
RBC # BLD AUTO: 3.82 10E12/L (ref 3.8–5.2)
WBC # BLD AUTO: 8.6 10E9/L (ref 4–11)

## 2018-10-15 PROCEDURE — 90662 IIV NO PRSV INCREASED AG IM: CPT | Performed by: INTERNAL MEDICINE

## 2018-10-15 PROCEDURE — 73630 X-RAY EXAM OF FOOT: CPT | Mod: LT

## 2018-10-15 PROCEDURE — 86140 C-REACTIVE PROTEIN: CPT | Performed by: INTERNAL MEDICINE

## 2018-10-15 PROCEDURE — 36415 COLL VENOUS BLD VENIPUNCTURE: CPT | Performed by: INTERNAL MEDICINE

## 2018-10-15 PROCEDURE — 85025 COMPLETE CBC W/AUTO DIFF WBC: CPT | Performed by: INTERNAL MEDICINE

## 2018-10-15 PROCEDURE — 85652 RBC SED RATE AUTOMATED: CPT | Performed by: INTERNAL MEDICINE

## 2018-10-15 PROCEDURE — G0008 ADMIN INFLUENZA VIRUS VAC: HCPCS | Performed by: INTERNAL MEDICINE

## 2018-10-15 PROCEDURE — 84550 ASSAY OF BLOOD/URIC ACID: CPT | Performed by: INTERNAL MEDICINE

## 2018-10-15 PROCEDURE — 99213 OFFICE O/P EST LOW 20 MIN: CPT | Mod: 25 | Performed by: INTERNAL MEDICINE

## 2018-10-15 RX ORDER — PREDNISONE 20 MG/1
60 TABLET ORAL DAILY
Qty: 15 TABLET | Refills: 0 | Status: SHIPPED | OUTPATIENT
Start: 2018-10-15 | End: 2018-12-10

## 2018-10-15 ASSESSMENT — PAIN SCALES - GENERAL: PAINLEVEL: SEVERE PAIN (7)

## 2018-10-15 NOTE — MR AVS SNAPSHOT
After Visit Summary   10/15/2018    Ashlie Brantley    MRN: [id number]           Patient Information     Date Of Birth          2/24/1931        Visit Information        Provider Department      10/15/2018 11:50 AM Luis Enrique Ellis MD [facility]        Today's Diagnoses     Acute gouty arthritis    -  1    Need for prophylactic vaccination and inoculation against influenza          Care Instructions    Diet and Gout - Topic Overview  Purines (specific chemical compounds found in some foods) are broken down into uric acid. A diet rich in purines from certain sources can raise uric acid levels in the body, which sometimes leads to gout. Meat and seafood may increase your risk of gout. Dairy products may lower your risk.    Foods to limit (very high in purines):     Organ meats, such as liver, kidneys, sweetbreads, and brains    Meats, including bower, beef, pork, and lamb   Game meats   Any other meats in large amounts   Anchovies, sardines, herring, mackerel, and scallops    Gravy   Beer  Foods to eat occasionally (moderately high in purines, but may not raise your risk of gout):     Fish and seafood (other than high purine seafood)   Oatmeal, wheat bran, and wheat germ  Foods that are safe to eat (low in purines):     Green vegetables and tomatoes   Fruits and fruit juices   Breads and cereals that are not whole-grain   Butter, buttermilk, cheese, and eggs   Chocolate and cocoa   Coffee, tea, and carbonated beverages   Peanut butter and nuts  Dairy products that may lower your risk of gout:     Low-fat or nonfat milk   Low-fat yogurt  If you have experienced a gout attack or have high uric acid in your blood (hyperuricemia), it may help to reduce your intake of meat, seafood, and alcohol.1    Changing your diet may help lower your risk of having future attacks of gout. Doctors recommend that overweight people who have gout reach and stay at a healthy body weight by getting moderate exercise  daily and regulating their fat and caloric intake    [facility]    If you have any questions regarding to your visit please contact your care team:     Team Pink:   Clinic Hours Telephone Number   Internal Medicine:  Dr. Gregoria Bowens NP 7am-7pm  Monday - Thursday   7am-5pm  Fridays  [phone]  (Appointment scheduling available 24/7)   Urgent Care - [place] [facility] - 11am-9pm Monday-Friday Saturday-Sunday- 9am-5pm   [place] - 5pm-9pm Monday-Friday Saturday-Sunday- 9am-5pm  [phone] - [place]  [phone] - [place]       What options do I have for a visit other than an office visit? We offer electronic visits (e-visits) and telephone visits, when medically appropriate.  Please check with your medical insurance to see if these types of visits are covered, as you will be responsible for any charges that are not paid by your insurance.      You can use ClickDelivery (secure electronic communication) to access to your chart, send your primary care provider a message, or make an appointment. Ask a team member how to get started.     For a price quote for your services, please call our Consumer Price Line at [phone] or our Imaging Cost estimation line at [phone] (for imaging tests).  Tyesha RAM CMA ([facility])            Follow-ups after your visit        Your next 10 appointments already scheduled     Oct 22, 2018 11:00 AM CDT   Anticoagulation Visit with NE ANTI COAG   [facility]    [address] [facility] [address] [phone]            Oct 24, 2018  9:45 AM CDT   MA SCREENING DIGITAL BILATERAL with FKMA1   [facility]    [address] [place] [address] Ivon RALPH [address] [phone]           How do I prepare for my exam? (Food and drink instructions) No Food and Drink Restrictions.  How do I prepare for my exam? (Other  "instructions) Do not use any powder, lotion or deodorant under your arms or on your breast. If you do, we will ask you to remove it before your exam.  What should I wear: Wear comfortable, two-piece clothing.  How long does the exam take: Most scans will take 15 minutes.  What should I bring: Bring any previous mammograms from other facilities or have them mailed to the breast center.  Do I need a :  No  is needed.  What do I need to tell my doctor: If you have any allergies, tell your care team.  What should I do after the exam: No restrictions, You may resume normal activities.  What is this test: This test is an x-ray of the breast to look for breast disease. The breast is pressed between two plates to flatten and spread the tissue. An X-ray is taken of the breast from different angles.  Who should I call with questions: If you have any questions, please call the Imaging Department where you will have your exam. Directions, parking instructions, and other information is available on our website, [email or web address].  Other information about my exam Three-dimensional (3D) mammograms are available at [place] locations in [facility], [facility], [place], [facility] and [place]. [facility] locations include [place] and the [facility] in [place].  Benefits of 3D mammograms include * Improved rate of cancer detection * Decreases your chance of having to go back for more tests, which means fewer: * \"False-positive\" results (This means that there is an abnormal area but it isn't cancer.) * Invasive testing procedures, such as a biopsy or surgery * Can provide clearer images of the breast if you have dense breast tissue.  *3D mammography is an optional exam that anyone can have with a 2D mammogram. It doesn't replace or take the place of a 2D mammogram. 2D mammograms remain an effective screening test for all women.  Not all insurance companies cover " the cost of a 3D mammogram. Check with your insurance.            Nov 09, 2018  3:00 PM CST   PFT VISIT with UC PFL D   [facility] ([facility])    [address] [address]   [phone]            Nov 09, 2018  3:30 PM CST   Six Minute Walk with UC PFL 6 MINUTE WALK 1   [facility] ([facility])    [address] [place] [address]   [phone]            Nov 09, 2018  4:20 PM CST   (Arrive by 4:05 PM)   Return Visit with Noemi Genao MD   [facility] ([facility])    [address] [place] [address]  [address] [place] [address]   [phone]            Feb 15, 2019 12:30 PM CST   PFT VISIT with UC PFL A   [facility] ([facility])    [address] [place] [address]   [phone]            Feb 15, 2019  1:00 PM CST   (Arrive by 12:45 PM)   Return Visit with Noemi Genao MD   [facility] ([facility])    [address] [place] [address]  [address] [place] [address]   [phone]              Future tests that were ordered for you today     Open Future Orders        Priority Expected Expires Ordered    XR Foot Left G/E 3 Views Routine 10/15/2018 10/15/2019 10/15/2018    MA Screening Digital Bilateral Routine  10/15/2019 10/15/2018            Who to contact     If you have questions or need follow up information about today's clinic visit or your schedule please contact [facility] directly at [phone].  Normal or non-critical lab and imaging results will be communicated to you by MyChart, letter or phone within 4 business days after the clinic has received the results. If you do not hear from us within 7 days, please contact the clinic through MyChart or  phone. If you have a critical or abnormal lab result, we will notify you by phone as soon as possible.  Submit refill requests through Cloud[employer]ees or call your pharmacy and they will forward the refill request to us. Please allow 3 business days for your refill to be completed.          Additional Information About Your Visit        [employer] Information     CloudBees gives you secure access to your electronic health record. If you see a primary care provider, you can also send messages to your care team and make appointments. If you have questions, please call your primary care clinic.  If you do not have a primary care provider, please call [phone] and they will assist you.        Care EveryWhere ID     This is your Care EveryWhere ID. This could be used by other organizations to access your [facility] medical records  [id number]        Your Vitals Were     Pulse Temperature Respirations Pulse Oximetry BMI (Body Mass Index)       95 97.7  F (36.5  C) (Oral) 16 95% 31.35 kg/m2        Blood Pressure from Last 3 Encounters:   10/15/18 106/58   09/07/18 96/62   08/17/18 138/82    Weight from Last 3 Encounters:   10/15/18 150 lb (68 kg)   09/07/18 144 lb (65.3 kg)   08/17/18 147 lb 8 oz (66.9 kg)              We Performed the Following     CBC with platelets differential     CRP inflammation     Erythrocyte sedimentation rate auto     FLU VACCINE, INCREASED ANTIGEN, PRESV FREE, AGE 65+ [50649]     Uric acid     Vaccine Administration, Initial [62196]          Today's Medication Changes          These changes are accurate as of 10/15/18 12:14 PM.  If you have any questions, ask your nurse or doctor.               Start taking these medicines.        Dose/Directions    predniSONE 20 MG tablet   Commonly known as:  DELTASONE   Used for:  Acute gouty arthritis   Started by:  Luis Enrique Ellis MD        Dose:  60 mg   Take 3 tablets (60 mg) by mouth daily   Quantity:  15 tablet   Refills:  0            Where to get your medicines       These medications [place] [facility] [place] [address]  [address] [facility] [address]     Phone:  [phone]     predniSONE 20 MG tablet                Primary Care Provider Office Phone # Fax #    Tad Mendoza [phone]       [address] [facility] [address]        Equal Access to Services     JUNIOR MURPHY : Hadii aad ku hadasho Soomaali, waaxda luqadaha, qaybta kaalmada adeegyada, waxay idiin hayaan adeeg kharash lacandy muller. So [facility] [phone].    ATENCIÓN: Si habla español, tiene a leon disposición servicios gratuitos de asistencia lingüística. Justiname al [phone].    We comply with applicable federal civil rights laws and [place] laws. We do not discriminate on the basis of race, color, national origin, age, disability, sex, sexual orientation, or gender identity.            Thank you!     Thank you for choosing [facility]  for your care. Our goal is always to provide you with excellent care. Hearing back from our patients is one way we can continue to improve our services. Please take a few minutes to complete the written survey that you may receive in the mail after your visit with us. Thank you!             Your Updated Medication List - Protect others around you: Learn how to safely use, store and throw away your medicines at www.disposemymeds.org.          This list is accurate as of 10/15/18 12:14 PM.  Always use your most recent med list.                   Brand Name Dispense Instructions for use Diagnosis    * albuterol (2.5 MG/3ML) 0.083% neb solution     3 mL    Take 1 vial (2.5 mg) by nebulization every 6 hours as needed for shortness of breath / dyspnea or wheezing    Acute bronchospasm       * albuterol (2.5 MG/3ML) 0.083% neb solution     1 Box    Take 1 vial (2.5 mg) by nebulization every 6 hours as needed for shortness of breath / dyspnea or wheezing    Pneumonia due to  infectious organism, unspecified laterality, unspecified part of lung       aspirin 81 MG tablet     1 Tab    ONE DAILY    HTN (hypertension), Elevated cholesterol       calcium + D 600-200 MG-UNIT Tabs   Generic drug:  calcium carbonate-vitamin D     0    ONE TABLET TWICE A DAY WITH MEALS    Osteopenia       cephALEXin 500 MG capsule    KEFLEX    20 capsule    Take 1 capsule (500 mg) by mouth 2 times daily    Pneumonia due to infectious organism, unspecified laterality, unspecified part of lung       codeine 30 MG tablet     60 tablet    Take 1 tablet (30 mg) by mouth 2 times daily May fill on or after 2/8/18    Fibrosis, lung (H)       furosemide 20 MG tablet    LASIX     Take 20 mg by mouth        levothyroxine 88 MCG tablet    SYNTHROID    90 tablet    Take 1 tablet (88 mcg) by mouth daily    Hypothyroidism due to acquired atrophy of thyroid       losartan 100 MG tablet    COZAAR    90 tablet    Take 1 tablet (100 mg) by mouth daily    Hypertension goal BP (blood pressure) < 140/90       metoprolol tartrate 25 MG tablet    LOPRESSOR     Take 25 mg by mouth 2 times daily        neomycin-polymyxin-dexamethasone 3.5-53133-5.1 Oint ophthalmic ointment    MAXITROL    1 Tube    Place 1 Application into both eyes At Bedtime Apply a small squirt into each eye at bedtime    Dermatochalasis, unspecified laterality       OMEGA-3 COMPLEX PO      1 capsule daily        order for DME     1 each    Equipment being ordered: Oxygen 2 liters NC at HS    Oxygen decrease, IPF (idiopathic pulmonary fibrosis) (H)       * order for DME     1 Units    Equipment being ordered: Please provide night time oxygen at 2L/min via nasal canula    IPF (idiopathic pulmonary fibrosis) (H), Hypoxia       * order for DME     1 Device    Equipment being ordered: Oxygen 2 liters NC at night time    IPF (idiopathic pulmonary fibrosis) (H)       predniSONE 20 MG tablet    DELTASONE    15 tablet    Take 3 tablets (60 mg) by mouth daily    Acute gouty  arthritis       simvastatin 40 MG tablet    ZOCOR    90 tablet    Take 1 tablet (40 mg) by mouth At Bedtime    Hyperlipidemia LDL goal <130       tolterodine 4 MG 24 hr capsule    DETROL LA    30 capsule    Take 1 capsule (4 mg) by mouth daily    Overactive bladder       traZODone 50 MG tablet    DESYREL    30 tablet    Take 1 tablet (50 mg) by mouth nightly as needed for sleep    Other insomnia       * vitamin D3 1000 units Caps      1 CAPSULE DAILY        * vitamin D 2000 units tablet     0    1 tablet daily    Osteopenia       VITAMIN E COMPLEX PO      Take  by mouth.        * warfarin 5 MG tablet    COUMADIN    90 tablet    Take as directed by [facility]. Current dose is 7.5 mg on Sunday and 5 mg the rest of the week.    Persistent atrial fibrillation (H)       * warfarin 7.5 MG tablet    COUMADIN    14 tablet    Take 7.5 mg every Sunday. 5mg ROW    Atrial fibrillation, unspecified type (H), Long-term (current) use of anticoagulants       * Notice:  This list has 8 medication(s) that are the same as other medications prescribed for you. Read the directions carefully, and ask your doctor or other care provider to review them with you.

## 2018-10-15 NOTE — LETTER
[address] [place] [address]    October 16, 2018    Ashlie [address] [place] [place] [address]          Dear Ashlie,    Every single one of these tests is fine. With a normal uric acid level this is less supportive of a diagnosis of gout.    Enclosed is a copy of your results.     Results for orders placed or performed in visit on 10/15/18   Uric acid   Result Value Ref Range    Uric Acid 5.5 2.6 - 6.0 mg/dL   CBC with platelets differential   Result Value Ref Range    WBC 8.6 4.0 - 11.0 10e9/L    RBC Count 3.82 3.8 - 5.2 10e12/L    Hemoglobin 12.0 11.7 - 15.7 g/dL    Hematocrit 36.9 35.0 - 47.0 %    MCV 97 78 - 100 fl    MCH 31.4 26.5 - 33.0 pg    MCHC 32.5 31.5 - 36.5 g/dL    RDW 14.9 10.0 - 15.0 %    Platelet Count 180 150 - 450 10e9/L    % Neutrophils 57.6 %    % Lymphocytes 28.1 %    % Monocytes 9.3 %    % Eosinophils 4.7 %    % Basophils 0.3 %    Absolute Neutrophil 5.0 1.6 - 8.3 10e9/L    Absolute Lymphocytes 2.4 0.8 - 5.3 10e9/L    Absolute Monocytes 0.8 0.0 - 1.3 10e9/L    Absolute Eosinophils 0.4 0.0 - 0.7 10e9/L    Absolute Basophils 0.0 0.0 - 0.2 10e9/L    Diff Method Automated Method    Erythrocyte sedimentation rate auto   Result Value Ref Range    Sed Rate 15 0 - 30 mm/h   CRP inflammation   Result Value Ref Range    CRP Inflammation <2.9 0.0 - 8.0 mg/L       If you have any questions or concerns, please call myself or my nurse at [phone].      Sincerely,        Luis Enrique Ellis MD/mitch

## 2018-10-15 NOTE — TELEPHONE ENCOUNTER
Reason for call:  Patient reporting a symptom    Symptom or request: foot pain    Duration (how long have symptoms been present): yesterday    Have you been treated for this before? No    Additional comments: Patient's daughter calling to request a same day appointment with Dr. Mendoza    Phone Number patient can be reached at:  Other phone number:  [phone]* (Mariela)    Best Time:  asap    Can we leave a detailed message on this number:  YES    Call taken on 10/15/2018 at 10:10 AM by Monster Medellin

## 2018-10-15 NOTE — PATIENT INSTRUCTIONS
Diet and Gout - Topic Overview  Purines (specific chemical compounds found in some foods) are broken down into uric acid. A diet rich in purines from certain sources can raise uric acid levels in the body, which sometimes leads to gout. Meat and seafood may increase your risk of gout. Dairy products may lower your risk.    Foods to limit (very high in purines):     Organ meats, such as liver, kidneys, sweetbreads, and brains    Meats, including bower, beef, pork, and lamb   Game meats   Any other meats in large amounts   Anchovies, sardines, herring, mackerel, and scallops    Gravy   Beer  Foods to eat occasionally (moderately high in purines, but may not raise your risk of gout):     Fish and seafood (other than high purine seafood)   Oatmeal, wheat bran, and wheat germ  Foods that are safe to eat (low in purines):     Green vegetables and tomatoes   Fruits and fruit juices   Breads and cereals that are not whole-grain   Butter, buttermilk, cheese, and eggs   Chocolate and cocoa   Coffee, tea, and carbonated beverages   Peanut butter and nuts  Dairy products that may lower your risk of gout:     Low-fat or nonfat milk   Low-fat yogurt  If you have experienced a gout attack or have high uric acid in your blood (hyperuricemia), it may help to reduce your intake of meat, seafood, and alcohol.1    Changing your diet may help lower your risk of having future attacks of gout. Doctors recommend that overweight people who have gout reach and stay at a healthy body weight by getting moderate exercise daily and regulating their fat and caloric intake    [facility]    If you have any questions regarding to your visit please contact your care team:     Team Pink:   Clinic Hours Telephone Number   Internal Medicine:  Dr. Gregoria Bowens NP 7am-7pm  Monday - Thursday   7am-5pm  Fridays  [phone]  (Appointment scheduling available 24/7)   Urgent Care - [place] and  [place] - 11am-9pm Monday-Friday Saturday-Sunday- 9am-5pm   [place] - 5pm-9pm Monday-Friday Saturday-Sunday- 9am-5pm  [phone] - [place]  [phone] - [place]       What options do I have for a visit other than an office visit? We offer electronic visits (e-visits) and telephone visits, when medically appropriate.  Please check with your medical insurance to see if these types of visits are covered, as you will be responsible for any charges that are not paid by your insurance.      You can use [employer] (secure electronic communication) to access to your chart, send your primary care provider a message, or make an appointment. Ask a team member how to get started.     For a price quote for your services, please call our Consumer Price Line at [phone] or our Imaging Cost estimation line at [phone] (for imaging tests).  Tyesha RAM CMA ([facility])

## 2018-10-15 NOTE — TELEPHONE ENCOUNTER
Ashlie Brantley is a 87 year old female who calls with left top of foot pain.    NURSING ASSESSMENT:  Description:  She states the top of her foot starting hurting yesterday and states it is hard to walk. Denies injury, swelling or redness.   Onset/duration:  yesterday  Improves/worsens symptoms:  Ice, heat  Pain scale (0-10)   7/10    Allergies:   Allergies   Allergen Reactions     Nkda [No Known Drug Allergies]        NURSING PLAN: Nursing advice to patient be seen- scheduled at  in one hour.    RECOMMENDED DISPOSITION:    Will comply with recommendation: Yes  If further questions/concerns or if symptoms do not improve, worsen or new symptoms develop, call your PCP or [facility] as soon as possible.    Guideline used:  Telephone Triage Protocols for Nurses, Fifth Edition, Tanisha Dunaway RN

## 2018-10-15 NOTE — PROGRESS NOTES
SUBJECTIVE:   Ashlie Brantley is a 87 year old female who presents to clinic today for the following health issues:    Ashlie is present with her  today. She has some issues with speech and uses a writing board. She is in a wheelchair today.    Acute Gouty Arthritis of Left Foot Big Toe Joint  Last night Ashlie was awoken when she began to experience an acute pain in her left foot big toe joint. She has never had this sort of pain before and denies an injury. This was a spontaneous pain with no provoking factors whatsoever , no proceeding injury.    Problem list and histories reviewed & adjusted, as indicated.  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     Hoarseness     Laryngeal dystonia     Spasmodic dysphonia     Hyperlipidemia LDL goal <130     Anxiety     Hypertension goal BP (blood pressure) < 140/90     LVH (left ventricular hypertrophy)     Advance Care Planning     Cough     Dermatochalasis     Health Care Home     Trochanteric bursitis - bilateral     PVD (posterior vitreous detachment) OU     Pseudophakia, OU     IPF (idiopathic pulmonary fibrosis) (H)     Hypothyroidism due to acquired atrophy of thyroid     Chronic pain syndrome     Urinary incontinence, unspecified type     Atrial fibrillation (H) [I48.91]     Long term current use of anticoagulant therapy     Past Surgical History:   Procedure Laterality Date     C NONSPECIFIC PROCEDURE      BACK SURGERY     C NONSPECIFIC PROCEDURE      VOCAL CORD POLYP     C SHOULDER SURG PROC UNLISTED       C STOMACH SURGERY PROCEDURE UNLISTED       C TOTAL KNEE ARTHROPLASTY      LT 1998  /  RT 2001     CATARACT IOL, RT/LT       HC REMOVAL GALLBLADDER       HERNIA REPAIR, INGUINAL RT/LT      RT     HYSTERECTOMY, CERVIX STATUS UNKNOWN      DUB     PHACOEMULSIFICATION WITH STANDARD INTRAOCULAR LENS IMPLANT   Rt 6/11/09, Lt 8/3/09    both eyes Dr. Barnett     ROTATOR CUFF REPAIR RT/LT      RT       Social History   Substance Use Topics     Smoking status: Never Smoker     Smokeless tobacco: Never Used     Alcohol use 0.0 oz/week     0 Standard drinks or equivalent per week      Comment: 1-2 drinks/month     Family History   Problem Relation Age of Onset     Diabetes Mother      C.A.D. Father      Cerebrovascular Disease Brother          BP Readings from Last 3 Encounters:   10/15/18 106/58   09/07/18 96/62   08/17/18 138/82    Wt Readings from Last 3 Encounters:   10/15/18 68 kg (150 lb)   09/07/18 65.3 kg (144 lb)   08/17/18 66.9 kg (147 lb 8 oz)        Reviewed and updated as needed this visit by clinical staff  Tobacco  Allergies  Meds  Med Hx  Surg Hx  Fam Hx  Soc Hx      Reviewed and updated as needed this visit by Provider       ROS:  Constitutional, HEENT, cardiovascular, pulmonary, GI, , musculoskeletal, neuro, skin, endocrine and psych systems are negative, except as otherwise noted.    This document serves as a record of the services and decisions personally performed and made by Luis Enrique Ellis MD. It was created on his behalf by Devon Garcia, a trained medical scribe. The creation of this document is based on the provider's statements to the medical scribe.  Devon Garcia 12:02 PM October 15, 2018    OBJECTIVE:     /58  Pulse 95  Temp 97.7  F (36.5  C) (Oral)  Resp 16  Wt 68 kg (150 lb)  SpO2 95%  BMI 31.35 kg/m2  Body mass index is 31.35 kg/(m^2).  GENERAL: healthy, alert and no distress  EYES: Eyes grossly normal to inspection, PERRL and conjunctivae and sclerae normal  MS: there is some significant warmth to touch  And reddened quality with left great toe joint . She had exquisite tenderness to palpation and all these things convince me that this is an acute gout flare-up  SKIN: no suspicious lesions or rashes to visible skin  NEURO: Normal strength and tone, mentation intact and speech  normal  PSYCH: mentation appears normal, affect normal/bright    Diagnostic Test Results:  No results found for this or any previous visit (from the past 24 hour(s)).    ASSESSMENT/PLAN:     (M10.9) Acute gouty arthritis  (primary encounter diagnosis)  Comment: we reviewed her case and discussed the diagnosis. Because she's on coumadin the best treatment is a course of prednisone in tapering doses. Gout diet reviewed . Uric acid level  And further follow up depending on the test results   Plan: Uric acid, CBC with platelets differential,         Erythrocyte sedimentation rate auto, CRP         inflammation, XR Foot Left G/E 3 Views,         predniSONE (DELTASONE) 20 MG tablet            (Z23) Need for prophylactic vaccination and inoculation against influenza  Comment: administered   Plan: FLU VACCINE, INCREASED ANTIGEN, PRESV FREE, AGE        65+ [00787], Vaccine Administration, Initial         [08461]             See Patient Instructions    The information in this document, created by the medical scribe for me, accurately reflects the services I personally performed and the decisions made by me. I have reviewed and approved this document for accuracy prior to leaving the patient care area.  October 15, 2018 12:02 PM    Luis Enrique Ellis MD  [facility]      Injectable Influenza Immunization Documentation    1.  Is the person to be vaccinated sick today?   No    2. Does the person to be vaccinated have an allergy to a component   of the vaccine?   No  Egg Allergy Algorithm Link    3. Has the person to be vaccinated ever had a serious reaction   to influenza vaccine in the past?   No    4. Has the person to be vaccinated ever had Guillain-Barré syndrome?   No    Form completed by Tyesha RAM CMA ([facility])

## 2018-10-16 LAB
CRP SERPL-MCNC: <2.9 MG/L (ref 0–8)
URATE SERPL-MCNC: 5.5 MG/DL (ref 2.6–6)

## 2018-10-22 ENCOUNTER — ANTICOAGULATION THERAPY VISIT (OUTPATIENT)
Dept: NURSING | Facility: CLINIC | Age: 83
End: 2018-10-22
Payer: COMMERCIAL

## 2018-10-22 DIAGNOSIS — I48.91 ATRIAL FIBRILLATION (H): ICD-10-CM

## 2018-10-22 DIAGNOSIS — Z79.01 LONG TERM CURRENT USE OF ANTICOAGULANT THERAPY: ICD-10-CM

## 2018-10-22 LAB
INR POINT OF CARE: 8 (ref 0.86–1.14)
INR PPP: 6.86 (ref 0.86–1.14)

## 2018-10-22 PROCEDURE — 85610 PROTHROMBIN TIME: CPT | Mod: QW

## 2018-10-22 PROCEDURE — 36416 COLLJ CAPILLARY BLOOD SPEC: CPT

## 2018-10-22 PROCEDURE — 85610 PROTHROMBIN TIME: CPT | Performed by: FAMILY MEDICINE

## 2018-10-22 PROCEDURE — 99207 ZZC NO CHARGE NURSE ONLY: CPT

## 2018-10-22 NOTE — PROGRESS NOTES
ANTICOAGULATION FOLLOW-UP CLINIC VISIT    Patient Name:  Ashlie Brantley  Date:  10/22/2018  Contact Type:  Face to Face   STAT Lab came back 6.8 notified patient to hold 3 days and return Friday. Signs and symptoms of bleeding reviewed with patient with her verbalizing when to seek medcial attention.    SUBJECTIVE:   sent to lab for elevated Inr, holding tonight dose, had gout flare up with pain, inflamation, steroid dose, and taking Tylenol more for pain.     OBJECTIVE    INR Protime   Date Value Ref Range Status   10/22/2018 8.0 (A) 0.86 - 1.14 Final       ASSESSMENT / PLAN  INR assessment SUPRA    Recheck INR In: 4 DAYS    INR Location Clinic      Anticoagulation Summary as of 10/22/2018     INR goal 2.0-3.0   Today's INR 8.0!   Warfarin maintenance plan 7.5 mg (5 mg x 1.5) on Sun; 5 mg (5 mg x 1) all other days   Full warfarin instructions 10/22: Hold; Otherwise 7.5 mg on Sun; 5 mg all other days   Weekly warfarin total 37.5 mg   Plan last modified Jarek Stephens, RN (5/10/2018)   Next INR check 10/26/2018   Target end date Indefinite    Indications   Atrial fibrillation (H) [I48.91] [I48.91]  Long term current use of anticoagulant therapy [Z79.01]         Anticoagulation Episode Summary     INR check location     Preferred lab     Send INR reminders to [facility]    Comments       Anticoagulation Care Providers     Provider Role Specialty Phone number    Tad Mendoza Cemaudrey  [facility] [phone]            See the Encounter Report to view Anticoagulation Flowsheet and Dosing Calendar (Go to Encounters tab in chart review, and find the Anticoagulation Therapy Visit)    Some signs and symptoms of bleeding include: Nose bleed or cut that does not stop bleeding in 10 minutes, bleeding of the gums, vomiting (will look like coffee grounds) or coughing up blood, unusual, easy or large areas of bruising, increased or unexpected vaginal bleeding or increased menstrual flow, red or  black stools, red or orange urine, prolonged or severe headache, pale skin, unusual or constant tiredness.  If you have these please call 911 or seek medical care immediately.       Lizy Mercado RN

## 2018-10-22 NOTE — MR AVS SNAPSHOT
Ashlie Brantley   10/22/2018 11:00 AM   Anticoagulation Therapy Visit    Description:  87 year old female   Provider:  NAHEED HUNTER   Department:  Ne Nurse           INR as of 10/22/2018     Today's INR 8.0!      Anticoagulation Summary as of 10/22/2018     INR goal 2.0-3.0   Today's INR 8.0!   Full warfarin instructions 10/22: Hold; Otherwise 7.5 mg on Sun; 5 mg all other days   Next INR check 10/26/2018    Indications   Atrial fibrillation (H) [I48.91] [I48.91]  Long term current use of anticoagulant therapy [Z79.01]         Your next Anticoagulation Clinic appointment(s)     Oct 26, 2018  1:00 PM CDT   Anticoagulation Visit with NE ANTI COAG   [facility]    [address] [place] [address] [phone]              Contact Numbers     Please call [phone] to cancel and/or reschedule your appointment.  Please call [phone] with any problems or questions regarding your therapy          October 2018 Details    Sun Mon Tue Wed Thu Fri Sat      1               2               3               4               5               6                 7               8               9               10               11               12               13                 14               15               16               17               18               19               20                 21               22      Hold   See details      23      5 mg         24      5 mg         25      5 mg         26            27                 28               29               30               31                   Date Details   10/22 This INR check       Date of next INR:  10/26/2018         How to take your warfarin dose     To take:  5 mg Take 1 of the 5 mg tablets.    Hold Do not take your warfarin dose. See the Details table to the right for additional instructions.

## 2018-10-23 ENCOUNTER — TELEPHONE (OUTPATIENT)
Dept: FAMILY MEDICINE | Facility: CLINIC | Age: 83
End: 2018-10-23

## 2018-10-23 ENCOUNTER — TRANSFERRED RECORDS (OUTPATIENT)
Dept: HEALTH INFORMATION MANAGEMENT | Facility: CLINIC | Age: 83
End: 2018-10-23

## 2018-10-23 NOTE — TELEPHONE ENCOUNTER
Ashlie Brantley is a 87 year old female whose daughter calls reporting bleeding.    NURSING ASSESSMENT:  Description:  They believe patient scraped her R ear with her fingernail today.  This occurred earlier today, and her son has cleaned her ear a few times, but then it starts bleeding again. He cannot really see the scratch, as it is somewhat in the ear canal, so isn't sure how deep, etc.  Patient takes warfarin and had an INR of 8 yesterday. They deny severe bleeding, other sx bleeding, dizziness.  Onset/duration:  Today  Precip. factors:  Patient takes warfarin  Associated symptoms:  NA  Improves/worsens symptoms:  NA    Allergies:   Allergies   Allergen Reactions     Nkda [No Known Drug Allergies]      NURSING PLAN: Nursing advice to patient : See provider today.    RECOMMENDED DISPOSITION: Recommended urgent care and provided with information for [facility].  They state they will probably take patient to [place], as it is close to them, and patient has been there before.    Will comply with recommendation: Yes  If further questions/concerns or if symptoms do not improve, worsen or new symptoms develop, call your PCP or [facility] as soon as possible.      AG DEWITT, RN

## 2018-10-24 ENCOUNTER — ALLIED HEALTH/NURSE VISIT (OUTPATIENT)
Dept: NURSING | Facility: CLINIC | Age: 83
End: 2018-10-24
Payer: COMMERCIAL

## 2018-10-24 ENCOUNTER — PATIENT OUTREACH (OUTPATIENT)
Dept: CARE COORDINATION | Facility: CLINIC | Age: 83
End: 2018-10-24

## 2018-10-24 ENCOUNTER — RADIANT APPOINTMENT (OUTPATIENT)
Dept: MAMMOGRAPHY | Facility: CLINIC | Age: 83
End: 2018-10-24
Attending: FAMILY MEDICINE
Payer: COMMERCIAL

## 2018-10-24 VITALS — SYSTOLIC BLOOD PRESSURE: 94 MMHG | HEART RATE: 64 BPM | DIASTOLIC BLOOD PRESSURE: 64 MMHG | TEMPERATURE: 99.1 F

## 2018-10-24 DIAGNOSIS — H92.21 EAR BLEEDING, RIGHT: ICD-10-CM

## 2018-10-24 DIAGNOSIS — Z12.31 VISIT FOR SCREENING MAMMOGRAM: ICD-10-CM

## 2018-10-24 PROCEDURE — 77067 SCR MAMMO BI INCL CAD: CPT | Mod: TC

## 2018-10-24 ASSESSMENT — PAIN SCALES - GENERAL: PAINLEVEL: NO PAIN (0)

## 2018-10-24 NOTE — PROGRESS NOTES
"  SUBJECTIVE:   Ashlie Brantley is a 87 year old female who walked into clinic today for the following health issues:    Right ear bleeding. Patient states she needs follow up appointment with PCP after ER visit yesterday.  No active bleeding. See previous notes. Holding coumadin as advised.  Was seen in [place] ER yesterday. INR in ER was 4 per patient -- this is down from 8.  ER provider stated that they could see a scratch deep in her ear canal. This is from patient's fingernail per patient.  Advised in ER to refrain from putting her finger in her ear, avoid getting her ear wet for 1 week.  No further bleeding after ER visit yesterday.  Patient states her ear feels \"plugged\" with dried blood. Dried blood noted in external ear canal.  Denies dizziness, lightheadedness, hearing changes, blood in stool or other concerns.    Problem list and histories reviewed & adjusted, as indicated.  Additional history: none    BP Readings from Last 3 Encounters:   10/24/18 94/64   10/15/18 106/58   09/07/18 96/62    Wt Readings from Last 3 Encounters:   10/15/18 150 lb (68 kg)   09/07/18 144 lb (65.3 kg)   08/17/18 147 lb 8 oz (66.9 kg)              Reviewed and updated as needed this visit by clinical staff  Tobacco  Allergies  Meds       Reviewed and updated as needed this visit by Provider         OBJECTIVE:     BP 94/64 (BP Location: Right arm, Patient Position: Chair, Cuff Size: Adult Regular)  Pulse 64  Temp 99.1  F (37.3  C) (Oral)  There is no height or weight on file to calculate BMI.  GENERAL: healthy, alert and no distress      ASSESSMENT/PLAN:     No active bleeding. INR improving while holding Coumadin.     Do not disrupt scabs/dried blood that may be present.  Follow up as planned with INR recheck on Friday and Dr. Mendoza next week.    Jarod Weaver RN  [facility] Nurse  [facility]    "

## 2018-10-24 NOTE — PROGRESS NOTES
Clinic Care Coordination Contact  [facility]/Voicemail    Referral Source: Non[facility]  Clinical Data: Care Coordinator Outreach  Patient was at [place] ED 10/23/18 for bleeding from ear, supra therapeutic INR  Outreach attempted x 1.  Left message on voicemail with call back information and requested return call.  Plan: Care Coordinator will mail out care coordination introduction letter with care coordinator contact information and explanation of care coordination services. Care Coordinator will try to reach patient again in 1-2 business days.    Melissa Behl BSN, RN, PHN  [facility] Care Coordinator  [phone]

## 2018-10-24 NOTE — LETTER
Health Care Home - Access Care Plan    About Me  Patient Name:  Ashlie Carter    YOB: 1931  Age:                             87 year old   Ratna MRN:            [id number] Telephone Information:     Home Phone [phone]   Mobile [phone]   Mobile [phone]       Address:    [address] [place] [address] Email address:  [email or web address]      Emergency Contact(s)  Name Relationship Lgl Grd Work Phone Home Phone Mobile Phone   1. CHARLOTTE RODRIGUEZ* Daughter   [phone]    2. JACKY CARTER Spouse   [phone]   3. COCO GRIGSBY* Grandchild   [phone]    4. MIK LAKHANI Daughter    [phone]             Health Maintenance: Routine Health maintenance Reviewed: Due/Overdue   Health Maintenance Due   Topic Date Due     URINE DRUG SCREEN Q1 YR  12/21/2017     MAMMO Q1 YR  10/05/2018        My Access Plan  Medical Emergency 911   Questions or concerns during clinic hours Primary Clinic Line, I will call the clinic directly: [facility] [phone]   24 Hour Appointment Line [phone] or  [phone]) (toll free)   24 Hour Nurse Line [phone] (toll free)   Questions or concerns outside clinic hours 24 Hour Appointment Line, I will call the after-hours on-call line:   [facility] [phone] or [phone]) (toll-free)   Preferred Urgent Care     Preferred Hospital     Preferred Pharmacy No Pharmacies Listed   Behavioral Health Crisis Line The National Suicide Prevention Lifeline at 1-422.453.3359 or 911     My Care Team Members  Patient Care Team       Relationship Specialty Notifications Start End    Tad Mendoza DO PCP - General Family Practice  3/8/18     Phone: [phone] Fax: [phone]         [address] [facility] [address]    Jackson Camejo MD MD Internal Medicine  11/22/15     Phone: [phone] Fax: [phone]          [address] [facility] [address] [facility] [address]    Minerva Hunt, RN Specialty Care Coordinator Pulmonary  9/25/18     Phone: [phone] Pager: [phone]        Behl, Melissa K, RN Clinic Care Coordinator Primary Care - CC Admissions 10/24/18     Phone: [phone] Fax: [phone]               My Medical and Care Information  Problem List   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     Hoarseness     Laryngeal dystonia     Spasmodic dysphonia     Hyperlipidemia LDL goal <130     Anxiety     Hypertension goal BP (blood pressure) < 140/90     LVH (left ventricular hypertrophy)     Advance Care Planning     Cough     Dermatochalasis     Health Care Home     Trochanteric bursitis - bilateral     PVD (posterior vitreous detachment) OU     Pseudophakia, OU     IPF (idiopathic pulmonary fibrosis) (H)     Hypothyroidism due to acquired atrophy of thyroid     Chronic pain syndrome     Urinary incontinence, unspecified type     Atrial fibrillation (H) [I48.91]     Long term current use of anticoagulant therapy     Ear bleeding, right      Current Medications and Allergies:  See printed Medication Report

## 2018-10-24 NOTE — LETTER
[place] CARE COORDINATION  [address] [facility] [address]     October 24, 2018    Ashlie Brantley  [address] [place] [address]      Dear Ashlie,    I am a clinic care coordinator who works with Tad Mendoza DO at [facility].  I wanted to introduce myself and provide you with my contact information so that you can call me with questions or concerns about your health care. Below is a description of clinic care coordination and how I can further assist you.     The clinic care coordinator is a registered nurse and/or  who understand the health care system. The goal of clinic care coordination is to help you manage your health and improve access to the [facility] system in the most efficient manner. The registered nurse can assist you in meeting your health care goals by providing education, coordinating services, and strengthening the communication among your providers. The  can assist you with financial, behavioral, psychosocial, chemical dependency, counseling, and/or psychiatric resources.    Please feel free to contact me at [phone], with any questions or concerns. We at [facility] are focused on providing you with the highest-quality healthcare experience possible and that all starts with you.     Sincerely,     Melissa Behl BSN, RN, PHN  [facility] Care Coordinator  [phone]     Enclosed: I have enclosed a copy of a 24 Hour Access Plan. This has helpful phone numbers for you to call when needed. Please keep this in an easy to access place to use as needed.

## 2018-10-25 NOTE — PROGRESS NOTES
"Clinic Care Coordination Contact    Clinic Care Coordination Contact  OUTREACH    Referral Information:  Referral Source: Non[facility]    Primary Diagnosis: ENT    Chief Complaint   Patient presents with     Clinic Care Coordination - Post Hospital     DC'd from [facility] on 10/23/18 to home self care         Universal Utilization: Patient utilizes [facility].  Clinic Utilization  Difficulty keeping appointments:: No  Compliance Concerns: No  No-Show Concerns: No  No PCP office visit in Past Year: No  Utilization    Last refreshed: 10/25/2018  7:22 AM:  No Show Count (past year) 1       Last refreshed: 10/25/2018  7:22 AM:  ED visits 0       Last refreshed: 10/25/2018  7:22 AM:  Hospital admissions 0          Current as of: 10/25/2018  7:22 AM             Clinical Concerns:  Current Medical Concerns:  Patient was at [place] ED 10/23/18 due to bleeding from right ear and supra therapeutic INR.  Patient was seen in the INR clinic yesterday.  Patient denies any needs or questions from care coordination.  \"I'm good.\"  Patient informed writer she had a difficult time talking and requested not to talk with care coordination any longer.      Current Behavioral Concerns: Patient has diagnosis of anxiety and is managed by PCP with medication.    Education Provided to patient: RN CC reviewed ED discharge instructions and informed patient of care coordination services.     Pain  Pain (GOAL):: No  Health Maintenance Reviewed: Due/Overdue   Health Maintenance Due   Topic Date Due     URINE DRUG SCREEN Q1 YR  12/21/2017      Clinical Pathway: None    Medication Management:  Patient states she takes her medications \"just fine.\"  But declined to talk further due to her difficulty with speaking.  Patient declined for RN CC to talk with anyone else in her home, \"I'm fine.\"    Functional Status:       Living Situation:  Current living arrangement:: I live in a private home  Type of residence:: Private home - " stairs    Diet/Exercise/Sleep:  Diet:: Regular  Inadequate nutrition (GOAL):: No  Food Insecurity: No  Tube Feeding: No  Exercise:: Currently not exercising  Inadequate activity/exercise (GOAL):: No  Significant changes in sleep pattern (GOAL): No    Transportation:  Transportation concerns (GOAL):: No  Transportation means:: Regular car, Family     Psychosocial:  Worship or spiritual beliefs that impact treatment:: No  Mental health DX:: No  Mental health management concern (GOAL):: No  Informal Support system:: Children, Family     Financial/Insurance:   Financial/Insurance concerns (GOAL):: No       Resources and Interventions:  Current Resources:    ;   Community Resources: None          Advance Care Plan/Directive  Advanced Care Plans/Directives on file:: Yes  Type Advanced Care Plans/Directives: Advanced Directive - On File    Referrals Placed: None     Goals: n/a        Patient/Caregiver understanding: Patient appeared to have a good understanding of her current plan of care, however, declined to talk with care coordination further due to her difficulty in speaking.  Patient declined for RN CC to talk with anyone else.       Future Appointments              Tomorrow [place] ANTI [place]    In 4 days Tad Mendoza DO [place]    In 2 weeks  [facility], [facility]    In 2 weeks  PFL 6 MINUTE WALK [facility], [facility]    In 2 weeks Noemi Genao MD [facility]    In 3 months  PFL [facility], [facility]    In 3 months Noemi Genao MD [facility]          Plan:   RN CC will make no further outreaches, as patient declined care coordination services.    Melissa Behl BSN, RN, N  [facility] Care Coordinator  [phone]

## 2018-10-26 ENCOUNTER — ANTICOAGULATION THERAPY VISIT (OUTPATIENT)
Dept: NURSING | Facility: CLINIC | Age: 83
End: 2018-10-26
Payer: COMMERCIAL

## 2018-10-26 DIAGNOSIS — Z79.01 LONG TERM CURRENT USE OF ANTICOAGULANT THERAPY: ICD-10-CM

## 2018-10-26 DIAGNOSIS — I48.91 ATRIAL FIBRILLATION (H): ICD-10-CM

## 2018-10-26 LAB — INR POINT OF CARE: 1.1 (ref 0.86–1.14)

## 2018-10-26 PROCEDURE — 99207 ZZC NO CHARGE NURSE ONLY: CPT

## 2018-10-26 PROCEDURE — 36416 COLLJ CAPILLARY BLOOD SPEC: CPT

## 2018-10-26 PROCEDURE — 85610 PROTHROMBIN TIME: CPT | Mod: QW

## 2018-10-26 NOTE — MR AVS SNAPSHOT
Ashlie Brantley   10/26/2018 1:00 PM   Anticoagulation Therapy Visit    Description:  87 year old female   Provider:  NAHEED HUNTER   Department:  [facility]           INR as of 10/26/2018     Today's INR 1.1!      Anticoagulation Summary as of 10/26/2018     INR goal 2.0-3.0   Today's INR 1.1!   Full warfarin instructions 10/28: 5 mg; Otherwise 7.5 mg on Sun; 5 mg all other days   Next INR check 11/2/2018    Indications   Atrial fibrillation (H) [I48.91] [I48.91]  Long term current use of anticoagulant therapy [Z79.01]         Your next Anticoagulation Clinic appointment(s)     Nov 02, 2018 10:20 AM CDT   Anticoagulation Visit with [facility]   [facility]    [address] [place] [id number] [phone]              Contact Numbers     Please call [phone] to cancel and/or reschedule your appointment.  Please call [phone] with any problems or questions regarding your therapy          October 2018 Details    Sun Mon Tue Wed Thu Fri Sat      1               2               3               4               5               6                 7               8               9               10               11               12               13                 14               15               16               17               18               19               20                 21               22               23               24               25               26      5 mg   See details      27      5 mg           28      5 mg         29      5 mg         30      5 mg         31      5 mg             Date Details   10/26 This INR check               How to take your warfarin dose     To take:  5 mg Take 1 of the 5 mg tablets.           November 2018 Details    Sun Mon Tue Wed Thu Fri Sat         1      5 mg         2            3                 4               5               6               7               8               9                10                 11               12               13               14               15               16               17                 18               19               20               21               22               23               24                 25               26               27               28               29               30                 Date Details   No additional details    Date of next INR:  11/2/2018         How to take your warfarin dose     To take:  5 mg Take 1 of the 5 mg tablets.

## 2018-10-26 NOTE — PROGRESS NOTES
ANTICOAGULATION FOLLOW-UP CLINIC VISIT    Patient Name:  Ashlie Brantley  Date:  10/26/2018  Contact Type:  Face to Face    SUBJECTIVE:     Patient Findings     Positives Intentional hold of therapy           OBJECTIVE    INR Protime   Date Value Ref Range Status   10/26/2018 1.1 0.86 - 1.14 Final       ASSESSMENT / PLAN  INR assessment SUB    Recheck INR In: 1 WEEK    INR Location Clinic      Anticoagulation Summary as of 10/26/2018     INR goal 2.0-3.0   Today's INR 1.1!   Warfarin maintenance plan 7.5 mg (5 mg x 1.5) on Sun; 5 mg (5 mg x 1) all other days   Full warfarin instructions 10/28: 5 mg; Otherwise 7.5 mg on Sun; 5 mg all other days   Weekly warfarin total 37.5 mg   Plan last modified Jarek Stephens RN (5/10/2018)   Next INR check 11/2/2018   Target end date Indefinite    Indications   Atrial fibrillation (H) [I48.91] [I48.91]  Long term current use of anticoagulant therapy [Z79.01]         Anticoagulation Episode Summary     INR check location     Preferred lab     Send INR reminders to [facility]    Comments       Anticoagulation Care Providers     Provider Role Specialty Phone number    Tad Mendoza DO [facility] [phone]            See the Encounter Report to view Anticoagulation Flowsheet and Dosing Calendar (Go to Encounters tab in chart review, and find the Anticoagulation Therapy Visit)    Dosage adjustment made based on physician directed care plan.    Jarek Stephens, REJI

## 2018-10-29 ENCOUNTER — TELEPHONE (OUTPATIENT)
Dept: FAMILY MEDICINE | Facility: CLINIC | Age: 83
End: 2018-10-29

## 2018-10-29 ENCOUNTER — OFFICE VISIT (OUTPATIENT)
Dept: FAMILY MEDICINE | Facility: CLINIC | Age: 83
End: 2018-10-29
Payer: COMMERCIAL

## 2018-10-29 VITALS
SYSTOLIC BLOOD PRESSURE: 118 MMHG | HEIGHT: 58 IN | TEMPERATURE: 98.1 F | DIASTOLIC BLOOD PRESSURE: 70 MMHG | HEART RATE: 80 BPM | BODY MASS INDEX: 31.07 KG/M2 | OXYGEN SATURATION: 95 % | WEIGHT: 148 LBS

## 2018-10-29 DIAGNOSIS — R79.1 SUPRATHERAPEUTIC INR: ICD-10-CM

## 2018-10-29 DIAGNOSIS — H93.8X1 EAR FULLNESS, RIGHT: Primary | ICD-10-CM

## 2018-10-29 DIAGNOSIS — J84.10 FIBROSIS, LUNG (H): ICD-10-CM

## 2018-10-29 DIAGNOSIS — H92.21 EAR BLEEDING, RIGHT: ICD-10-CM

## 2018-10-29 DIAGNOSIS — R10.13 DYSPEPSIA: ICD-10-CM

## 2018-10-29 PROCEDURE — 99214 OFFICE O/P EST MOD 30 MIN: CPT | Performed by: FAMILY MEDICINE

## 2018-10-29 RX ORDER — CODEINE SULFATE 30 MG/1
30 TABLET ORAL 2 TIMES DAILY
Qty: 60 TABLET | Refills: 0 | Status: SHIPPED | OUTPATIENT
Start: 2018-11-09 | End: 2018-12-12

## 2018-10-29 NOTE — TELEPHONE ENCOUNTER
TC- Please check to see if she scheduled with ENT and then close (family usually schedules for patient). Left a detailed VM with ENT referral information.  Miguelina Dunaway RN

## 2018-10-29 NOTE — TELEPHONE ENCOUNTER
Please help patient make an appoint with ENT this week, she has fullness in ear and needs ENT to remove debris in ear canal from recent bleeding(she had to go the ED for this).  Hoping to get in before INR is therapeutic.  Thanks  Tad Mendoza D.O.

## 2018-10-29 NOTE — MR AVS SNAPSHOT
After Visit Summary   10/29/2018    Ashlie Brantley    MRN: [id number]           Patient Information     Date Of Birth          2/24/1931        Visit Information        Provider Department      10/29/2018 2:20 PM Tad Mendoza DO [facility]        Today's Diagnoses     Ear fullness, right    -  1    Ear bleeding, right        Supratherapeutic INR        Dyspepsia        Fibrosis, lung (H)          Care Instructions    You may take Tums for Heart Burn symptoms as directed.   Follow up here if worsening or persisting.     Follow up with ENT as discussed today to debris out.   Refill codine today.                     Heartburn/GERD   What is heartburn?   Heartburn refers to the symptoms you feel when acids in your stomach flow back into the esophagus. (The esophagus is the tube that carries food from your throat to your stomach.) This backward movement of stomach acid is called reflux. The acid can burn and irritate the esophagus, throat, and vocal cords.   Heartburn is a common problem. Despite its name, it has nothing to do with the heart.   When you have heartburn often, you may have a condition called gastroesophageal reflux disease, or GERD.   How does it occur?   At the bottom of the esophagus there is a ring of muscle called a sphincter. It acts like a valve. When you swallow food, the sphincter opens to let the food pass into the stomach. The ring then closes to keep the stomach contents from going back into the esophagus. If the sphincter is weak or too relaxed, stomach acid and food flow backward into the esophagus. Because the esophagus does not have the protective lining that the stomach has, the acid causes pain.   The sphincter muscle sometimes does not work properly if:   You are overweight.   You are pregnant.   You have a hiatal hernia (a condition in which part of the stomach protrudes through the diaphragm into the chest).   You eat too much.   You lie  down soon after eating.   You wear tight clothes that push on your stomach.   Foods that may make heartburn worse are:   foods high in fat   sugar   chocolate   peppermint   onions   citrus foods such as orange juice   tomato-based foods   spicy foods   coffee and other drinks with caffeine, such as tea and denys   alcohol.   Heartburn can also be made worse by:   taking certain medicines, such as aspirin   smoking cigarettes.   Anyone can have an attack of heartburn from overeating or eating foods that are high in acid. Most of the time heartburn is mild and lasts for a short time. There is usually not a problem when heartburn occurs just once in a while. You should see your healthcare provider if:   You have heartburn nearly every day for 2 weeks.   The heartburn comes back when the antacid wears off.   Heartburn wakes you up at night.   What are the symptoms?   The main symptom of heartburn is a burning pain in the lower chest, usually close to the bottom of the breastbone. Other symptoms you may have are:   acid or sour taste in your mouth   belching   a feeling of bloating or fullness in the stomach.   These symptoms tend to happen after very large meals and especially with activity such as bending or lifting after meals. The symptoms may be made worse by lying down or by wearing tight clothing.   Heartburn is very common during the last few months of pregnancy. The weight of the baby pushes on the stomach and can cause the sphincter to relax and let acid to flow back into the esophagus.   How is it diagnosed?   Usually heartburn can be diagnosed from your medical history.   If there is any question about the diagnosis, you may have the following tests to check for ulcers or other problems that might cause your symptoms:   barium swallow X-ray study of the esophagus   complete upper GI (gastrointestinal) barium X-ray study of the esophagus, stomach, and upper intestine   endoscopy, a procedure in which a thin  flexible tube with a tiny camera is placed in your mouth and down into your stomach so your provider can see your esophagus and stomach.   How is it treated?   To help reduce the symptoms of heartburn you can:   Try not to put a lot of pressure on the sphincter muscle. Eating light meals and wearing loose clothing will help.   Lose weight if you are overweight.   Take nonprescription antacids (tablets or liquid) after meals and at bedtime.   Raise the head of your bed or use more than one pillow so your head is higher than your stomach. This may allow gravity to help keep food from backing up.   If you find that certain foods or drinks seem to cause your symptoms or make them worse, avoid those foods.   If the simple measures described above do not relieve the symptoms, your healthcare provider may prescribe medicine. The prescription medicines help reduce stomach acid. They also help stomach emptying. A very few people who are not helped with medicines may need surgery.   Get emergency care if the following symptoms occur with the heartburn and do not go away within 15 minutes of treatment for heartburn: shortness of breath; sweating; light-headedness, weakness; or jaw, arm, back, or chest pain.   How long will the effects last?   Heartburn symptoms are usually relieved by treatment in just a few hours or less. If you are having heartburn every day, starting treatment will usually relieve the symptoms in a few days. However, the symptoms may come back from time to time, especially if you gain weight.   Heartburn can sometimes make asthma worse. If you have asthma, preventing or controlling heartburn may help control your asthma symptoms.   How can I help prevent heartburn?   The best prevention is to:   Keep a healthy weight. Lose weight if you are overweight.   Sleep with your head elevated at least 4 to 6 inches. (It's usually most comfortable to put the head of your bed on blocks.)   It may also help if you:    Wait an hour or longer after eating before you lie down. If you have to lie down after a meal, lie on your left side. Keep your head and shoulders slightly higher than the rest of your body. It's best to not eat for 2 to 3 hours before you go to bed.   Eat smaller, more frequent meals.   Avoid wearing tight clothing or belts.   Don't smoke. Smoking relaxes the sphincter leading to your stomach.   Avoid foods and other things that seem to cause heartburn or make it worse.   Developed by [employer].   Published by [employer].   Last modified: 2009-01-14   Last reviewed: 2008-12-02       [facility]   Discharged by : Mónica LANDAVERDE CMA ([facility])    Paper scripts provided to patient : Codeine 30 mg     If you have any questions regarding your visit please contact your care team:     Team Gold                Clinic Hours Telephone Number     Dr. Hayley Miner, CNP  Noemi Grey, CNP 7am-7pm  Monday - Thursday   7am-5pm  Fridays  [phone]   (Appointment scheduling available 24/7)     RN Line  [phone] option 2     [place]gent [facility] - [place] - 11am-9pm Monday-Friday Saturday-Sunday- 9am-5pm     [place] -   5pm-9pm Monday-Friday Saturday-Sunday- 9am-5pm    [phone] - [place]    [phone] - [place]       For a Price Quote for your services, please call our Consumer Price Line at [phone].     What options do I have for visits at the clinic other than the traditional office visit?     To expand how we care for you, many of our providers are utilizing electronic visits (e-visits) and telephone visits, when medically appropriate, for interactions with their patients rather than a visit in the clinic. We also offer nurse visits for many medical concerns. Just like any other service, we will bill your insurance company for this type of visit based on time spent on the phone with your provider.  Not all insurance companies cover these visits. Please check with your medical insurance if this type of visit is covered. You will be responsible for any charges that are not paid by your insurance.   E-visits via [employer]: generally incur a $35.00 fee.     Telephone visits:  Time spent on the phone: *charged based on time that is spent on the phone in increments of 10 minutes. Estimated cost:   5-10 mins $30.00   11-20 mins. $59.00   21-30 mins. $85.00       Use [employer] (secure email communication and access to your chart) to send your primary care provider a message or make an appointment. Ask someone on your Team how to sign up for [employer].     As always, Thank you for trusting us with your health care needs!      [facility]:    Scheduling Appointments  [facility]  Call: [phone]    [place] [facility] [facility]  Call: [phone]    [facility]  Call: [phone]    For Gastroenterology referrals   [facility]enter[facility]   Clinics and Surgery Center, 4th Floor   [address] [place] [address]   Appointments: [phone]    WHERE TO GO FOR CARE?  Clinic    Make an appointment if you:       Are sick (cold, cough, flu, sore throat, earache or in pain).       Have a small injury (sprain, small cut, burn or broken bone).       Need a physical exam, Pap smear, vaccine or prescription refill.       Have questions about your health or medicines.    To reach us:      Call [phone] ([phone]). Open 24 hours every day. (For counseling services, call [phone].)    Log into [employer] at [email or web address]. (Visit [email or web address] to create an account.) Hospital emergency room    An emergency is a serious or life- threatening problem that must be treated right away.    Call 630 or get to the hospital if you have:      Very bad or sudden:            - Chest pain or pressure         - Bleeding         - Head or  belly pain         - Dizziness or trouble seeing, walking or                          Speaking      Problems breathing      Blood in your vomit or you are coughing up blood      A major injury (knocked out, loss of a finger or limb, rape, broken bone protruding from skin)    A mental health crisis. (Or call the Mental Health Crisis line at 1-748.683.5900 or Suicide Prevention Hotline at 1-744.581.3145.)    Open 24 hours every day. You don't need an appointment.     Urgent care    Visit urgent care for sickness or small injuries when the clinic is closed. You don't need an appointment. To check hours or find an urgent care near you, visit [email or web address]. Online care    Get online care from [facility] for more than 70 common problems, like colds, allergies and infections. Open 24 hours every day at:   [email or web address]   Need help deciding?    For advice about where to be seen, you may call your clinic and ask to speak with a nurse. We're here for you 24 hours every day.         If you are deaf or hard of hearing, please let us know. We provide many free services including sign language interpreters, oral interpreters, TTYs, telephone amplifiers, note takers and written materials.                   Follow-ups after your visit        Additional Services     OTOLARYNGOLOGY REFERRAL       Your provider has referred you to: [facility]: [facility] [phone]   [email or web address]    Please be aware that coverage of these services is subject to the terms and limitations of your health insurance plan.  Call member services at your health plan with any benefit or coverage questions.      Please bring the following with you to your appointment:    (1) Any X-Rays, CTs or MRIs which have been performed.  Contact the facility where they were done to arrange for  prior to your scheduled appointment.   (2) List of current medications  (3) This referral request   (4) Any documents/labs  given to you for this referral                  Your next 10 appointments already scheduled     Nov 02, 2018 10:20 AM CDT   [facility]    [address] [id number] [phone]            Nov 09, 2018  3:00 PM CST   PFT VISIT with UC PFL D   [facility] Pulmonary Function Testing ([facility])    [address]  3rd Floor  [facility] [address]   [phone]            Nov 09, 2018  3:30 PM CST   Six Minute Walk with UC PFL 6 MINUTE WALK 1   [facility] Function Testing ([facility])    [address]  3rd Floor  [facility] [address]   [phone]            Nov 09, 2018  4:20 PM CST   (Arrive by 4:05 PM)   Return Visit with Noemi Genao MD   [facility] ([facility])    [address]  [address] [place] [id number]   [phone]            Feb 15, 2019 12:30 PM CST   PFT VISIT with UC PFL A   [facility] Pulmonary Function Testing ([facility])    [address]  [address] [facility] [address]   [phone]            Feb 15, 2019  1:00 PM CST   (Arrive by 12:45 PM)   Return Visit with Noemi Genao MD   [facility] ([facility])    [address] [place] [address]  [address] [place] [id number] [phone]              Who to contact     If you have questions or need follow up information about today's clinic visit or your schedule please contact [facility] directly at [phone].  Normal or non-critical lab and imaging results will be communicated to you by MyChart, letter or phone within 4 business days after the clinic has received the results. If you do not hear from us within 7 days, please contact the clinic through MyChart or phone. If  "you have a critical or abnormal lab result, we will notify you by phone as soon as possible.  Submit refill requests through [employer] or call your pharmacy and they will forward the refill request to us. Please allow 3 business days for your refill to be completed.          Additional Information About Your Visit        [employer] Information     [employer] gives you secure access to your electronic health record. If you see a primary care provider, you can also send messages to your care team and make appointments. If you have questions, please call your primary care clinic.  If you do not have a primary care provider, please call [phone] and they will assist you.        Care EveryWhere ID     This is your Care EveryWhere ID. This could be used by other organizations to access your [place] medical records  [id number]        Your Vitals Were     Pulse Temperature Height Pulse Oximetry BMI (Body Mass Index)       80 98.1  F (36.7  C) (Oral) 4' 10\" (1.473 m) 95% 30.93 kg/m2        Blood Pressure from Last 3 Encounters:   10/29/18 118/70   10/24/18 94/64   10/15/18 106/58    Weight from Last 3 Encounters:   10/29/18 148 lb (67.1 kg)   10/15/18 150 lb (68 kg)   09/07/18 144 lb (65.3 kg)              We Performed the Following     OTOLARYNGOLOGY REFERRAL          Today's Medication Changes          These changes are accurate as of 10/29/18  3:07 PM.  If you have any questions, ask your nurse or doctor.               These medicines have changed or have updated prescriptions.        Dose/Directions    codeine 30 MG tablet   This may have changed:  These instructions start on 11/9/2018. If you are unsure what to do until then, ask your doctor or other care provider.   Used for:  Fibrosis, lung (H)   Changed by:  Tad Mendoza DO        Dose:  30 mg   Start taking on:  11/9/2018   Take 1 tablet (30 mg) by mouth 2 times daily May fill on or after 2/8/18   Quantity:  60 tablet   Refills:  0            Where to get " your medicines      Some of these will need a paper prescription and others can be bought over the counter.  Ask your nurse if you have questions.     Bring a paper prescription for each of these medications     codeine 30 MG tablet               Information about OPIOIDS     PRESCRIPTION OPIOIDS: WHAT YOU NEED TO KNOW   We gave you an opioid (narcotic) pain medicine. It is important to manage your pain, but opioids are not always the best choice. You should first try all the other options your care team gave you. Take this medicine for as short a time (and as few doses) as possible.    Some activities can increase your pain, such as bandage changes or therapy sessions. It may help to take your pain medicine 30 to 60 minutes before these activities. Reduce your stress by getting enough sleep, working on hobbies you enjoy and practicing relaxation or meditation. Talk to your care team about ways to manage your pain beyond prescription opioids.    These medicines have risks:    DO NOT drive when on new or higher doses of pain medicine. These medicines can affect your alertness and reaction times, and you could be arrested for driving under the influence (DUI). If you need to use opioids long-term, talk to your care team about driving.    DO NOT operate heavy machinery    DO NOT do any other dangerous activities while taking these medicines.    DO NOT drink any alcohol while taking these medicines.     If the opioid prescribed includes acetaminophen, DO NOT take with any other medicines that contain acetaminophen. Read all labels carefully. Look for the word  acetaminophen  or  Tylenol.  Ask your pharmacist if you have questions or are unsure.    You can get addicted to pain medicines, especially if you have a history of addiction (chemical, alcohol or substance dependence). Talk to your care team about ways to reduce this risk.    All opioids tend to cause constipation. Drink plenty of water and eat foods that have a  lot of fiber, such as fruits, vegetables, prune juice, apple juice and high-fiber cereal. Take a laxative (Miralax, milk of magnesia, Colace, Senna) if you don t move your bowels at least every other day. Other side effects include upset stomach, sleepiness, dizziness, throwing up, tolerance (needing more of the medicine to have the same effect), physical dependence and slowed breathing.    Store your pills in a secure place, locked if possible. We will not replace any lost or stolen medicine. If you don t finish your medicine, please throw away (dispose) as directed by your pharmacist. The [place] Pollution Control Agency has more information about safe disposal: https://www.pca.On license of [facility].mn.us/living-green/managing-[email or web address]-medications         Primary Care Provider Office Phone # Fax #    Tad Mendoza [phone]       [address] [place] [address]        Equal Access to Services     JUNIOR MURPHY : Hadii aad ku hadasho Soamadoali, waaxda luqadaha, qaybta kaalmada adeegyaaziza, giovani bynum . So [facility] [phone].    ATENCIÓN: Si deala espleandro, tiene a leon disposición servicios gratuitos de asistencia lingüística. Llame al [phone].    We comply with applicable federal civil rights laws and [place] laws. We do not discriminate on the basis of race, color, national origin, age, disability, sex, sexual orientation, or gender identity.            Thank you!     Thank you for choosing [facility]  for your care. Our goal is always to provide you with excellent care. Hearing back from our patients is one way we can continue to improve our services. Please take a few minutes to complete the written survey that you may receive in the mail after your visit with us. Thank you!             Your Updated Medication List - Protect others around you: Learn how to safely use, store and throw away your medicines at www.disposemymeds.org.           This list is accurate as of 10/29/18  3:07 PM.  Always use your most recent med list.                   Brand Name Dispense Instructions for use Diagnosis    * albuterol (2.5 MG/3ML) 0.083% neb solution     3 mL    Take 1 vial (2.5 mg) by nebulization every 6 hours as needed for shortness of breath / dyspnea or wheezing    Acute bronchospasm       * albuterol (2.5 MG/3ML) 0.083% neb solution     1 Box    Take 1 vial (2.5 mg) by nebulization every 6 hours as needed for shortness of breath / dyspnea or wheezing    Pneumonia due to infectious organism, unspecified laterality, unspecified part of lung       aspirin 81 MG tablet     1 Tab    ONE DAILY    HTN (hypertension), Elevated cholesterol       calcium + D 600-200 MG-UNIT Tabs   Generic drug:  calcium carbonate-vitamin D     0    ONE TABLET TWICE A DAY WITH MEALS    Osteopenia       cephALEXin 500 MG capsule    KEFLEX    20 capsule    Take 1 capsule (500 mg) by mouth 2 times daily    Pneumonia due to infectious organism, unspecified laterality, unspecified part of lung       codeine 30 MG tablet   Start taking on:  11/9/2018     60 tablet    Take 1 tablet (30 mg) by mouth 2 times daily May fill on or after 2/8/18    Fibrosis, lung (H)       furosemide 20 MG tablet    LASIX     Take 20 mg by mouth        levothyroxine 88 MCG tablet    SYNTHROID    90 tablet    Take 1 tablet (88 mcg) by mouth daily    Hypothyroidism due to acquired atrophy of thyroid       losartan 100 MG tablet    COZAAR    90 tablet    Take 1 tablet (100 mg) by mouth daily    Hypertension goal BP (blood pressure) < 140/90       metoprolol tartrate 25 MG tablet    LOPRESSOR     Take 25 mg by mouth 2 times daily        neomycin-polymyxin-dexamethasone 3.5-40630-3.1 Oint ophthalmic ointment    MAXITROL    1 Tube    Place 1 Application into both eyes At Bedtime Apply a small squirt into each eye at bedtime    Dermatochalasis, unspecified laterality       OMEGA-3 COMPLEX PO      1 capsule daily         order for DME     1 each    Equipment being ordered: Oxygen 2 liters NC at HS    Oxygen decrease, IPF (idiopathic pulmonary fibrosis) (H)       * order for DME     1 Units    Equipment being ordered: Please provide night time oxygen at 2L/min via nasal canula    IPF (idiopathic pulmonary fibrosis) (H), Hypoxia       * order for DME     1 Device    Equipment being ordered: Oxygen 2 liters NC at night time    IPF (idiopathic pulmonary fibrosis) (H)       predniSONE 20 MG tablet    DELTASONE    15 tablet    Take 3 tablets (60 mg) by mouth daily    Acute gouty arthritis       simvastatin 40 MG tablet    ZOCOR    90 tablet    Take 1 tablet (40 mg) by mouth At Bedtime    Hyperlipidemia LDL goal <130       tolterodine 4 MG 24 hr capsule    DETROL LA    30 capsule    Take 1 capsule (4 mg) by mouth daily    Overactive bladder       traZODone 50 MG tablet    DESYREL    30 tablet    Take 1 tablet (50 mg) by mouth nightly as needed for sleep    Other insomnia       * vitamin D3 1000 units Caps      1 CAPSULE DAILY        * vitamin D 2000 units tablet     0    1 tablet daily    Osteopenia       VITAMIN E COMPLEX PO      Take  by mouth.        * warfarin 5 MG tablet    COUMADIN    90 tablet    Take as directed by [facility]. Current dose is 7.5 mg on Sunday and 5 mg the rest of the week.    Persistent atrial fibrillation (H)       * warfarin 7.5 MG tablet    COUMADIN    14 tablet    Take 7.5 mg every Sunday. 5mg ROW    Atrial fibrillation, unspecified type (H), Long-term (current) use of anticoagulants       * Notice:  This list has 8 medication(s) that are the same as other medications prescribed for you. Read the directions carefully, and ask your doctor or other care provider to review them with you.

## 2018-10-29 NOTE — PATIENT INSTRUCTIONS
You may take Tums for Heart Burn symptoms as directed.   Follow up here if worsening or persisting.     Follow up with ENT as discussed today to debris out.   Refill codeine today.                     Heartburn/GERD   What is heartburn?   Heartburn refers to the symptoms you feel when acids in your stomach flow back into the esophagus. (The esophagus is the tube that carries food from your throat to your stomach.) This backward movement of stomach acid is called reflux. The acid can burn and irritate the esophagus, throat, and vocal cords.   Heartburn is a common problem. Despite its name, it has nothing to do with the heart.   When you have heartburn often, you may have a condition called gastroesophageal reflux disease, or GERD.   How does it occur?   At the bottom of the esophagus there is a ring of muscle called a sphincter. It acts like a valve. When you swallow food, the sphincter opens to let the food pass into the stomach. The ring then closes to keep the stomach contents from going back into the esophagus. If the sphincter is weak or too relaxed, stomach acid and food flow backward into the esophagus. Because the esophagus does not have the protective lining that the stomach has, the acid causes pain.   The sphincter muscle sometimes does not work properly if:   You are overweight.   You are pregnant.   You have a hiatal hernia (a condition in which part of the stomach protrudes through the diaphragm into the chest).   You eat too much.   You lie down soon after eating.   You wear tight clothes that push on your stomach.   Foods that may make heartburn worse are:   foods high in fat   sugar   chocolate   peppermint   onions   citrus foods such as orange juice   tomato-based foods   spicy foods   coffee and other drinks with caffeine, such as tea and denys   alcohol.   Heartburn can also be made worse by:   taking certain medicines, such as aspirin   smoking cigarettes.   Anyone can have an attack of  heartburn from overeating or eating foods that are high in acid. Most of the time heartburn is mild and lasts for a short time. There is usually not a problem when heartburn occurs just once in a while. You should see your healthcare provider if:   You have heartburn nearly every day for 2 weeks.   The heartburn comes back when the antacid wears off.   Heartburn wakes you up at night.   What are the symptoms?   The main symptom of heartburn is a burning pain in the lower chest, usually close to the bottom of the breastbone. Other symptoms you may have are:   acid or sour taste in your mouth   belching   a feeling of bloating or fullness in the stomach.   These symptoms tend to happen after very large meals and especially with activity such as bending or lifting after meals. The symptoms may be made worse by lying down or by wearing tight clothing.   Heartburn is very common during the last few months of pregnancy. The weight of the baby pushes on the stomach and can cause the sphincter to relax and let acid to flow back into the esophagus.   How is it diagnosed?   Usually heartburn can be diagnosed from your medical history.   If there is any question about the diagnosis, you may have the following tests to check for ulcers or other problems that might cause your symptoms:   barium swallow X-ray study of the esophagus   complete upper GI (gastrointestinal) barium X-ray study of the esophagus, stomach, and upper intestine   endoscopy, a procedure in which a thin flexible tube with a tiny camera is placed in your mouth and down into your stomach so your provider can see your esophagus and stomach.   How is it treated?   To help reduce the symptoms of heartburn you can:   Try not to put a lot of pressure on the sphincter muscle. Eating light meals and wearing loose clothing will help.   Lose weight if you are overweight.   Take nonprescription antacids (tablets or liquid) after meals and at bedtime.   Raise the head of  your bed or use more than one pillow so your head is higher than your stomach. This may allow gravity to help keep food from backing up.   If you find that certain foods or drinks seem to cause your symptoms or make them worse, avoid those foods.   If the simple measures described above do not relieve the symptoms, your healthcare provider may prescribe medicine. The prescription medicines help reduce stomach acid. They also help stomach emptying. A very few people who are not helped with medicines may need surgery.   Get emergency care if the following symptoms occur with the heartburn and do not go away within 15 minutes of treatment for heartburn: shortness of breath; sweating; light-headedness, weakness; or jaw, arm, back, or chest pain.   How long will the effects last?   Heartburn symptoms are usually relieved by treatment in just a few hours or less. If you are having heartburn every day, starting treatment will usually relieve the symptoms in a few days. However, the symptoms may come back from time to time, especially if you gain weight.   Heartburn can sometimes make asthma worse. If you have asthma, preventing or controlling heartburn may help control your asthma symptoms.   How can I help prevent heartburn?   The best prevention is to:   Keep a healthy weight. Lose weight if you are overweight.   Sleep with your head elevated at least 4 to 6 inches. (It's usually most comfortable to put the head of your bed on blocks.)   It may also help if you:   Wait an hour or longer after eating before you lie down. If you have to lie down after a meal, lie on your left side. Keep your head and shoulders slightly higher than the rest of your body. It's best to not eat for 2 to 3 hours before you go to bed.   Eat smaller, more frequent meals.   Avoid wearing tight clothing or belts.   Don't smoke. Smoking relaxes the sphincter leading to your stomach.   Avoid foods and other things that seem to cause heartburn or make  it worse.   Developed by [employer].   Published by [employer].   Last modified: 2009-01-14   Last reviewed: 2008-12-02       [facility]   Discharged by : Mónica LANDAVERDE CMA ([facility])    Paper scripts provided to patient : Codeine 30 mg     If you have any questions regarding your visit please contact your care team:     Team Gold                Clinic Hours Telephone Number     Dr. Hayley Miner, CNP  Noemi Grey, CNP 7am-7pm  Monday - Thursday   7am-5pm  Fridays  [phone]   (Appointment scheduling available 24/7)     RN Line  [phone] option 2     Urgent Care - [place] - 11am-9pm Monday-Friday Saturday-Sunday- 9am-5pm     [place] -   5pm-9pm Monday-Friday Saturday-Sunday- 9am-5pm    [phone] - [place]    [phone] - [place]       For a Price Quote for your services, please call our Consumer Price Line at [phone].     What options do I have for visits at the clinic other than the traditional office visit?     To expand how we care for you, many of our providers are utilizing electronic visits (e-visits) and telephone visits, when medically appropriate, for interactions with their patients rather than a visit in the clinic. We also offer nurse visits for many medical concerns. Just like any other service, we will bill your insurance company for this type of visit based on time spent on the phone with your provider. Not all insurance companies cover these visits. Please check with your medical insurance if this type of visit is covered. You will be responsible for any charges that are not paid by your insurance.   E-visits via sezmi: generally incur a $35.00 fee.     Telephone visits:  Time spent on the phone: *charged based on time that is spent on the phone in increments of 10 minutes. Estimated cost:   5-10 mins $30.00   11-20 mins. $59.00   21-30 mins. $85.00       Use  [facility] (secure email communication and access to your chart) to send your primary care provider a message or make an appointment. Ask someone on your Team how to sign up for [employer].     As always, Thank you for trusting us with your health care needs!      [facility]:    Scheduling Appointments  [facility]  Call: [phone]    [facility] [place]  Call: [phone]    [facility]  Call: [phone]    For Gastroenterology referrals   [facility]   Clinics and Surgery Center, 4th Floor   [address] [place] [address]   Appointments: [phone]    WHERE TO GO FOR CARE?  Clinic    Make an appointment if you:       Are sick (cold, cough, flu, sore throat, earache or in pain).       Have a small injury (sprain, small cut, burn or broken bone).       Need a physical exam, Pap smear, vaccine or prescription refill.       Have questions about your health or medicines.    To reach us:      Call [phone] ([phone]). Open 24 hours every day. (For counseling services, call [phone].)    Log into [employer] at [email or web address]. (Visit [email or web address] to create an account.) Hospital emergency room    An emergency is a serious or life- threatening problem that must be treated right away.    Call [phone] or get to the hospital if you have:      Very bad or sudden:            - Chest pain or pressure         - Bleeding         - Head or belly pain         - Dizziness or trouble seeing, walking or                          Speaking      Problems breathing      Blood in your vomit or you are coughing up blood      A major injury (knocked out, loss of a finger or limb, rape, broken bone protruding from skin)    A mental health crisis. (Or call the Mental Health Crisis line at 1-367.790.4476 or Suicide Prevention Hotline at 1-265.426.8660.)    Open 24 hours every day. You don't need an appointment.      Urgent care    Visit urgent care for sickness or small injuries when the clinic is closed. You don't need an appointment. To check hours or find an urgent care near you, visit [email or web address]. Online care    Get online care from [facility] for more than 70 common problems, like colds, allergies and infections. Open 24 hours every day at:   [email or web address]   Need help deciding?    For advice about where to be seen, you may call your clinic and ask to speak with a nurse. We're here for you 24 hours every day.         If you are deaf or hard of hearing, please let us know. We provide many free services including sign language interpreters, oral interpreters, TTYs, telephone amplifiers, note takers and written materials.

## 2018-10-29 NOTE — PROGRESS NOTES
"  SUBJECTIVE:   Ashlie Brantley is a 87 year old female who presents to clinic today for the following health issues:    Present with spouse.       ED/UC Followup:    Facility:  [facility]  Date of visit: 10/23/18  Reason for visit: Bleeding ear- Right  Current Status: Doing okay- no more bleeding     Has not had ENT evaluate ear at the Emergency Department.   Ear is still \"scratchy\" and irritating- itchy, attributes this to ear \"must be healing\". Has never had this before. She also stated that since this episode she is having decrease hearing. Also endorses right ear fullness.   No other areas bleeding   Restarted warfarin. Has INR appointment on 11/2/18. INR is noted to be not controlled- going and up and down.     Attributes INR being at 8.0 on 10/22/18  from taking prednisone 60mg for one day only for Gout. Foot is now fine. She stated that she just woke up with it and lasted for one day.  Never had gout in the past.     Insomnia   Taking Trazodone daily, controlling insomnia       dyspepsia  Having some issues with indigestion today and wondering what she can take for it. She stated that she ate toast and had coffee this morning. Normally has one cup of coffee.  no dark stools, red bright stools, nausea or emesis. Not taking ibuprofen. Instead she takes tylenol.   Last night had chili.   epigastric burning sensation started today, has not tried Tums.    Has history of heart burn.               Problem list and histories reviewed & adjusted, as indicated.  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     Hoarseness     Laryngeal dystonia     Spasmodic dysphonia     Hyperlipidemia LDL goal <130     Anxiety     Hypertension goal BP (blood pressure) < 140/90     LVH (left ventricular hypertrophy)     Advance Care Planning     Cough     Dermatochalasis "     Health Care Home     Trochanteric bursitis - bilateral     PVD (posterior vitreous detachment) OU     Pseudophakia, OU     IPF (idiopathic pulmonary fibrosis) (H)     Hypothyroidism due to acquired atrophy of thyroid     Chronic pain syndrome     Urinary incontinence, unspecified type     Atrial fibrillation (H) [I48.91]     Long term current use of anticoagulant therapy     Ear bleeding, right     Past Surgical History:   Procedure Laterality Date     C NONSPECIFIC PROCEDURE      BACK SURGERY     C NONSPECIFIC PROCEDURE      VOCAL CORD POLYP     C SHOULDER SURG PROC UNLISTED       C STOMACH SURGERY PROCEDURE UNLISTED       C TOTAL KNEE ARTHROPLASTY      LT 1998  /  RT 2001     CATARACT IOL, RT/LT       HC REMOVAL GALLBLADDER       HERNIA REPAIR, INGUINAL RT/LT      RT     HYSTERECTOMY, CERVIX STATUS UNKNOWN      DUB     PHACOEMULSIFICATION WITH STANDARD INTRAOCULAR LENS IMPLANT  Rt 6/11/09, Lt 8/3/09    both eyes Dr. Barnett     ROTATOR CUFF REPAIR RT/LT      RT       Social History   Substance Use Topics     Smoking status: Never Smoker     Smokeless tobacco: Never Used     Alcohol use 0.0 oz/week     0 Standard drinks or equivalent per week      Comment: 1-2 drinks/month     Family History   Problem Relation Age of Onset     Diabetes Mother      C.A.D. Father      Cerebrovascular Disease Brother          Current Outpatient Prescriptions   Medication Sig Dispense Refill     albuterol (2.5 MG/3ML) 0.083% neb solution Take 1 vial (2.5 mg) by nebulization every 6 hours as needed for shortness of breath / dyspnea or wheezing 3 mL 0     albuterol (2.5 MG/3ML) 0.083% neb solution Take 1 vial (2.5 mg) by nebulization every 6 hours as needed for shortness of breath / dyspnea or wheezing 1 Box 1     ASPIRIN 81 MG OR TABS ONE DAILY 1 Tab 0     CALCIUM + D 600-200 MG-UNIT OR TABS ONE TABLET TWICE A DAY WITH MEALS 0 0     cephALEXin (KEFLEX) 500 MG capsule Take 1 capsule (500 mg) by mouth 2 times daily 20 capsule 0      codeine 30 MG tablet Take 1 tablet (30 mg) by mouth 2 times daily May fill on or after 2/8/18 60 tablet 0     furosemide (LASIX) 20 MG tablet Take 20 mg by mouth       levothyroxine (SYNTHROID) 88 MCG tablet Take 1 tablet (88 mcg) by mouth daily 90 tablet 3     losartan (COZAAR) 100 MG tablet Take 1 tablet (100 mg) by mouth daily 90 tablet 3     metoprolol tartrate (LOPRESSOR) 25 MG tablet Take 25 mg by mouth 2 times daily       neomycin-polymyxin-dexamethasone (MAXITROL) 3.5-88222-5.1 OINT ophthalmic ointment Place 1 Application into both eyes At Bedtime Apply a small squirt into each eye at bedtime 1 Tube 6     OMEGA-3 COMPLEX OR 1 capsule daily       order for DME Equipment being ordered: Oxygen 2 liters NC at night time 1 Device 0     order for DME Equipment being ordered: Please provide night time oxygen at 2L/min via nasal canula 1 Units 0     ORDER FOR DME Equipment being ordered: Oxygen 2 liters NC at HS 1 each 0     predniSONE (DELTASONE) 20 MG tablet Take 3 tablets (60 mg) by mouth daily 15 tablet 0     simvastatin (ZOCOR) 40 MG tablet Take 1 tablet (40 mg) by mouth At Bedtime 90 tablet 3     tolterodine (DETROL LA) 4 MG 24 hr capsule Take 1 capsule (4 mg) by mouth daily 30 capsule 1     traZODone (DESYREL) 50 MG tablet Take 1 tablet (50 mg) by mouth nightly as needed for sleep 30 tablet 1     VITAMIN D 2000 UNIT OR TABS 1 tablet daily 0 0     VITAMIN D3 1000 UNIT OR CAPS 1 CAPSULE DAILY       VITAMIN E COMPLEX PO Take  by mouth.       warfarin (COUMADIN) 5 MG tablet Take as directed by [facility]. Current dose is 7.5 mg on Sunday and 5 mg the rest of the week. 90 tablet 1     warfarin (COUMADIN) 7.5 MG tablet Take 7.5 mg every Sunday. 5mg ROW 14 tablet 3     Allergies   Allergen Reactions     Nkda [No Known Drug Allergies]      Recent Labs   Lab Test  04/24/18   1342  12/13/17   0852  12/21/16   0840  12/09/15   0917   03/26/12   1137   03/24/11   1028   LDL   --   41  53  74   < >  68   --   85   HDL   --   71   "63  65   < >  48*   --   55   TRIG   --   53  101  82   < >  102   --   94   ALT   --    --    --    --    --   <6   --   15   CR  0.77  0.67  0.76  0.70   < >  0.83   < >  0.74   GFRESTIMATED  71  83  72  79   < >  66   < >  76   GFRESTBLACK  86  >90  87  >90   GFR Calc     < >  80   < >  >90   POTASSIUM  4.1  4.0  4.3  3.9   < >  3.8   < >  3.8   TSH  0.42  0.40  1.23  0.96   < >  0.47   --   0.62    < > = values in this interval not displayed.      BP Readings from Last 3 Encounters:   10/29/18 118/70   10/24/18 94/64   10/15/18 106/58    Wt Readings from Last 3 Encounters:   10/29/18 67.1 kg (148 lb)   10/15/18 68 kg (150 lb)   09/07/18 65.3 kg (144 lb)                  Labs reviewed in EPIC    Reviewed and updated as needed this visit by clinical staff  Tobacco  Allergies  Meds  Med Hx  Surg Hx  Fam Hx  Soc Hx      Reviewed and updated as needed this visit by Provider         ROS:  Constitutional, HEENT, cardiovascular, pulmonary, GI, , musculoskeletal, neuro, skin, endocrine and psych systems are negative, except as otherwise noted.    This document serves as a record of the services and decisions personally performed and made by Tad Mendoza D.O. It was created on her behalf by Aaron Means, a trained medical scribe. The creation of this document is based on the provider's statements to the medical scribe.  Aaron Measn October 29, 2018 2:40 PM      OBJECTIVE:     /70 (BP Location: Right arm, Patient Position: Sitting, Cuff Size: Adult Regular)  Pulse 80  Temp 98.1  F (36.7  C) (Oral)  Ht 1.473 m (4' 10\")  Wt 67.1 kg (148 lb)  SpO2 95%  BMI 30.93 kg/m2  Body mass index is 30.93 kg/(m^2).  GENERAL: alert, no distress and over weight  EYES: Eyes grossly normal to inspection, PERRL and conjunctivae and sclerae normal  HENT: normal cephalic/atraumatic, right ear: unable to visualize TM and landmarks due to hard dark brown debris obstructing canal, left ear: normal: no " "effusions, no erythema, normal landmarks and minimal cerumen, nose and mouth without ulcers or lesions, oropharynx clear and oral mucous membranes moist  NECK: no adenopathy, no asymmetry, masses, or scars and thyroid normal to palpation  RESP: lungs clear to auscultation - no rales, rhonchi or wheezes  CV: regular rate and rhythm, normal S1 S2, no S3 or S4, no murmur, click or rub, no peripheral edema and peripheral pulses strong  ABDOMEN: soft, nontender, no hepatosplenomegaly, no masses and bowel sounds normal  NEURO: Normal strength and tone, mentation intact and speech normal  PSYCH: mentation appears normal, affect normal/bright    Diagnostic Test Results:  none     ASSESSMENT/PLAN:       Tobacco Cessation:   reports that she has never smoked. She has never used smokeless tobacco.      BMI:   Estimated body mass index is 30.93 kg/(m^2) as calculated from the following:    Height as of this encounter: 1.473 m (4' 10\").    Weight as of this encounter: 67.1 kg (148 lb).         1. Ear fullness, right  2. Ear bleeding, right  Due to complicated ED visit on 10/23/18 for right ear bleeding, I advised patient to follow up with ENT for removal of debris from right ear canal.   - OTOLARYNGOLOGY REFERRAL      3. Supratherapeutic INR  Patient has restarted coumadin after a few doses was held. Follows INR nurse for close monitoring.  On 10/26/18 INR Protime was 1.1.    INR was at 8.0 on 10/22/18 likely due to taking prednisone 60mg for foot pain.     4. Dyspepsia  Epigastric burning sensation started today, has not tried Tums.    Has history of heart burn.   She stated that she ate toast and had coffee this morning otherwise fasting . Normally has one cup of coffee.  No dark stools, red bright stools, nausea or emesis. Not taking ibuprofen. Instead she takes tylenol. hgb stable  Last night had she ate chili.   Discussed triggers for heart burn, GERD handout given. Advised patient to take tums as directed. Follow up if " worsening or persisting.          5. Fibrosis, lung (H)  Stable.  Refill codeine today.   - codeine 30 MG tablet; Take 1 tablet (30 mg) by mouth 2 times daily May fill on or after 2/8/18  Dispense: 60 tablet; Refill: 0          Patient instructions discussed with patient    The information in this document, created by the medical scribe for me, accurately reflects the services I personally performed and the decisions made by me. I have reviewed and approved this document for accuracy prior to leaving the patient care area.  October 29, 2018 3:23 PM      Tad Mendoza DO  [facility]

## 2018-11-02 ENCOUNTER — ANTICOAGULATION THERAPY VISIT (OUTPATIENT)
Dept: NURSING | Facility: CLINIC | Age: 83
End: 2018-11-02
Payer: COMMERCIAL

## 2018-11-02 DIAGNOSIS — I48.91 ATRIAL FIBRILLATION (H): ICD-10-CM

## 2018-11-02 DIAGNOSIS — Z79.01 LONG TERM CURRENT USE OF ANTICOAGULANT THERAPY: ICD-10-CM

## 2018-11-02 LAB — INR POINT OF CARE: 2.3 (ref 0.86–1.14)

## 2018-11-02 PROCEDURE — 99207 ZZC NO CHARGE NURSE ONLY: CPT

## 2018-11-02 PROCEDURE — 85610 PROTHROMBIN TIME: CPT | Mod: QW

## 2018-11-02 PROCEDURE — 36416 COLLJ CAPILLARY BLOOD SPEC: CPT

## 2018-11-02 NOTE — PATIENT INSTRUCTIONS
Some signs and symptoms of bleeding include: Nose bleed or cut that does not stop bleeding in 10 minutes, bleeding of the gums, vomiting (will look like coffee grounds) or coughing up blood, unusual, easy or large areas of bruising, increased or unexpected vaginal bleeding or increased menstrual flow, red or black stools, red or orange urine, prolonged or severe headache, pale skin, unusual or constant tiredness.  If you have these please call 911 or seek medical care immediately.   Some signs and symptoms of clots include: pain or tenderness in arm or leg, swelling in arm or leg, changes in skin color, or area is warm to touch, shortness or breath, trouble breathing.  Numbness or weakness especially on 1 side of the body, sudden trouble speaking or swallowing, sudden trouble seeing, sudden confusion, dizzy spells or headache.  If you have these please call 911 or seek medical care immediately.

## 2018-11-02 NOTE — PROGRESS NOTES
ANTICOAGULATION FOLLOW-UP CLINIC VISIT    Patient Name:  Ashlie Brantley  Date:  11/2/2018  Contact Type:  Face to Face    SUBJECTIVE:     Patient Findings     Positives No Problem Findings           OBJECTIVE    INR Protime   Date Value Ref Range Status   11/02/2018 2.3 (A) 0.86 - 1.14 Final       ASSESSMENT / PLAN  INR assessment THER    Recheck INR In: 2 WEEKS    INR Location Clinic      Anticoagulation Summary as of 11/2/2018     INR goal 2.0-3.0   Today's INR 2.3   Warfarin maintenance plan 5 mg (5 mg x 1) every day   Full warfarin instructions 5 mg every day   Weekly warfarin total 35 mg   Plan last modified Jarek Stephens RN (11/2/2018)   Next INR check 11/12/2018   Target end date Indefinite    Indications   Atrial fibrillation (H) [I48.91] [I48.91]  Long term current use of anticoagulant therapy [Z79.01]         Anticoagulation Episode Summary     INR check location     Preferred lab     Send INR reminders to [facility]    Comments       Anticoagulation Care Providers     Provider Role Specialty Phone number    RejiTad lynne DO [facility] [phone]            See the Encounter Report to view Anticoagulation Flowsheet and Dosing Calendar (Go to Encounters tab in chart review, and find the Anticoagulation Therapy Visit)    Dosage adjustment made based on physician directed care plan.    Jarek Stephens, RN

## 2018-11-02 NOTE — MR AVS SNAPSHOT
Ashlie Brantley   11/2/2018 10:20 AM   Anticoagulation Therapy Visit    Description:  87 year old female   Provider:  NE ANTI ELSA   Department:  Ne Nurse           INR as of 11/2/2018     Today's INR 2.3      Anticoagulation Summary as of 11/2/2018     INR goal 2.0-3.0   Today's INR 2.3   Full warfarin instructions 5 mg every day   Next INR check     Indications   Atrial fibrillation (H) [I48.91] [I48.91]  Long term current use of anticoagulant therapy [Z79.01]         Your next Anticoagulation Clinic appointment(s)     Nov 12, 2018 10:20 AM CST   Anticoagulation Visit with NE ANTI COAG   [facility] ([facility]    [address] [place] [address] [phone]              Contact Numbers     Please call [phone] to cancel and/or reschedule your appointment.  Please call [phone] with any problems or questions regarding your therapy          November 2018 Details    Sun Mon Tue Wed Thu Fri Sat         1               2      5 mg   See details      3      5 mg           4      5 mg         5      5 mg         6      5 mg         7      5 mg         8      5 mg         9      5 mg         10      5 mg           11      5 mg         12      5 mg         13      5 mg         14      5 mg         15      5 mg         16      5 mg         17      5 mg           18      5 mg         19      5 mg         20      5 mg         21      5 mg         22      5 mg         23      5 mg         24      5 mg           25      5 mg         26      5 mg         27      5 mg         28      5 mg         29      5 mg         30      5 mg           Date Details   11/02 This INR check      Date of next INR: No date specified         How to take your warfarin dose     To take:  5 mg Take 1 of the 5 mg tablets.

## 2018-11-06 ENCOUNTER — OFFICE VISIT (OUTPATIENT)
Dept: OTOLARYNGOLOGY | Facility: CLINIC | Age: 83
End: 2018-11-06
Payer: COMMERCIAL

## 2018-11-06 ENCOUNTER — APPOINTMENT (OUTPATIENT)
Dept: AUDIOLOGY | Facility: CLINIC | Age: 83
End: 2018-11-06
Payer: COMMERCIAL

## 2018-11-06 VITALS
OXYGEN SATURATION: 96 % | WEIGHT: 148 LBS | SYSTOLIC BLOOD PRESSURE: 95 MMHG | DIASTOLIC BLOOD PRESSURE: 65 MMHG | BODY MASS INDEX: 30.93 KG/M2 | HEART RATE: 80 BPM

## 2018-11-06 DIAGNOSIS — H61.21 IMPACTED CERUMEN OF RIGHT EAR: Primary | ICD-10-CM

## 2018-11-06 PROCEDURE — 69210 REMOVE IMPACTED EAR WAX UNI: CPT | Performed by: OTOLARYNGOLOGY

## 2018-11-06 PROCEDURE — 99207 ZZC DROP WITH A PROCEDURE: CPT | Performed by: OTOLARYNGOLOGY

## 2018-11-06 NOTE — LETTER
11/6/2018         RE: Ashlie Brantley  [address] [place] [address]        Dear Colleague,    Thank you for referring your patient, Ashlie Brantley, to the [facility]. Please see a copy of my visit note below.    Cerumen/blood crust removal - Patient here for right ear cleaning. She scratched right ear a week ago and it bled. Now ear is plugged. Left ear feels fine. Denies ear pain, drainage, and infection.     Physical Exam and Procedure  Ears - On examination of the ears, I found that the right ear was impacted with a large blood crust and clot.  Therefore, I positioned the patient in the examination chair in a semi-supine position. I used the binocular surgical microscope to perform cerumen removal.  On the right side, I began by using a cerumen loop to gently lift the edges of the blood clot away from the walls of the external canal.  Once I did this, I was able to pull away fragments of dried clot. Under the crust was old blood and clot. I suctioned this. I removed all the clot and debri.  The tympanic membrane was intact, no sign of perforation or middle ear effusion. I placed ciprodex drops to prevent bleeding and dried clot. The left ear was normal.    A/P - right ear blood clot in canal causing impaction. Right ear cleaned today in clinic. Return prn.       Again, thank you for allowing me to participate in the care of your patient.        Sincerely,        Peter Mcclellan MD

## 2018-11-06 NOTE — MR AVS SNAPSHOT
After Visit Summary   11/6/2018    Ashlie Brantley    MRN: [id number]           Patient Information     Date Of Birth          2/24/1931        Visit Information        Provider Department      11/6/2018 11:00 AM Peter Mcclellan MD [facility]        Today's Diagnoses     Impacted cerumen of right ear    -  1      Care Instructions    General Scheduling Information  To schedule your CT/MRI scan, please contact [place] at [phone]   [address] [place] [address]    To schedule your Surgery, please contact our Specialty Schedulers at [phone]    ENT Clinic Locations Clinic Hours Telephone Number     [facility]  [address]  [place] [address]   Tuesday:       8:00am -- 4:00pm    Wednesday:  8:00am - 4:00pm   To schedule an appointment with   Dr. Mcclellan,   please contact our   Specialty Scheduling Department at:     [phone]       [facility]  [address] [place] [address]   Friday:          8:00am - 4:00pm         Urgent Care Locations Clinic Hours Telephone Numbers     [facility]  [address] [place] [address]     Monday-Friday:     11:00pm - 9:00pm    Saturday-Sunday:  9:00am - 5:00pm   [phone]     [facility]  [address] [place] [address]     Monday-Friday:      5:00pm - 9:00pm     Saturday-Sunday:  9:00am - 5:00pm   [phone]               Follow-ups after your visit        Your next 10 appointments already scheduled     Nov 09, 2018  3:00 PM CST   PFT VISIT with  PFL D   [facility] ([facility])    [address] [id number] [phone]            Nov 09, 2018  3:30 PM CST   Six Minute Walk with  PFL 6 MINUTE WALK 1   [facility] ([facility])    [address] [id number] [phone]            Nov 09,  2018  4:20 PM CST   (Arrive by 4:05 PM)   Return Visit with Noemi Genao MD   [facility] ([facility] [place])    [address] [facility] [address]  [address] [place] [address]   [phone]            Nov 12, 2018 10:20 AM CST   Anticoagulation Visit with NE ANTI COAG   [facility] ([facility])    [address] [facility] [address]   [phone]            Feb 15, 2019 12:30 PM CST   PFT VISIT with  PFL A   [facility] ([facility])    [address] [place] [address]  3rd Floor  [facility] [address]   [phone]            Feb 15, 2019  1:00 PM CST   (Arrive by 12:45 PM)   Return Visit with Noemi Genao MD   [facility] ([facility] [place])    [address] [place] [address]  [address] [place] [address]   [phone]              Who to contact     If you have questions or need follow up information about today's clinic visit or your schedule please contact [facility] directly at [phone].  Normal or non-critical lab and imaging results will be communicated to you by [employer], letter or phone within 4 business days after the clinic has received the results. If you do not hear from us within 7 days, please contact the clinic through [employer] or phone. If you have a critical or abnormal lab result, we will notify you by phone as soon as possible.  Submit refill requests through [facility] or call your pharmacy and they will forward the refill request to us. Please allow 3 business days for your refill to be completed.          Additional Information About Your Visit        [facility] Information     [facility] gives you secure access to your electronic health record. If you see a primary care provider, you can also send messages to your care team and make appointments. If you have questions, please call your primary  care clinic.  If you do not have a primary care provider, please call [phone] and they will assist you.        Care EveryWhere ID     This is your Care EveryWhere ID. This could be used by other organizations to access your [place] medical records  [id number]        Your Vitals Were     Pulse Pulse Oximetry BMI (Body Mass Index)             80 96% 30.93 kg/m2          Blood Pressure from Last 3 Encounters:   11/06/18 95/65   10/29/18 118/70   10/24/18 94/64    Weight from Last 3 Encounters:   11/06/18 67.1 kg (148 lb)   10/29/18 67.1 kg (148 lb)   10/15/18 68 kg (150 lb)              We Performed the Following     Remove Mariella        Primary Care Provider Office Phone # Fax #    Tad Mendoza [phone]       [address] [facility] [address]        Equal Access to Services     JUNIOR MURPHY : Hadii viktor kenneyo Sopurnima, waaxda luqadaha, qaybta kaalmada adeegyada, giovani bynum . So [facility] [phone].    ATENCIÓN: Si habla español, tiene a leon disposición servicios gratuitos de asistencia lingüística. Llame al [phone].    We comply with applicable federal civil rights laws and [place] laws. We do not discriminate on the basis of race, color, national origin, age, disability, sex, sexual orientation, or gender identity.            Thank you!     Thank you for choosing [facility]  for your care. Our goal is always to provide you with excellent care. Hearing back from our patients is one way we can continue to improve our services. Please take a few minutes to complete the written survey that you may receive in the mail after your visit with us. Thank you!             Your Updated Medication List - Protect others around you: Learn how to safely use, store and throw away your medicines at www.disposemymeds.org.          This list is accurate as of 11/6/18 11:13 AM.  Always use your most recent med list.                    Brand Name Dispense Instructions for use Diagnosis    * albuterol (2.5 MG/3ML) 0.083% neb solution     3 mL    Take 1 vial (2.5 mg) by nebulization every 6 hours as needed for shortness of breath / dyspnea or wheezing    Acute bronchospasm       * albuterol (2.5 MG/3ML) 0.083% neb solution     1 Box    Take 1 vial (2.5 mg) by nebulization every 6 hours as needed for shortness of breath / dyspnea or wheezing    Pneumonia due to infectious organism, unspecified laterality, unspecified part of lung       aspirin 81 MG tablet     1 Tab    ONE DAILY    HTN (hypertension), Elevated cholesterol       calcium + D 600-200 MG-UNIT Tabs   Generic drug:  calcium carbonate-vitamin D     0    ONE TABLET TWICE A DAY WITH MEALS    Osteopenia       cephALEXin 500 MG capsule    KEFLEX    20 capsule    Take 1 capsule (500 mg) by mouth 2 times daily    Pneumonia due to infectious organism, unspecified laterality, unspecified part of lung       codeine 30 MG tablet   Start taking on:  11/9/2018     60 tablet    Take 1 tablet (30 mg) by mouth 2 times daily May fill on or after 2/8/18    Fibrosis, lung (H)       furosemide 20 MG tablet    LASIX     Take 20 mg by mouth        levothyroxine 88 MCG tablet    SYNTHROID    90 tablet    Take 1 tablet (88 mcg) by mouth daily    Hypothyroidism due to acquired atrophy of thyroid       losartan 100 MG tablet    COZAAR    90 tablet    Take 1 tablet (100 mg) by mouth daily    Hypertension goal BP (blood pressure) < 140/90       metoprolol tartrate 25 MG tablet    LOPRESSOR     Take 25 mg by mouth 2 times daily        neomycin-polymyxin-dexamethasone 3.5-18754-7.1 Oint ophthalmic ointment    MAXITROL    1 Tube    Place 1 Application into both eyes At Bedtime Apply a small squirt into each eye at bedtime    Dermatochalasis, unspecified laterality       OMEGA-3 COMPLEX PO      1 capsule daily        order for DME     1 each    Equipment being ordered: Oxygen 2 liters NC at HS    Oxygen decrease, IPF  (idiopathic pulmonary fibrosis) (H)       * order for DME     1 Units    Equipment being ordered: Please provide night time oxygen at 2L/min via nasal canula    IPF (idiopathic pulmonary fibrosis) (H), Hypoxia       * order for DME     1 Device    Equipment being ordered: Oxygen 2 liters NC at night time    IPF (idiopathic pulmonary fibrosis) (H)       predniSONE 20 MG tablet    DELTASONE    15 tablet    Take 3 tablets (60 mg) by mouth daily    Acute gouty arthritis       simvastatin 40 MG tablet    ZOCOR    90 tablet    Take 1 tablet (40 mg) by mouth At Bedtime    Hyperlipidemia LDL goal <130       tolterodine 4 MG 24 hr capsule    DETROL LA    30 capsule    Take 1 capsule (4 mg) by mouth daily    Overactive bladder       traZODone 50 MG tablet    DESYREL    30 tablet    Take 1 tablet (50 mg) by mouth nightly as needed for sleep    Other insomnia       * vitamin D3 1000 units Caps      1 CAPSULE DAILY        * vitamin D 2000 units tablet     0    1 tablet daily    Osteopenia       VITAMIN E COMPLEX PO      Take  by mouth.        * warfarin 5 MG tablet    COUMADIN    90 tablet    Take as directed by ACC. Current dose is 7.5 mg on Sunday and 5 mg the rest of the week.    Persistent atrial fibrillation (H)       * warfarin 7.5 MG tablet    COUMADIN    14 tablet    Take 7.5 mg every Sunday. 5mg ROW    Atrial fibrillation, unspecified type (H), Long-term (current) use of anticoagulants       * Notice:  This list has 8 medication(s) that are the same as other medications prescribed for you. Read the directions carefully, and ask your doctor or other care provider to review them with you.

## 2018-11-06 NOTE — PROGRESS NOTES
Cerumen/blood crust removal - Patient here for right ear cleaning. She scratched right ear a week ago and it bled. Now ear is plugged. Left ear feels fine. Denies ear pain, drainage, and infection.     Physical Exam and Procedure  Ears - On examination of the ears, I found that the right ear was impacted with a large blood crust and clot.  Therefore, I positioned the patient in the examination chair in a semi-supine position. I used the binocular surgical microscope to perform cerumen removal.  On the right side, I began by using a cerumen loop to gently lift the edges of the blood clot away from the walls of the external canal.  Once I did this, I was able to pull away fragments of dried clot. Under the crust was old blood and clot. I suctioned this. I removed all the clot and debri.  The tympanic membrane was intact, no sign of perforation or middle ear effusion. I placed ciprodex drops to prevent bleeding and dried clot. The left ear was normal.    A/P - right ear blood clot in canal causing impaction. Right ear cleaned today in clinic. Return prn.

## 2018-11-06 NOTE — PATIENT INSTRUCTIONS
General Scheduling Information  To schedule your CT/MRI scan, please contact [place] at [phone]   [address] [place] [address]    To schedule your Surgery, please contact our Specialty Schedulers at [phone]    ENT Clinic Locations Clinic Hours Telephone Number     [facility] [place]  [address] [place] [place] [address]   Tuesday:       8:00am -- 4:00pm    Wednesday:  8:00am - 4:00pm   To schedule an appointment with   Dr. Mcclellan,   please contact our   Specialty Scheduling Department at:     [phone]       [facility]  [address] [place] [address]   Friday:          8:00am - 4:00pm         Urgent Care Locations Clinic Hours Telephone Numbers     [facility]  [address] [place] [address]     Monday-Friday:     11:00pm - 9:00pm    Saturday-Sunday:  9:00am - 5:00pm   [phone]     [facility]  [address] [place] [address]     Monday-Friday:      5:00pm - 9:00pm     Saturday-Sunday:  9:00am - 5:00pm   [phone]

## 2018-11-09 ENCOUNTER — OFFICE VISIT (OUTPATIENT)
Dept: PULMONOLOGY | Facility: CLINIC | Age: 83
End: 2018-11-09
Attending: STUDENT IN AN ORGANIZED HEALTH CARE EDUCATION/TRAINING PROGRAM
Payer: COMMERCIAL

## 2018-11-09 VITALS
HEIGHT: 58 IN | RESPIRATION RATE: 16 BRPM | DIASTOLIC BLOOD PRESSURE: 77 MMHG | SYSTOLIC BLOOD PRESSURE: 126 MMHG | HEART RATE: 69 BPM | OXYGEN SATURATION: 100 % | BODY MASS INDEX: 31.07 KG/M2 | WEIGHT: 148 LBS

## 2018-11-09 DIAGNOSIS — J84.112 IPF (IDIOPATHIC PULMONARY FIBROSIS) (H): Primary | ICD-10-CM

## 2018-11-09 DIAGNOSIS — J84.9 ILD (INTERSTITIAL LUNG DISEASE) (H): Primary | ICD-10-CM

## 2018-11-09 LAB
6 MIN WALK (FT): 550 FT
6 MIN WALK (M): 168 M

## 2018-11-09 PROCEDURE — G0463 HOSPITAL OUTPT CLINIC VISIT: HCPCS | Mod: ZF

## 2018-11-09 ASSESSMENT — PAIN SCALES - GENERAL: PAINLEVEL: NO PAIN (0)

## 2018-11-09 NOTE — PROGRESS NOTES
[facility]    Name: Ashlie Brantley MRN: [id number]     Age: 87 year old   YOB: 1931       Reason for Visit: Followup for ILD          HPI:   Ashlie Brantley is a 87 year old female with history of  ILD (NSIP) on nocturnal O2, pulmonary HTN, and vocal cord dystonia who presents for followup after URI.    Patient presents today with her 2 daughters. Patient does not remember the details of her most recent URI, but states she has been to urgent care several times since for nonpulmonary issues (UTI, ear bleed). States that since January after she was hospitalized for the flu her breathing has been slowly getting worse. Very intermittent cough. Is still able to do her water aerobics twice a week with her group class. Is able to walk down to the basement to do laundry and cook and dress herself. Lives with her . Is currently using 2L at night.     10 point ROS performed and negative except for as noted in HPI.         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     sees [facility]     Cataract     IOL both     DJD (degenerative joint disease)     KNEES     Elevated cholesterol      GERD (gastroesophageal reflux disease)      Hoarseness      HTN (hypertension)      Hypothyroid      Laryngeal dystonia      LVH (left ventricular hypertrophy) due to hypertensive disease      Osteopenia     MILD     Urinary stress incontinence              Past Surgical History:      Past Surgical History:   Procedure Laterality Date     C NONSPECIFIC PROCEDURE      BACK SURGERY     C NONSPECIFIC PROCEDURE      VOCAL CORD POLYP     C SHOULDER SURG PROC UNLISTED       C STOMACH SURGERY PROCEDURE UNLISTED       C TOTAL KNEE ARTHROPLASTY      LT 1998  /  RT 2001     CATARACT IOL, RT/LT       HC REMOVAL GALLBLADDER       HERNIA REPAIR, INGUINAL RT/LT      RT     HYSTERECTOMY, CERVIX STATUS UNKNOWN      DUB     PHACOEMULSIFICATION WITH STANDARD INTRAOCULAR  LENS IMPLANT  Rt 6/11/09, Lt 8/3/09    both eyes Dr. Barnett     ROTATOR CUFF REPAIR RT/LT      RT             Social History:     Social History     Social History     Marital status:      Spouse name: N/A     Number of children: 6     Years of education: N/A     Occupational History      Retired     Social History Main Topics     Smoking status: Never Smoker     Smokeless tobacco: Never Used     Alcohol use 0.0 oz/week     0 Standard drinks or equivalent per week      Comment: 1-2 drinks/month     Drug use: No     Sexual activity: Not Currently     Other Topics Concern      Service No     Blood Transfusions No     Caffeine Concern No     Occupational Exposure No     Hobby Hazards No     Sleep Concern No     Stress Concern No     Weight Concern Yes     Special Diet No     Back Care No     Exercise Yes     a litttle     Seat Belt Yes     Self-Exams Yes     Parent/Sibling W/ Cabg, Mi Or Angioplasty Before 65f 55m? No     Social History Narrative            Family History:     Family History   Problem Relation Age of Onset     Diabetes Mother      C.A.D. Father      Cerebrovascular Disease Brother              Allergies:     Allergies   Allergen Reactions     Nkda [No Known Drug Allergies]              Medications:     Current Outpatient Prescriptions on File Prior to Visit:  albuterol (2.5 MG/3ML) 0.083% neb solution Take 1 vial (2.5 mg) by nebulization every 6 hours as needed for shortness of breath / dyspnea or wheezing   albuterol (2.5 MG/3ML) 0.083% neb solution Take 1 vial (2.5 mg) by nebulization every 6 hours as needed for shortness of breath / dyspnea or wheezing   ASPIRIN 81 MG OR TABS ONE DAILY   CALCIUM + D 600-200 MG-UNIT OR TABS ONE TABLET TWICE A DAY WITH MEALS   cephALEXin (KEFLEX) 500 MG capsule Take 1 capsule (500 mg) by mouth 2 times daily   codeine 30 MG tablet Take 1 tablet (30 mg) by mouth 2 times daily May fill on or after 2/8/18   furosemide (LASIX) 20 MG tablet Take 20 mg by mouth  "  levothyroxine (SYNTHROID) 88 MCG tablet Take 1 tablet (88 mcg) by mouth daily   losartan (COZAAR) 100 MG tablet Take 1 tablet (100 mg) by mouth daily   metoprolol tartrate (LOPRESSOR) 25 MG tablet Take 25 mg by mouth 2 times daily   neomycin-polymyxin-dexamethasone (MAXITROL) 3.5-86367-4.1 OINT ophthalmic ointment Place 1 Application into both eyes At Bedtime Apply a small squirt into each eye at bedtime   OMEGA-3 COMPLEX OR 1 capsule daily   order for DME Equipment being ordered: Oxygen 2 liters NC at night time   order for DME Equipment being ordered: Please provide night time oxygen at 2L/min via nasal canula   ORDER FOR DME Equipment being ordered: Oxygen 2 liters NC at HS   predniSONE (DELTASONE) 20 MG tablet Take 3 tablets (60 mg) by mouth daily   simvastatin (ZOCOR) 40 MG tablet Take 1 tablet (40 mg) by mouth At Bedtime   tolterodine (DETROL LA) 4 MG 24 hr capsule Take 1 capsule (4 mg) by mouth daily   traZODone (DESYREL) 50 MG tablet Take 1 tablet (50 mg) by mouth nightly as needed for sleep   VITAMIN D 2000 UNIT OR TABS 1 tablet daily   VITAMIN D3 1000 UNIT OR CAPS 1 CAPSULE DAILY   VITAMIN E COMPLEX PO Take  by mouth.   warfarin (COUMADIN) 5 MG tablet Take as directed by ACC. Current dose is 7.5 mg on Sunday and 5 mg the rest of the week.   warfarin (COUMADIN) 7.5 MG tablet Take 7.5 mg every Sunday. 5mg ROW     No current facility-administered medications on file prior to visit.            Exam:   /77 (BP Location: Right arm, Patient Position: Chair, Cuff Size: Adult Regular)  Pulse 69  Resp 16  Ht 1.473 m (4' 9.99\")  Wt 67.1 kg (148 lb)  SpO2 100%  BMI 30.94 kg/m2 on RA  General: Sitting in a chair, no acute distress  HEENT: No scleral icterus  Heart: Normal rate, irregular rhythm  Neuro: Answering questions appropriately through notepad. Able to mouth words with minimal sound.   Psych: Normal affect  Ext: No edema         Data:     6 minute walk test:  96% at rest, desatted to 87% after 50 " feet. No hypoxia or desaturation on 2L/min. Lowest O2 saturation 96% on 2L.  FEV1 1.08L 80% predicted  FVC 1.24L 70% predicted  FEV1/FVC 87%  DLCO 8.46 51% predicted    Personal interpretation: Mild restriction with moderate diffusion defect. Compared to 8/17/18, no significant change in FEV1 or FVC.         Assessment and Plan:     Ashlie Brantley is a 87 year old female with history of ILD (NSIP) on nocturnal O2, pulmonary HTN, and vocal cord dystonia who presents for followup after URI.      NSIP  Found incidentally on cxr/ct following URI in 2011. Previous w/u negative (HP panel, rheum panel, exposure Hx). PFT's have slowly declined previously, but is stable in the past year. Remains active. CT in 2014 did show 5mm pulm nodule near minor fissure. She has declined to undergo further chest imaging. She is a non-smoker. 6 minute walk test today showed desaturation with walking on room air. Exertional hypoxia likely from worsening of underlying ILD vs recent URI vs pulmonary HTN. Patient also has history of heart failure but appears euvolemic today.  --Will start 2L/min supplemental oxygen during the day. Ok to take breaks while at rest. Continue 2L/min at night.  --UTD on immunizations, received flu shot this year.  --RTC in 6 months with PFTs and 6MWT.      Vocal Cord Dystonia   She has been extensively evaluated by ENT and speech therapy and has undergone multiple therapies without success.     Patient staffed with Dr. Velez.     Noemi Genao  Pulmonary and Critical Care Fellow      Pulmonary Staff:  I have discussed Ms. Brantley's case with Dr. Genao; reviewed the patient's radiographic imaging and PFT data and met with her.  I agree with the findings, assessment and recommendations as outlined above by Dr. Genao.      Irina Velez MD  #[id number]

## 2018-11-09 NOTE — MR AVS SNAPSHOT
After Visit Summary   11/9/2018    Ashlie Brantley    MRN: [id number]           Patient Information     Date Of Birth          2/24/1931        Visit Information        Provider Department      11/9/2018 4:20 PM Noemi Genao MD [facility]        Today's Diagnoses     ILD (interstitial lung disease) (H)    -  1       Follow-ups after your visit        Follow-up notes from your care team     Return in about 6 months (around 5/9/2019) for Please cancel 2/15/19 pulmonary appointment. 6MWT and PFTs with RTC appt..      Your next 10 appointments already scheduled     Nov 12, 2018 10:20 AM CST   Anticoagulation [facility] with NE ANTI COAG   [facility]    [address] [id number] [phone]            Feb 15, 2019 12:30 PM CST   PFT VISIT with  PFL KENNETH   [facility] Pulmonary Function Testing ([facility] [place])    [address]  3rd Floor  [facility] [id number] [phone]            Feb 15, 2019  1:00 PM CST   (Arrive by 12:45 PM)   Return Visit with Noemi Genao MD   [facility] ([facility] [place])    [address] [id number] [phone]              Who to contact     If you have questions or need follow up information about today's clinic visit or your schedule please contact [facility] directly at [phone].  Normal or non-critical lab and imaging results will be communicated to you by MyChart, letter or phone within 4 business days after the clinic has received the results. If you do not hear from us within 7 days, please contact the clinic through MyChart or phone. If you have a critical or abnormal lab result, we will notify you by phone as soon as possible.  Submit refill requests through [employer] or call your pharmacy and they will forward  "the refill request to us. Please allow 3 business days for your refill to be completed.          Additional Information About Your Visit        [employer] Information     [employer] gives you secure access to your electronic health record. If you see a primary care provider, you can also send messages to your care team and make appointments. If you have questions, please call your primary care clinic.  If you do not have a primary care provider, please call [phone] and they will assist you.        Care EveryWhere ID     This is your Care EveryWhere ID. This could be used by other organizations to access your [place] medical records  [id number]        Your Vitals Were     Pulse Respirations Height Pulse Oximetry BMI (Body Mass Index)       69 16 1.473 m (4' 9.99\") 100% 30.94 kg/m2        Blood Pressure from Last 3 Encounters:   11/09/18 126/77   11/06/18 95/65   10/29/18 118/70    Weight from Last 3 Encounters:   11/09/18 67.1 kg (148 lb)   11/06/18 67.1 kg (148 lb)   10/29/18 67.1 kg (148 lb)              Today, you had the following     No orders found for display       Primary Care Provider Office Phone # Fax #    Tad Mendoza [phone]       [facility] [address]        Equal Access to Services     JUNIOR MURPHY : Hadii viktor ku hadasho Soomaali, waaxda luqadaha, qaybta kaalmada adeegyada, giovani muller. So [facility] [phone].    ATENCIÓN: Si habla español, tiene a leon disposición servicios gratuitos de asistencia lingüística. Llame al [phone].    We comply with applicable federal civil rights laws and [place] laws. We do not discriminate on the basis of race, color, national origin, age, disability, sex, sexual orientation, or gender identity.            Thank you!     Thank you for choosing [facility]  for your care. Our goal is always to provide you with excellent care. Hearing back from our " patients is one way we can continue to improve our services. Please take a few minutes to complete the written survey that you may receive in the mail after your visit with us. Thank you!             Your Updated Medication List - Protect others around you: Learn how to safely use, store and throw away your medicines at www.disposemymeds.org.          This list is accurate as of 11/9/18  4:52 PM.  Always use your most recent med list.                   Brand Name Dispense Instructions for use Diagnosis    * albuterol (2.5 MG/3ML) 0.083% neb solution     3 mL    Take 1 vial (2.5 mg) by nebulization every 6 hours as needed for shortness of breath / dyspnea or wheezing    Acute bronchospasm       * albuterol (2.5 MG/3ML) 0.083% neb solution     1 Box    Take 1 vial (2.5 mg) by nebulization every 6 hours as needed for shortness of breath / dyspnea or wheezing    Pneumonia due to infectious organism, unspecified laterality, unspecified part of lung       aspirin 81 MG tablet     1 Tab    ONE DAILY    HTN (hypertension), Elevated cholesterol       calcium + D 600-200 MG-UNIT Tabs   Generic drug:  calcium carbonate-vitamin D     0    ONE TABLET TWICE A DAY WITH MEALS    Osteopenia       cephALEXin 500 MG capsule    KEFLEX    20 capsule    Take 1 capsule (500 mg) by mouth 2 times daily    Pneumonia due to infectious organism, unspecified laterality, unspecified part of lung       codeine 30 MG tablet     60 tablet    Take 1 tablet (30 mg) by mouth 2 times daily May fill on or after 2/8/18    Fibrosis, lung (H)       furosemide 20 MG tablet    LASIX     Take 20 mg by mouth        levothyroxine 88 MCG tablet    SYNTHROID    90 tablet    Take 1 tablet (88 mcg) by mouth daily    Hypothyroidism due to acquired atrophy of thyroid       losartan 100 MG tablet    COZAAR    90 tablet    Take 1 tablet (100 mg) by mouth daily    Hypertension goal BP (blood pressure) < 140/90       metoprolol tartrate 25 MG tablet    LOPRESSOR     Take  25 mg by mouth 2 times daily        neomycin-polymyxin-dexamethasone 3.5-08342-2.1 Oint ophthalmic ointment    MAXITROL    1 Tube    Place 1 Application into both eyes At Bedtime Apply a small squirt into each eye at bedtime    Dermatochalasis, unspecified laterality       OMEGA-3 COMPLEX PO      1 capsule daily        order for DME     1 each    Equipment being ordered: Oxygen 2 liters NC at HS    Oxygen decrease, IPF (idiopathic pulmonary fibrosis) (H)       * order for DME     1 Units    Equipment being ordered: Please provide night time oxygen at 2L/min via nasal canula    IPF (idiopathic pulmonary fibrosis) (H), Hypoxia       * order for DME     1 Device    Equipment being ordered: Oxygen 2 liters NC at night time    IPF (idiopathic pulmonary fibrosis) (H)       predniSONE 20 MG tablet    DELTASONE    15 tablet    Take 3 tablets (60 mg) by mouth daily    Acute gouty arthritis       simvastatin 40 MG tablet    ZOCOR    90 tablet    Take 1 tablet (40 mg) by mouth At Bedtime    Hyperlipidemia LDL goal <130       tolterodine 4 MG 24 hr capsule    DETROL LA    30 capsule    Take 1 capsule (4 mg) by mouth daily    Overactive bladder       traZODone 50 MG tablet    DESYREL    30 tablet    Take 1 tablet (50 mg) by mouth nightly as needed for sleep    Other insomnia       * vitamin D3 1000 units Caps      1 CAPSULE DAILY        * vitamin D 2000 units tablet     0    1 tablet daily    Osteopenia       VITAMIN E COMPLEX PO      Take  by mouth.        * warfarin 5 MG tablet    COUMADIN    90 tablet    Take as directed by ACC. Current dose is 7.5 mg on Sunday and 5 mg the rest of the week.    Persistent atrial fibrillation (H)       * warfarin 7.5 MG tablet    COUMADIN    14 tablet    Take 7.5 mg every Sunday. 5mg ROW    Atrial fibrillation, unspecified type (H), Long-term (current) use of anticoagulants       * Notice:  This list has 8 medication(s) that are the same as other medications prescribed for you. Read the  directions carefully, and ask your doctor or other care provider to review them with you.

## 2018-11-09 NOTE — LETTER
11/9/2018       RE: Ashlie Brantley  [address] [place] [address]     Dear Colleague,    Thank you for referring your patient, Ashlie Brantley, to the [facility] at [facility]. Please see a copy of my visit note below.    [facility]    Name: Ashlie Brantley MRN: [id number]     Age: 87 year old   YOB: 1931       Reason for Visit: Followup for ILD          HPI:   Ashlie Brantley is a 87 year old female with history of  ILD (NSIP) on nocturnal O2, pulmonary HTN, and vocal cord dystonia who presents for followup after URI.    Patient presents today with her 2 daughters. Patient does not remember the details of her most recent URI, but states she has been to urgent care several times since for nonpulmonary issues (UTI, ear bleed). States that since January after she was hospitalized for the flu her breathing has been slowly getting worse. Very intermittent cough. Is still able to do her water aerobics twice a week with her group class. Is able to walk down to the basement to do laundry and cook and dress herself. Lives with her . Is currently using 2L at night.     10 point ROS performed and negative except for as noted in HPI.         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     sees [facility]     Cataract     IOL both     DJD (degenerative joint disease)     KNEES     Elevated cholesterol      GERD (gastroesophageal reflux disease)      Hoarseness      HTN (hypertension)      Hypothyroid      Laryngeal dystonia      LVH (left ventricular hypertrophy) due to hypertensive disease      Osteopenia     MILD     Urinary stress incontinence              Past Surgical History:      Past Surgical History:   Procedure Laterality Date     C NONSPECIFIC PROCEDURE      BACK SURGERY     C NONSPECIFIC PROCEDURE      VOCAL CORD POLYP     C SHOULDER SURG PROC  UNLISTED       C STOMACH SURGERY PROCEDURE UNLISTED       C TOTAL KNEE ARTHROPLASTY      LT 1998  /  RT 2001     CATARACT IOL, RT/LT       HC REMOVAL GALLBLADDER       HERNIA REPAIR, INGUINAL RT/LT      RT     HYSTERECTOMY, CERVIX STATUS UNKNOWN      DUB     PHACOEMULSIFICATION WITH STANDARD INTRAOCULAR LENS IMPLANT  Rt 6/11/09, Lt 8/3/09    both eyes Dr. Barnett     ROTATOR CUFF REPAIR RT/LT      RT             Social History:     Social History     Social History     Marital status:      Spouse name: N/A     Number of children: 6     Years of education: N/A     Occupational History      Retired     Social History Main Topics     Smoking status: Never Smoker     Smokeless tobacco: Never Used     Alcohol use 0.0 oz/week     0 Standard drinks or equivalent per week      Comment: 1-2 drinks/month     Drug use: No     Sexual activity: Not Currently     Other Topics Concern      Service No     Blood Transfusions No     Caffeine Concern No     Occupational Exposure No     Hobby Hazards No     Sleep Concern No     Stress Concern No     Weight Concern Yes     Special Diet No     Back Care No     Exercise Yes     a litttle     Seat Belt Yes     Self-Exams Yes     Parent/Sibling W/ Cabg, Mi Or Angioplasty Before 65f 55m? No     Social History Narrative            Family History:     Family History   Problem Relation Age of Onset     Diabetes Mother      C.A.D. Father      Cerebrovascular Disease Brother              Allergies:     Allergies   Allergen Reactions     Nkda [No Known Drug Allergies]              Medications:     Current Outpatient Prescriptions on File Prior to Visit:  albuterol (2.5 MG/3ML) 0.083% neb solution Take 1 vial (2.5 mg) by nebulization every 6 hours as needed for shortness of breath / dyspnea or wheezing   albuterol (2.5 MG/3ML) 0.083% neb solution Take 1 vial (2.5 mg) by nebulization every 6 hours as needed for shortness of breath / dyspnea or wheezing   ASPIRIN 81 MG OR TABS ONE DAILY  "  CALCIUM + D 600-200 MG-UNIT OR TABS ONE TABLET TWICE A DAY WITH MEALS   cephALEXin (KEFLEX) 500 MG capsule Take 1 capsule (500 mg) by mouth 2 times daily   codeine 30 MG tablet Take 1 tablet (30 mg) by mouth 2 times daily May fill on or after 2/8/18   furosemide (LASIX) 20 MG tablet Take 20 mg by mouth   levothyroxine (SYNTHROID) 88 MCG tablet Take 1 tablet (88 mcg) by mouth daily   losartan (COZAAR) 100 MG tablet Take 1 tablet (100 mg) by mouth daily   metoprolol tartrate (LOPRESSOR) 25 MG tablet Take 25 mg by mouth 2 times daily   neomycin-polymyxin-dexamethasone (MAXITROL) 3.5-02578-0.1 OINT ophthalmic ointment Place 1 Application into both eyes At Bedtime Apply a small squirt into each eye at bedtime   OMEGA-3 COMPLEX OR 1 capsule daily   order for DME Equipment being ordered: Oxygen 2 liters NC at night time   order for DME Equipment being ordered: Please provide night time oxygen at 2L/min via nasal canula   ORDER FOR DME Equipment being ordered: Oxygen 2 liters NC at HS   predniSONE (DELTASONE) 20 MG tablet Take 3 tablets (60 mg) by mouth daily   simvastatin (ZOCOR) 40 MG tablet Take 1 tablet (40 mg) by mouth At Bedtime   tolterodine (DETROL LA) 4 MG 24 hr capsule Take 1 capsule (4 mg) by mouth daily   traZODone (DESYREL) 50 MG tablet Take 1 tablet (50 mg) by mouth nightly as needed for sleep   VITAMIN D 2000 UNIT OR TABS 1 tablet daily   VITAMIN D3 1000 UNIT OR CAPS 1 CAPSULE DAILY   VITAMIN E COMPLEX PO Take  by mouth.   warfarin (COUMADIN) 5 MG tablet Take as directed by ACC. Current dose is 7.5 mg on Sunday and 5 mg the rest of the week.   warfarin (COUMADIN) 7.5 MG tablet Take 7.5 mg every Sunday. 5mg ROW     No current facility-administered medications on file prior to visit.            Exam:   /77 (BP Location: Right arm, Patient Position: Chair, Cuff Size: Adult Regular)  Pulse 69  Resp 16  Ht 1.473 m (4' 9.99\")  Wt 67.1 kg (148 lb)  SpO2 100%  BMI 30.94 kg/m2 on RA  General: Sitting in " a chair, no acute distress  HEENT: No scleral icterus  Heart: Normal rate, irregular rhythm  Neuro: Answering questions appropriately through notepad. Able to mouth words with minimal sound.   Psych: Normal affect  Ext: No edema         Data:     6 minute walk test:  96% at rest, desatted to 87% after 50 feet. No hypoxia or desaturation on 2L/min. Lowest O2 saturation 96% on 2L.  FEV1 1.08L 80% predicted  FVC 1.24L 70% predicted  FEV1/FVC 87%  DLCO 8.46 51% predicted    Personal interpretation: Mild restriction with moderate diffusion defect. Compared to 8/17/18, no significant change in FEV1 or FVC.         Assessment and Plan:     Ashlie Brantley is a 87 year old female with history of ILD (NSIP) on nocturnal O2, pulmonary HTN, and vocal cord dystonia who presents for followup after URI.      NSIP  Found incidentally on cxr/ct following URI in 2011. Previous w/u negative (HP panel, rheum panel, exposure Hx). PFT's have slowly declined previously, but is stable in the past year. Remains active. CT in 2014 did show 5mm pulm nodule near minor fissure. She has declined to undergo further chest imaging. She is a non-smoker. 6 minute walk test today showed desaturation with walking on room air. Exertional hypoxia likely from worsening of underlying ILD vs recent URI vs pulmonary HTN. Patient also has history of heart failure but appears euvolemic today.  --Will start 2L/min supplemental oxygen during the day. Ok to take breaks while at rest. Continue 2L/min at night.  --UTD on immunizations, received flu shot this year.  --RTC in 6 months with PFTs and 6MWT.      Vocal Cord Dystonia   She has been extensively evaluated by ENT and speech therapy and has undergone multiple therapies without success.     Patient staffed with Dr. Velez.     Noemi Genao  Pulmonary and Critical Care Fellow      Pulmonary Staff:  I have discussed Ms. Brantley's case with Dr. Genao; reviewed the patient's radiographic imaging and PFT data and  met with her.  I agree with the findings, assessment and recommendations as outlined above by Dr. Genao.      Irina Velez MD  #[id number]    Again, thank you for allowing me to participate in the care of your patient.      Sincerely,    Noemi Genao MD

## 2018-11-11 LAB
DLCOUNC-%PRED-PRE: 51 %
DLCOUNC-PRE: 8.46 ML/MIN/MMHG
DLCOUNC-PRED: 16.31 ML/MIN/MMHG
ERV-%PRED-PRE: 210 %
ERV-PRE: 0.25 L
ERV-PRED: 0.12 L
EXPTIME-PRE: 4.93 SEC
FEF2575-%PRED-PRE: 123 %
FEF2575-PRE: 1.41 L/SEC
FEF2575-PRED: 1.14 L/SEC
FEFMAX-%PRED-PRE: 118 %
FEFMAX-PRE: 3.78 L/SEC
FEFMAX-PRED: 3.18 L/SEC
FEV1-%PRED-PRE: 80 %
FEV1-PRE: 1.08 L
FEV1FEV6-PRE: 87 %
FEV1FEV6-PRED: 76 %
FEV1FVC-PRE: 87 %
FEV1FVC-PRED: 72 %
FEV1SVC-PRE: 119 %
FEV1SVC-PRED: 68 %
FIFMAX-PRE: 2.36 L/SEC
FVC-%PRED-PRE: 70 %
FVC-PRE: 1.24 L
FVC-PRED: 1.76 L
IC-%PRED-PRE: 35 %
IC-PRE: 0.66 L
IC-PRED: 1.87 L
VA-%PRED-PRE: 49 %
VA-PRE: 2.06 L
VC-%PRED-PRE: 45 %
VC-PRE: 0.91 L
VC-PRED: 1.99 L

## 2018-11-12 ENCOUNTER — ANTICOAGULATION THERAPY VISIT (OUTPATIENT)
Dept: NURSING | Facility: CLINIC | Age: 83
End: 2018-11-12
Payer: COMMERCIAL

## 2018-11-12 DIAGNOSIS — I48.91 ATRIAL FIBRILLATION (H): ICD-10-CM

## 2018-11-12 DIAGNOSIS — Z79.01 LONG TERM CURRENT USE OF ANTICOAGULANT THERAPY: ICD-10-CM

## 2018-11-12 LAB — INR POINT OF CARE: 3.1 (ref 0.86–1.14)

## 2018-11-12 PROCEDURE — 36416 COLLJ CAPILLARY BLOOD SPEC: CPT

## 2018-11-12 PROCEDURE — 99207 ZZC NO CHARGE NURSE ONLY: CPT

## 2018-11-12 PROCEDURE — 85610 PROTHROMBIN TIME: CPT | Mod: QW

## 2018-11-12 NOTE — MR AVS SNAPSHOT
Ashlie Brantley   11/12/2018 10:20 AM   Anticoagulation Therapy Visit    Description:  87 year old female   Provider:  NAHEED HUNTER   Department:  Ne Nurse           INR as of 11/12/2018     Today's INR 3.1!      Anticoagulation Summary as of 11/12/2018     INR goal 2.0-3.0   Today's INR 3.1!   Full warfarin instructions 5 mg every day   Next INR check 12/10/2018    Indications   Atrial fibrillation (H) [I48.91] [I48.91]  Long term current use of anticoagulant therapy [Z79.01]         Your next Anticoagulation Clinic appointment(s)     Dec 10, 2018 10:40 AM CST   Anticoagulation Visit with NE ANTI COAG   [facility] ([facility]    [address] [place] [id number] [phone]              Contact Numbers     Please call [phone] to cancel and/or reschedule your appointment.  Please call [phone] with any problems or questions regarding your therapy          November 2018 Details    Sun Mon Tue Wed Thu Fri Sat         1               2               3                 4               5               6               7               8               9               10                 11               12      5 mg   See details      13      5 mg         14      5 mg         15      5 mg         16      5 mg         17      5 mg           18      5 mg         19      5 mg         20      5 mg         21      5 mg         22      5 mg         23      5 mg         24      5 mg           25      5 mg         26      5 mg         27      5 mg         28      5 mg         29      5 mg         30      5 mg           Date Details   11/12 This INR check               How to take your warfarin dose     To take:  5 mg Take 1 of the 5 mg tablets.           December 2018 Details    Sun Mon Tue Wed Thu Fri Sat           1      5 mg           2      5 mg         3      5 mg         4      5 mg         5      5 mg         6      5 mg         7      5 mg         8       5 mg           9      5 mg         10            11               12               13               14               15                 16               17               18               19               20               21               22                 23               24               25               26               27               28               29                 30               31                     Date Details   No additional details    Date of next INR:  12/10/2018         How to take your warfarin dose     To take:  5 mg Take 1 of the 5 mg tablets.

## 2018-11-12 NOTE — PROGRESS NOTES
ANTICOAGULATION FOLLOW-UP CLINIC VISIT    Patient Name:  Ashlie Brantley  Date:  11/12/2018  Contact Type:  Face to Face    SUBJECTIVE:   Ashlie is doing better on small adjustment and will eat greens more consist ant.     Patient Findings     Positives No Problem Findings           OBJECTIVE    INR Protime   Date Value Ref Range Status   11/12/2018 3.1 (A) 0.86 - 1.14 Final       ASSESSMENT / PLAN  INR assessment THER    Recheck INR In: 4 WEEKS    INR Location Clinic      Anticoagulation Summary as of 11/12/2018     INR goal 2.0-3.0   Today's INR 3.1!   Warfarin maintenance plan 5 mg (5 mg x 1) every day   Full warfarin instructions 5 mg every day   Weekly warfarin total 35 mg   No change documented Lizy Mercado RN   Plan last modified Jarek Stephens RN (11/2/2018)   Next INR check 12/10/2018   Target end date Indefinite    Indications   Atrial fibrillation (H) [I48.91] [I48.91]  Long term current use of anticoagulant therapy [Z79.01]         Anticoagulation Episode Summary     INR check location     Preferred lab     Send INR reminders to [facility]    Comments       Anticoagulation Care Providers     Provider Role Specialty Phone number    RejiFede lynnemaximbrandt DO [facility] [phone]            See the Encounter Report to view Anticoagulation Flowsheet and Dosing Calendar (Go to Encounters tab in chart review, and find the Anticoagulation Therapy Visit)        Lizy Mercado, REJI

## 2018-12-06 ENCOUNTER — TELEPHONE (OUTPATIENT)
Dept: FAMILY MEDICINE | Facility: CLINIC | Age: 83
End: 2018-12-06

## 2018-12-06 NOTE — TELEPHONE ENCOUNTER
Reason for Call:  Other call back    Detailed comments: Per patients daughter Glynn will no longer cover patients prescription for Codeine 30MG in 2019. Requests a callback to discuss what can be done, if there is an alternate or what is recommended as patient needs it for throat/voice issues. Please call pura Sierra back.     Phone Number Patient can be reached at: Home number on file [phone] (home)    Best Time: any    Can we leave a detailed message on this number? YES    Call taken on 12/6/2018 at 4:14 PM by Katelyn Burden

## 2018-12-06 NOTE — TELEPHONE ENCOUNTER
Called  and he will call [facility] and see what will be covered and it's cost and then call us back. Then will route to PCP.   Miguelina Dunaway RN

## 2018-12-10 ENCOUNTER — ANTICOAGULATION THERAPY VISIT (OUTPATIENT)
Dept: NURSING | Facility: CLINIC | Age: 83
End: 2018-12-10
Payer: COMMERCIAL

## 2018-12-10 ENCOUNTER — TELEPHONE (OUTPATIENT)
Dept: FAMILY MEDICINE | Facility: CLINIC | Age: 83
End: 2018-12-10

## 2018-12-10 DIAGNOSIS — Z79.01 LONG TERM CURRENT USE OF ANTICOAGULANT THERAPY: ICD-10-CM

## 2018-12-10 DIAGNOSIS — I48.91 ATRIAL FIBRILLATION (H): ICD-10-CM

## 2018-12-10 DIAGNOSIS — J84.10 FIBROSIS, LUNG (H): ICD-10-CM

## 2018-12-10 LAB — INR POINT OF CARE: 3.7 (ref 0.86–1.14)

## 2018-12-10 PROCEDURE — 36416 COLLJ CAPILLARY BLOOD SPEC: CPT

## 2018-12-10 PROCEDURE — 85610 PROTHROMBIN TIME: CPT | Mod: QW

## 2018-12-10 PROCEDURE — 99207 ZZC NO CHARGE NURSE ONLY: CPT

## 2018-12-10 NOTE — PROGRESS NOTES
ANTICOAGULATION FOLLOW-UP CLINIC VISIT    Patient Name:  Ashlie Brantley  Date:  12/10/2018  Contact Type:  Face to Face    SUBJECTIVE:        OBJECTIVE    INR Protime   Date Value Ref Range Status   12/10/2018 3.7 (A) 0.86 - 1.14 Final       ASSESSMENT / PLAN  No question data found.  Anticoagulation Summary  As of 12/10/2018    INR goal:   2.0-3.0   TTR:   42.6 % (8.9 mo)   INR used for dosing:   3.7! (12/10/2018)   Warfarin maintenance plan:   5 mg (5 mg x 1) every day   Full warfarin instructions:   5 mg every day   Weekly warfarin total:   35 mg   No change documented:   Lizy Mercado RN   Plan last modified:   Jarek Stephens RN (11/2/2018)   Next INR check:   12/24/2018   Target end date:   Indefinite    Indications    Atrial fibrillation (H) [I48.91] [I48.91]  Long term current use of anticoagulant therapy [Z79.01]             Anticoagulation Episode Summary     INR check location:       Preferred lab:       Send INR reminders to:   [facility]    Comments:         Anticoagulation Care Providers     Provider Role Specialty Phone number    Tad Mendoza DO [facility] [phone]            See the Encounter Report to view Anticoagulation Flowsheet and Dosing Calendar (Go to Encounters tab in chart review, and find the Anticoagulation Therapy Visit)        Lizy Mercado, REJI

## 2018-12-10 NOTE — ADDENDUM NOTE
Addended by: RUSSELL FARLEY on: 12/10/2018 11:05 AM     Modules accepted: Level of Service, SmartSet

## 2018-12-11 ENCOUNTER — TRANSFERRED RECORDS (OUTPATIENT)
Dept: HEALTH INFORMATION MANAGEMENT | Facility: CLINIC | Age: 83
End: 2018-12-11

## 2018-12-11 NOTE — TELEPHONE ENCOUNTER
codeine 30 MG tablet      Last Written Prescription Date:  11/9/2018  Last Fill Quantity: 60 tablet,   # refills: 0  Last Office Visit: 10/29/2018  w/ ERON Mendoza    Future Office visit:       Routing refill request to provider for review/approval because:  Drug not on the [facility], [facility] or [facility] refill protocol or controlled substance

## 2018-12-12 RX ORDER — CODEINE SULFATE 30 MG/1
30 TABLET ORAL 2 TIMES DAILY
Qty: 60 TABLET | Refills: 0 | Status: SHIPPED | OUTPATIENT
Start: 2018-12-12 | End: 2018-12-12

## 2018-12-12 RX ORDER — CODEINE SULFATE 30 MG/1
30 TABLET ORAL 2 TIMES DAILY
Qty: 60 TABLET | Refills: 0 | Status: SHIPPED | OUTPATIENT
Start: 2018-12-12 | End: 2018-12-28

## 2018-12-12 NOTE — TELEPHONE ENCOUNTER
Routing refill request to provider for review/approval because:  Drug not on the [facility] refill protocol      Shannan Sanders RN

## 2018-12-24 ENCOUNTER — ANTICOAGULATION THERAPY VISIT (OUTPATIENT)
Dept: NURSING | Facility: CLINIC | Age: 83
End: 2018-12-24
Payer: COMMERCIAL

## 2018-12-24 DIAGNOSIS — I48.91 ATRIAL FIBRILLATION (H): ICD-10-CM

## 2018-12-24 DIAGNOSIS — Z79.01 LONG TERM CURRENT USE OF ANTICOAGULANT THERAPY: ICD-10-CM

## 2018-12-24 LAB — INR POINT OF CARE: 3.8 (ref 0.86–1.14)

## 2018-12-24 PROCEDURE — 85610 PROTHROMBIN TIME: CPT | Mod: QW

## 2018-12-24 PROCEDURE — 36416 COLLJ CAPILLARY BLOOD SPEC: CPT

## 2018-12-24 PROCEDURE — 99207 ZZC NO CHARGE NURSE ONLY: CPT

## 2018-12-24 NOTE — PROGRESS NOTES
ANTICOAGULATION FOLLOW-UP CLINIC VISIT    Patient Name:  Ashlie Brantley  Date:  12/24/2018  Contact Type:  Face to Face    SUBJECTIVE: has been running high thought she could correct with diet, but has been unsuccessful will make small dose adjustment and recheck.   Patient Findings     Positives:   Unexplained INR or factor level change           OBJECTIVE    INR Protime   Date Value Ref Range Status   12/24/2018 3.8 (A) 0.86 - 1.14 Final       ASSESSMENT / PLAN  INR assessment SUPRA    Recheck INR In: 2 WEEKS    INR Location Clinic      Anticoagulation Summary  As of 12/24/2018    INR goal:   2.0-3.0   TTR:   40.4 % (9.4 mo)   INR used for dosing:   3.8! (12/24/2018)   Warfarin maintenance plan:   5 mg (5 mg x 1) every day   Full warfarin instructions:   12/24: 2.5 mg; 12/26: 2.5 mg; 1/2: 2.5 mg; Otherwise 5 mg every day   Weekly warfarin total:   35 mg   Plan last modified:   Jarek Stephens RN (11/2/2018)   Next INR check:   1/7/2019   Target end date:   Indefinite    Indications    Atrial fibrillation (H) [I48.91] [I48.91]  Long term current use of anticoagulant therapy [Z79.01]             Anticoagulation Episode Summary     INR check location:       Preferred lab:       Send INR reminders to:   [facility]    Comments:         Anticoagulation Care Providers     Provider Role Specialty Phone number    Tad Mendoza CemaudreyDO [facility] [phone]            See the Encounter Report to view Anticoagulation Flowsheet and Dosing Calendar (Go to Encounters tab in chart review, and find the Anticoagulation Therapy Visit)        Lizy Mercado RN

## 2018-12-27 DIAGNOSIS — I48.19 PERSISTENT ATRIAL FIBRILLATION (H): ICD-10-CM

## 2018-12-27 DIAGNOSIS — E78.5 HYPERLIPIDEMIA LDL GOAL <130: ICD-10-CM

## 2018-12-27 NOTE — TELEPHONE ENCOUNTER
Please call family and have them contact insurance as to why her codeine is not covered.  She may want to also ask her pulmonary team(it was started for her cough) what the alternative would be.  Thanks  Tad Mendoza D.O.

## 2018-12-27 NOTE — TELEPHONE ENCOUNTER
"Requested Prescriptions   Pending Prescriptions Disp Refills     simvastatin (ZOCOR) 40 MG tablet  Last Written Prescription Date:  12/20/2017  Last Fill Quantity: 90 tablet,  # refills: 3   Last office visit: 1/29/2018 with prescribing provider:  MARIELENA Manley   Future Office Visit:     90 tablet 3     Sig: Take 1 tablet (40 mg) by mouth At Bedtime    Statins Protocol Failed - 12/27/2018  9:12 AM       Failed - LDL on file in past 12 months    Recent Labs   Lab Test 12/13/17  0852   LDL 41            Passed - No abnormal creatine kinase in past 12 months    No lab results found.          Passed - Recent (12 mo) or future (30 days) visit within the authorizing provider's specialty    Patient had office visit in the last 12 months or has a visit in the next 30 days with authorizing provider or within the authorizing provider's specialty.  See \"Patient Info\" tab in inbasket, or \"Choose Columns\" in Meds & Orders section of the refill encounter.           Passed - Patient is age 18 or older       Passed - No active pregnancy on record       Passed - No positive pregnancy test in past 12 months                warfarin (COUMADIN) 5 MG tablet  Last Written Prescription Date:  7/13/2018  Last Fill Quantity: 90 tablet,  # refills: 1   Last office visit: 10/29/2018 with prescribing provider:  ERON Mendoza   Future Office Visit:     90 tablet 1     Sig: Take as directed by [facility]. Current dose is 7.5 mg on Sunday and 5 mg the rest of the week.    Vitamin K Antagonists Failed - 12/27/2018  9:12 AM       Failed - INR is within goal in the past 6 weeks    Confirm INR is within goal in the past 6 weeks.     Recent Labs   Lab Test 12/24/18   INR 3.8*          Passed - Recent (12 mo) or future (30 days) visit within the authorizing provider's specialty    Patient had office visit in the last 12 months or has a visit in the next 30 days with authorizing provider or within the authorizing provider's specialty.  See \"Patient Info\" tab in " "inbasket, or \"Choose Columns\" in Meds & Orders section of the refill encounter.           Passed - Patient is 18 years of age or older       Passed - Patient is not pregnant       Passed - No positive pregnancy on file in past 12 months          "

## 2018-12-28 RX ORDER — WARFARIN SODIUM 5 MG/1
TABLET ORAL
Qty: 90 TABLET | Refills: 3 | Status: SHIPPED | OUTPATIENT
Start: 2018-12-28 | End: 2019-06-17

## 2018-12-28 RX ORDER — SIMVASTATIN 40 MG
40 TABLET ORAL AT BEDTIME
Qty: 30 TABLET | Refills: 0 | Status: SHIPPED | OUTPATIENT
Start: 2018-12-28 | End: 2019-01-28

## 2018-12-28 RX ORDER — CODEINE SULFATE 30 MG/1
30 TABLET ORAL 2 TIMES DAILY
Qty: 60 TABLET | Refills: 0 | Status: SHIPPED | OUTPATIENT
Start: 2018-12-28 | End: 2019-02-15

## 2018-12-28 NOTE — TELEPHONE ENCOUNTER
Refilled med again  Referral also placed.  I don't mind refilling med but if it is not covered not sure what to switch her to. It was originally given by a pulmonologist and needs to be reassessed.  thanks  Tad Mendoza D.O.

## 2018-12-28 NOTE — TELEPHONE ENCOUNTER
Routing to PCP.  Please read message below.    Called patient and she asked me to call her daughter Mariela because her daughter handles all this kind of stuff for her.    Called daughter, Mariela, and she has already called the insurance company about the codeine and they told her that she needs to talk to patients PCP to see what they want to do about the codeine.  Daughter also said that patients pulmonary team does not prescribe any medications and when they have talked to them about specifically the codeine that told patient to get that from her PCP.  I told daughter that I would speak to Dr Mendoza about this.  Daughter said that patient has taken this for years for a cough that she has.    Jayne Porter

## 2018-12-28 NOTE — TELEPHONE ENCOUNTER
Called Mariela, daughter and relayed message & she will call [facility] to schedule and she verbalized understanding.   Miguelina Dunaway RN

## 2018-12-28 NOTE — TELEPHONE ENCOUNTER
Prescription approved for coumadin per [facility] Refill Protocol.  Jarek Watson RN

## 2018-12-28 NOTE — TELEPHONE ENCOUNTER
Route to PCP to advise please.  You had sent a refill of codeine for a month worth of med on 12/12/18.  Daughter Mariela reports that you had said this would be the last refill and would need to be filled by pulmonology, but she states every time they ask, pulmonology defers back to PCP.  She states whenever they make an appointment, it is never the same pulmonologist seeing patient, is always someone different, usually an intern/resident.  She would like a referral to a pulmonologist that can see patient long-term, get to know patient, manage her illness and prescribe.     Migdalia Leslie RN

## 2018-12-28 NOTE — TELEPHONE ENCOUNTER
This was an informational letter that was sent by [facility] stating that patients Codeine Sulfate 30mg will not be on patients formulary coverage starting 1/1/2019.    This is nothing that needs to be completed or faxed back.    Jayne Porter

## 2018-12-28 NOTE — TELEPHONE ENCOUNTER
Flag for anticoagulation RN for warfarin refill.    Patient due for lipids.  Assisted with scheduling lab for 1/7/19, which is when patient has INR checked next, and sent phil on simvastatin.    Migdalia Leslie RN

## 2019-01-02 DIAGNOSIS — E03.4 HYPOTHYROIDISM DUE TO ACQUIRED ATROPHY OF THYROID: ICD-10-CM

## 2019-01-02 NOTE — TELEPHONE ENCOUNTER
"Requested Prescriptions   Pending Prescriptions Disp Refills     levothyroxine (SYNTHROID) 88 MCG tablet  Last Written Prescription Date:  7/10/18  Last Fill Quantity: 90,  # refills: 3   Last office visit: 10/29/2018 with prescribing provider:  Reji   Future Office Visit:     90 tablet 3     Sig: Take 1 tablet (88 mcg) by mouth daily    Thyroid Protocol Passed - 1/2/2019  9:37 AM       Passed - Patient is 12 years or older       Passed - Recent (12 mo) or future (30 days) visit within the authorizing provider's specialty    Patient had office visit in the last 12 months or has a visit in the next 30 days with authorizing provider or within the authorizing provider's specialty.  See \"Patient Info\" tab in inbasket, or \"Choose Columns\" in Meds & Orders section of the refill encounter.             Passed - Normal TSH on file in past 12 months    Recent Labs   Lab Test 04/24/18  1342   TSH 0.42             Passed - No active pregnancy on record    If patient is pregnant or has had a positive pregnancy test, please check TSH.         Passed - No positive pregnancy test in past 12 months    If patient is pregnant or has had a positive pregnancy test, please check TSH.            "

## 2019-01-03 RX ORDER — LEVOTHYROXINE SODIUM 88 UG/1
88 TABLET ORAL DAILY
Qty: 90 TABLET | Refills: 3 | Status: SHIPPED | OUTPATIENT
Start: 2019-01-03 | End: 2020-01-07

## 2019-01-03 NOTE — TELEPHONE ENCOUNTER
Prescription approved per [facility] Refill Protocol.  Jarek Watson RN

## 2019-01-07 ENCOUNTER — TELEPHONE (OUTPATIENT)
Dept: FAMILY MEDICINE | Facility: CLINIC | Age: 84
End: 2019-01-07

## 2019-01-07 ENCOUNTER — ANTICOAGULATION THERAPY VISIT (OUTPATIENT)
Dept: NURSING | Facility: CLINIC | Age: 84
End: 2019-01-07
Payer: COMMERCIAL

## 2019-01-07 DIAGNOSIS — I48.91 ATRIAL FIBRILLATION (H): ICD-10-CM

## 2019-01-07 DIAGNOSIS — Z79.01 LONG TERM CURRENT USE OF ANTICOAGULANT THERAPY: ICD-10-CM

## 2019-01-07 DIAGNOSIS — E78.5 HYPERLIPIDEMIA LDL GOAL <130: ICD-10-CM

## 2019-01-07 LAB — INR POINT OF CARE: 2.9 (ref 0.86–1.14)

## 2019-01-07 PROCEDURE — 36416 COLLJ CAPILLARY BLOOD SPEC: CPT

## 2019-01-07 PROCEDURE — 85610 PROTHROMBIN TIME: CPT | Mod: QW

## 2019-01-07 PROCEDURE — 99207 ZZC NO CHARGE NURSE ONLY: CPT

## 2019-01-07 PROCEDURE — 80061 LIPID PANEL: CPT | Performed by: FAMILY MEDICINE

## 2019-01-07 RX ORDER — WARFARIN SODIUM 2.5 MG/1
TABLET ORAL
Qty: 90 TABLET | Refills: 3 | Status: SHIPPED | OUTPATIENT
Start: 2019-01-07 | End: 2019-06-17

## 2019-01-07 NOTE — TELEPHONE ENCOUNTER
Reason for Call:  Same Day Appointment, Requested Provider:  Tad Mendoza DO    PCP: Tad Mendoza    Reason for visit: Arm Pain    Duration of symptoms: several days    Have you been treated for this in the past? No    Additional comments: Patient stopped by the  to see if Dr Mendoza had any appointments open.  Her 1st available is not until next week. Patient is requesting to be fit into her schedule sometime this week if possible.    Can we leave a detailed message on this number? YES    Phone number patient can be reached at: Home number on file [phone] (home) or Cell number on file:    Telephone Information:   Mobile [phone]       Best Time: any      Call taken on 1/7/2019 at 9:29 AM by Kath Fay

## 2019-01-07 NOTE — LETTER
[address] [address] [phone]                                                                                                January 14, 2019    Ashlie rBantley  [address] [place] [address]        Dear Ms. Brantley,    Your recent lab results were within normal limits. A copy of those results are included with this letter.      Sincerely,      Tad Mendoza DO/hl    Results for orders placed or performed in visit on 01/07/19   Lipid panel reflex to direct LDL Fasting   Result Value Ref Range    Cholesterol 123 <200 mg/dL    Triglycerides 93 <150 mg/dL    HDL Cholesterol 52 >49 mg/dL    LDL Cholesterol Calculated 52 <100 mg/dL    Non HDL Cholesterol 71 <130 mg/dL

## 2019-01-08 LAB
CHOLEST SERPL-MCNC: 123 MG/DL
HDLC SERPL-MCNC: 52 MG/DL
LDLC SERPL CALC-MCNC: 52 MG/DL
NONHDLC SERPL-MCNC: 71 MG/DL
TRIGL SERPL-MCNC: 93 MG/DL

## 2019-01-15 ENCOUNTER — TELEPHONE (OUTPATIENT)
Dept: FAMILY MEDICINE | Facility: CLINIC | Age: 84
End: 2019-01-15

## 2019-01-17 ENCOUNTER — TELEPHONE (OUTPATIENT)
Dept: PULMONOLOGY | Facility: CLINIC | Age: 84
End: 2019-01-17

## 2019-01-17 NOTE — TELEPHONE ENCOUNTER
Spoke with daughter and explained that she should call [facility] and determine if there is something like Codeine that they would cover. Also ask [employer] how much non covered Codeine is. Gave her clinic number to return call to clinic with information.

## 2019-01-17 NOTE — TELEPHONE ENCOUNTER
Health Call Center    Phone Message    May a detailed message be left on voicemail: yes    Reason for Call: Medication Question or concern regarding medication   Prescription Clarification  Name of Medication:Pt's daughter stated its called Codeine,it for pt's cough   Prescribing Provider:    Pharmacy: N/a   What on the order needs clarification? Pt got a letter in the mail from insurance stating that [facility] will no longer cover this medication.Pt is on last refill. Pt's daughter would like a call back to discuss if there is an alternative medication or if something that can be done so pt can receive medication.          Action Taken: Message routed to:  [facility] pulmonology

## 2019-01-18 ENCOUNTER — TELEPHONE (OUTPATIENT)
Dept: PULMONOLOGY | Facility: CLINIC | Age: 84
End: 2019-01-18

## 2019-01-21 NOTE — TELEPHONE ENCOUNTER
Prior Authorization Approval    Authorization Effective Date: 12/22/2018  Authorization Expiration Date: 1/21/2020  Medication: codeine 30 MG tablet-APPROVED  Approved Dose/Quantity:   Reference #:     Insurance Company: [employer] - Phone [phone] Fax [phone]  Expected CoPay:       CoPay Card Available:      Foundation Assistance Needed:    Which Pharmacy is filling the prescription (Not needed for infusion/clinic administered): [facility] - [place], [place] - [address] [place] [address]  Pharmacy Notified: Yes  Patient Notified: No    Pharmacy will notify patient when medication is ready.

## 2019-01-21 NOTE — TELEPHONE ENCOUNTER
Central Prior Authorization Team   Phone: [phone]      PA Initiation    Medication: codeine 30 MG tablet-Initiated  Insurance Company: [employer] - Phone [phone] Fax [phone]  Pharmacy Filling the Rx: [facility] - [place], [place] - [address] [place] [address].  Filling Pharmacy Phone: [phone]  Filling Pharmacy Fax:    Start Date: 1/21/2019

## 2019-01-27 ENCOUNTER — OFFICE VISIT (OUTPATIENT)
Dept: URGENT CARE | Facility: URGENT CARE | Age: 84
End: 2019-01-27
Payer: COMMERCIAL

## 2019-01-27 ENCOUNTER — ANCILLARY PROCEDURE (OUTPATIENT)
Dept: GENERAL RADIOLOGY | Facility: CLINIC | Age: 84
End: 2019-01-27
Payer: COMMERCIAL

## 2019-01-27 VITALS
TEMPERATURE: 98.1 F | SYSTOLIC BLOOD PRESSURE: 139 MMHG | RESPIRATION RATE: 18 BRPM | DIASTOLIC BLOOD PRESSURE: 86 MMHG | OXYGEN SATURATION: 94 % | HEART RATE: 71 BPM

## 2019-01-27 DIAGNOSIS — M54.50 ACUTE LEFT-SIDED LOW BACK PAIN WITHOUT SCIATICA: ICD-10-CM

## 2019-01-27 DIAGNOSIS — M54.50 ACUTE LEFT-SIDED LOW BACK PAIN WITHOUT SCIATICA: Primary | ICD-10-CM

## 2019-01-27 PROCEDURE — 99214 OFFICE O/P EST MOD 30 MIN: CPT | Performed by: NURSE PRACTITIONER

## 2019-01-27 PROCEDURE — 72100 X-RAY EXAM L-S SPINE 2/3 VWS: CPT

## 2019-01-27 RX ORDER — PREDNISONE 20 MG/1
20 TABLET ORAL 2 TIMES DAILY
Qty: 10 TABLET | Refills: 0 | Status: SHIPPED | OUTPATIENT
Start: 2019-01-27 | End: 2019-04-11

## 2019-01-27 NOTE — PATIENT INSTRUCTIONS
Patient Education     Back Pain (Acute or Chronic)    Back pain is one of the most common problems. The good news is that most people feel better in 1 to 2 weeks, and most of the rest in 1 to 2 months. Most people can remain active.  People experience and describe pain differently; not everyone is the same.    The pain can be sharp, stabbing, shooting, aching, cramping or burning.    Movement, standing, bending, lifting, sitting, or walking may worsen pain.    It can be localized to one spot or area, or it can be more generalized.    It can spread or radiate upwards, to the front, or go down your arms or legs (sciatica).    It can cause muscle spasm.  Most of the time, mechanical problems with the muscles or spine cause the pain. Mechanical problems are usually caused by an injury to the muscles or ligaments. While illness can cause back pain, it is usually not caused by a serious illness. Mechanical problems include:     Physical activity such as sports, exercise, work, or normal activity    Overexertion, lifting, pushing, pulling incorrectly or too aggressively    Sudden twisting, bending, or stretching from an accident, or accidental movement    Poor posture    Stretching or moving wrong, without noticing pain at the time    Poor coordination, lack of regular exercise (check with your doctor about this)    Spinal disc disease or arthritis    Stress  Pain can also be related to pregnancy, or illness like appendicitis, bladder or kidney infections, pelvic infections, and many other things.  Acute back pain usually gets better in 1 to 2 weeks. Back pain related to disk disease, arthritis in the spinal joints or spinal stenosis (narrowing of the spinal canal) can become chronic and last for months or years.  Unless you had a physical injury (for example, a car accident or fall) X-rays are usually not needed for the initial evaluation of back pain. If pain continues and does not respond to medical treatment, X-rays  and other tests may be needed.  Home care  Try these home care recommendations:    When in bed, try to find a position of comfort. A firm mattress is best. Try lying flat on your back with pillows under your knees. You can also try lying on your side with your knees bent up towards your chest and a pillow between your knees.    At first, do not try to stretch out the sore spots. If there is a strain, it is not like the good soreness you get after exercising without an injury. In this case, stretching may make it worse.    Avoid prolong sitting, long car rides, or travel. This puts more stress on the lower back than standing or walking.    During the first 24 to 72 hours after an acute injury or flare up of chronic back pain, apply an ice pack to the painful area for 20 minutes and then remove it for 20 minutes. Do this over a period of 60 to 90 minutes or several times a day. This will reduce swelling and pain. Wrap the ice pack in a thin towel or plastic to protect your skin.    You can start with ice, then switch to heat. Heat (hot shower, hot bath, or heating pad) reduces pain and works well for muscle spasms. Heat can be applied to the painful area for 20 minutes then remove it for 20 minutes. Do this over a period of 60 to 90 minutes or several times a day. Do not sleep on a heating pad. It can lead to skin burns or tissue damage.    You can alternate ice and heat therapy. Talk with your doctor about the best treatment for your back pain.    Therapeutic massage can help relax the back muscles without stretching them.    Be aware of safe lifting methods and do not lift anything without stretching first.  Medicines  Talk to your doctor before using medicine, especially if you have other medical problems or are taking other medicines.    You may use over-the-counter medicine as directed on the bottle to control pain, unless another pain medicine was prescribed. If you have chronic conditions like diabetes, liver  or kidney disease, stomach ulcers, or gastrointestinal bleeding, or are taking blood thinners, talk to your doctor before taking any medicine.    Be careful if you are given a prescription medicines, narcotics, or medicine for muscle spasms. They can cause drowsiness, affect your coordination, reflexes, and judgement. Do not drive or operate heavy machinery.  Follow-up care  Follow up with your healthcare provider, or as advised.   A radiologist will review any X-rays that were taken. Your provide will notify you of any new findings that may affect your care.  Call 911  Call emergency services if any of the following occur:    Trouble breathing    Confusion    Very drowsy or trouble awakening    Fainting or loss of consciousness    Rapid or very slow heart rate    Loss of bowel or bladder control  When to seek medical advice  Call your healthcare provider right away if any of these occur:     Pain becomes worse or spreads to your legs    Weakness or numbness in one or both legs    Numbness in the groin or genital area  Date Last Reviewed: 7/1/2016 2000-2017 [employer]. [address] [place] [address]. All rights reserved. This information is not intended as a substitute for professional medical care. Always follow your healthcare professional's instructions.

## 2019-01-27 NOTE — PROGRESS NOTES
prednisSUBJECTIVE  HPI: Ashlie Brantley is a 87 year old female who presents for evaluation of back pain  Symptoms began 1 day(s) ago, have been onset acute and are worse.  Pain is located in the low back region, with radiation to does not radiate, and are at worst a 8 on a scale of 1-10.  Recent injury:none recalled by the patient  Personal hx of back pain is recurrent self limited episodes of low back pain in the past.  Pain is exacerbated by: standing, walking and changing position.  Pain is relieved by: rest  ASSOCIATED sx include: none.  Red flag symptoms: negative.    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     sees [facility]     Cataract     IOL both     DJD (degenerative joint disease)     KNEES     Elevated cholesterol      GERD (gastroesophageal reflux disease)      Hoarseness      HTN (hypertension)      Hypothyroid      Laryngeal dystonia      LVH (left ventricular hypertrophy) due to hypertensive disease      Osteopenia     MILD     Urinary stress incontinence      Current Outpatient Medications   Medication Sig Dispense Refill     ASPIRIN 81 MG OR TABS ONE DAILY 1 Tab 0     CALCIUM + D 600-200 MG-UNIT OR TABS ONE TABLET TWICE A DAY WITH MEALS 0 0     codeine 30 MG tablet Take 1 tablet (30 mg) by mouth 2 times daily 60 tablet 0     furosemide (LASIX) 20 MG tablet Take 20 mg by mouth       levothyroxine (SYNTHROID) 88 MCG tablet Take 1 tablet (88 mcg) by mouth daily 90 tablet 3     losartan (COZAAR) 100 MG tablet Take 1 tablet (100 mg) by mouth daily 90 tablet 3     metoprolol tartrate (LOPRESSOR) 25 MG tablet Take 25 mg by mouth 2 times daily       neomycin-polymyxin-dexamethasone (MAXITROL) 3.5-24600-7.1 OINT ophthalmic ointment Place 1 Application into both eyes At Bedtime Apply a small squirt into each eye at bedtime 1 Tube 6     OMEGA-3 COMPLEX OR 1 capsule daily       order for DME Please provide patient with portable oxygen.  Patient requires 2 LPM oxygen with activity.  Patient  is already on nocturnal oxygen.  Please provide patient with POC as soon as available.  Patient is elderly and will have difficulty managing oxygen tanks. 1 Device 0     order for DME Equipment being ordered: Oxygen 2 liters NC at night time 1 Device 0     order for DME Equipment being ordered: Please provide night time oxygen at 2L/min via nasal canula 1 Units 0     ORDER FOR DME Equipment being ordered: Oxygen 2 liters NC at HS 1 each 0     simvastatin (ZOCOR) 40 MG tablet Take 1 tablet (40 mg) by mouth At Bedtime 30 tablet 0     VITAMIN D 2000 UNIT OR TABS 1 tablet daily 0 0     VITAMIN D3 1000 UNIT OR CAPS 1 CAPSULE DAILY       VITAMIN E COMPLEX PO Take  by mouth.       warfarin (COUMADIN) 2.5 MG tablet Take 2.5 on wed only 5 mg all other days. 90 tablet 3     warfarin (COUMADIN) 5 MG tablet Take as directed by ACC. Current dose is 5 mg everyday 90 tablet 3     warfarin (COUMADIN) 7.5 MG tablet Take 7.5 mg every Sunday. 5mg ROW 14 tablet 3     albuterol (2.5 MG/3ML) 0.083% neb solution Take 1 vial (2.5 mg) by nebulization every 6 hours as needed for shortness of breath / dyspnea or wheezing (Patient not taking: Reported on 11/9/2018) 3 mL 0     albuterol (2.5 MG/3ML) 0.083% neb solution Take 1 vial (2.5 mg) by nebulization every 6 hours as needed for shortness of breath / dyspnea or wheezing (Patient not taking: Reported on 11/9/2018) 1 Box 1     tolterodine (DETROL LA) 4 MG 24 hr capsule Take 1 capsule (4 mg) by mouth daily (Patient not taking: Reported on 11/9/2018) 30 capsule 1     traZODone (DESYREL) 50 MG tablet Take 1 tablet (50 mg) by mouth nightly as needed for sleep (Patient not taking: Reported on 11/9/2018) 30 tablet 1     Social History     Tobacco Use     Smoking status: Never Smoker     Smokeless tobacco: Never Used   Substance Use Topics     Alcohol use: Yes     Alcohol/week: 0.0 oz     Comment: 1-2 drinks/month       ROS:  CONSTITUTIONAL:NEGATIVE for fever, chills, change in  weight  INTEGUMENTARY/SKIN: NEGATIVE for worrisome rashes, moles or lesions  RESP:NEGATIVE for significant cough or SOB  CV: NEGATIVE for chest pain, palpitations or peripheral edema  GI: NEGATIVE for nausea, abdominal pain, heartburn, or change in bowel habits  MUSCULOSKELETAL: POSITIVE for back pain  NEURO: NEGATIVE for weakness, dizziness or paresthesias  ENDOCRINE: NEGATIVE for temperature intolerance, skin/hair changes  HEME/ALLERGY/IMMUNE: NEGATIVE for bleeding problems  PSYCHIATRIC: NEGATIVE for changes in mood or affect    OBJECTIVE:  /86 (BP Location: Left arm, Patient Position: Chair, Cuff Size: Adult Regular)   Pulse 71   Temp 98.1  F (36.7  C) (Oral)   Resp 18   SpO2 94%   Back examination: Back symmetric, no curvature. ROM normal. No CVA tenderness. Tender lower back to palpation more left side  GENERAL APPEARANCE: healthy, alert and no distress  RESP: lungs clear to auscultation - no rales, rhonchi or wheezes  CV: regular rates and rhythm, normal S1 S2, no murmur noted  ABDOMEN:  soft, nontender, no HSM or masses and bowel sounds normal  NEURO: Normal strength and tone with no weakness or sensory deficit noted, reflexes normal   SKIN: no suspicious lesions or rashes    XRAY:  There is a moderate right convexity curvature of the mid to  upper lumbar spine. Vertebral body alignment is otherwise normal with  no fracture or osseous lesion seen. There is degenerative disc disease  at all levels with disc height loss and marginal osteophyte formation.  There is calcification in the vascular structures. There is a surgical  clip in the right upper abdomen. No other abnormality is seen.    ASSESSMENT/IMPRESSION:  (M54.5) Acute left-sided low back pain without sciatica  (primary encounter diagnosis)    Plan: predniSONE (DELTASONE) 20 MG tablet BID X 5 days   ORTHO  REFERRAL ( to follow up if not improving)       Continue stretching and strengthening exercises.    Continue prn heat or ice  application.    MARY Brooks CNP

## 2019-01-28 DIAGNOSIS — E78.5 HYPERLIPIDEMIA LDL GOAL <130: ICD-10-CM

## 2019-01-28 NOTE — TELEPHONE ENCOUNTER
"Requested Prescriptions   Pending Prescriptions Disp Refills     simvastatin (ZOCOR) 40 MG tablet [Pharmacy Med Name: SIMVASTATIN 40MG TABS]  Last Written Prescription Date:  12/28/2018  Last Fill Quantity: 30 tablet,  # refills: 0   Last office visit: 10/29/2018 with prescribing provider:  ERON Mendoza   Future Office Visit:     30 tablet 0     Sig: TAKE ONE TABLET BY MOUTH EVERY NIGHT AT BEDTIME    Statins Protocol Passed - 1/28/2019 10:24 AM       Passed - LDL on file in past 12 months    Recent Labs   Lab Test 01/07/19  0925   LDL 52            Passed - No abnormal creatine kinase in past 12 months    No lab results found.            Passed - Recent (12 mo) or future (30 days) visit within the authorizing provider's specialty    Patient had office visit in the last 12 months or has a visit in the next 30 days with authorizing provider or within the authorizing provider's specialty.  See \"Patient Info\" tab in inbasket, or \"Choose Columns\" in Meds & Orders section of the refill encounter.             Passed - Medication is active on med list       Passed - Patient is age 18 or older       Passed - No active pregnancy on record       Passed - No positive pregnancy test in past 12 months          "

## 2019-01-29 ENCOUNTER — OFFICE VISIT (OUTPATIENT)
Dept: ORTHOPEDICS | Facility: CLINIC | Age: 84
End: 2019-01-29
Payer: COMMERCIAL

## 2019-01-29 VITALS — BODY MASS INDEX: 30.94 KG/M2 | HEIGHT: 58 IN | SYSTOLIC BLOOD PRESSURE: 145 MMHG | DIASTOLIC BLOOD PRESSURE: 83 MMHG

## 2019-01-29 DIAGNOSIS — M54.5 ACUTE LEFT-SIDED LOW BACK PAIN, WITH SCIATICA PRESENCE UNSPECIFIED: Primary | ICD-10-CM

## 2019-01-29 PROCEDURE — 99204 OFFICE O/P NEW MOD 45 MIN: CPT | Performed by: FAMILY MEDICINE

## 2019-01-29 RX ORDER — ACETAMINOPHEN 500 MG
1000 TABLET ORAL EVERY 8 HOURS PRN
Qty: 1 BOTTLE | Refills: 1 | Status: SHIPPED | OUTPATIENT
Start: 2019-01-29

## 2019-01-29 RX ORDER — ACETAMINOPHEN 500 MG
500-1000 TABLET ORAL EVERY 8 HOURS PRN
Qty: 1 BOTTLE | Refills: 1 | Status: SHIPPED | OUTPATIENT
Start: 2019-01-29 | End: 2019-01-29

## 2019-01-29 NOTE — PROGRESS NOTES
ASSESSMENT & PLAN  Ashlie was seen today for pain.    Diagnoses and all orders for this visit:    Acute left-sided low back pain, with sciatica presence unspecified  -     acetaminophen (TYLENOL) 500 MG tablet; Take 2 tablets (1,000 mg) by mouth every 8 hours as needed for mild pain    Other orders  -     Discontinue: acetaminophen (TYLENOL) 500 MG tablet; Take 1-2 tablets (500-1,000 mg) by mouth every 8 hours as needed for mild pain    Pt is an 86 yo f with PMH of A fib on warfarin presenting with a 3 day HO acute left low back pain without radiation.  XR neg for fx but moderate scoliosis and significant degeneration.  No radicular sx.  Etiology likely flare of DDD vs OA.  No red flag symptoms/signs on history or examination.  Pain improved with prednisone.  Given this will recommend Tylenol as well given pt is on blood thinners, heat and brace PRN.  Pt to cont moving as much as possible. Concerning signs/sx that would warrant urgent evaluation were discussed.  All questions were answered, patient understands and agrees with plan.  Pt f/u PRN if not improved or worsened over the next month.    -----    SUBJECTIVE  Ashlie Brantley is a/an 87 year old female who is seen in consultation at the request of  Daniela Contreras C.N.P. for evaluation of low back pain. The patient is seen with their daughter, Malaika.  Pt does water aerobics Tue/Thur to stay active over the past couple of years.     Onset: 1/26/19, 3 day(s) ago. Reports insidious onset without acute precipitating event.  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     sees [facility]     Cataract     IOL both     DJD (degenerative joint disease)     KNEES     Elevated cholesterol      GERD (gastroesophageal reflux disease)      Hoarseness      HTN (hypertension)      Hypothyroid      Laryngeal dystonia      LVH (left ventricular hypertrophy) due to hypertensive disease      Osteopenia     MILD     Urinary stress incontinence      Social History     Socioeconomic History     Marital status:      Spouse name: Not on file     Number of children: 6     Years of education: Not on file     Highest education level: Not on file   Social Needs     Financial resource strain: Not on file     Food insecurity - worry: Not on file     Food insecurity - inability: Not on file     Transportation needs - medical: Not on file     Transportation needs - non-medical: Not on file   Occupational History     Employer: RETIRED   Tobacco Use     Smoking status: Never Smoker     Smokeless tobacco: Never Used   Substance and Sexual Activity     Alcohol use: Yes     Alcohol/week: 0.0 oz     Comment: 1-2 drinks/month     Drug use: No     Sexual activity: Not Currently   Other Topics Concern      Service No     Blood Transfusions No     Caffeine Concern No     Occupational Exposure No     Hobby Hazards No     Sleep Concern No     Stress Concern No     Weight Concern Yes     Special Diet No     Back Care No     Exercise Yes     Comment: a litttle     Bike Helmet Not Asked     Seat Belt Yes     Self-Exams Yes     Parent/sibling w/ CABG, MI or angioplasty before 65F 55M? No   Social History Narrative     Not on file         Patient's past medical, surgical, social, and family histories were reviewed today and no changes are noted.    REVIEW OF SYSTEMS:  10 point ROS is negative other than symptoms noted above in HPI, Past Medical History or as stated below  Constitutional: NEGATIVE for fever, chills,  "change in weight  Skin: NEGATIVE for worrisome rashes, moles or lesions  GI/: NEGATIVE for bowel or bladder changes  Neuro: NEGATIVE for weakness, dizziness or paresthesias    OBJECTIVE:  /83   Ht 1.473 m (4' 9.99\")   BMI 30.94 kg/m     General: healthy, alert and in no distress, pt whispering  HEENT: no scleral icterus or conjunctival erythema  Skin: no suspicious lesions or rash. No jaundice.  CV: no pedal edema  Resp: normal respiratory effort without conversational dyspnea   Psych: normal mood and affect  Gait: normal steady gait with appropriate coordination and balance  Neuro: normal light touch sensory exam of the bilateral lower extremities.  DTR's 2+ patella and achilles bilaterally.  MSK:  THORACIC/LUMBAR SPINE  Inspection:    Right apex scoliolis  Palpation:    Tender about the left para lumbar muscles. Otherwise remainder of landmarks are nontender.  Range of Motion:     Lumbar flexion show full range    Lumbar extension show full range  Strength:    5/5 - quadriceps, hamstrings, tibialis anterior, gastrocsoleus, and extensor hallicus longus  Special Tests:    Positive: None    Negative: slump test (bilateral), FADIR (bilateral)    BILATERAL HIP  Inspection:    No obvious deformity or asymmetry, pelvis level  Palpation:    Nontender.  Active Range of Motion:     Flexion show full range, IR show full range, ER  show full range  Strength:    Flexion 5/5, adduction 5/5, abduction 5/5  Special Tests:    Positive: None    Negative: Logroll, anterior impingement (FADIR)    Independent visualization of the below image:  No results found for this or any previous visit (from the past 24 hour(s)).  LUMBAR SPINE TWO VIEWS   1/27/2019 9:54 AM     HISTORY: Acute left-sided low back pain without sciatica.     COMPARISON: None.     FINDINGS: There is a moderate right convexity curvature of the mid to  upper lumbar spine. Vertebral body alignment is otherwise normal with  no fracture or osseous lesion seen. " There is degenerative disc disease  at all levels with disc height loss and marginal osteophyte formation.  There is calcification in the vascular structures. There is a surgical  clip in the right upper abdomen. No other abnormality is seen.                                                                      IMPRESSION: Degenerative changes as described above.      SHALOM OLMSTEAD MD    Patient's conditions were thoroughly discussed during today's visit with greater than 50% of the visit spent counseling the patient with total time spent face-to-face with the patient being 30 minutes.    Kj Dowling MD [facility]

## 2019-01-29 NOTE — LETTER
1/29/2019         RE: Ashlie Brantley  [address] [place] [address]        Dear Colleague,    Thank you for referring your patient, Ashlie Brantley, to the [facility]. Please see a copy of my visit note below.    ASSESSMENT & PLAN  Ashlie was seen today for pain.    Diagnoses and all orders for this visit:    Acute left-sided low back pain, with sciatica presence unspecified  -     acetaminophen (TYLENOL) 500 MG tablet; Take 2 tablets (1,000 mg) by mouth every 8 hours as needed for mild pain    Other orders  -     Discontinue: acetaminophen (TYLENOL) 500 MG tablet; Take 1-2 tablets (500-1,000 mg) by mouth every 8 hours as needed for mild pain    Pt is an 86 yo f with PMH of A fib on warfarin presenting with a 3 day HO acute left low back pain without radiation.  XR neg for fx but moderate scoliosis and significant degeneration.  No radicular sx.  Etiology likely flare of DDD vs OA.  No red flag symptoms/signs on history or examination.  Pain improved with prednisone.  Given this will recommend Tylenol as well given pt is on blood thinners, heat and brace PRN.  Pt to cont moving as much as possible. Concerning signs/sx that would warrant urgent evaluation were discussed.  All questions were answered, patient understands and agrees with plan.  Pt f/u PRN if not improved or worsened over the next month.    -----    SUBJECTIVE  Ashlie Brantley is a/an 87 year old female who is seen in consultation at the request of  Daniela Contreras C.N.P. for evaluation of low back pain. The patient is seen with their daughter, Malaika.  Pt does water aerobics Tue/Thur to stay active over the past couple of years.     Onset: 1/26/19, 3 day(s) ago. Reports insidious onset without acute precipitating event.  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     sees [facility]     Cataract     IOL both     DJD (degenerative joint disease)     KNEES     Elevated cholesterol      GERD (gastroesophageal reflux disease)      Hoarseness      HTN (hypertension)      Hypothyroid      Laryngeal dystonia      LVH (left ventricular hypertrophy) due to hypertensive disease      Osteopenia     MILD     Urinary stress incontinence      Social History     Socioeconomic History     Marital status:      Spouse name: Not on file     Number of children: 6     Years of education: Not on file     Highest education level: Not on file   Social Needs     Financial resource strain: Not on file     Food insecurity - worry: Not on file     Food insecurity - inability: Not on file     Transportation needs - medical: Not on file     Transportation needs - non-medical: Not on file   Occupational History     Employer: RETIRED   Tobacco Use     Smoking status: Never Smoker     Smokeless tobacco: Never Used   Substance and Sexual Activity     Alcohol use: Yes     Alcohol/week: 0.0 oz     Comment: 1-2 drinks/month     Drug use: No     Sexual activity: Not Currently   Other Topics Concern      Service No     Blood Transfusions No     Caffeine Concern No     Occupational Exposure No     Hobby Hazards No     Sleep Concern No     Stress Concern No     Weight Concern Yes     Special Diet No     Back Care No     Exercise Yes     Comment: a litttle     Bike Helmet Not Asked     Seat Belt Yes     Self-Exams Yes     Parent/sibling w/ CABG, MI or angioplasty before 65F 55M? No   Social History Narrative     Not on file         Patient's past medical,  "surgical, social, and family histories were reviewed today and no changes are noted.    REVIEW OF SYSTEMS:  10 point ROS is negative other than symptoms noted above in HPI, Past Medical History or as stated below  Constitutional: NEGATIVE for fever, chills, change in weight  Skin: NEGATIVE for worrisome rashes, moles or lesions  GI/: NEGATIVE for bowel or bladder changes  Neuro: NEGATIVE for weakness, dizziness or paresthesias    OBJECTIVE:  /83   Ht 1.473 m (4' 9.99\")   BMI 30.94 kg/m      General: healthy, alert and in no distress, pt whispering  HEENT: no scleral icterus or conjunctival erythema  Skin: no suspicious lesions or rash. No jaundice.  CV: no pedal edema  Resp: normal respiratory effort without conversational dyspnea   Psych: normal mood and affect  Gait: normal steady gait with appropriate coordination and balance  Neuro: normal light touch sensory exam of the bilateral lower extremities.  DTR's 2+ patella and achilles bilaterally.  MSK:  THORACIC/LUMBAR SPINE  Inspection:    Right apex scoliolis  Palpation:    Tender about the left para lumbar muscles. Otherwise remainder of landmarks are nontender.  Range of Motion:     Lumbar flexion show full range    Lumbar extension show full range  Strength:    5/5 - quadriceps, hamstrings, tibialis anterior, gastrocsoleus, and extensor hallicus longus  Special Tests:    Positive: None    Negative: slump test (bilateral), FADIR (bilateral)    BILATERAL HIP  Inspection:    No obvious deformity or asymmetry, pelvis level  Palpation:    Nontender.  Active Range of Motion:     Flexion show full range, IR show full range, ER  show full range  Strength:    Flexion 5/5, adduction 5/5, abduction 5/5  Special Tests:    Positive: None    Negative: Logroll, anterior impingement (FADIR)    Independent visualization of the below image:  No results found for this or any previous visit (from the past 24 hour(s)).  LUMBAR SPINE TWO VIEWS   1/27/2019 9:54 " AM     HISTORY: Acute left-sided low back pain without sciatica.     COMPARISON: None.     FINDINGS: There is a moderate right convexity curvature of the mid to  upper lumbar spine. Vertebral body alignment is otherwise normal with  no fracture or osseous lesion seen. There is degenerative disc disease  at all levels with disc height loss and marginal osteophyte formation.  There is calcification in the vascular structures. There is a surgical  clip in the right upper abdomen. No other abnormality is seen.                                                                      IMPRESSION: Degenerative changes as described above.      SHALOM OLMSTEAD MD    Patient's conditions were thoroughly discussed during today's visit with greater than 50% of the visit spent counseling the patient with total time spent face-to-face with the patient being 30 minutes.    Kj Dowling MD [facility]      Again, thank you for allowing me to participate in the care of your patient.        Sincerely,        Kj Dowling MD

## 2019-01-29 NOTE — PATIENT INSTRUCTIONS
Patient to follow up with Primary Care provider regarding elevated blood pressure.  Patient Education     Back Pain (Acute or Chronic)    Back pain is one of the most common problems. The good news is that most people feel better in 1 to 2 weeks, and most of the rest in 1 to 2 months. Most people can remain active.  People experience and describe pain differently; not everyone is the same.    The pain can be sharp, stabbing, shooting, aching, cramping or burning.    Movement, standing, bending, lifting, sitting, or walking may worsen pain.    It can be localized to one spot or area, or it can be more generalized.    It can spread or radiate upwards, to the front, or go down your arms or legs (sciatica).    It can cause muscle spasm.  Most of the time, mechanical problems with the muscles or spine cause the pain. Mechanical problems are usually caused by an injury to the muscles or ligaments. While illness can cause back pain, it is usually not caused by a serious illness. Mechanical problems include:     Physical activity such as sports, exercise, work, or normal activity    Overexertion, lifting, pushing, pulling incorrectly or too aggressively    Sudden twisting, bending, or stretching from an accident, or accidental movement    Poor posture    Stretching or moving wrong, without noticing pain at the time    Poor coordination, lack of regular exercise (check with your doctor about this)    Spinal disc disease or arthritis    Stress  Pain can also be related to pregnancy, or illness like appendicitis, bladder or kidney infections, pelvic infections, and many other things.  Acute back pain usually gets better in 1 to 2 weeks. Back pain related to disk disease, arthritis in the spinal joints or spinal stenosis (narrowing of the spinal canal) can become chronic and last for months or years.  Unless you had a physical injury (for example, a car accident or fall) X-rays are usually not needed for the initial evaluation  of back pain. If pain continues and does not respond to medical treatment, X-rays and other tests may be needed.  Home care  Try these home care recommendations:    When in bed, try to find a position of comfort. A firm mattress is best. Try lying flat on your back with pillows under your knees. You can also try lying on your side with your knees bent up towards your chest and a pillow between your knees.    At first, do not try to stretch out the sore spots. If there is a strain, it is not like the good soreness you get after exercising without an injury. In this case, stretching may make it worse.    Avoid prolong sitting, long car rides, or travel. This puts more stress on the lower back than standing or walking.    During the first 24 to 72 hours after an acute injury or flare up of chronic back pain, apply an ice pack to the painful area for 20 minutes and then remove it for 20 minutes. Do this over a period of 60 to 90 minutes or several times a day. This will reduce swelling and pain. Wrap the ice pack in a thin towel or plastic to protect your skin.    You can start with ice, then switch to heat. Heat (hot shower, hot bath, or heating pad) reduces pain and works well for muscle spasms. Heat can be applied to the painful area for 20 minutes then remove it for 20 minutes. Do this over a period of 60 to 90 minutes or several times a day. Do not sleep on a heating pad. It can lead to skin burns or tissue damage.    You can alternate ice and heat therapy. Talk with your doctor about the best treatment for your back pain.    Therapeutic massage can help relax the back muscles without stretching them.    Be aware of safe lifting methods and do not lift anything without stretching first.  Medicines  Talk to your doctor before using medicine, especially if you have other medical problems or are taking other medicines.    You may use over-the-counter medicine as directed on the bottle to control pain, unless another  pain medicine was prescribed. If you have chronic conditions like diabetes, liver or kidney disease, stomach ulcers, or gastrointestinal bleeding, or are taking blood thinners, talk to your doctor before taking any medicine.    Be careful if you are given a prescription medicines, narcotics, or medicine for muscle spasms. They can cause drowsiness, affect your coordination, reflexes, and judgement. Do not drive or operate heavy machinery.  Follow-up care  Follow up with your healthcare provider, or as advised.   A radiologist will review any X-rays that were taken. Your provide will notify you of any new findings that may affect your care.  Call 911  Call emergency services if any of the following occur:    Trouble breathing    Confusion    Very drowsy or trouble awakening    Fainting or loss of consciousness    Rapid or very slow heart rate    Loss of bowel or bladder control  When to seek medical advice  Call your healthcare provider right away if any of these occur:     Pain becomes worse or spreads to your legs    Weakness or numbness in one or both legs    Numbness in the groin or genital area  Date Last Reviewed: 7/1/2016 2000-2017 [employer]. [address] [place] [address]. All rights reserved. This information is not intended as a substitute for professional medical care. Always follow your healthcare professional's instructions.

## 2019-01-30 RX ORDER — SIMVASTATIN 40 MG
TABLET ORAL
Qty: 30 TABLET | Refills: 0 | Status: SHIPPED | OUTPATIENT
Start: 2019-01-30 | End: 2019-02-26

## 2019-01-30 NOTE — TELEPHONE ENCOUNTER
Prescription approved per [facility] [facility].  Labs done 1/7/19.    Shannan Sanders RN

## 2019-02-12 DIAGNOSIS — I10 HYPERTENSION GOAL BP (BLOOD PRESSURE) < 140/90: ICD-10-CM

## 2019-02-12 NOTE — TELEPHONE ENCOUNTER
"Requested Prescriptions   Pending Prescriptions Disp Refills     losartan (COZAAR) 100 MG tablet 90 tablet 3     Sig: Take 1 tablet (100 mg) by mouth daily    Angiotensin-II Receptors Failed - 2/12/2019 11:09 AM       Failed - Blood pressure under 140/90 in past 12 months    BP Readings from Last 3 Encounters:   01/29/19 145/83   01/27/19 139/86   11/09/18 126/77                Passed - Recent (12 mo) or future (30 days) visit within the authorizing provider's specialty    Patient had office visit in the last 12 months or has a visit in the next 30 days with authorizing provider or within the authorizing provider's specialty.  See \"Patient Info\" tab in inbasket, or \"Choose Columns\" in Meds & Orders section of the refill encounter.             Passed - Medication is active on med list       Passed - Patient is age 18 or older       Passed - No active pregnancy on record       Passed - Normal serum creatinine on file in past 12 months    Recent Labs   Lab Test 04/24/18  1342   CR 0.77            Passed - Normal serum potassium on file in past 12 months    Recent Labs   Lab Test 04/24/18  1342   POTASSIUM 4.1                   Passed - No positive pregnancy test in past 12 months        Last Written Prescription Date:  12/20/17  Last Fill Quantity: 90,  # refills: 3   Last office visit: 10/29/2018 with prescribing provider:  1/27/19   Future Office Visit:      "

## 2019-02-14 DIAGNOSIS — J84.10 FIBROSIS, LUNG (H): ICD-10-CM

## 2019-02-14 RX ORDER — LOSARTAN POTASSIUM 100 MG/1
100 TABLET ORAL DAILY
Qty: 90 TABLET | Refills: 1 | Status: SHIPPED | OUTPATIENT
Start: 2019-02-14 | End: 2019-08-18

## 2019-02-14 NOTE — TELEPHONE ENCOUNTER
Requested Prescriptions   Pending Prescriptions Disp Refills     codeine 30 MG tablet 60 tablet 0     Sig: Take 1 tablet (30 mg) by mouth 2 times daily    There is no refill protocol information for this order        Last Written Prescription Date:  12/28/18  Last Fill Quantity: 60,  # refills: 0   Last office visit: 10/29/2018 with prescribing provider:  Tad Mendoza     Future Office Visit:

## 2019-02-14 NOTE — TELEPHONE ENCOUNTER
Prescription approved per [facility] Refill Protocol.  Jarek Watson RN

## 2019-02-15 RX ORDER — CODEINE SULFATE 30 MG/1
30 TABLET ORAL 2 TIMES DAILY
Qty: 60 TABLET | Refills: 0 | Status: SHIPPED | OUTPATIENT
Start: 2019-02-15 | End: 2019-03-21

## 2019-02-15 NOTE — TELEPHONE ENCOUNTER
Last month was not covered and we did recommend discussing with pulmonary  If not covered she should discuss with other options of Treatment.   Tad Mendoza D.O.

## 2019-02-18 ENCOUNTER — ANTICOAGULATION THERAPY VISIT (OUTPATIENT)
Dept: NURSING | Facility: CLINIC | Age: 84
End: 2019-02-18
Payer: COMMERCIAL

## 2019-02-18 DIAGNOSIS — Z79.01 LONG TERM CURRENT USE OF ANTICOAGULANT THERAPY: ICD-10-CM

## 2019-02-18 DIAGNOSIS — I48.91 ATRIAL FIBRILLATION (H): ICD-10-CM

## 2019-02-18 LAB
INR POINT OF CARE: 6 (ref 0.86–1.14)
INR PPP: 4.68 (ref 0.86–1.14)

## 2019-02-18 PROCEDURE — 36416 COLLJ CAPILLARY BLOOD SPEC: CPT

## 2019-02-18 PROCEDURE — 85610 PROTHROMBIN TIME: CPT | Mod: QW

## 2019-02-18 PROCEDURE — 99207 ZZC NO CHARGE NURSE ONLY: CPT

## 2019-02-18 PROCEDURE — 85610 PROTHROMBIN TIME: CPT | Performed by: FAMILY MEDICINE

## 2019-02-18 NOTE — PROGRESS NOTES
ANTICOAGULATION FOLLOW-UP CLINIC VISIT    Patient Name:  Ashlie Brantley  Date:  2019  Contact Type:  Face to Face    SUBJECTIVE: increase in pain, inflammation, using ex tylenol and had dose of prednisone.     Patient Findings     Positives:   Change in medications (had 2 weeks of prednisone. has ongoing arm and right hip back pain she was prescribed ex tylenol 1000 mg tid pain is better she will stop taking.), Unexplained INR or factor level change           OBJECTIVE    INR Protime   Date Value Ref Range Status   2019 6.0 (A) 0.86 - 1.14 Final       ASSESSMENT / PLAN  INR assessment SUPRA    Recheck INR In: 3 DAYS    INR Location Clinic      Anticoagulation Summary  As of 2019    INR goal:   2.0-3.0   TTR:   34.6 % (11.2 mo)   INR used for dosin.0! (2019)   Warfarin maintenance plan:   2.5 mg (5 mg x 0.5) every Wed; 5 mg (5 mg x 1) all other days   Full warfarin instructions:   : Hold; : Hold; Otherwise 2.5 mg every Wed; 5 mg all other days   Weekly warfarin total:   32.5 mg   Plan last modified:   Lizy Mercado RN (2019)   Next INR check:   2019   Target end date:   Indefinite    Indications    Atrial fibrillation (H) [I48.91] [I48.91]  Long term current use of anticoagulant therapy [Z79.01]             Anticoagulation Episode Summary     INR check location:       Preferred lab:       Send INR reminders to:   [facility] [place] [facility]    Comments:         Anticoagulation Care Providers     Provider Role Specialty Phone number    RejiFede lynnealberto Chavarria DO [facility] [phone]            See the Encounter Report to view Anticoagulation Flowsheet and Dosing Calendar (Go to Encounters tab in chart review, and find the Anticoagulation Therapy Visit)        Lizy Mercado RN

## 2019-02-22 ENCOUNTER — ANTICOAGULATION THERAPY VISIT (OUTPATIENT)
Dept: NURSING | Facility: CLINIC | Age: 84
End: 2019-02-22
Payer: COMMERCIAL

## 2019-02-22 DIAGNOSIS — Z79.01 LONG TERM CURRENT USE OF ANTICOAGULANT THERAPY: ICD-10-CM

## 2019-02-22 DIAGNOSIS — I48.91 ATRIAL FIBRILLATION (H): ICD-10-CM

## 2019-02-22 LAB — INR POINT OF CARE: 1.5 (ref 0.86–1.14)

## 2019-02-22 PROCEDURE — 99207 ZZC NO CHARGE NURSE ONLY: CPT

## 2019-02-22 PROCEDURE — 85610 PROTHROMBIN TIME: CPT | Mod: QW

## 2019-02-22 PROCEDURE — 36416 COLLJ CAPILLARY BLOOD SPEC: CPT

## 2019-02-22 NOTE — PROGRESS NOTES
ANTICOAGULATION FOLLOW-UP CLINIC VISIT    Patient Name:  Ashlie Brantley  Date:  2019  Contact Type:  Face to Face    SUBJECTIVE:     Patient Findings     Positives:   Intentional hold of therapy (held 2 doses due to prednisone and tylenol doses. Off this now. Will start her usual maintanence dose and recheck INR in 2 weeks.)           OBJECTIVE    INR Protime   Date Value Ref Range Status   2019 1.5 (A) 0.86 - 1.14 Final       ASSESSMENT / PLAN  INR assessment SUB    Recheck INR In: 2 WEEKS    INR Location Clinic      Anticoagulation Summary  As of 2019    INR goal:   2.0-3.0   TTR:   34.4 % (11.4 mo)   INR used for dosin.5! (2019)   Warfarin maintenance plan:   2.5 mg (5 mg x 0.5) every Wed; 5 mg (5 mg x 1) all other days   Full warfarin instructions:   2.5 mg every Wed; 5 mg all other days   Weekly warfarin total:   32.5 mg   No change documented:   Jarek Stephens RN   Plan last modified:   Lizy Mercado RN (2019)   Next INR check:   3/8/2019   Target end date:   Indefinite    Indications    Atrial fibrillation (H) [I48.91] [I48.91]  Long term current use of anticoagulant therapy [Z79.01]             Anticoagulation Episode Summary     INR check location:       Preferred lab:       Send INR reminders to:   [facility] [place] [facility]    Comments:         Anticoagulation Care Providers     Provider Role Specialty Phone number    Tad Mendoza DO [facility] [phone]            See the Encounter Report to view Anticoagulation Flowsheet and Dosing Calendar (Go to Encounters tab in chart review, and find the Anticoagulation Therapy Visit)    Dosage adjustment made based on physician directed care plan.    Jarek Stephens, REJI

## 2019-02-26 DIAGNOSIS — E78.5 HYPERLIPIDEMIA LDL GOAL <130: ICD-10-CM

## 2019-02-26 RX ORDER — SIMVASTATIN 40 MG
TABLET ORAL
Qty: 90 TABLET | Refills: 2 | Status: SHIPPED | OUTPATIENT
Start: 2019-02-26 | End: 2019-12-13

## 2019-02-26 NOTE — TELEPHONE ENCOUNTER
"Requested Prescriptions   Pending Prescriptions Disp Refills     simvastatin (ZOCOR) 40 MG tablet [Pharmacy Med Name: SIMVASTATIN 40MG TABS]  Last Written Prescription Date:  1/30/2019  Last Fill Quantity: 30 tablet,  # refills: 0   Last office visit: 10/29/2018 with prescribing provider:  ERON Mendoza   Future Office Visit:     30 tablet 0     Sig: TAKE ONE TABLET BY MOUTH EVERY NIGHT AT BEDTIME    [facility] Protocol Passed - 2/26/2019 12:34 PM       Passed - LDL on file in past 12 months    Recent Labs   Lab Test 01/07/19  0925   LDL 52            Passed - No abnormal creatine kinase in past 12 months    No lab results found.            Passed - Recent (12 mo) or future (30 days) visit within the authorizing provider's specialty    Patient had office visit in the last 12 months or has a visit in the next 30 days with authorizing provider or within the authorizing provider's specialty.  See \"Patient Info\" tab in inbasket, or \"Choose Columns\" in Meds & Orders section of the refill encounter.             Passed - Medication is active on med list       Passed - Patient is age 18 or older       Passed - No active pregnancy on record       Passed - No positive pregnancy test in past 12 months          "

## 2019-02-26 NOTE — TELEPHONE ENCOUNTER
Prescription approved per [facility] Refill Protocol.  Lizy Mercado,Clinic Rn  [facility] [place]

## 2019-03-08 ENCOUNTER — ANTICOAGULATION THERAPY VISIT (OUTPATIENT)
Dept: NURSING | Facility: CLINIC | Age: 84
End: 2019-03-08
Payer: COMMERCIAL

## 2019-03-08 DIAGNOSIS — I48.91 ATRIAL FIBRILLATION (H): ICD-10-CM

## 2019-03-08 DIAGNOSIS — Z79.01 LONG TERM CURRENT USE OF ANTICOAGULANT THERAPY: ICD-10-CM

## 2019-03-08 LAB — INR POINT OF CARE: 2.2 (ref 0.86–1.14)

## 2019-03-08 PROCEDURE — 85610 PROTHROMBIN TIME: CPT | Mod: QW

## 2019-03-08 PROCEDURE — 36416 COLLJ CAPILLARY BLOOD SPEC: CPT

## 2019-03-08 PROCEDURE — 99207 ZZC NO CHARGE NURSE ONLY: CPT

## 2019-03-08 NOTE — PROGRESS NOTES
ANTICOAGULATION FOLLOW-UP CLINIC VISIT    Patient Name:  Ashlie Brantley  Date:  3/8/2019  Contact Type:  Face to Face    SUBJECTIVE:     Patient Findings     Positives:   No Problem Findings           OBJECTIVE    INR Protime   Date Value Ref Range Status   2019 2.2 (A) 0.86 - 1.14 Final       ASSESSMENT / PLAN  INR assessment THER    Recheck INR In: 4 WEEKS    INR Location Clinic      Anticoagulation Summary  As of 3/8/2019    INR goal:   2.0-3.0   TTR:   34.2 % (11.8 mo)   INR used for dosin.2 (3/8/2019)   Warfarin maintenance plan:   2.5 mg (5 mg x 0.5) every Wed; 5 mg (5 mg x 1) all other days   Full warfarin instructions:   2.5 mg every Wed; 5 mg all other days   Weekly warfarin total:   32.5 mg   No change documented:   Jarek Stephens RN   Plan last modified:   Lizy Mercado RN (2019)   Next INR check:   2019   Target end date:   Indefinite    Indications    Atrial fibrillation (H) [I48.91] [I48.91]  Long term current use of anticoagulant therapy [Z79.01]             Anticoagulation Episode Summary     INR check location:       Preferred lab:       Send INR reminders to:   [facility]    Comments:         Anticoagulation Care Providers     Provider Role Specialty Phone number    Tad Mendoza DO [facility] [phone]            See the Encounter Report to view Anticoagulation Flowsheet and Dosing Calendar (Go to Encounters tab in chart review, and find the Anticoagulation Therapy Visit)        Jarek Stephens, REJI

## 2019-03-18 ENCOUNTER — TELEPHONE (OUTPATIENT)
Dept: FAMILY MEDICINE | Facility: CLINIC | Age: 84
End: 2019-03-18

## 2019-03-18 DIAGNOSIS — J84.10 FIBROSIS, LUNG (H): ICD-10-CM

## 2019-03-19 NOTE — TELEPHONE ENCOUNTER
codeine 30 MG tablet 60 tablet 0 2/15/2019  No   Sig - Route: Take 1 tablet (30 mg) by mouth 2 times daily - Oral   Class: Local Print   Earliest Fill Date: 2/15/2019   Order: [id number]       Last Office Visit: 10/29/2018  Future Office visit:       Routing refill request to provider for review/approval because:  Drug not on the [facility], P or [facility] refill protocol or controlled substance

## 2019-03-20 NOTE — TELEPHONE ENCOUNTER
"Route refill request for codeine to providers in Dr. Mendoza's absence.  Last clinic visit was 10/29/18 with note as follows:    \"5. Fibrosis, lung (H)  Stable.  Refill codeine today.   - codeine 30 MG tablet; Take 1 tablet (30 mg) by mouth 2 times daily May fill on or after 2/8/18  Dispense: 60 tablet; Refill: 0\"    Then script done on 2/15/19 with note as follows:    \"Last month was not covered and we did recommend discussing with pulmonary  If not covered she should discuss with other options of Treatment.   Tad Mendoza D.O.\"      Migdalia Leslie RN    "

## 2019-03-21 RX ORDER — CODEINE SULFATE 30 MG/1
30 TABLET ORAL 2 TIMES DAILY
Qty: 20 TABLET | Refills: 0 | Status: SHIPPED | OUTPATIENT
Start: 2019-03-21 | End: 2019-03-26

## 2019-03-21 NOTE — TELEPHONE ENCOUNTER
I do not see an updated problem list recommendation for how much / how often to refill. I can do 20 pills - please route to PCP to evaluate / update and decide on further fills.     RX is in team silver completed folder.  Thanks.  Yuan

## 2019-03-21 NOTE — TELEPHONE ENCOUNTER
RX walked to pharmacy next door. Routing encounter to PCP to review.    Thanks!  Monster Medellin

## 2019-03-26 RX ORDER — CODEINE SULFATE 30 MG/1
30 TABLET ORAL 2 TIMES DAILY
Qty: 60 TABLET | Refills: 0 | Status: SHIPPED | OUTPATIENT
Start: 2019-03-26 | End: 2019-04-24

## 2019-03-26 NOTE — TELEPHONE ENCOUNTER
Spoke with patient's daughter, Mariela.  Consent on file.   Below message given to her with understanding.  Appointment with pulmonologist in 2 months.  Instructed her to follow up with them and to call back if needed.    Toñito Garcia RN

## 2019-03-26 NOTE — TELEPHONE ENCOUNTER
We have a discussion where she needs to talk to her pulmonary team about codeine use  as she is taking it for her lung condition(per patient).  Apparently her old pulmonlogist started her on this med and her pcp in the past( Dr falcon ) was filling it.   I would like this forwarded to see if her pulmonary team have alternatives or if they thinks she should be on this med long term to help for cough.   Refilled med for now,  Tad Mendoza D.O.

## 2019-03-26 NOTE — TELEPHONE ENCOUNTER
Dr. Mendoza,     Patient did receive the #20 but is wondering if she will be able to get the full Rx soon?    Dory Guo PharmD, [facility], located within [facility]  Pharmacist Manager   [facility]  [phone]

## 2019-04-02 ENCOUNTER — DOCUMENTATION ONLY (OUTPATIENT)
Dept: OPHTHALMOLOGY | Facility: CLINIC | Age: 84
End: 2019-04-02

## 2019-04-08 ENCOUNTER — TELEPHONE (OUTPATIENT)
Dept: FAMILY MEDICINE | Facility: CLINIC | Age: 84
End: 2019-04-08

## 2019-04-08 ENCOUNTER — ANTICOAGULATION THERAPY VISIT (OUTPATIENT)
Dept: NURSING | Facility: CLINIC | Age: 84
End: 2019-04-08
Payer: COMMERCIAL

## 2019-04-08 DIAGNOSIS — Z79.01 LONG TERM CURRENT USE OF ANTICOAGULANT THERAPY: ICD-10-CM

## 2019-04-08 DIAGNOSIS — I48.91 ATRIAL FIBRILLATION (H): ICD-10-CM

## 2019-04-08 LAB — INR POINT OF CARE: 3.6 (ref 0.86–1.14)

## 2019-04-08 PROCEDURE — 85610 PROTHROMBIN TIME: CPT | Mod: QW

## 2019-04-08 PROCEDURE — 99207 ZZC NO CHARGE NURSE ONLY: CPT

## 2019-04-08 PROCEDURE — 36416 COLLJ CAPILLARY BLOOD SPEC: CPT

## 2019-04-08 NOTE — TELEPHONE ENCOUNTER
Daughter reports patient has had shoulder pain x 3 months due to a possible pulled muscle.  Appointment made for Wednesday.    Toñito Garcia RN

## 2019-04-08 NOTE — TELEPHONE ENCOUNTER
Reason for Call:  Same Day Appointment, Requested Provider:  Tad Mendoza DO    PCP: Tad Mendoza    Reason for visit: Shoulder pain    Duration of symptoms: Several days    Have you been treated for this in the past? Yes    Additional comments: Patient states that the pain is keeping her up at night and she has not been sleeping well.    Can we leave a detailed message on this number? YES    Phone number patient can be reached at: Home number on file [phone] (home)    Best Time: Any     Call taken on 4/8/2019 at 10:42 AM by Kath Fay

## 2019-04-08 NOTE — PROGRESS NOTES
ANTICOAGULATION FOLLOW-UP CLINIC VISIT    Patient Name:  Ashlie Brantley  Date:  4/8/2019  Contact Type:  Face to Face     SUBJECTIVE: taking tylenol 500 -1000 will drcrease her dose while she is taking it for shoulder pain       Patient Findings            OBJECTIVE    INR Protime   Date Value Ref Range Status   04/08/2019 3.6 (A) 0.86 - 1.14 Final       ASSESSMENT / PLAN  INR assessment SUPRA    Recheck INR In: 2 WEEKS    INR Location Clinic      Anticoagulation Summary  As of 4/8/2019    INR goal:   2.0-3.0   TTR:   36.0 % (1.1 y)   INR used for dosing:   3.6! (4/8/2019)   Warfarin maintenance plan:   2.5 mg (5 mg x 0.5) every Wed; 5 mg (5 mg x 1) all other days   Full warfarin instructions:   4/8: 2.5 mg; Otherwise 2.5 mg every Wed; 5 mg all other days   Weekly warfarin total:   32.5 mg   Plan last modified:   Lizy Mercado RN (1/7/2019)   Next INR check:   4/22/2019   Target end date:   Indefinite    Indications    Atrial fibrillation (H) [I48.91] [I48.91]  Long term current use of anticoagulant therapy [Z79.01]             Anticoagulation Episode Summary     INR check location:       Preferred lab:       Send INR reminders to:   [facility]    Comments:         Anticoagulation Care Providers     Provider Role Specialty Phone number    Tad Mendoza  [facility] [phone]            See the Encounter Report to view Anticoagulation Flowsheet and Dosing Calendar (Go to Encounters tab in chart review, and find the Anticoagulation Therapy Visit)        Lizy Mercado RN

## 2019-04-10 ENCOUNTER — ANCILLARY PROCEDURE (OUTPATIENT)
Dept: GENERAL RADIOLOGY | Facility: CLINIC | Age: 84
End: 2019-04-10
Attending: NURSE PRACTITIONER
Payer: COMMERCIAL

## 2019-04-10 ENCOUNTER — OFFICE VISIT (OUTPATIENT)
Dept: FAMILY MEDICINE | Facility: CLINIC | Age: 84
End: 2019-04-10
Payer: COMMERCIAL

## 2019-04-10 VITALS
TEMPERATURE: 97.6 F | SYSTOLIC BLOOD PRESSURE: 136 MMHG | HEART RATE: 69 BPM | WEIGHT: 151.8 LBS | HEIGHT: 58 IN | OXYGEN SATURATION: 96 % | BODY MASS INDEX: 31.87 KG/M2 | DIASTOLIC BLOOD PRESSURE: 74 MMHG

## 2019-04-10 DIAGNOSIS — I10 HYPERTENSION GOAL BP (BLOOD PRESSURE) < 140/90: ICD-10-CM

## 2019-04-10 DIAGNOSIS — E03.9 HYPOTHYROIDISM, UNSPECIFIED TYPE: ICD-10-CM

## 2019-04-10 DIAGNOSIS — M25.512 LEFT SHOULDER PAIN, UNSPECIFIED CHRONICITY: Primary | ICD-10-CM

## 2019-04-10 DIAGNOSIS — J84.112 IPF (IDIOPATHIC PULMONARY FIBROSIS) (H): ICD-10-CM

## 2019-04-10 DIAGNOSIS — M25.512 LEFT SHOULDER PAIN, UNSPECIFIED CHRONICITY: ICD-10-CM

## 2019-04-10 PROCEDURE — 99214 OFFICE O/P EST MOD 30 MIN: CPT | Performed by: NURSE PRACTITIONER

## 2019-04-10 PROCEDURE — 80048 BASIC METABOLIC PNL TOTAL CA: CPT | Performed by: NURSE PRACTITIONER

## 2019-04-10 PROCEDURE — 84443 ASSAY THYROID STIM HORMONE: CPT | Performed by: NURSE PRACTITIONER

## 2019-04-10 PROCEDURE — 73030 X-RAY EXAM OF SHOULDER: CPT | Mod: LT

## 2019-04-10 PROCEDURE — 36415 COLL VENOUS BLD VENIPUNCTURE: CPT | Performed by: NURSE PRACTITIONER

## 2019-04-10 ASSESSMENT — MIFFLIN-ST. JEOR: SCORE: 1008.31

## 2019-04-10 NOTE — PROGRESS NOTES
SUBJECTIVE:   Ashlie Brantley is a 88 year old female who presents to clinic today for the following   health issues:      Left Shoulder Pain    Onset: 3 months    Description:   Location: left shoulder-- worst at night time when laying down  Character: Sharp and Dull ache    Intensity: mild, moderate, severe    Progression of Symptoms: same    Accompanying Signs & Symptoms:  Other symptoms: none    History:   Previous similar pain: no       Precipitating factors:   Trauma or overuse: YES- pulling a chair    Alleviating factors:  Improved by: rest/inactivity    Therapies Tried and outcome: heat and ice-- no relief  Overhead reach is worse.  No known injuries.    Interstitial lung disease:  Patient/daughter tell me that Dr. Mendoza wants them to talk to her pulmonary team about codeine use as she is taking it for her lung condition (per patient).  Apparently her old Pulmonlogist started her on this med and her PCP in the past (Dr. Manley) was filling it.   PCP would like patient to ask her pulmonary team have alternatives or if they thinks she should be on this med long term to help for cough.     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     Hoarseness     Laryngeal dystonia     Spasmodic dysphonia     Hyperlipidemia LDL goal <130     Anxiety     Hypertension goal BP (blood pressure) < 140/90     LVH (left ventricular hypertrophy)     Advance Care Planning     Cough     Dermatochalasis     Health Care Home     Trochanteric bursitis - bilateral     PVD (posterior vitreous detachment) OU     Pseudophakia, OU     IPF (idiopathic pulmonary fibrosis) (H)     Hypothyroidism  due to acquired atrophy of thyroid     Chronic pain syndrome     Urinary incontinence, unspecified type     Atrial fibrillation (H) [I48.91]     Long term current use of anticoagulant therapy     Ear bleeding, right     Past Surgical History:   Procedure Laterality Date     C NONSPECIFIC PROCEDURE      BACK SURGERY     C NONSPECIFIC PROCEDURE      VOCAL CORD POLYP     C SHOULDER SURG PROC UNLISTED       C STOMACH SURGERY PROCEDURE UNLISTED       C TOTAL KNEE ARTHROPLASTY      LT 1998  /  RT 2001     CATARACT IOL, RT/LT       HC REMOVAL GALLBLADDER       HERNIA REPAIR, INGUINAL RT/LT      RT     HYSTERECTOMY, CERVIX STATUS UNKNOWN      DUB     PHACOEMULSIFICATION WITH STANDARD INTRAOCULAR LENS IMPLANT  Rt 6/11/09, Lt 8/3/09    both eyes Dr. Barnett     ROTATOR CUFF REPAIR RT/LT      RT       Social History     Tobacco Use     Smoking status: Never Smoker     Smokeless tobacco: Never Used   Substance Use Topics     Alcohol use: Yes     Alcohol/week: 0.0 oz     Comment: 1-2 drinks/month     Family History   Problem Relation Age of Onset     Diabetes Mother      C.A.D. Father      Cerebrovascular Disease Brother          Current Outpatient Medications   Medication Sig Dispense Refill     acetaminophen (TYLENOL) 500 MG tablet Take 2 tablets (1,000 mg) by mouth every 8 hours as needed for mild pain 1 Bottle 1     albuterol (2.5 MG/3ML) 0.083% neb solution Take 1 vial (2.5 mg) by nebulization every 6 hours as needed for shortness of breath / dyspnea or wheezing 3 mL 0     albuterol (2.5 MG/3ML) 0.083% neb solution Take 1 vial (2.5 mg) by nebulization every 6 hours as needed for shortness of breath / dyspnea or wheezing 1 Box 1     CALCIUM + D 600-200 MG-UNIT OR TABS ONE TABLET TWICE A DAY WITH MEALS 0 0     codeine 30 MG tablet Take 1 tablet (30 mg) by mouth 2 times daily 60 tablet 0     furosemide (LASIX) 20 MG tablet Take 20 mg by mouth       levothyroxine (SYNTHROID) 88 MCG tablet Take 1 tablet (88 mcg) by mouth daily 90  tablet 3     losartan (COZAAR) 100 MG tablet Take 1 tablet (100 mg) by mouth daily 90 tablet 1     metoprolol tartrate (LOPRESSOR) 25 MG tablet Take 25 mg by mouth 2 times daily       neomycin-polymyxin-dexamethasone (MAXITROL) 3.5-05464-4.1 OINT ophthalmic ointment Place 1 Application into both eyes At Bedtime Apply a small squirt into each eye at bedtime 1 Tube 6     OMEGA-3 COMPLEX OR 1 capsule daily       order for DME Please provide patient with portable oxygen.  Patient requires 2 LPM oxygen with activity.  Patient is already on nocturnal oxygen.  Please provide patient with POC as soon as available.  Patient is elderly and will have difficulty managing oxygen tanks. 1 Device 0     order for DME Equipment being ordered: Oxygen 2 liters NC at night time 1 Device 0     order for DME Equipment being ordered: Please provide night time oxygen at 2L/min via nasal canula 1 Units 0     ORDER FOR DME Equipment being ordered: Oxygen 2 liters NC at HS 1 each 0     simvastatin (ZOCOR) 40 MG tablet TAKE ONE TABLET BY MOUTH EVERY NIGHT AT BEDTIME 90 tablet 2     tolterodine (DETROL LA) 4 MG 24 hr capsule Take 1 capsule (4 mg) by mouth daily 30 capsule 1     traZODone (DESYREL) 50 MG tablet Take 1 tablet (50 mg) by mouth nightly as needed for sleep 30 tablet 1     VITAMIN D 2000 UNIT OR TABS 1 tablet daily 0 0     VITAMIN D3 1000 UNIT OR CAPS 1 CAPSULE DAILY       VITAMIN E COMPLEX PO Take  by mouth.       warfarin (COUMADIN) 2.5 MG tablet Take 2.5 on wed only 5 mg all other days. 90 tablet 3     warfarin (COUMADIN) 5 MG tablet Take as directed by [facility]. Current dose is 5 mg everyday 90 tablet 3     warfarin (COUMADIN) 7.5 MG tablet Take 7.5 mg every Sunday. 5mg ROW 14 tablet 3     Allergies   Allergen Reactions     Nkda [No Known Drug Allergies]      BP Readings from Last 3 Encounters:   04/10/19 136/74   01/29/19 145/83   01/27/19 139/86    Wt Readings from Last 3 Encounters:   04/10/19 68.9 kg (151 lb 12.8 oz)   11/09/18  "67.1 kg (148 lb)   11/06/18 67.1 kg (148 lb)                    ROS:  Constitutional, HEENT, cardiovascular, pulmonary, gi and gu systems are negative, except as otherwise noted.    OBJECTIVE:     /74 (BP Location: Right arm, Patient Position: Sitting, Cuff Size: Adult Regular)   Pulse 69   Temp 97.6  F (36.4  C) (Oral)   Ht 1.473 m (4' 10\")   Wt 68.9 kg (151 lb 12.8 oz)   SpO2 96%   BMI 31.73 kg/m    Body mass index is 31.73 kg/m .  GENERAL: healthy, alert, no distress and over weight  RESP: decreased breath sounds throughout  CV: regular rate and rhythm, normal S1 S2, no S3 or S4, no murmur, click or rub, no peripheral edema and peripheral pulses strong  MS: left shoulder:  Tenderness to.  Decreased ROM due to pain, particularly abduction, internal rotation.  Patient not able to actively abduct past 90 degrees.    Diagnostic Test Results:  Left Shoulder X-ray - radiologist read   \"FINDINGS: There is no significant degenerative change. The acromioclavicular and coracoclavicular distances appear within normal  limits. The subacromial space is maintained. There is no acute fracture or demonstrated dislocation. There are no worrisome bony lesions.     ASSESSMENT/PLAN:     1. Left shoulder pain, unspecified chronicity  Differentials:  Adhesive capsulitis  - XR Shoulder Left 2 Views; Future  - [facility] PT, HAND, AND CHIROPRACTIC REFERRAL; Future  - Orthopedic consult recommended but patient declined at this time.  She prefers to try Physical Therapy.  - If not improving, follow up    2. Hypothyroidism, unspecified type  Chronic, stable, continue current treatment.  - TSH with free T4 reflex    3. Hypertension goal BP (blood pressure) < 140/90  Chronic, stable, continue current treatment.  - Basic metabolic panel  (Ca, Cl, CO2, Creat, Gluc, K, Na, BUN)    4. IPF (idiopathic pulmonary fibrosis) (H)  Per PCP, patient needs to talk to her pulmonary team about chronic codeine use as she is taking it for her lung " condition (per patient).  Apparently her old Pulmonlogist started her on this med and her PCP in the past (Dr. Manley) was filling it.  To discuss with Pulmonary team if there are any have alternatives or if they think she should be on this med long term to help for cough.   - Patient scheduled with Dr. Chadwick Miner, NP  [facility]

## 2019-04-10 NOTE — Clinical Note
Dear Dr. Genao,This patient is seeing you 5/10/19 and patient to discuss with you any alternatives to treat cough in the context of ILD, or if you'd advise her to use the codeine chronically.Anay Miner, DNP, APRN, CNP

## 2019-04-11 LAB
ANION GAP SERPL CALCULATED.3IONS-SCNC: 3 MMOL/L (ref 3–14)
BUN SERPL-MCNC: 24 MG/DL (ref 7–30)
CALCIUM SERPL-MCNC: 9.4 MG/DL (ref 8.5–10.1)
CHLORIDE SERPL-SCNC: 101 MMOL/L (ref 94–109)
CO2 SERPL-SCNC: 35 MMOL/L (ref 20–32)
CREAT SERPL-MCNC: 0.8 MG/DL (ref 0.52–1.04)
GFR SERPL CREATININE-BSD FRML MDRD: 66 ML/MIN/{1.73_M2}
GLUCOSE SERPL-MCNC: 99 MG/DL (ref 70–99)
POTASSIUM SERPL-SCNC: 4.2 MMOL/L (ref 3.4–5.3)
SODIUM SERPL-SCNC: 139 MMOL/L (ref 133–144)
TSH SERPL DL<=0.005 MIU/L-ACNC: 0.9 MU/L (ref 0.4–4)

## 2019-04-16 ENCOUNTER — THERAPY VISIT (OUTPATIENT)
Dept: PHYSICAL THERAPY | Facility: CLINIC | Age: 84
End: 2019-04-16
Payer: COMMERCIAL

## 2019-04-16 DIAGNOSIS — M25.512 LEFT SHOULDER PAIN: ICD-10-CM

## 2019-04-16 PROCEDURE — 97110 THERAPEUTIC EXERCISES: CPT | Mod: GP | Performed by: PHYSICAL THERAPIST

## 2019-04-16 PROCEDURE — 97530 THERAPEUTIC ACTIVITIES: CPT | Mod: GP | Performed by: PHYSICAL THERAPIST

## 2019-04-16 PROCEDURE — 97161 PT EVAL LOW COMPLEX 20 MIN: CPT | Mod: GP | Performed by: PHYSICAL THERAPIST

## 2019-04-16 NOTE — PROGRESS NOTES
[facility] Initial Evaluation  Subjective:    Ashlie Brantley is a 88 year old female with a left shoulder condition.  Occurance: pulling a chair out from a table.  Condition occurred: for unknown reasons.  This is a chronic condition  Patient has spasmotic dysphonia and used an Ipad to write on to communicate. Patient reports onset of left shoulder pain in January 2019 after pulling a chair out from a table. Patient reports an immediate pain in her left shoulder.    Patient reports pain:  Anterior.    Pain is described as aching and is constant and reported as 2/10.  Associated symptoms:  Loss of strength and loss of motion/stiffness. Pain is worse during the day.  Symptoms are exacerbated by carrying, lifting, lying on extremity, using arm behind back, using arm at shoulder level and using arm overhead and relieved by rest.  Since onset symptoms are gradually improving.  Special tests:  X-ray (see epic).      General health as reported by patient is good.                      Red flags:  None as reported by the patient.                        Objective:  System                   Shoulder Evaluation:  ROM:  AROM:    Flexion:  Left:  110 deg, painful      Extension: Left: WNL  Abduction:  Left: 98 deg, painful     Adduction:   Left:  WNL    Internal Rotation:  Left:  WNL, no pain      External Rotation:  Left:  WNL, no pain                          Strength:  : grossly 4-/5 on left with significant pain reported into resisted left shoulder abduction and flexion.                      Stability Testing:  normal      Special Tests:    Left shoulder positive for the following special tests:  Impingement and Rotator cuff tear    Palpation:    Left shoulder tenderness present at:  Supraspinatus and Bicipital Groove    Mobility Tests:  normal                                                 General     ROS    Assessment/Plan:    Patient is a 88 year old female with left side shoulder complaints.     Patient has the following significant findings with corresponding treatment plan.                Diagnosis 1:  Left shoulder pain  Pain -  hot/cold therapy, manual therapy, self management, education and home program  Decreased ROM/flexibility - manual therapy, therapeutic exercise, therapeutic activity and home program  Decreased strength - therapeutic exercise, therapeutic activities and home program  Decreased function - therapeutic activities and home program    Therapy Evaluation Codes:   1) History comprised of:   Personal factors that impact the plan of care:      None.    Comorbidity factors that impact the plan of care are:      None.     Medications impacting care: None.  2) Examination of Body Systems comprised of:   Body structures and functions that impact the plan of care:      Shoulder.   Activity limitations that impact the plan of care are:      Bathing, Dressing, Lifting, Sleeping and Laying down.  3) Clinical presentation characteristics are:   Stable/Uncomplicated.  4) Decision-Making    Low complexity using standardized patient assessment instrument and/or measureable assessment of functional outcome.  Cumulative Therapy Evaluation is: Low complexity.    Previous and current functional limitations:  (See Goal Flow Sheet for this information)    Short term and Long term goals: (See Goal Flow Sheet for this information)     Communication ability:  Patient appears to be able to clearly communicate and understand verbal and written communication and follow directions correctly.  Treatment Explanation - The following has been discussed with the patient:   RX ordered/plan of care  Anticipated outcomes  Possible risks and side effects  This patient would benefit from PT intervention to resume normal activities.   Rehab potential is good.    Frequency:  1 X week, once daily  Duration:  for 6 weeks  Discharge Plan:  Achieve all LTG.  Independent in home treatment program.  Reach maximal therapeutic  benefit.    Please refer to the daily flowsheet for treatment today, total treatment time and time spent performing 1:1 timed codes.

## 2019-04-16 NOTE — PROGRESS NOTES
[facility] Initial Evaluation  Subjective:                                     General health as reported by patient is good.  Pertinent medical history includes:  High blood pressure, thyroid problems and other.  Medical allergies: no.  Other surgeries include:  Orthopedic surgery.  Current medications:  High blood pressure medication.  Current occupation is retired.                                    Objective:  System    Physical Exam    General     ROS    Assessment/Plan:

## 2019-04-16 NOTE — LETTER
YI FONTANA PT  [address]  [address]  [place] [address]  [phone]    2019    Re: Ashlie Brantley   :   1931  MRN:  [id number]   REFERRING PHYSICIAN:   ANAI Wade PT  Date of Initial Evaluation:  2019  Visits:  Rxs Used: 1  Reason for Referral:  Left shoulder pain    EVALUATION SUMMARY    [facility] Initial Evaluation  Subjective:  Ashlie Brantley is a 88 year old female with a left shoulder condition.  Occurance: pulling a chair out from a table.  Condition occurred: for unknown reasons.  This is a chronic condition  Patient has spasmotic dysphonia and used an Ipad to write on to communicate. Patient reports onset of left shoulder pain in 2019 after pulling a chair out from a table. Patient reports an immediate pain in her left shoulder.    Patient reports pain:  Anterior.  Pain is described as aching and is constant and reported as 2/10.  Associated symptoms:  Loss of strength and loss of motion/stiffness. Pain is worse during the day.  Symptoms are exacerbated by carrying, lifting, lying on extremity, using arm behind back, using arm at shoulder level and using arm overhead and relieved by rest.  Since onset symptoms are gradually improving.  Special tests:  X-ray (see epic).  General health as reported by patient is good.                  Red flags:  None as reported by the patient.    Objective:  Shoulder Evaluation:  ROM:  AROM:    Flexion:  Left:  110 deg, painful      Extension: Left: WNL  Abduction:  Left: 98 deg, painful     Adduction:   Left:  WNL    Internal Rotation:  Left:  WNL, no pain      External Rotation:  Left:  WNL, no pain        Strength:  : grossly 4-/5 on left with significant pain reported into resisted left shoulder abduction and flexion.    Stability Testing:  normal    Special Tests:    Left shoulder positive for the following special tests:  Impingement and Rotator cuff tear  Re: Ashlie  Karon   :   1931    Palpation:    Left shoulder tenderness present at:  Supraspinatus and Bicipital Groove    Mobility Tests:  normal    Assessment/Plan:    Patient is a 88 year old female with left side shoulder complaints.    Patient has the following significant findings with corresponding treatment plan.                Diagnosis 1:  Left shoulder pain  Pain -  hot/cold therapy, manual therapy, self management, education and home program  Decreased ROM/flexibility - manual therapy, therapeutic exercise, therapeutic activity and home program  Decreased strength - therapeutic exercise, therapeutic activities and home program  Decreased function - therapeutic activities and home program    Therapy Evaluation Codes:   1) History comprised of:   Personal factors that impact the plan of care:      None.    Comorbidity factors that impact the plan of care are:      None.     Medications impacting care: None.  2) Examination of Body Systems comprised of:   Body structures and functions that impact the plan of care:      Shoulder.   Activity limitations that impact the plan of care are:      Bathing, Dressing, Lifting, Sleeping and Laying down.  3) Clinical presentation characteristics are:   Stable/Uncomplicated.  4) Decision-Making    Low complexity using standardized patient assessment instrument and/or measureable assessment of functional outcome.  Cumulative Therapy Evaluation is: Low complexity.    Previous and current functional limitations:  (See Goal Flow Sheet for this information)    Short term and Long term goals: (See Goal Flow Sheet for this information)     Communication ability:  Patient appears to be able to clearly communicate and understand verbal and written communication and follow directions correctly.  Treatment Explanation - The following has been discussed with the patient:   RX ordered/plan of care  Anticipated outcomes  Possible risks and side effects  This patient would benefit from  PT intervention to resume normal activities.   Rehab potential is good.            Re: Ashlie Brantley   :   1931    Frequency:  1 X week, once daily  Duration:  for 6 weeks  Discharge Plan:  Achieve all LTG.  Independent in home treatment program.  Reach maximal therapeutic benefit.    Please refer to the daily flowsheet for treatment today, total treatment time and time spent performing 1:1 timed codes.       Thank you for your referral.    INQUIRIES  Therapist: SAGE Davidson PT  [address]  [address] [facility]  [place] [address]  Phone: [phone]  Fax: [phone]

## 2019-04-22 ENCOUNTER — ANTICOAGULATION THERAPY VISIT (OUTPATIENT)
Dept: NURSING | Facility: CLINIC | Age: 84
End: 2019-04-22
Payer: COMMERCIAL

## 2019-04-22 DIAGNOSIS — I48.91 ATRIAL FIBRILLATION (H): ICD-10-CM

## 2019-04-22 DIAGNOSIS — Z79.01 LONG TERM CURRENT USE OF ANTICOAGULANT THERAPY: ICD-10-CM

## 2019-04-22 LAB — INR POINT OF CARE: 3.8 (ref 0.86–1.14)

## 2019-04-22 PROCEDURE — 85610 PROTHROMBIN TIME: CPT | Mod: QW

## 2019-04-22 PROCEDURE — 36416 COLLJ CAPILLARY BLOOD SPEC: CPT

## 2019-04-22 PROCEDURE — 99207 ZZC NO CHARGE NURSE ONLY: CPT

## 2019-04-23 ENCOUNTER — TELEPHONE (OUTPATIENT)
Dept: FAMILY MEDICINE | Facility: CLINIC | Age: 84
End: 2019-04-23

## 2019-04-23 DIAGNOSIS — J84.10 FIBROSIS, LUNG (H): ICD-10-CM

## 2019-04-23 NOTE — TELEPHONE ENCOUNTER
codeine 30 MG tablet      Last Written Prescription Date:  3/26/2019  Last Fill Quantity: 60 tablet,   # refills: 0  Last Office Visit: 10/29/2018  w/ ERON Mendoza    Future Office visit:       Routing refill request to provider for review/approval because:  Drug not on the [facility], [facility] or [facility] refill protocol or controlled substance

## 2019-04-24 RX ORDER — CODEINE SULFATE 30 MG/1
30 TABLET ORAL 2 TIMES DAILY
Qty: 60 TABLET | Refills: 0 | Status: SHIPPED | OUTPATIENT
Start: 2019-04-24 | End: 2019-05-10

## 2019-04-24 NOTE — TELEPHONE ENCOUNTER
Patient has an appoint 5/11/19 with pulmonary team  I need her to discuss case with them as this is a medication started for pulmonary reason and her old pcp maintained it, I don't feel comfortable as I am not sure about the indication.  There has been a discussion regarding this for the last few months. I hope it will be resolved prior to my leave in June.  Please call family and relay message, will refill this time until taken over by their team or given a replacement med.  Tad Mendoza D.O.        Message from previous pulmonologist-  René Patel MD Dessie, Tad Chavarria, DO             Yes, we are following her yearly as she doesn't have much for us to do other than follow her symptoms. I think if the codeine is helping her and you are comfortable managing the refills, that would be the best option as she will probably have more contact with you. But if you'd like us to take over, we can certainly do that as well.     Thanks,     Yuan

## 2019-04-25 NOTE — TELEPHONE ENCOUNTER
Spoke with patient's daughter, Mariela.  Consent to communicate on file.      Mariela verbalized understanding and agreed to plan.      Toñito Garcia RN

## 2019-04-26 ENCOUNTER — THERAPY VISIT (OUTPATIENT)
Dept: PHYSICAL THERAPY | Facility: CLINIC | Age: 84
End: 2019-04-26
Payer: COMMERCIAL

## 2019-04-26 DIAGNOSIS — M25.512 LEFT SHOULDER PAIN, UNSPECIFIED CHRONICITY: ICD-10-CM

## 2019-04-26 PROCEDURE — 97110 THERAPEUTIC EXERCISES: CPT | Mod: GP | Performed by: PHYSICAL THERAPIST

## 2019-04-29 ENCOUNTER — ANTICOAGULATION THERAPY VISIT (OUTPATIENT)
Dept: NURSING | Facility: CLINIC | Age: 84
End: 2019-04-29
Payer: COMMERCIAL

## 2019-04-29 DIAGNOSIS — Z79.01 LONG TERM CURRENT USE OF ANTICOAGULANT THERAPY: ICD-10-CM

## 2019-04-29 DIAGNOSIS — I48.91 ATRIAL FIBRILLATION (H): ICD-10-CM

## 2019-04-29 LAB — INR POINT OF CARE: 3.4 (ref 0.86–1.14)

## 2019-04-29 PROCEDURE — 36416 COLLJ CAPILLARY BLOOD SPEC: CPT

## 2019-04-29 PROCEDURE — 85610 PROTHROMBIN TIME: CPT | Mod: QW

## 2019-04-29 PROCEDURE — 99207 ZZC NO CHARGE NURSE ONLY: CPT

## 2019-04-29 NOTE — PROGRESS NOTES
ANTICOAGULATION FOLLOW-UP CLINIC VISIT    Patient Name:  Ashlie Brantley  Date:  4/29/2019  Contact Type:  Face to Face    SUBJECTIVE:     Patient Findings            OBJECTIVE    INR Protime   Date Value Ref Range Status   04/29/2019 3.4 (A) 0.86 - 1.14 Final       ASSESSMENT / PLAN  INR assessment SUPRA    Recheck INR In: 2 WEEKS    INR Location Clinic      Anticoagulation Summary  As of 4/29/2019    INR goal:   2.0-3.0   TTR:   34.2 % (1.1 y)   INR used for dosing:   3.4! (4/29/2019)   Warfarin maintenance plan:   5 mg (5 mg x 1) every Tue, Thu, Sat; 2.5 mg (5 mg x 0.5) all other days   Full warfarin instructions:   4/29: Hold; Otherwise 5 mg every Tue, Thu, Sat; 2.5 mg all other days   Weekly warfarin total:   25 mg   Plan last modified:   Lizy Mercado RN (4/29/2019)   Next INR check:   5/13/2019   Target end date:   Indefinite    Indications    Atrial fibrillation (H) [I48.91] [I48.91]  Long term current use of anticoagulant therapy [Z79.01]             Anticoagulation Episode Summary     INR check location:       Preferred lab:       Send INR reminders to:   [facility]    Comments:         Anticoagulation Care Providers     Provider Role Specialty Phone number    Tad Mendoza DO [facility] [phone]            See the Encounter Report to view Anticoagulation Flowsheet and Dosing Calendar (Go to Encounters tab in chart review, and find the Anticoagulation Therapy Visit)        Lizy Mercado RN

## 2019-05-10 ENCOUNTER — OFFICE VISIT (OUTPATIENT)
Dept: PULMONOLOGY | Facility: CLINIC | Age: 84
End: 2019-05-10
Attending: STUDENT IN AN ORGANIZED HEALTH CARE EDUCATION/TRAINING PROGRAM
Payer: COMMERCIAL

## 2019-05-10 VITALS
RESPIRATION RATE: 17 BRPM | SYSTOLIC BLOOD PRESSURE: 113 MMHG | DIASTOLIC BLOOD PRESSURE: 76 MMHG | OXYGEN SATURATION: 95 % | HEIGHT: 58 IN | BODY MASS INDEX: 31.07 KG/M2 | WEIGHT: 148 LBS | HEART RATE: 69 BPM

## 2019-05-10 DIAGNOSIS — J84.10 FIBROSIS, LUNG (H): ICD-10-CM

## 2019-05-10 DIAGNOSIS — J84.112 IPF (IDIOPATHIC PULMONARY FIBROSIS) (H): Primary | ICD-10-CM

## 2019-05-10 PROCEDURE — G0463 HOSPITAL OUTPT CLINIC VISIT: HCPCS | Mod: ZF

## 2019-05-10 RX ORDER — CODEINE SULFATE 30 MG/1
30 TABLET ORAL AT BEDTIME
Qty: 60 TABLET | Refills: 0 | Status: SHIPPED | OUTPATIENT
Start: 2019-05-10 | End: 2019-05-24

## 2019-05-10 ASSESSMENT — MIFFLIN-ST. JEOR: SCORE: 991.07

## 2019-05-10 ASSESSMENT — PAIN SCALES - GENERAL: PAINLEVEL: NO PAIN (0)

## 2019-05-10 NOTE — LETTER
5/10/2019       RE: Ashlie Brantley  [address] [place] [address]     Dear Colleague,    Thank you for referring your patient, Ashlie Brantley, to the [facility] at [facility]. Please see a copy of my visit note below.    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    At her last visit in November, patient had desaturations on room air on 6MWT and was started on 2L/min supplemental oxygen during the day (was already on 2L/min at night). She is using her oxygen about 2-4 hours a day with exercise. Is able to recover faster when using it. No respiratory symptoms over the last 6 months. Is doing 60 min of water aerobic twice a week.    Patient was started on codeine in 11/2011 for ILD associated cough with almost complete resolution of her cough. Has been titrated up to 30mg BID initially and has been maintained on that dose (is taking 60mg at bedtime). Denies any daytime somnolence. No urinary retention or constipation. No confusion. Had not tried anything else for her cough previously.    Lives with her  of 70 years.    10 point ROS performed and negative except for as noted in HPI.         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     sees [facility]     Cataract     IOL both     DJD (degenerative joint disease)     KNEES     Elevated cholesterol      GERD (gastroesophageal reflux disease)      Hoarseness      HTN (hypertension)      Hypothyroid      Laryngeal dystonia      LVH (left ventricular hypertrophy) due to hypertensive disease      Osteopenia     MILD      Urinary stress incontinence              Past Surgical History:      Past Surgical History:   Procedure Laterality Date     C NONSPECIFIC PROCEDURE      BACK SURGERY     C NONSPECIFIC PROCEDURE      VOCAL CORD POLYP     C SHOULDER SURG PROC UNLISTED       C STOMACH SURGERY PROCEDURE UNLISTED       C TOTAL KNEE ARTHROPLASTY      LT 1998  /  RT 2001     CATARACT IOL, RT/LT       HC REMOVAL GALLBLADDER       HERNIA REPAIR, INGUINAL RT/LT      RT     HYSTERECTOMY, CERVIX STATUS UNKNOWN      DUB     PHACOEMULSIFICATION WITH STANDARD INTRAOCULAR LENS IMPLANT  Rt 6/11/09, Lt 8/3/09    both eyes Dr. Barnett     ROTATOR CUFF REPAIR RT/LT      RT             Social History:     Social History     Socioeconomic History     Marital status:      Spouse name: Not on file     Number of children: 6     Years of education: Not on file     Highest education level: Not on file   Occupational History     Employer: RETIRED   Social Needs     Financial resource strain: Not on file     Food insecurity:     Worry: Not on file     Inability: Not on file     Transportation needs:     Medical: Not on file     Non-medical: Not on file   Tobacco Use     Smoking status: Never Smoker     Smokeless tobacco: Never Used   Substance and Sexual Activity     Alcohol use: Yes     Alcohol/week: 0.0 oz     Comment: 1-2 drinks/month     Drug use: No     Sexual activity: Not Currently   Lifestyle     Physical activity:     Days per week: Not on file     Minutes per session: Not on file     Stress: Not on file   Relationships     Social connections:     Talks on phone: Not on file     Gets together: Not on file     Attends Sabianist service: Not on file     Active member of club or organization: Not on file     Attends meetings of clubs or organizations: Not on file     Relationship status: Not on file     Intimate partner violence:     Fear of current or ex partner: Not on file     Emotionally abused: Not on file     Physically abused: Not on file      Forced sexual activity: Not on file   Other Topics Concern      Service No     Blood Transfusions No     Caffeine Concern No     Occupational Exposure No     Hobby Hazards No     Sleep Concern No     Stress Concern No     Weight Concern Yes     Special Diet No     Back Care No     Exercise Yes     Comment: a litttle     Bike Helmet Not Asked     Seat Belt Yes     Self-Exams Yes     Parent/sibling w/ CABG, MI or angioplasty before 65F 55M? No   Social History Narrative     Not on file            Family History:     Family History   Problem Relation Age of Onset     Diabetes Mother      C.A.D. Father      Cerebrovascular Disease Brother              Allergies:     Allergies   Allergen Reactions     Nkda [No Known Drug Allergies]              Medications:     Current Outpatient Medications on File Prior to Visit:  acetaminophen (TYLENOL) 500 MG tablet Take 2 tablets (1,000 mg) by mouth every 8 hours as needed for mild pain   albuterol (2.5 MG/3ML) 0.083% neb solution Take 1 vial (2.5 mg) by nebulization every 6 hours as needed for shortness of breath / dyspnea or wheezing   albuterol (2.5 MG/3ML) 0.083% neb solution Take 1 vial (2.5 mg) by nebulization every 6 hours as needed for shortness of breath / dyspnea or wheezing   CALCIUM + D 600-200 MG-UNIT OR TABS ONE TABLET TWICE A DAY WITH MEALS   codeine 30 MG tablet Take 1 tablet (30 mg) by mouth 2 times daily   furosemide (LASIX) 20 MG tablet Take 20 mg by mouth   levothyroxine (SYNTHROID) 88 MCG tablet Take 1 tablet (88 mcg) by mouth daily   losartan (COZAAR) 100 MG tablet Take 1 tablet (100 mg) by mouth daily   metoprolol tartrate (LOPRESSOR) 25 MG tablet Take 25 mg by mouth 2 times daily   neomycin-polymyxin-dexamethasone (MAXITROL) 3.5-96369-7.1 OINT ophthalmic ointment Place 1 Application into both eyes At Bedtime Apply a small squirt into each eye at bedtime   OMEGA-3 COMPLEX OR 1 capsule daily   order for DME Please provide patient with portable oxygen.   "Patient requires 2 LPM oxygen with activity.  Patient is already on nocturnal oxygen.  Please provide patient with POC as soon as available.  Patient is elderly and will have difficulty managing oxygen tanks.   order for DME Equipment being ordered: Oxygen 2 liters NC at night time   order for DME Equipment being ordered: Please provide night time oxygen at 2L/min via nasal canula   ORDER FOR DME Equipment being ordered: Oxygen 2 liters NC at HS   simvastatin (ZOCOR) 40 MG tablet TAKE ONE TABLET BY MOUTH EVERY NIGHT AT BEDTIME   tolterodine (DETROL LA) 4 MG 24 hr capsule Take 1 capsule (4 mg) by mouth daily   traZODone (DESYREL) 50 MG tablet Take 1 tablet (50 mg) by mouth nightly as needed for sleep   VITAMIN D 2000 UNIT OR TABS 1 tablet daily   VITAMIN D3 1000 UNIT OR CAPS 1 CAPSULE DAILY   VITAMIN E COMPLEX PO Take  by mouth.   warfarin (COUMADIN) 2.5 MG tablet Take 2.5 on wed only 5 mg all other days.   warfarin (COUMADIN) 5 MG tablet Take as directed by [facility]. Current dose is 5 mg everyday   warfarin (COUMADIN) 7.5 MG tablet Take 7.5 mg every Sunday. 5mg ROW     No current facility-administered medications on file prior to visit.            Exam:   /76   Pulse 69   Resp 17   Ht 1.473 m (4' 10\")   Wt 67.1 kg (148 lb)   SpO2 95%   BMI 30.93 kg/m    on RA  General: Sitting in a chair, no acute distress  HEENT: No scleral icterus  Heart: Normal rate, irregular rhythm  Neuro: Answering questions appropriately through notepad. Able to mouth words with minimal sound.   Psych: Normal affect  Ext: No edema         Data:     PFT: 5/10/19           Assessment and Plan:     Ashlie Brantley is a 88 year old female with history of ILD (NSIP) on nocturnal O2, pulmonary HTN, and vocal cord dystonia who presents for followup of ILD associated cough.      NSIP  Found incidentally on cxr/ct following URI in 2011. Previous w/u negative (HP panel, rheum panel, exposure Hx). PFT's have slowly declined previously, but is " stable in the past year. Remains active. CT in 2014 did show 5mm pulm nodule near minor fissure. She has declined to undergo further chest imaging. She is a non-smoker. 6 minute walk test 11/2018 showed desaturation with walking on room air and was started on 2L/min oxygen.  --Patient has been on codeine for the past 7 years with good symptomatic improvement in her cough. Is not having any side effects from taking codeine. Will prescribe current dose 60mg qhs. Can consider adding Tessalon pearls in the future.   --UTD on PPSV23 and PCV13      Vocal Cord Dystonia   She has been extensively evaluated by ENT and speech therapy and has undergone multiple therapies without success.     Patient staffed with Dr. Villegas. Return to clinic in 12 months with PFTs.    Noemi Genao  Pulmonary and Critical Care Fellow    Again, thank you for allowing me to participate in the care of your patient.      Sincerely,    Noemi Genao MD

## 2019-05-10 NOTE — PROGRESS NOTES
[facility]    Name: Ashlie Brantley MRN: [id number]     Age: 88 year old   YOB: 1931       Reason for Visit: Followup for ILD           HPI:   Ashlie Brantley is a 86 year old female with history of  ILD (NSIP) on nocturnal O2, pulmonary HTN, and vocal cord dystonia who presents for followup regarding codeine prescription.    At her last visit in November, patient had desaturations on room air on 6MWT and was started on 2L/min supplemental oxygen during the day (was already on 2L/min at night). She is using her oxygen about 2-4 hours a day with exercise. Is able to recover faster when using it. No respiratory symptoms over the last 6 months. Is doing 60 min of water aerobic twice a week.    Patient was started on codeine in 11/2011 for ILD associated cough with almost complete resolution of her cough. Has been titrated up to 30mg BID initially and has been maintained on that dose (is taking 60mg at bedtime). Denies any daytime somnolence. No urinary retention or constipation. No confusion. Had not tried anything else for her cough previously.    Lives with her  of 70 years.    10 point ROS performed and negative except for as noted in HPI.         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     sees [facility]     Cataract     IOL both     DJD (degenerative joint disease)     KNEES     Elevated cholesterol      GERD (gastroesophageal reflux disease)      Hoarseness      HTN (hypertension)      Hypothyroid      Laryngeal dystonia      LVH (left ventricular hypertrophy) due to hypertensive disease      Osteopenia     MILD     Urinary stress incontinence              Past Surgical History:      Past Surgical History:   Procedure Laterality Date     C NONSPECIFIC PROCEDURE      BACK SURGERY     C NONSPECIFIC PROCEDURE      VOCAL CORD POLYP     C SHOULDER SURG PROC UNLISTED       C STOMACH SURGERY PROCEDURE UNLISTED       C TOTAL  KNEE ARTHROPLASTY      LT 1998  /  RT 2001     CATARACT IOL, RT/LT       HC REMOVAL GALLBLADDER       HERNIA REPAIR, INGUINAL RT/LT      RT     HYSTERECTOMY, CERVIX STATUS UNKNOWN      DUB     PHACOEMULSIFICATION WITH STANDARD INTRAOCULAR LENS IMPLANT  Rt 6/11/09, Lt 8/3/09    both eyes Dr. Barnett     ROTATOR CUFF REPAIR RT/LT      RT             Social History:     Social History     Socioeconomic History     Marital status:      Spouse name: Not on file     Number of children: 6     Years of education: Not on file     Highest education level: Not on file   Occupational History     Employer: RETIRED   Social Needs     Financial resource strain: Not on file     Food insecurity:     Worry: Not on file     Inability: Not on file     Transportation needs:     Medical: Not on file     Non-medical: Not on file   Tobacco Use     Smoking status: Never Smoker     Smokeless tobacco: Never Used   Substance and Sexual Activity     Alcohol use: Yes     Alcohol/week: 0.0 oz     Comment: 1-2 drinks/month     Drug use: No     Sexual activity: Not Currently   Lifestyle     Physical activity:     Days per week: Not on file     Minutes per session: Not on file     Stress: Not on file   Relationships     Social connections:     Talks on phone: Not on file     Gets together: Not on file     Attends Cheondoism service: Not on file     Active member of club or organization: Not on file     Attends meetings of clubs or organizations: Not on file     Relationship status: Not on file     Intimate partner violence:     Fear of current or ex partner: Not on file     Emotionally abused: Not on file     Physically abused: Not on file     Forced sexual activity: Not on file   Other Topics Concern      Service No     Blood Transfusions No     Caffeine Concern No     Occupational Exposure No     Hobby Hazards No     Sleep Concern No     Stress Concern No     Weight Concern Yes     Special Diet No     Back Care No     Exercise Yes      Comment: a litttle     Bike Helmet Not Asked     Seat Belt Yes     Self-Exams Yes     Parent/sibling w/ CABG, MI or angioplasty before 65F 55M? No   Social History Narrative     Not on file            Family History:     Family History   Problem Relation Age of Onset     Diabetes Mother      C.A.D. Father      Cerebrovascular Disease Brother              Allergies:     Allergies   Allergen Reactions     Nkda [No Known Drug Allergies]              Medications:     Current Outpatient Medications on File Prior to Visit:  acetaminophen (TYLENOL) 500 MG tablet Take 2 tablets (1,000 mg) by mouth every 8 hours as needed for mild pain   albuterol (2.5 MG/3ML) 0.083% neb solution Take 1 vial (2.5 mg) by nebulization every 6 hours as needed for shortness of breath / dyspnea or wheezing   albuterol (2.5 MG/3ML) 0.083% neb solution Take 1 vial (2.5 mg) by nebulization every 6 hours as needed for shortness of breath / dyspnea or wheezing   CALCIUM + D 600-200 MG-UNIT OR TABS ONE TABLET TWICE A DAY WITH MEALS   codeine 30 MG tablet Take 1 tablet (30 mg) by mouth 2 times daily   furosemide (LASIX) 20 MG tablet Take 20 mg by mouth   levothyroxine (SYNTHROID) 88 MCG tablet Take 1 tablet (88 mcg) by mouth daily   losartan (COZAAR) 100 MG tablet Take 1 tablet (100 mg) by mouth daily   metoprolol tartrate (LOPRESSOR) 25 MG tablet Take 25 mg by mouth 2 times daily   neomycin-polymyxin-dexamethasone (MAXITROL) 3.5-08915-7.1 OINT ophthalmic ointment Place 1 Application into both eyes At Bedtime Apply a small squirt into each eye at bedtime   OMEGA-3 COMPLEX OR 1 capsule daily   order for DME Please provide patient with portable oxygen.  Patient requires 2 LPM oxygen with activity.  Patient is already on nocturnal oxygen.  Please provide patient with POC as soon as available.  Patient is elderly and will have difficulty managing oxygen tanks.   order for DME Equipment being ordered: Oxygen 2 liters NC at night time   order for DME  "Equipment being ordered: Please provide night time oxygen at 2L/min via nasal canula   ORDER FOR DME Equipment being ordered: Oxygen 2 liters NC at HS   simvastatin (ZOCOR) 40 MG tablet TAKE ONE TABLET BY MOUTH EVERY NIGHT AT BEDTIME   tolterodine (DETROL LA) 4 MG 24 hr capsule Take 1 capsule (4 mg) by mouth daily   traZODone (DESYREL) 50 MG tablet Take 1 tablet (50 mg) by mouth nightly as needed for sleep   VITAMIN D 2000 UNIT OR TABS 1 tablet daily   VITAMIN D3 1000 UNIT OR CAPS 1 CAPSULE DAILY   VITAMIN E COMPLEX PO Take  by mouth.   warfarin (COUMADIN) 2.5 MG tablet Take 2.5 on wed only 5 mg all other days.   warfarin (COUMADIN) 5 MG tablet Take as directed by ACC. Current dose is 5 mg everyday   warfarin (COUMADIN) 7.5 MG tablet Take 7.5 mg every Sunday. 5mg ROW     No current facility-administered medications on file prior to visit.            Exam:   /76   Pulse 69   Resp 17   Ht 1.473 m (4' 10\")   Wt 67.1 kg (148 lb)   SpO2 95%   BMI 30.93 kg/m   on RA  General: Sitting in a chair, no acute distress  HEENT: No scleral icterus  Heart: Normal rate, irregular rhythm  Neuro: Answering questions appropriately through notepad. Able to mouth words with minimal sound.   Psych: Normal affect  Ext: No edema         Data:     PFT: 5/10/19           Assessment and Plan:     Ashlie Brantley is a 88 year old female with history of ILD (NSIP) on nocturnal O2, pulmonary HTN, and vocal cord dystonia who presents for followup of ILD associated cough.      NSIP  Found incidentally on cxr/ct following URI in 2011. Previous w/u negative (HP panel, rheum panel, exposure Hx). PFT's have slowly declined previously, but is stable in the past year. Remains active. CT in 2014 did show 5mm pulm nodule near minor fissure. She has declined to undergo further chest imaging. She is a non-smoker. 6 minute walk test 11/2018 showed desaturation with walking on room air and was started on 2L/min oxygen.  --Patient has been on " codeine for the past 7 years with good symptomatic improvement in her cough. Is not having any side effects from taking codeine. Will prescribe current dose 60mg qhs. Can consider adding Tessalon pearls in the future.   --UTD on PPSV23 and PCV13      Vocal Cord Dystonia   She has been extensively evaluated by ENT and speech therapy and has undergone multiple therapies without success.     Patient staffed with Dr. Villegas. Return to clinic in 12 months with PFTs.    Noemi Genao  Pulmonary and Critical Care Fellow      Faculty Addendum:  This patient has been seen and evaluated by me.  I have discussed care with Dr. Genao and agree with the findings and plan in this note.    Leo Villegas  Pulmonary/Critical Care  June 12, 2019 12:39 PM

## 2019-05-10 NOTE — NURSING NOTE
Chief Complaint   Patient presents with     RECHECK     6 month follow up     Medications reviewed and vital signs taken.   Krystal Bejarano CMA

## 2019-05-11 LAB
DLCOUNC-%PRED-PRE: 58 %
DLCOUNC-PRE: 8.68 ML/MIN/MMHG
DLCOUNC-PRED: 14.8 ML/MIN/MMHG
ERV-%PRED-PRE: 279 %
ERV-PRE: 0.22 L
ERV-PRED: 0.08 L
EXPTIME-PRE: 4.93 SEC
FEF2575-%PRED-PRE: 126 %
FEF2575-PRE: 1.42 L/SEC
FEF2575-PRED: 1.12 L/SEC
FEFMAX-%PRED-PRE: 138 %
FEFMAX-PRE: 4.25 L/SEC
FEFMAX-PRED: 3.08 L/SEC
FEV1-%PRED-PRE: 79 %
FEV1-PRE: 1.05 L
FEV1FEV6-PRE: 87 %
FEV1FEV6-PRED: 76 %
FEV1FVC-PRE: 86 %
FEV1FVC-PRED: 78 %
FEV1SVC-PRE: 81 %
FEV1SVC-PRED: 69 %
FIFMAX-PRE: 1.93 L/SEC
FVC-%PRED-PRE: 71 %
FVC-PRE: 1.22 L
FVC-PRED: 1.72 L
IC-%PRED-PRE: 58 %
IC-PRE: 1.08 L
IC-PRED: 1.84 L
VA-%PRED-PRE: 65 %
VA-PRE: 2.3 L
VC-%PRED-PRE: 67 %
VC-PRE: 1.3 L
VC-PRED: 1.92 L

## 2019-05-13 ENCOUNTER — ANTICOAGULATION THERAPY VISIT (OUTPATIENT)
Dept: NURSING | Facility: CLINIC | Age: 84
End: 2019-05-13
Payer: COMMERCIAL

## 2019-05-13 DIAGNOSIS — I48.91 ATRIAL FIBRILLATION (H): ICD-10-CM

## 2019-05-13 DIAGNOSIS — Z79.01 LONG TERM CURRENT USE OF ANTICOAGULANT THERAPY: ICD-10-CM

## 2019-05-13 LAB — INR POINT OF CARE: 2.6 (ref 0.86–1.14)

## 2019-05-13 PROCEDURE — 36416 COLLJ CAPILLARY BLOOD SPEC: CPT

## 2019-05-13 PROCEDURE — 99207 ZZC NO CHARGE NURSE ONLY: CPT

## 2019-05-13 PROCEDURE — 85610 PROTHROMBIN TIME: CPT | Mod: QW

## 2019-05-13 NOTE — PROGRESS NOTES
ANTICOAGULATION FOLLOW-UP CLINIC VISIT    Patient Name:  Ashlie Brantley  Date:  2019  Contact Type:  Face to Face    SUBJECTIVE:  Patient Findings         Clinical Outcomes     Negatives:   Major bleeding event, Thromboembolic event, Anticoagulation-related hospital admission, Anticoagulation-related ED visit, Anticoagulation-related fatality           OBJECTIVE    INR Protime   Date Value Ref Range Status   2019 2.6 (A) 0.86 - 1.14 Final       ASSESSMENT / PLAN  INR assessment THER    Recheck INR In: 3 WEEKS    INR Location Clinic      Anticoagulation Summary  As of 2019    INR goal:   2.0-3.0   TTR:   34.7 % (1.2 y)   INR used for dosin.6 (2019)   Warfarin maintenance plan:   5 mg (5 mg x 1) every Tue, Thu, Sat; 2.5 mg (5 mg x 0.5) all other days   Full warfarin instructions:   5 mg every Tue, Thu, Sat; 2.5 mg all other days   Weekly warfarin total:   25 mg   No change documented:   Lizy Mercado RN   Plan last modified:   Lizy Mercado RN (2019)   Next INR check:   6/3/2019   Target end date:   Indefinite    Indications    Atrial fibrillation (H) [I48.91] [I48.91]  Long term current use of anticoagulant therapy [Z79.01]             Anticoagulation Episode Summary     INR check location:       Preferred lab:       Send INR reminders to:   [facility]    Comments:         Anticoagulation Care Providers     Provider Role Specialty Phone number    Tad MendozaDO [facility] [phone]            See the Encounter Report to view Anticoagulation Flowsheet and Dosing Calendar (Go to Encounters tab in chart review, and find the Anticoagulation Therapy Visit)        Lizy Mercado RN

## 2019-05-24 ENCOUNTER — OFFICE VISIT (OUTPATIENT)
Dept: FAMILY MEDICINE | Facility: CLINIC | Age: 84
End: 2019-05-24
Payer: COMMERCIAL

## 2019-05-24 VITALS
HEART RATE: 66 BPM | RESPIRATION RATE: 16 BRPM | OXYGEN SATURATION: 97 % | SYSTOLIC BLOOD PRESSURE: 120 MMHG | DIASTOLIC BLOOD PRESSURE: 60 MMHG | TEMPERATURE: 97.8 F

## 2019-05-24 DIAGNOSIS — G89.29 CHRONIC LEFT SHOULDER PAIN: ICD-10-CM

## 2019-05-24 DIAGNOSIS — J84.10 FIBROSIS, LUNG (H): ICD-10-CM

## 2019-05-24 DIAGNOSIS — Z79.01 LONG TERM CURRENT USE OF ANTICOAGULANT THERAPY: ICD-10-CM

## 2019-05-24 DIAGNOSIS — M76.61 RIGHT ACHILLES TENDINITIS: Primary | ICD-10-CM

## 2019-05-24 DIAGNOSIS — M25.512 CHRONIC LEFT SHOULDER PAIN: ICD-10-CM

## 2019-05-24 PROCEDURE — 99214 OFFICE O/P EST MOD 30 MIN: CPT | Performed by: NURSE PRACTITIONER

## 2019-05-24 RX ORDER — CODEINE SULFATE 30 MG/1
30 TABLET ORAL AT BEDTIME
Qty: 60 TABLET | Refills: 0 | Status: CANCELLED | OUTPATIENT
Start: 2019-05-24

## 2019-05-24 RX ORDER — CODEINE SULFATE 30 MG/1
60 TABLET ORAL AT BEDTIME
Qty: 60 TABLET | Refills: 0 | Status: SHIPPED | OUTPATIENT
Start: 2019-06-02 | End: 2019-05-31

## 2019-05-24 ASSESSMENT — PAIN SCALES - GENERAL: PAINLEVEL: EXTREME PAIN (8)

## 2019-05-24 NOTE — Clinical Note
Dear Dr. Genao,I saw that you prescribed Codeine 60 mg (2 tablets) at bedtime for this patient but only dispensed 30 tablets.I shredded your Codeine prescription dated 5/10/19 and gave her a new one so that 60 tablets is dispensed, to cover a full month.Anay Miner, DNP, APRN, CNP

## 2019-05-24 NOTE — PROGRESS NOTES
Subjective     Ashlie Brantley is a 88 year old female who presents to clinic today for the following health issues:    HPI   Right Ankle Pain    Onset: today     Description:   Location: right heel/achilles  Character: Sharp- when walking     Intensity: moderate, severe    Progression of Symptoms: same    Accompanying Signs & Symptoms:  Other symptoms: none    History:   Previous similar pain: no       Precipitating factors:   Trauma or overuse: no     Alleviating factors:  Improved by: nothing    Therapies Tried and outcome: nothing tried     IPF: She recently was evaluated with Dr. Genao in pulmonary 5/10/2019.  No changes with her treatment plan was made.  Pulmonary signed off on continuing lung term use of codeine 60 mg at bedtime as this has been effective for her cough.  Patient states she was given a written prescription for codeine but she noticed that it was only written for 30 tablets to be dispensed rather than 60.  She has this prescription with her today.    Left shoulder pain:  She was last seen 4/10/2019 for left shoulder pain and an x-ray was unremarkable.  She was referred to physical therapy and she went to 2 sessions.  She reports that she has had no improvement of her pain.    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     vocal cord dystonia     Hyperlipidemia LDL goal <130     Anxiety     Hypertension goal BP (blood pressure) < 140/90     LVH (left ventricular hypertrophy)     Advance Care Planning     Cough     Dermatochalasis     Health Care Home     Trochanteric bursitis - bilateral     PVD (posterior vitreous detachment) OU     Pseudophakia, OU     IPF (idiopathic pulmonary fibrosis) (H)     Hypothyroidism due to acquired atrophy of thyroid     Chronic pain syndrome     Urinary incontinence, unspecified type     Atrial fibrillation (H) [I48.91]     Long term current use of  anticoagulant therapy     Ear bleeding, right     Left shoulder pain     Past Surgical History:   Procedure Laterality Date     C NONSPECIFIC PROCEDURE      BACK SURGERY     C NONSPECIFIC PROCEDURE      VOCAL CORD POLYP     C SHOULDER SURG PROC UNLISTED       C STOMACH SURGERY PROCEDURE UNLISTED       C TOTAL KNEE ARTHROPLASTY      LT 1998  /  RT 2001     CATARACT IOL, RT/LT       HC REMOVAL GALLBLADDER       HERNIA REPAIR, INGUINAL RT/LT      RT     HYSTERECTOMY, CERVIX STATUS UNKNOWN      DUB     PHACOEMULSIFICATION WITH STANDARD INTRAOCULAR LENS IMPLANT  Rt 6/11/09, Lt 8/3/09    both eyes Dr. Barnett     ROTATOR CUFF REPAIR RT/LT      RT       Social History     Tobacco Use     Smoking status: Never Smoker     Smokeless tobacco: Never Used   Substance Use Topics     Alcohol use: Yes     Alcohol/week: 0.0 oz     Comment: 1-2 drinks/month     Family History   Problem Relation Age of Onset     Diabetes Mother      C.A.D. Father      Cerebrovascular Disease Brother          Current Outpatient Medications   Medication Sig Dispense Refill     acetaminophen (TYLENOL) 500 MG tablet Take 2 tablets (1,000 mg) by mouth every 8 hours as needed for mild pain       albuterol (2.5 MG/3ML) 0.083% neb solution Take 1 vial (2.5 mg) by nebulization every 6 hours as needed for shortness of breath / dyspnea or wheezing 3 mL 0     albuterol (2.5 MG/3ML) 0.083% neb solution Take 1 vial (2.5 mg) by nebulization every 6 hours as needed for shortness of breath / dyspnea or wheezing 1 Box 1     CALCIUM + D 600-200 MG-UNIT OR TABS ONE TABLET TWICE A DAY WITH MEALS 0 0     codeine 30 MG tablet Take 2 tablets (60 mg) by mouth At Bedtime       furosemide (LASIX) 20 MG tablet Take 20 mg by mouth       levothyroxine (SYNTHROID) 88 MCG tablet Take 1 tablet (88 mcg) by mouth daily 90 tablet 3     losartan (COZAAR) 100 MG tablet Take 1 tablet (100 mg) by mouth daily 90 tablet 1     metoprolol tartrate (LOPRESSOR) 25 MG tablet Take 25 mg by mouth 2  times daily       neomycin-polymyxin-dexamethasone (MAXITROL) 3.5-49461-0.1 OINT ophthalmic ointment Place 1 Application into both eyes At Bedtime Apply a small squirt into each eye at bedtime 1 Tube 6     OMEGA-3 COMPLEX OR 1 capsule daily       order for DME Please provide patient with portable oxygen.  Patient requires 2 LPM oxygen with activity.  Patient is already on nocturnal oxygen.  Please provide patient with POC as soon as available.  Patient is elderly and will have difficulty managing oxygen tanks. 1 Device 0     order for DME Equipment being ordered: Oxygen 2 liters NC at night time 1 Device 0     order for DME Equipment being ordered: Please provide night time oxygen at 2L/min via nasal canula 1 Units 0     ORDER FOR DME Equipment being ordered: Oxygen 2 liters NC at HS 1 each 0     simvastatin (ZOCOR) 40 MG tablet TAKE ONE TABLET BY MOUTH EVERY NIGHT AT BEDTIME 90 tablet 2     tolterodine (DETROL LA) 4 MG 24 hr capsule Take 1 capsule (4 mg) by mouth daily 30 capsule 1     traZODone (DESYREL) 50 MG tablet Take 1 tablet (50 mg) by mouth nightly as needed for sleep 30 tablet 1     VITAMIN D 2000 UNIT OR TABS 1 tablet daily 0 0     VITAMIN D3 1000 UNIT OR CAPS 1 CAPSULE DAILY       VITAMIN E COMPLEX PO Take  by mouth.       warfarin (COUMADIN) 2.5 MG tablet Take 2.5 on wed only 5 mg all other days. 90 tablet 3     warfarin (COUMADIN) 5 MG tablet Take as directed by ACC. Current dose is 5 mg everyday 90 tablet 3     warfarin (COUMADIN) 7.5 MG tablet Take 7.5 mg every Sunday. 5mg ROW 14 tablet 3     Allergies   Allergen Reactions     Nkda [No Known Drug Allergies]      BP Readings from Last 3 Encounters:   05/24/19 120/60   05/10/19 113/76   04/10/19 136/74    Wt Readings from Last 3 Encounters:   05/10/19 67.1 kg (148 lb)   04/10/19 68.9 kg (151 lb 12.8 oz)   11/09/18 67.1 kg (148 lb)                    Reviewed and updated as needed this visit by Provider  Tobacco  Allergies  Meds  Problems  Med Hx   Surg Hx  Fam Hx         Review of Systems   ROS COMP: Constitutional, HEENT, cardiovascular, pulmonary, musculoskeletal, Gi and gu systems are negative, except as otherwise noted.      Objective    /60 (BP Location: Right arm, Patient Position: Chair, Cuff Size: Adult Regular)   Pulse 66   Temp 97.8  F (36.6  C) (Oral)   Resp 16   SpO2 97%   There is no height or weight on file to calculate BMI.  Physical Exam   GENERAL: healthy, alert and no distress  MS: Tenderness to right Achilles.  No tenderness to medial, lateral, or anterior ankle.   no tenderness to right foot  PSYCH: mentation appears normal, affect normal/bright          Assessment & Plan     1. Right Achilles tendinitis    2. Chronic left shoulder pain    - ORTHO  REFERRAL    3. Fibrosis, lung (H)    - codeine 30 MG tablet; Take 2 tablets (60 mg) by mouth At Bedtime  Dispense: 60 tablet; Refill: 0    I shredded Dr. Noemi Genao's prescription for Codeine dated 5/10/19 due to it was written to dispense 30 tablets and patient takes 2 tablets at bedtime.  I signed and gave patient a new Rx today for the Codeine with dispense of 60 tablets, to be filled 6/2/19.    Patient was looked up in [facility] and there are not indications for concern regarding use of controlled substances on record at this time.     4. Long term current use of anticoagulant therapy    Patient instructions:  Next due to fill the Codeine on 6/2/2019.  New prescription provided to you today and I shredded Noemi Genao's prescription due to it only had written for 30 dispensed (and was written to take 2 tablets daily at bedtime)    Schedule appointment with Kj Dowling through [facility] for the right achilles pain    Eat a few more green vegetables and keep Your INR appt on 6/3/19.  This will help manage your Coumadin while you are taking the Tylenol       BMI:   Estimated body mass index is 30.93 kg/m  as calculated from the following:    Height as of  "5/10/19: 1.473 m (4' 10\").    Weight as of 5/10/19: 67.1 kg (148 lb).           Return in about 3 months (around 8/24/2019) for Routine Visit.    Anay Miner NP  [facility]    I shredded the prescription written 5/10/19 written by Dr. Noemi Genao due to incorrect Sig of \"take 1 tablet at bedtime.\"  Correct Sig is \"take 2 tablets at bedtime\" and I gave patient an updated RX today.  "

## 2019-05-24 NOTE — PATIENT INSTRUCTIONS
Next due to fill the Codeine on 6/2/2019.  New prescription provided to you today and I shredded Noemi Genao's prescription due to it only had written for 30 dispensed (and written to take 2 tablets daily at bedtime)    Schedule appointment with Kj Dowling through [facility]    Eat a few more green vegetables and keep Your INR appt on 6/3/19.  This will help manage your Coumadin while you are taking the Tylenol

## 2019-05-30 ENCOUNTER — OFFICE VISIT (OUTPATIENT)
Dept: URGENT CARE | Facility: URGENT CARE | Age: 84
End: 2019-05-30
Payer: COMMERCIAL

## 2019-05-30 VITALS
SYSTOLIC BLOOD PRESSURE: 136 MMHG | HEART RATE: 96 BPM | TEMPERATURE: 99 F | OXYGEN SATURATION: 96 % | DIASTOLIC BLOOD PRESSURE: 79 MMHG

## 2019-05-30 DIAGNOSIS — M79.671 RIGHT FOOT PAIN: Primary | ICD-10-CM

## 2019-05-30 DIAGNOSIS — M10.9 ACUTE GOUTY ARTHRITIS: ICD-10-CM

## 2019-05-30 PROCEDURE — 99213 OFFICE O/P EST LOW 20 MIN: CPT | Performed by: FAMILY MEDICINE

## 2019-05-30 RX ORDER — PREDNISONE 20 MG/1
40 TABLET ORAL DAILY
Qty: 10 TABLET | Refills: 1 | Status: SHIPPED | OUTPATIENT
Start: 2019-05-30 | End: 2019-06-04

## 2019-05-31 ENCOUNTER — TELEPHONE (OUTPATIENT)
Dept: PULMONOLOGY | Facility: CLINIC | Age: 84
End: 2019-05-31

## 2019-05-31 DIAGNOSIS — J84.10 FIBROSIS, LUNG (H): ICD-10-CM

## 2019-05-31 RX ORDER — CODEINE SULFATE 30 MG/1
60 TABLET ORAL AT BEDTIME
Qty: 60 TABLET | Refills: 0 | Status: SHIPPED | OUTPATIENT
Start: 2019-06-02 | End: 2019-07-03

## 2019-05-31 ASSESSMENT — ENCOUNTER SYMPTOMS
SORE THROAT: 0
WOUND: 0
RHINORRHEA: 0
COUGH: 0
NAUSEA: 0
HEADACHES: 0
FEVER: 0
SHORTNESS OF BREATH: 0
DIARRHEA: 0
CHILLS: 0
VOMITING: 0

## 2019-05-31 NOTE — TELEPHONE ENCOUNTER
I received message from Quyen, , that the printed prescription of Codeine that I gave patient on 5/24/19 cannot be found.    I signed a new Codeine prescription, ok to fill today.  Quyen to give Rx to daughter at  now.    Anay Miner, DNP, APRN, CNP

## 2019-05-31 NOTE — TELEPHONE ENCOUNTER
Spoke with daughter and reviewed that PCP had signed new script. RN called pharmacy to ensure they did not have new script and they did not. Advised to call clinic as it might be at  for . Daughter verbalized understanding and will call PCP. Daughter wanted this noted as to ensure pt is given correct script next time. Leatha Moffett RN BSN

## 2019-05-31 NOTE — PROGRESS NOTES
SUBJECTIVE:   Ashlie Brantley is a 88 year old female presenting with a chief complaint of   Chief Complaint   Patient presents with     Ankle/Foot right     pain on top of right foot      Right MTP area and adjacent dorsal  to palpation  She does have a hx of gout about a year ago and was treated with prednisone at that time which seems to have helped.   Denies any previous or recent known injury to the area.     Review of Systems   Constitutional: Negative for chills and fever.   HENT: Negative for congestion, ear pain, rhinorrhea and sore throat.    Respiratory: Negative for cough and shortness of breath.    Gastrointestinal: Negative for diarrhea, nausea and vomiting.   Skin: Negative for rash and wound.   Neurological: Negative for headaches.       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 Father      Cerebrovascular Disease Brother      Current Outpatient Medications   Medication Sig Dispense Refill     acetaminophen (TYLENOL) 500 MG tablet Take 2 tablets (1,000 mg) by mouth every 8 hours as needed for mild pain 1 Bottle 1     albuterol (2.5 MG/3ML) 0.083% neb solution Take 1 vial (2.5 mg) by nebulization every 6 hours as needed for shortness of breath / dyspnea or wheezing 3 mL 0     albuterol (2.5 MG/3ML) 0.083% neb solution Take 1 vial (2.5 mg) by nebulization every 6 hours as needed for shortness of breath / dyspnea or wheezing 1 Box 1     CALCIUM + D 600-200 MG-UNIT OR TABS ONE TABLET TWICE A DAY WITH MEALS 0 0     [START ON 6/2/2019]  codeine 30 MG tablet Take 2 tablets (60 mg) by mouth At Bedtime 60 tablet 0     furosemide (LASIX) 20 MG tablet Take 20 mg by mouth       levothyroxine (SYNTHROID) 88 MCG tablet Take 1 tablet (88 mcg) by mouth daily 90 tablet 3     losartan (COZAAR) 100 MG tablet Take 1 tablet (100 mg) by mouth daily 90 tablet 1     metoprolol tartrate (LOPRESSOR) 25 MG tablet Take 25 mg by mouth 2 times daily       neomycin-polymyxin-dexamethasone (MAXITROL) 3.5-87618-4.1 OINT ophthalmic ointment Place 1 Application into both eyes At Bedtime Apply a small squirt into each eye at bedtime 1 Tube 6     OMEGA-3 COMPLEX OR 1 capsule daily       order for DME Please provide patient with portable oxygen.  Patient requires 2 LPM oxygen with activity.  Patient is already on nocturnal oxygen.  Please provide patient with POC as soon as available.  Patient is elderly and will have difficulty managing oxygen tanks. 1 Device 0     order for DME Equipment being ordered: Oxygen 2 liters NC at night time 1 Device 0     order for DME Equipment being ordered: Please provide night time oxygen at 2L/min via nasal canula 1 Units 0     ORDER FOR DME Equipment being ordered: Oxygen 2 liters NC at HS 1 each 0     simvastatin (ZOCOR) 40 MG tablet TAKE ONE TABLET BY MOUTH EVERY NIGHT AT BEDTIME 90 tablet 2     tolterodine (DETROL LA) 4 MG 24 hr capsule Take 1 capsule (4 mg) by mouth daily 30 capsule 1     traZODone (DESYREL) 50 MG tablet Take 1 tablet (50 mg) by mouth nightly as needed for sleep 30 tablet 1     VITAMIN D 2000 UNIT OR TABS 1 tablet daily 0 0     VITAMIN D3 1000 UNIT OR CAPS 1 CAPSULE DAILY       VITAMIN E COMPLEX PO Take  by mouth.       warfarin (COUMADIN) 2.5 MG tablet Take 2.5 on wed only 5 mg all other days. 90 tablet 3     warfarin (COUMADIN) 5 MG tablet Take as directed by [facility]. Current dose is 5 mg everyday 90 tablet 3     warfarin (COUMADIN) 7.5 MG tablet Take 7.5 mg every Sunday. 5mg ROW 14 tablet 3     Social History     Tobacco Use      Smoking status: Never Smoker     Smokeless tobacco: Never Used   Substance Use Topics     Alcohol use: Yes     Alcohol/week: 0.0 oz     Comment: 1-2 drinks/month       OBJECTIVE  /79   Pulse 96   Temp 99  F (37.2  C) (Oral)   SpO2 96%   Breastfeeding? No     Physical Exam   Constitutional: She is oriented to person, place, and time.   HENT:   Head: Normocephalic and atraumatic.   Right Ear: External ear normal.   Left Ear: External ear normal.   Nose: Nose normal.   Mouth/Throat: Oropharynx is clear and moist. No oropharyngeal exudate.   Eyes: Pupils are equal, round, and reactive to light. Conjunctivae are normal. Right eye exhibits no discharge. Left eye exhibits no discharge. No scleral icterus.   Neck: Neck supple. No tracheal deviation present. No thyromegaly present.   Cardiovascular: Normal rate, regular rhythm, normal heart sounds and intact distal pulses. Exam reveals no gallop and no friction rub.   No murmur heard.  Pulmonary/Chest: Effort normal and breath sounds normal. No stridor. No respiratory distress. She has no wheezes. She has no rales. She exhibits no tenderness.   Abdominal: Soft. She exhibits no distension and no mass. There is no tenderness. There is no rebound and no guarding.   Musculoskeletal: She exhibits tenderness (at right MTP area with minimal swelling and almost no redness). She exhibits no edema or deformity.   Lymphadenopathy:     She has no cervical adenopathy.   Neurological: She is alert and oriented to person, place, and time. No cranial nerve deficit.   Skin: Skin is warm and dry. No rash noted. No erythema.   Psychiatric: Judgment normal.           ASSESSMENT:    ICD-10-CM    1. Right foot pain M79.671 CANCELED: XR Foot Right G/E 3 Views   2. Acute gouty arthritis M10.9 predniSONE (DELTASONE) 20 MG tablet      Follow-up if symptoms persist or worsen.    Info provided on dietary approached to avoid gout and preventative measures.   Patient  educational/instructional material provided including reasons for follow-up   Keenan Carbajal MD

## 2019-05-31 NOTE — TELEPHONE ENCOUNTER
[facility] Call Center    Phone Message    May a detailed message be left on voicemail: yes    Reason for Call: Medication Question or concern regarding medication   Prescription Clarification  Name of Medication: Codeine   Prescribing Provider: Dr. Genao   Pharmacy: [facility] in [place]   What on the order needs clarification? Ashlie went to fill her prescription and the pharmacist explained that it was for one tablet a day.  Ashlie responded that it should be for two tablets daily.  She did not fill the prescription and will need a new copy with the corrected directions.  Her daughter Mariela is willing to  the prescription if it can not be submitted electronically.    Please call Mariela to make arrangements for .          Action Taken: Message routed to:  [facility] ([facility]): [facility] pulmonary

## 2019-06-03 ENCOUNTER — ANTICOAGULATION THERAPY VISIT (OUTPATIENT)
Dept: NURSING | Facility: CLINIC | Age: 84
End: 2019-06-03
Payer: COMMERCIAL

## 2019-06-03 DIAGNOSIS — Z79.01 LONG TERM CURRENT USE OF ANTICOAGULANT THERAPY: ICD-10-CM

## 2019-06-03 DIAGNOSIS — I48.91 ATRIAL FIBRILLATION (H): ICD-10-CM

## 2019-06-03 LAB
INR POINT OF CARE: 1.2 (ref 0.86–1.14)
INR POINT OF CARE: 1.2 (ref 0.86–1.14)

## 2019-06-03 PROCEDURE — 99207 ZZC NO CHARGE NURSE ONLY: CPT

## 2019-06-03 PROCEDURE — 85610 PROTHROMBIN TIME: CPT | Mod: QW

## 2019-06-03 PROCEDURE — 36416 COLLJ CAPILLARY BLOOD SPEC: CPT

## 2019-06-03 NOTE — PROGRESS NOTES
ANTICOAGULATION FOLLOW-UP CLINIC VISIT    Patient Name:  Ashlie Brantley  Date:  6/3/2019  Contact Type:  Face to Face    SUBJECTIVE:  Patient Findings     Positives:   Change in health, Change in medications    Comments:   Please call [phone] for questions.  Bleeding Signs/Symptoms: NO  Thromboembolic Signs/Symptoms: NO     Medication Changes:  NO  Dietary Changes:  YES  Bacterial/Viral Infection: YES     Missed Coumadin Doses: YES  Other Concerns:  YES            Clinical Outcomes     Comments:   Please call [phone] for questions.  Bleeding Signs/Symptoms: NO  Thromboembolic Signs/Symptoms: NO     Medication Changes:  NO  Dietary Changes:  YES  Bacterial/Viral Infection: YES     Missed Coumadin Doses: YES  Other Concerns:  YES               OBJECTIVE    INR Protime   Date Value Ref Range Status   2019 1.2 (A) 0.86 - 1.14 Final   2019 1.2 (A) 0.86 - 1.14 Final       ASSESSMENT / PLAN  INR assessment SUB    Recheck INR In: 1 WEEK    INR Location Clinic      Anticoagulation Summary  As of 6/3/2019    INR goal:   2.0-3.0   TTR:   35.1 % (1.2 y)   INR used for dosin.2! (6/3/2019)   Warfarin maintenance plan:   5 mg (5 mg x 1) every Tue, Thu, Sat; 2.5 mg (5 mg x 0.5) all other days   Full warfarin instructions:   6/3: 5 mg; 6: 5 mg; Otherwise 5 mg every Tue, Thu, Sat; 2.5 mg all other days   Weekly warfarin total:   25 mg   Plan last modified:   Lizy Mercado RN (2019)   Next INR check:   6/10/2019   Target end date:   Indefinite    Indications    Atrial fibrillation (H) [I48.91] [I48.91]  Long term current use of anticoagulant therapy [Z79.01]             Anticoagulation Episode Summary     INR check location:       Preferred lab:       Send INR reminders to:   [facility]    Comments:         Anticoagulation Care Providers     Provider Role Specialty Phone number    Tad Mendoza DO Responsible [facility] [phone]            See the Encounter Report  to view Anticoagulation Flowsheet and Dosing Calendar (Go to Encounters tab in chart review, and find the Anticoagulation Therapy Visit)    Some signs and symptoms of clots include: pain or tenderness in arm or leg, swelling in arm or leg, changes in skin color, or area is warm to touch, shortness or breath, trouble breathing.  Numbness or weakness especially on 1 side of the body, sudden trouble speaking or swallowing, sudden trouble seeing, sudden confusion, dizzy spells or headache.  If you have these please call 911 or seek medical care immediately.      Lizy Mercado RN

## 2019-06-06 ENCOUNTER — OFFICE VISIT (OUTPATIENT)
Dept: ORTHOPEDICS | Facility: CLINIC | Age: 84
End: 2019-06-06
Payer: COMMERCIAL

## 2019-06-06 VITALS — SYSTOLIC BLOOD PRESSURE: 122 MMHG | BODY MASS INDEX: 30.93 KG/M2 | HEIGHT: 58 IN | DIASTOLIC BLOOD PRESSURE: 80 MMHG

## 2019-06-06 DIAGNOSIS — G89.29 CHRONIC LEFT SHOULDER PAIN: Primary | ICD-10-CM

## 2019-06-06 DIAGNOSIS — M25.512 CHRONIC LEFT SHOULDER PAIN: Primary | ICD-10-CM

## 2019-06-06 PROCEDURE — 99203 OFFICE O/P NEW LOW 30 MIN: CPT | Mod: 25 | Performed by: FAMILY MEDICINE

## 2019-06-06 PROCEDURE — 20611 DRAIN/INJ JOINT/BURSA W/US: CPT | Mod: LT | Performed by: FAMILY MEDICINE

## 2019-06-06 RX ADMIN — BETAMETHASONE SODIUM PHOSPHATE AND BETAMETHASONE ACETATE 6 MG: 3; 3 INJECTION, SUSPENSION INTRA-ARTICULAR; INTRALESIONAL; INTRAMUSCULAR; SOFT TISSUE at 12:45

## 2019-06-06 RX ADMIN — ROPIVACAINE HYDROCHLORIDE 3 ML: 5 INJECTION, SOLUTION EPIDURAL; INFILTRATION; PERINEURAL at 12:45

## 2019-06-06 NOTE — PROGRESS NOTES
ASSESSMENT & PLAN  Ashlie was seen today for pain.    Diagnoses and all orders for this visit:    Chronic left shoulder pain  -     Large Joint Injection/Arthocentesis: L subacromial bursa      Patient is a 88 year old female presenting for evaluation of   Chief Complaint   Patient presents with     Left Shoulder - Pain    Chronic Left Shoulder Pain: Notable after pulling a chair out with repeat injury 3 mon ago.  Pain persisting with pos impingement signs and with pos empty can test for pain not weakness. XR neg for sig OA.  Likely rotator cuff tendinpathy.  Given this a steroid injection completed today with HEP and f/u PRN.  Pt noted sig improvement of pain afterwards  Treatment: subacromial steroid injection  Physical Therapy HEP  Injection completed today  Medications  Limited NSAIDs/Tylenol    Concerning signs/sx that would warrant urgent evaluation were discussed.  All questions were answered, patient understands and agrees with plan.      Return if symptoms worsen or fail to improve.    -----    SUBJECTIVE  Ashlie Brantley is a/an 88 year old Right handed female who is seen in consultation at the request of  Anay Miner N.P. for evaluation of left shoulder pain. The patient is seen with their daughter.    Onset: 6 month(s) ago. Patient describes injury as pulling chair out. Re-injury 3 months ago.  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     sees [facility]     Cataract     IOL both     DJD (degenerative joint disease)     KNEES     Elevated cholesterol      GERD (gastroesophageal reflux disease)       Hoarseness      HTN (hypertension)      Hypothyroid      Laryngeal dystonia      LVH (left ventricular hypertrophy) due to hypertensive disease      Osteopenia     MILD     Urinary stress incontinence      Social History     Socioeconomic History     Marital status:      Spouse name: None     Number of children: 6     Years of education: None     Highest education level: None   Occupational History     Employer: RETIRED   Social Needs     Financial resource strain: None     Food insecurity:     Worry: None     Inability: None     Transportation needs:     Medical: None     Non-medical: None   Tobacco Use     Smoking status: Never Smoker     Smokeless tobacco: Never Used   Substance and Sexual Activity     Alcohol use: Yes     Alcohol/week: 0.0 oz     Comment: 1-2 drinks/month     Drug use: No     Sexual activity: Not Currently   Lifestyle     Physical activity:     Days per week: None     Minutes per session: None     Stress: None   Relationships     Social connections:     Talks on phone: None     Gets together: None     Attends Zoroastrian service: None     Active member of club or organization: None     Attends meetings of clubs or organizations: None     Relationship status: None     Intimate partner violence:     Fear of current or ex partner: None     Emotionally abused: None     Physically abused: None     Forced sexual activity: None   Other Topics Concern      Service No     Blood Transfusions No     Caffeine Concern No     Occupational Exposure No     Hobby Hazards No     Sleep Concern No     Stress Concern No     Weight Concern Yes     Special Diet No     Back Care No     Exercise Yes     Comment: a litttle     Bike Helmet Not Asked     Seat Belt Yes     Self-Exams Yes     Parent/sibling w/ CABG, MI or angioplasty before 65F 55M? No   Social History Narrative     None         Patient's past medical, surgical, social, and family histories were reviewed today and no changes are  "noted.    REVIEW OF SYSTEMS:  10 point ROS is negative other than symptoms noted above in HPI, Past Medical History or as stated below  Constitutional: NEGATIVE for fever, chills, change in weight  Skin: NEGATIVE for worrisome rashes, moles or lesions  GI/: NEGATIVE for bowel or bladder changes  Neuro: NEGATIVE for weakness, dizziness or paresthesias    OBJECTIVE:  /80   Ht 1.473 m (4' 10\")   BMI 30.93 kg/m     General: healthy, alert and in no distress  HEENT: no scleral icterus or conjunctival erythema, pt quiet due to underlying dysphonia  Skin: no suspicious lesions or rash. No jaundice.  CV: regular rhythm by palpation  Resp: normal respiratory effort without conversational dyspnea   Psych: normal mood and affect  Gait: normal steady gait with appropriate coordination and balance  Neuro: normal light touch sensory exam of the bilateral upper extremities.    MSK:  LEFT SHOULDER  Inspection:    no swelling, bruising, discoloration, or obvious deformity or asymmetry  Palpation:    Tender about the anterior capsule, proximal humerus and supraspinatus insertion. Remainder of bony and tendinous landmarks are nontender.  Active Range of Motion:     Abduction 1500, FF 1500, , IR hip pocket.       Strength:    Scapular plane abduction 5-/5, painful,  ER 5/5, IR 5/5, biceps 5/5, triceps 5/5  Special Tests:    Positive: Neer's, Styles', supraspinatus (empty can), Darby's and Speed's    Negative: Yergason's    CERVICAL SPINE  Inspection:    normal cervical lordosis present, rounded shoulders, forward head posture  Palpation:  Nontender.  Range of Motion:     Flexion full    Extension full    Right side bend full    Left side bend full    Right rotation full    Left rotation full  Strength:    Full strength throughout all neck muscles  Special Tests:    Positive: None    Negative: Spurling's (bilateral)    Independent visualization of the below image:  No results found for this or any previous visit (from " the past 24 hour(s)).  SHOULDER TWO VIEWS LEFT 4/10/2019 9:53 AM      HISTORY: Left shoulder pain chronic, anterior. Left shoulder pain,  unspecified chronicity.     COMPARISON: None.     FINDINGS: There is no significant degenerative change. The  acromioclavicular and coracoclavicular distances appear within normal  limits. The subacromial space is maintained. There is no acute  fracture or demonstrated dislocation. There are no worrisome bony  lesions.                                                                       IMPRESSION: No acute osseous abnormality demonstrated.     FLORA MENDES MD    Large Joint Injection/Arthocentesis: L subacromial bursa  Date/Time: 6/6/2019 12:45 PM  Performed by: Kj Dowling MD  Authorized by: Kj Dowling MD     Indications:  Pain  Needle Size:  25 G  Guidance: ultrasound    Approach:  Posterolateral  Location:  Shoulder      Site:  L subacromial bursa  Medications:  6 mg betamethasone acet & sod phos 6 (3-3) MG/ML; 3 mL ropivacaine 5 MG/ML  Medications comment:  Actual amount of ropivacaine used 4 mL  Outcome:  Tolerated well, no immediate complications  Consent Given by:  Patient  Timeout: timeout called immediately prior to procedure    Prep: patient was prepped and draped in usual sterile fashion     Patient reported significant improvement of pain after injection.  Aftercare instructions given to patient.  Plan to follow-up as discussed above.     MD [facility]          Patient's conditions were thoroughly discussed during today's visit with greater than 50% of the visit spent counseling the patient with total time spent face-to-face with the patient being 30 minutes.    MD [facility]ENG [facility]

## 2019-06-06 NOTE — PATIENT INSTRUCTIONS
[facility] [place]      You may shower, however avoid swimming, tub baths or hot tubs for 24 hours following your procedure    You may have a mild to moderate increase in pain for several days following the injection.    It may take up to 14 days for the steroid medication to start working although you may feel the effect as early as a few days after the procedure.    You may use ice packs for 10-15 minutes, 3 to 4 times a day at the injection site for comfort    You may use anti-inflammatory medications (such as Ibuprofen or Aleve or Advil) or Tylenol for pain control if necessary    If you were fasting, you may resume your normal diet and medications after the procedure    If you have diabetes, check your blood sugar more frequently than usual as your blood sugar may be higher than normal for 10-14 days following a steroid injection. Contact your doctor who manages your diabetes if your blood sugar is higher than usual      If you experience any of the following, call [facility] [place] @ [phone] or [phone]  -Fever over 100 degree F  -Swelling, bleeding, redness, drainage, warmth at the injection site  - New or worsening pain

## 2019-06-06 NOTE — LETTER
6/6/2019         RE: Ashlie Brantley  [address] [place] [address]        Dear Colleague,    Thank you for referring your patient, Ashlie Brantley, to the [facility]. Please see a copy of my visit note below.    ASSESSMENT & PLAN  Ashlie was seen today for pain.    Diagnoses and all orders for this visit:    Chronic left shoulder pain  -     Large Joint Injection/Arthocentesis: L subacromial bursa      Patient is a 88 year old female presenting for evaluation of   Chief Complaint   Patient presents with     Left Shoulder - Pain    Chronic Left Shoulder Pain: Notable after pulling a chair out with repeat injury 3 mon ago.  Pain persisting with pos impingement signs and with pos empty can test for pain not weakness. XR neg for sig OA.  Likely rotator cuff tendinpathy.  Given this a steroid injection completed today with HEP and f/u PRN.  Pt noted sig improvement of pain afterwards  Treatment: subacromial steroid injection  Physical Therapy HEP  Injection completed today  Medications  Limited NSAIDs/Tylenol    Concerning signs/sx that would warrant urgent evaluation were discussed.  All questions were answered, patient understands and agrees with plan.      Return if symptoms worsen or fail to improve.    -----    SUBJECTIVE  Ashlie Brantley is a/an 88 year old Right handed female who is seen in consultation at the request of  Anay Miner N.P. for evaluation of left shoulder pain. The patient is seen with their daughter.    Onset: 6 month(s) ago. Patient describes injury as pulling chair out. Re-injury 3 months ago.  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     sees [facility]     Cataract     IOL both     DJD (degenerative joint disease)     KNEES     Elevated cholesterol      GERD (gastroesophageal reflux disease)      Hoarseness      HTN (hypertension)      Hypothyroid      Laryngeal dystonia      LVH (left ventricular hypertrophy) due to hypertensive disease      Osteopenia     MILD     Urinary stress incontinence      Social History     Socioeconomic History     Marital status:      Spouse name: None     Number of children: 6     Years of education: None     Highest education level: None   Occupational History     Employer: RETIRED   Social Needs     Financial resource strain: None     Food insecurity:     Worry: None     Inability: None     Transportation needs:     Medical: None     Non-medical: None   Tobacco Use     Smoking status: Never Smoker     Smokeless tobacco: Never Used   Substance and Sexual Activity     Alcohol use: Yes     Alcohol/week: 0.0 oz     Comment: 1-2 drinks/month     Drug use: No     Sexual activity: Not Currently   Lifestyle     Physical activity:     Days per week: None     Minutes per session: None     Stress: None   Relationships     Social connections:     Talks on phone: None     Gets together: None     Attends Islam service: None     Active member of club or organization: None     Attends meetings of clubs or organizations: None     Relationship status: None     Intimate partner violence:     Fear of current or ex partner: None     Emotionally abused: None     Physically abused: None     Forced sexual activity: None   Other Topics Concern      Service No     Blood Transfusions No     Caffeine Concern No     Occupational Exposure No     Hobby Hazards No     Sleep Concern No     Stress Concern No     Weight Concern Yes     Special Diet No     Back Care No     Exercise Yes     Comment: a litttle     Bike Helmet Not  "Asked     Seat Belt Yes     Self-Exams Yes     Parent/sibling w/ CABG, MI or angioplasty before 65F 55M? No   Social History Narrative     None         Patient's past medical, surgical, social, and family histories were reviewed today and no changes are noted.    REVIEW OF SYSTEMS:  10 point ROS is negative other than symptoms noted above in HPI, Past Medical History or as stated below  Constitutional: NEGATIVE for fever, chills, change in weight  Skin: NEGATIVE for worrisome rashes, moles or lesions  GI/: NEGATIVE for bowel or bladder changes  Neuro: NEGATIVE for weakness, dizziness or paresthesias    OBJECTIVE:  /80   Ht 1.473 m (4' 10\")   BMI 30.93 kg/m      General: healthy, alert and in no distress  HEENT: no scleral icterus or conjunctival erythema, pt quiet due to underlying dysphonia  Skin: no suspicious lesions or rash. No jaundice.  CV: regular rhythm by palpation  Resp: normal respiratory effort without conversational dyspnea   Psych: normal mood and affect  Gait: normal steady gait with appropriate coordination and balance  Neuro: normal light touch sensory exam of the bilateral upper extremities.    MSK:  LEFT SHOULDER  Inspection:    no swelling, bruising, discoloration, or obvious deformity or asymmetry  Palpation:    Tender about the anterior capsule, proximal humerus and supraspinatus insertion. Remainder of bony and tendinous landmarks are nontender.  Active Range of Motion:     Abduction 1500, FF 1500, , IR hip pocket.       Strength:    Scapular plane abduction 5-/5, painful,  ER 5/5, IR 5/5, biceps 5/5, triceps 5/5  Special Tests:    Positive: Neer's, Styles', supraspinatus (empty can), Cary's and Speed's    Negative: Yergason's    CERVICAL SPINE  Inspection:    normal cervical lordosis present, rounded shoulders, forward head posture  Palpation:  Nontender.  Range of Motion:     Flexion full    Extension full    Right side bend full    Left side bend full    Right rotation " full    Left rotation full  Strength:    Full strength throughout all neck muscles  Special Tests:    Positive: None    Negative: Spurling's (bilateral)    Independent visualization of the below image:  No results found for this or any previous visit (from the past 24 hour(s)).  SHOULDER TWO VIEWS LEFT 4/10/2019 9:53 AM      HISTORY: Left shoulder pain chronic, anterior. Left shoulder pain,  unspecified chronicity.     COMPARISON: None.     FINDINGS: There is no significant degenerative change. The  acromioclavicular and coracoclavicular distances appear within normal  limits. The subacromial space is maintained. There is no acute  fracture or demonstrated dislocation. There are no worrisome bony  lesions.                                                                       IMPRESSION: No acute osseous abnormality demonstrated.     FLORA MENDES MD    Large Joint Injection/Arthocentesis: L subacromial bursa  Date/Time: 6/6/2019 12:45 PM  Performed by: Kj Dowling MD  Authorized by: Kj Dowling MD     Indications:  Pain  Needle Size:  25 G  Guidance: ultrasound    Approach:  Posterolateral  Location:  Shoulder      Site:  L subacromial bursa  Medications:  6 mg betamethasone acet & sod phos 6 (3-3) MG/ML; 3 mL ropivacaine 5 MG/ML  Medications comment:  Actual amount of ropivacaine used 4 mL  Outcome:  Tolerated well, no immediate complications  Consent Given by:  Patient  Timeout: timeout called immediately prior to procedure    Prep: patient was prepped and draped in usual sterile fashion     Patient reported significant improvement of pain after injection.  Aftercare instructions given to patient.  Plan to follow-up as discussed above.     Kj Dowling MD [facility]          Patient's conditions were thoroughly discussed during today's visit with greater than 50% of the visit spent counseling the patient with total time spent face-to-face with the patient being  30 minutes.    Kj Dowling MD [facility]      Again, thank you for allowing me to participate in the care of your patient.        Sincerely,        Kj Dowling MD

## 2019-06-10 ENCOUNTER — ANTICOAGULATION THERAPY VISIT (OUTPATIENT)
Dept: NURSING | Facility: CLINIC | Age: 84
End: 2019-06-10
Payer: COMMERCIAL

## 2019-06-10 DIAGNOSIS — Z79.01 LONG TERM CURRENT USE OF ANTICOAGULANT THERAPY: ICD-10-CM

## 2019-06-10 DIAGNOSIS — I48.91 ATRIAL FIBRILLATION (H): ICD-10-CM

## 2019-06-10 PROBLEM — G89.29 CHRONIC LEFT SHOULDER PAIN: Status: ACTIVE | Noted: 2019-04-16

## 2019-06-10 LAB — INR POINT OF CARE: 1.3 (ref 0.86–1.14)

## 2019-06-10 PROCEDURE — 36416 COLLJ CAPILLARY BLOOD SPEC: CPT

## 2019-06-10 PROCEDURE — 99207 ZZC NO CHARGE NURSE ONLY: CPT

## 2019-06-10 PROCEDURE — 85610 PROTHROMBIN TIME: CPT | Mod: QW

## 2019-06-10 RX ORDER — BETAMETHASONE SODIUM PHOSPHATE AND BETAMETHASONE ACETATE 3; 3 MG/ML; MG/ML
6 INJECTION, SUSPENSION INTRA-ARTICULAR; INTRALESIONAL; INTRAMUSCULAR; SOFT TISSUE
Status: DISCONTINUED | OUTPATIENT
Start: 2019-06-06 | End: 2019-08-18

## 2019-06-10 RX ORDER — ROPIVACAINE HYDROCHLORIDE 5 MG/ML
3 INJECTION, SOLUTION EPIDURAL; INFILTRATION; PERINEURAL
Status: DISCONTINUED | OUTPATIENT
Start: 2019-06-06 | End: 2019-08-18

## 2019-06-10 NOTE — PATIENT INSTRUCTIONS
Please call [phone] for questions.    New Hours at [facility]  Monday  730-330 pm  Tuesday 10:00- 6 pm   Friday     12:30- 4:30 pm  Closed on Wed and Thursday

## 2019-06-10 NOTE — PROGRESS NOTES
ANTICOAGULATION FOLLOW-UP CLINIC VISIT    Patient Name:  Ashlie Brantley  Date:  6/10/2019  Contact Type:  Face to Face    SUBJECTIVE:  Patient Findings     Comments:   Bleeding Signs/Symptoms: NO  Thromboembolic Signs/Symptoms: NO     Medication Changes:  YES had cortisone shot to left shoulder 4 days ago  Dietary Changes:  YES was out of town ate more greens. Had some alcohol  Bacterial/Viral Infection: NO     Missed Coumadin Doses: NO  Other Concerns:  YES  Using cbd cream to left shoulder          Clinical Outcomes     Negatives:   Major bleeding event, Thromboembolic event, Anticoagulation-related hospital admission, Anticoagulation-related ED visit, Anticoagulation-related fatality    Comments:   Bleeding Signs/Symptoms: NO  Thromboembolic Signs/Symptoms: NO     Medication Changes:  YES had cortisone shot to left shoulder 4 days ago  Dietary Changes:  YES was out of town ate more greens. Had some alcohol  Bacterial/Viral Infection: NO     Missed Coumadin Doses: NO  Other Concerns:  YES  Using cbd cream to left shoulder             OBJECTIVE    INR Protime   Date Value Ref Range Status   06/10/2019 1.3 (A) 0.86 - 1.14 Final       ASSESSMENT / PLAN  INR assessment SUB    Recheck INR In: 1 WEEK    INR Location Clinic      Anticoagulation Summary  As of 6/10/2019    INR goal:   2.0-3.0   TTR:   34.6 % (1.2 y)   INR used for dosin.3! (6/10/2019)   Warfarin maintenance plan:   5 mg (5 mg x 1) every Tue, Thu, Sat; 2.5 mg (5 mg x 0.5) all other days   Full warfarin instructions:   6/10: 5 mg; : 5 mg; Otherwise 5 mg every Tue, Thu, Sat; 2.5 mg all other days   Weekly warfarin total:   25 mg   Plan last modified:   Lizy Mercado RN (2019)   Next INR check:   2019   Target end date:   Indefinite    Indications    Atrial fibrillation (H) [I48.91] [I48.91]  Long term current use of anticoagulant therapy [Z79.01]             Anticoagulation Episode Summary     INR check location:       Preferred  lab:       Send INR reminders to:   [facility]    Comments:         Anticoagulation Care Providers     Provider Role Specialty Phone number    Tad MendozaDO [facility] [phone]            See the Encounter Report to view Anticoagulation Flowsheet and Dosing Calendar (Go to Encounters tab in chart review, and find the Anticoagulation Therapy Visit)    Some signs and symptoms of clots include: pain or tenderness in arm or leg, swelling in arm or leg, changes in skin color, or area is warm to touch, shortness or breath, trouble breathing.  Numbness or weakness especially on 1 side of the body, sudden trouble speaking or swallowing, sudden trouble seeing, sudden confusion, dizzy spells or headache.  If you have these please call 911 or seek medical care immediately.      Lizy Mercado RN

## 2019-06-17 ENCOUNTER — ANTICOAGULATION THERAPY VISIT (OUTPATIENT)
Dept: NURSING | Facility: CLINIC | Age: 84
End: 2019-06-17
Payer: COMMERCIAL

## 2019-06-17 DIAGNOSIS — I48.91 ATRIAL FIBRILLATION (H): ICD-10-CM

## 2019-06-17 DIAGNOSIS — Z79.01 LONG TERM CURRENT USE OF ANTICOAGULANT THERAPY: ICD-10-CM

## 2019-06-17 DIAGNOSIS — I48.19 PERSISTENT ATRIAL FIBRILLATION (H): ICD-10-CM

## 2019-06-17 LAB — INR POINT OF CARE: 1.6 (ref 0.86–1.14)

## 2019-06-17 PROCEDURE — 36416 COLLJ CAPILLARY BLOOD SPEC: CPT

## 2019-06-17 PROCEDURE — 99207 ZZC NO CHARGE NURSE ONLY: CPT

## 2019-06-17 PROCEDURE — 85610 PROTHROMBIN TIME: CPT | Mod: QW

## 2019-06-17 RX ORDER — WARFARIN SODIUM 5 MG/1
TABLET ORAL
Qty: 90 TABLET | Refills: 3 | Status: SHIPPED | OUTPATIENT
Start: 2019-06-17 | End: 2020-03-23

## 2019-06-17 RX ORDER — WARFARIN SODIUM 2.5 MG/1
TABLET ORAL
Qty: 90 TABLET | Refills: 3 | Status: SHIPPED | OUTPATIENT
Start: 2019-06-17 | End: 2019-07-11

## 2019-06-17 NOTE — PROGRESS NOTES
ANTICOAGULATION FOLLOW-UP CLINIC VISIT    Patient Name:  Ashlie Brantley  Date:  2019  Contact Type:  Face to Face    SUBJECTIVE:  Patient Findings         Clinical Outcomes     Negatives:   Major bleeding event, Thromboembolic event, Anticoagulation-related hospital admission, Anticoagulation-related ED visit, Anticoagulation-related fatality           OBJECTIVE    INR Protime   Date Value Ref Range Status   2019 1.6 (A) 0.86 - 1.14 Final       ASSESSMENT / PLAN  INR assessment SUB    Recheck INR In: 1 WEEK    INR Location Clinic      Anticoagulation Summary  As of 2019    INR goal:   2.0-3.0   TTR:   34.0 % (1.2 y)   INR used for dosin.6! (2019)   Warfarin maintenance plan:   5 mg (5 mg x 1) every Tue, Thu, Sat; 2.5 mg (5 mg x 0.5) all other days   Full warfarin instructions:   : 5 mg; Otherwise 5 mg every Tue, Thu, Sat; 2.5 mg all other days   Weekly warfarin total:   25 mg   Plan last modified:   Lizy Mercado RN (2019)   Next INR check:   2019   Target end date:   Indefinite    Indications    Atrial fibrillation (H) [I48.91] [I48.91]  Long term current use of anticoagulant therapy [Z79.01]             Anticoagulation Episode Summary     INR check location:       Preferred lab:       Send INR reminders to:   [facility]    Comments:         Anticoagulation Care Providers     Provider Role Specialty Phone number    Tad Mendoza  [facility] [phone]            See the Encounter Report to view Anticoagulation Flowsheet and Dosing Calendar (Go to Encounters tab in chart review, and find the Anticoagulation Therapy Visit)  Some signs and symptoms of clots include: pain or tenderness in arm or leg, swelling in arm or leg, changes in skin color, or area is warm to touch, shortness or breath, trouble breathing.  Numbness or weakness especially on 1 side of the body, sudden trouble speaking or swallowing, sudden trouble seeing,  sudden confusion, dizzy spells or headache.  If you have these please call 911 or seek medical care immediately.        Lizy Mercado RN

## 2019-06-17 NOTE — PATIENT INSTRUCTIONS
Please call [phone] for questions.    New Hours at [facility]  Monday  730-330 pm  Tuesday 10:00- 6 pm   Friday     12:30- 4:30 pm  Closed on Wed and Thursday         If you miss a dose ok to take when you remember or split the dose you missed over the next few days.

## 2019-06-24 ENCOUNTER — ANTICOAGULATION THERAPY VISIT (OUTPATIENT)
Dept: NURSING | Facility: CLINIC | Age: 84
End: 2019-06-24
Payer: COMMERCIAL

## 2019-06-24 ENCOUNTER — TELEPHONE (OUTPATIENT)
Dept: FAMILY MEDICINE | Facility: CLINIC | Age: 84
End: 2019-06-24

## 2019-06-24 DIAGNOSIS — I48.91 ATRIAL FIBRILLATION (H): ICD-10-CM

## 2019-06-24 DIAGNOSIS — Z79.01 LONG TERM CURRENT USE OF ANTICOAGULANT THERAPY: ICD-10-CM

## 2019-06-24 DIAGNOSIS — I48.91 ATRIAL FIBRILLATION (H): Primary | ICD-10-CM

## 2019-06-24 LAB — INR PPP: 8.28 (ref 0.86–1.14)

## 2019-06-24 PROCEDURE — 36415 COLL VENOUS BLD VENIPUNCTURE: CPT | Performed by: FAMILY MEDICINE

## 2019-06-24 PROCEDURE — 85610 PROTHROMBIN TIME: CPT | Performed by: FAMILY MEDICINE

## 2019-06-24 NOTE — PROGRESS NOTES
Spoke to  Patients daughter for dosing instructions she will hold coumadin today and tomorrow and recheck inr thur.results to be dosed by Mahesh. Went through dosing with daughter wondering if the 2.5 and 5 mg are being mixed up no real reason for spike in inr. Other then patient is taking cbd oil for shoulder pain. Apt made for Monday next week for further monitoring.      Sent to lab inr would not read after two finger pokes. Will need to call and adjust her this evening.  Lizy Mercado,Clinic Rn  [facility] [place]

## 2019-06-24 NOTE — TELEPHONE ENCOUNTER
Called and spoke to patient's  Bill.    Patient denies any s/sx of bleeding.    Advised for patient to use caution to avoid falls or injuries while INR is elevated. Seek care for any falls, head injuries, lacerations, or any s/sx of bleeding.     Advised to hold warfarin today and tomorrow (6/24 and 6/25) and Lizy MENDOZA will call with further instructions tomorrow.    Bill verbalized understanding and had no other concerns or questions

## 2019-06-25 NOTE — TELEPHONE ENCOUNTER
Spoke to patients daughter per Ashlie with dosing instructions and follow up. She will hold her coumadin until Thursdays lab draw and Mahesh will get those results, and dose patient.  Lizy Mercado,Clinic Rn  [place]

## 2019-06-25 NOTE — TELEPHONE ENCOUNTER
Standing INR lab order placed for future use if INR clinic appointment not available when Ashlie is due for next INR recheck.    Bridgett Ibarra, PharmD BCACP  Anticoagulation Clinical Pharmacist

## 2019-06-27 ENCOUNTER — ANTICOAGULATION THERAPY VISIT (OUTPATIENT)
Dept: FAMILY MEDICINE | Facility: CLINIC | Age: 84
End: 2019-06-27

## 2019-06-27 DIAGNOSIS — Z79.01 LONG TERM CURRENT USE OF ANTICOAGULANT THERAPY: ICD-10-CM

## 2019-06-27 DIAGNOSIS — I48.91 ATRIAL FIBRILLATION (H): ICD-10-CM

## 2019-06-27 LAB — INR PPP: 4.2 (ref 0.86–1.14)

## 2019-06-27 PROCEDURE — 85610 PROTHROMBIN TIME: CPT | Performed by: NURSE PRACTITIONER

## 2019-06-27 PROCEDURE — 99207 ZZC NO CHARGE NURSE ONLY: CPT | Performed by: NURSE PRACTITIONER

## 2019-06-27 PROCEDURE — 36416 COLLJ CAPILLARY BLOOD SPEC: CPT | Performed by: NURSE PRACTITIONER

## 2019-06-27 NOTE — PROGRESS NOTES
ANTICOAGULATION FOLLOW-UP CLINIC VISIT    Patient Name:  Ashlie Brantley  Date:  6/27/2019  Contact Type:  Face to Face    SUBJECTIVE: here in lab for a draw so dosed her while here. She will be back on Monday.  Patient Findings         Clinical Outcomes     Negatives:   Major bleeding event, Thromboembolic event, Anticoagulation-related hospital admission, Anticoagulation-related ED visit, Anticoagulation-related fatality           OBJECTIVE    INR   Date Value Ref Range Status   06/27/2019 4.20 (H) 0.86 - 1.14 Final     Comment:     This test is intended for monitoring Coumadin therapy.  Results are not   accurate in patients with prolonged INR due to factor deficiency.         ASSESSMENT / PLAN  INR assessment SUPRA    Recheck INR In: 4 DAYS    INR Location Clinic      Anticoagulation Summary  As of 6/27/2019    INR goal:   2.0-3.0   TTR:   33.5 % (1.3 y)   INR used for dosing:      Warfarin maintenance plan:   5 mg (5 mg x 1) every Tue, Thu, Sat; 2.5 mg (5 mg x 0.5) all other days   Full warfarin instructions:   6/27: 2.5 mg; Otherwise 5 mg every Tue, Thu, Sat; 2.5 mg all other days   Weekly warfarin total:   25 mg   Plan last modified:   Lizy Mercado RN (4/29/2019)   Next INR check:   7/1/2019   Target end date:   Indefinite    Indications    Atrial fibrillation (H) [I48.91] [I48.91]  Long term current use of anticoagulant therapy [Z79.01]             Anticoagulation Episode Summary     INR check location:       Preferred lab:       Send INR reminders to:   AGG[facility] MURILLO    Comments:         Anticoagulation Care Providers     Provider Role Specialty Phone number    Tda MendozaDO [facility] [phone]            See the Encounter Report to view Anticoagulation Flowsheet and Dosing Calendar (Go to Encounters tab in chart review, and find the Anticoagulation Therapy Visit)        Lizy Mercado RN

## 2019-07-01 ENCOUNTER — ANTICOAGULATION THERAPY VISIT (OUTPATIENT)
Dept: NURSING | Facility: CLINIC | Age: 84
End: 2019-07-01
Payer: COMMERCIAL

## 2019-07-01 ENCOUNTER — TRANSFERRED RECORDS (OUTPATIENT)
Dept: HEALTH INFORMATION MANAGEMENT | Facility: CLINIC | Age: 84
End: 2019-07-01

## 2019-07-01 DIAGNOSIS — Z79.01 LONG TERM CURRENT USE OF ANTICOAGULANT THERAPY: ICD-10-CM

## 2019-07-01 DIAGNOSIS — I48.91 ATRIAL FIBRILLATION (H): ICD-10-CM

## 2019-07-01 LAB — INR POINT OF CARE: 1.8 (ref 0.86–1.14)

## 2019-07-01 PROCEDURE — 36416 COLLJ CAPILLARY BLOOD SPEC: CPT

## 2019-07-01 PROCEDURE — 99207 ZZC NO CHARGE NURSE ONLY: CPT

## 2019-07-01 PROCEDURE — 85610 PROTHROMBIN TIME: CPT | Mod: QW

## 2019-07-01 NOTE — PROGRESS NOTES
ANTICOAGULATION FOLLOW-UP CLINIC VISIT    Patient Name:  Ahslie Brantley  Date:  2019  Contact Type:  Face to Face    SUBJECTIVE:  Patient Findings     Positives:   Change in health (feeling better post injections last month), Missed doses (intentional hold last week), Change in diet/appetite (Ashlie stated has issues with eating too many or not enough greens)             OBJECTIVE    INR Protime   Date Value Ref Range Status   2019 1.8 (A) 0.86 - 1.14 Final       ASSESSMENT / PLAN  INR assessment SUB    Recheck INR In: 1 WEEK    INR Location Clinic      Anticoagulation Summary  As of 2019    INR goal:   2.0-3.0   TTR:   33.6 % (1.3 y)   INR used for dosin.8! (2019)   Warfarin maintenance plan:   5 mg (5 mg x 1) every Mon, Fri; 2.5 mg (5 mg x 0.5) all other days   Full warfarin instructions:   5 mg every Mon, Fri; 2.5 mg all other days   Weekly warfarin total:   22.5 mg   Plan last modified:   Bridgett Ibarra [facility] (2019)   Next INR check:   2019   Target end date:   Indefinite    Indications    Atrial fibrillation (H) [I48.91] [I48.91]  Long term current use of anticoagulant therapy [Z79.01]             Anticoagulation Episode Summary     INR check location:       Preferred lab:       Send INR reminders to:   AGGIE MURILLO    Comments:         Anticoagulation Care Providers     Provider Role Specialty Phone number    Tad MendozaDO [facility] [phone]            See the Encounter Report to view Anticoagulation Flowsheet and Dosing Calendar (Go to Encounters tab in chart review, and find the Anticoagulation Therapy Visit)    Gave vitamin K ([email or web address]) food sheet and discussed having a serving of greens every other day, and being consistent.  Also discussed that inflammation, etc will increase INR.  Plan to pull back dose 10%, and see effect of diet changes next week, then adjust, since using 2.5mg tablets can also increase by half tablet  incriment for smaller changes.    Bridgett Ibarra, [facility]

## 2019-07-03 DIAGNOSIS — J84.10 FIBROSIS, LUNG (H): ICD-10-CM

## 2019-07-03 RX ORDER — CODEINE SULFATE 30 MG/1
60 TABLET ORAL AT BEDTIME
Qty: 60 TABLET | Refills: 0 | Status: SHIPPED | OUTPATIENT
Start: 2019-07-03 | End: 2019-08-09

## 2019-07-03 NOTE — TELEPHONE ENCOUNTER
Left voicemail for sobia, daughter, consent on file, letting her know script is ready to be  at .    Thank you,  Quyen VIKC    NE Team Jeannine

## 2019-07-03 NOTE — TELEPHONE ENCOUNTER
Patient's daughter picked up envelope, verified Id, Book Signed, gave envelope.     .Carolin Spain  Patient Representative

## 2019-07-03 NOTE — TELEPHONE ENCOUNTER
codeine 30 MG tablet 60 tablet 0 6/2/2019  No   Sig - Route: Take 2 tablets (60 mg) by mouth At Bedtime - Oral   Class: Local Print   Earliest Fill Date: 6/2/2019   Notes to Pharmacy: To replace lost prescription that was dated 5/24/19 to be filled 6/2/19.  Ok to fill this prescription today   Order: [id number]       Last Office Visit: 5/24/2019  Future Office visit:       Routing refill request to provider for review/approval because:  Drug not on the [facility], [facility] or [facility] refill protocol or controlled substance

## 2019-07-03 NOTE — TELEPHONE ENCOUNTER
Refill in completed folder  I will forward this to Anay Miner so she is aware.     Orders Placed This Encounter     codeine 30 MG tablet     Sig: Take 2 tablets (60 mg) by mouth At Bedtime     Dispense:  60 tablet     Refill:  0

## 2019-07-08 ENCOUNTER — ANTICOAGULATION THERAPY VISIT (OUTPATIENT)
Dept: NURSING | Facility: CLINIC | Age: 84
End: 2019-07-08
Payer: COMMERCIAL

## 2019-07-08 DIAGNOSIS — I48.91 ATRIAL FIBRILLATION (H): ICD-10-CM

## 2019-07-08 DIAGNOSIS — Z79.01 LONG TERM CURRENT USE OF ANTICOAGULANT THERAPY: ICD-10-CM

## 2019-07-08 LAB — INR POINT OF CARE: 1.4 (ref 0.86–1.14)

## 2019-07-08 PROCEDURE — 85610 PROTHROMBIN TIME: CPT | Mod: QW

## 2019-07-08 PROCEDURE — 36416 COLLJ CAPILLARY BLOOD SPEC: CPT

## 2019-07-08 PROCEDURE — 99207 ZZC NO CHARGE NURSE ONLY: CPT

## 2019-07-08 NOTE — PROGRESS NOTES
ANTICOAGULATION FOLLOW-UP CLINIC VISIT    Patient Name:  Ashlie Brantley  Date:  2019  Contact Type:  Face to Face    SUBJECTIVE:  Patient Findings         Clinical Outcomes     Negatives:   Major bleeding event, Thromboembolic event, Anticoagulation-related hospital admission, Anticoagulation-related ED visit, Anticoagulation-related fatality           OBJECTIVE    INR Protime   Date Value Ref Range Status   2019 1.4 (A) 0.86 - 1.14 Final       ASSESSMENT / PLAN  INR assessment THER    Recheck INR In: 1 WEEK    INR Location Clinic      Anticoagulation Summary  As of 2019    INR goal:   2.0-3.0   TTR:   33.1 % (1.3 y)   INR used for dosin.4! (2019)   Warfarin maintenance plan:   5 mg (5 mg x 1) every Mon, Fri; 2.5 mg (5 mg x 0.5) all other days   Full warfarin instructions:   7/10: 5 mg; Otherwise 5 mg every Mon, Fri; 2.5 mg all other days   Weekly warfarin total:   22.5 mg   Plan last modified:   Bridgett Ibarra [facility] (2019)   Next INR check:   7/15/2019   Target end date:   Indefinite    Indications    Atrial fibrillation (H) [I48.91] [I48.91]  Long term current use of anticoagulant therapy [Z79.01]             Anticoagulation Episode Summary     INR check location:       Preferred lab:       Send INR reminders to:   AGGIE MURILLO    Comments:         Anticoagulation Care Providers     Provider Role Specialty Phone number    Tad Mendoza CemaudreyDO [facility] [phone]            See the Encounter Report to view Anticoagulation Flowsheet and Dosing Calendar (Go to Encounters tab in chart review, and find the Anticoagulation Therapy Visit)    Reeducated re diet and consistancy    Lizy Mercado RN

## 2019-07-11 ENCOUNTER — TELEPHONE (OUTPATIENT)
Dept: FAMILY MEDICINE | Facility: CLINIC | Age: 84
End: 2019-07-11

## 2019-07-11 DIAGNOSIS — Z79.01 LONG TERM CURRENT USE OF ANTICOAGULANT THERAPY: ICD-10-CM

## 2019-07-11 DIAGNOSIS — I48.91 ATRIAL FIBRILLATION (H): ICD-10-CM

## 2019-07-11 RX ORDER — WARFARIN SODIUM 2.5 MG/1
TABLET ORAL
Qty: 90 TABLET | Refills: 3 | Status: SHIPPED | OUTPATIENT
Start: 2019-07-11 | End: 2019-12-02 | Stop reason: DRUGHIGH

## 2019-07-15 ENCOUNTER — ANTICOAGULATION THERAPY VISIT (OUTPATIENT)
Dept: NURSING | Facility: CLINIC | Age: 84
End: 2019-07-15
Payer: COMMERCIAL

## 2019-07-15 DIAGNOSIS — I48.91 ATRIAL FIBRILLATION (H): ICD-10-CM

## 2019-07-15 DIAGNOSIS — Z79.01 LONG TERM CURRENT USE OF ANTICOAGULANT THERAPY: ICD-10-CM

## 2019-07-15 LAB — INR POINT OF CARE: 1.9 (ref 0.86–1.14)

## 2019-07-15 PROCEDURE — 36416 COLLJ CAPILLARY BLOOD SPEC: CPT

## 2019-07-15 PROCEDURE — 85610 PROTHROMBIN TIME: CPT | Mod: QW

## 2019-07-15 PROCEDURE — 99207 ZZC NO CHARGE NURSE ONLY: CPT

## 2019-07-15 NOTE — PROGRESS NOTES
ANTICOAGULATION FOLLOW-UP CLINIC VISIT    Patient Name:  Ashlie Brantley  Date:  7/15/2019  Contact Type:  Face to Face    SUBJECTIVE:  Patient Findings         Clinical Outcomes     Negatives:   Major bleeding event, Thromboembolic event, Anticoagulation-related hospital admission, Anticoagulation-related ED visit           OBJECTIVE    INR Protime   Date Value Ref Range Status   07/15/2019 1.9 (A) 0.86 - 1.14 Final       ASSESSMENT / PLAN  INR assessment THER    Recheck INR In: 2 WEEKS    INR Location Clinic      Anticoagulation Summary  As of 7/15/2019    INR goal:   2.0-3.0   TTR:   32.6 % (1.3 y)   INR used for dosin.9! (7/15/2019)   Warfarin maintenance plan:   5 mg (5 mg x 1) every Mon, Wed, Fri; 2.5 mg (5 mg x 0.5) all other days   Full warfarin instructions:   5 mg every Mon, Wed, Fri; 2.5 mg all other days   Weekly warfarin total:   25 mg   Plan last modified:   Lizy Mercado RN (7/15/2019)   Next INR check:   2019   Target end date:   Indefinite    Indications    Atrial fibrillation (H) [I48.91] [I48.91]  Long term current use of anticoagulant therapy [Z79.01]             Anticoagulation Episode Summary     INR check location:       Preferred lab:       Send INR reminders to:   AGGIE MURILLO    Comments:         Anticoagulation Care Providers     Provider Role Specialty Phone number    Tad eMndoza  [facility] [phone]            See the Encounter Report to view Anticoagulation Flowsheet and Dosing Calendar (Go to Encounters tab in chart review, and find the Anticoagulation Therapy Visit)        Lizy Mercado RN

## 2019-07-16 ENCOUNTER — TELEPHONE (OUTPATIENT)
Dept: FAMILY MEDICINE | Facility: CLINIC | Age: 84
End: 2019-07-16

## 2019-07-16 NOTE — TELEPHONE ENCOUNTER
Attempted to reach Daughter Mariela.  Left message for Daughter to return a call directly to anticoagulation/INR [place] at [phone]   Luz Allen RN

## 2019-07-16 NOTE — TELEPHONE ENCOUNTER
Daughter Mariela wanted to double check on Dosing instructions from yesterday.  Patient currently has 2 doses of coumadin; 2.5 mg and 5 mg tabs.  Daughter is planning to use up remaining 5 mg tabs then go to the 2.5 mg tabs only.  Luz Allen RN

## 2019-07-29 ENCOUNTER — ANTICOAGULATION THERAPY VISIT (OUTPATIENT)
Dept: NURSING | Facility: CLINIC | Age: 84
End: 2019-07-29
Payer: COMMERCIAL

## 2019-07-29 DIAGNOSIS — I48.91 ATRIAL FIBRILLATION (H): ICD-10-CM

## 2019-07-29 DIAGNOSIS — Z79.01 LONG TERM CURRENT USE OF ANTICOAGULANT THERAPY: ICD-10-CM

## 2019-07-29 LAB — INR POINT OF CARE: 1.7 (ref 0.86–1.14)

## 2019-07-29 PROCEDURE — 99207 ZZC NO CHARGE NURSE ONLY: CPT

## 2019-07-29 PROCEDURE — 36416 COLLJ CAPILLARY BLOOD SPEC: CPT

## 2019-07-29 PROCEDURE — 85610 PROTHROMBIN TIME: CPT | Mod: QW

## 2019-07-29 NOTE — PATIENT INSTRUCTIONS
Please call [phone] for questions.    Make sure you are setting up using the 2.5mg tablets    New Hours at [place] [facility]  Monday  730-330 pm  Tuesday 10:00- 6 pm   Friday     12:30- 4:30 pm  Closed on Wed and Thursday

## 2019-07-29 NOTE — PROGRESS NOTES
ANTICOAGULATION FOLLOW-UP CLINIC VISIT    Patient Name:  Ashlie Brantley  Date:  2019  Contact Type:  Face to Face  Mariela sets up medications and has called questioning the dose. I hope the calender sent with is helpful. Not to use the 5 mg tablets any longer.  SUBJECTIVE:  Patient Findings         Clinical Outcomes     Negatives:   Major bleeding event, Thromboembolic event, Anticoagulation-related hospital admission, Anticoagulation-related ED visit, Anticoagulation-related fatality           OBJECTIVE    INR Protime   Date Value Ref Range Status   2019 1.7 (A) 0.86 - 1.14 Final       ASSESSMENT / PLAN  INR assessment SUB    Recheck INR In: 1 WEEK    INR Location Clinic      Anticoagulation Summary  As of 2019    INR goal:   2.0-3.0   TTR:   31.7 % (1.4 y)   INR used for dosin.7! (2019)   Warfarin maintenance plan:   5 mg (5 mg x 1) every Mon, Wed, Fri; 2.5 mg (5 mg x 0.5) all other days   Full warfarin instructions:   8/3: 5 mg; Otherwise 5 mg every Mon, Wed, Fri; 2.5 mg all other days   Weekly warfarin total:   25 mg   Plan last modified:   Lizy Mercado RN (2019)   Next INR check:   2019   Target end date:   Indefinite    Indications    Atrial fibrillation (H) [I48.91] [I48.91]  Long term current use of anticoagulant therapy [Z79.01]             Anticoagulation Episode Summary     INR check location:       Preferred lab:       Send INR reminders to:   AGGIE MURILLO    Comments:         Anticoagulation Care Providers     Provider Role Specialty Phone number    Tad Mendoza DO Aura [facility] [phone]            See the Encounter Report to view Anticoagulation Flowsheet and Dosing Calendar (Go to Encounters tab in chart review, and find the Anticoagulation Therapy Visit)        Lizy Mercado RN

## 2019-08-05 ENCOUNTER — ANTICOAGULATION THERAPY VISIT (OUTPATIENT)
Dept: NURSING | Facility: CLINIC | Age: 84
End: 2019-08-05
Payer: COMMERCIAL

## 2019-08-05 DIAGNOSIS — I48.91 ATRIAL FIBRILLATION (H): ICD-10-CM

## 2019-08-05 DIAGNOSIS — Z79.01 LONG TERM CURRENT USE OF ANTICOAGULANT THERAPY: ICD-10-CM

## 2019-08-05 LAB — INR POINT OF CARE: 1.7 (ref 0.86–1.14)

## 2019-08-05 PROCEDURE — 36416 COLLJ CAPILLARY BLOOD SPEC: CPT

## 2019-08-05 PROCEDURE — 85610 PROTHROMBIN TIME: CPT | Mod: QW

## 2019-08-05 PROCEDURE — 99207 ZZC NO CHARGE NURSE ONLY: CPT

## 2019-08-05 NOTE — PATIENT INSTRUCTIONS
Inquire with insurance about cost of a DOAC , I had pharmacy run a quote with your insurance on file and they thought the first month out of pocket would cost about 200-240.00 to meet your deductible, after that once your deductible was met it would be about 40.00 $ a month. With Doac such as Xeralto  you don't need your inr tested. It stays constant and steady in your system because it is not effected by vit k. Other doac to look up would be Eliquis, Pradaxa, or Edoxaban

## 2019-08-05 NOTE — PROGRESS NOTES
ANTICOAGULATION FOLLOW-UP CLINIC VISIT    Patient Name:  Ashlie Brantley  Date:  2019  Contact Type:  Face to Face    SUBJECTIVE:    discussed doac she will do research and consult cardiology  For past year unable to really stable inr.     OBJECTIVE    INR Protime   Date Value Ref Range Status   2019 1.7 (A) 0.86 - 1.14 Final       ASSESSMENT / PLAN  INR assessment SUB    Recheck INR In: 1 WEEK    INR Location Clinic      Anticoagulation Summary  As of 2019    INR goal:   2.0-3.0   TTR:   31.2 % (1.4 y)   INR used for dosin.7! (2019)   Warfarin maintenance plan:   2.5 mg (2.5 mg x 1) every Sun, Tue, Thu; 5 mg (2.5 mg x 2) all other days   Full warfarin instructions:   : 5 mg; Otherwise 2.5 mg every Sun, Tue, Thu; 5 mg all other days   Weekly warfarin total:   27.5 mg   Plan last modified:   Lizy Mercado RN (2019)   Next INR check:   2019   Target end date:   Indefinite    Indications    Atrial fibrillation (H) [I48.91] [I48.91]  Long term current use of anticoagulant therapy [Z79.01]             Anticoagulation Episode Summary     INR check location:       Preferred lab:       Send INR reminders to:   AGGIE MURILLO    Comments:         Anticoagulation Care Providers     Provider Role Specialty Phone number    Tad MendozaDO [facility] [phone]            See the Encounter Report to view Anticoagulation Flowsheet and Dosing Calendar (Go to Encounters tab in chart review, and find the Anticoagulation Therapy Visit)        Lizy Mercado RN

## 2019-08-06 DIAGNOSIS — J84.10 FIBROSIS, LUNG (H): ICD-10-CM

## 2019-08-07 ENCOUNTER — OFFICE VISIT (OUTPATIENT)
Dept: UROLOGY | Facility: CLINIC | Age: 84
End: 2019-08-07
Payer: COMMERCIAL

## 2019-08-07 ENCOUNTER — TRANSFERRED RECORDS (OUTPATIENT)
Dept: HEALTH INFORMATION MANAGEMENT | Facility: CLINIC | Age: 84
End: 2019-08-07

## 2019-08-07 VITALS — HEART RATE: 92 BPM | OXYGEN SATURATION: 92 % | DIASTOLIC BLOOD PRESSURE: 79 MMHG | SYSTOLIC BLOOD PRESSURE: 136 MMHG

## 2019-08-07 DIAGNOSIS — N32.81 OAB (OVERACTIVE BLADDER): Primary | ICD-10-CM

## 2019-08-07 LAB
ALBUMIN UR-MCNC: NEGATIVE MG/DL
APPEARANCE UR: CLEAR
BACTERIA #/AREA URNS HPF: ABNORMAL /HPF
BILIRUB UR QL STRIP: NEGATIVE
COLOR UR AUTO: YELLOW
GLUCOSE UR STRIP-MCNC: NEGATIVE MG/DL
HGB UR QL STRIP: ABNORMAL
HYALINE CASTS #/AREA URNS LPF: ABNORMAL /LPF
KETONES UR STRIP-MCNC: NEGATIVE MG/DL
LEUKOCYTE ESTERASE UR QL STRIP: NEGATIVE
NITRATE UR QL: NEGATIVE
NON-SQ EPI CELLS #/AREA URNS LPF: ABNORMAL /LPF
PH UR STRIP: 6.5 PH (ref 5–7)
RBC #/AREA URNS AUTO: ABNORMAL /HPF
SOURCE: ABNORMAL
SP GR UR STRIP: 1.01 (ref 1–1.03)
UROBILINOGEN UR STRIP-ACNC: 1 EU/DL (ref 0.2–1)
WBC #/AREA URNS AUTO: ABNORMAL /HPF

## 2019-08-07 PROCEDURE — 99213 OFFICE O/P EST LOW 20 MIN: CPT | Performed by: UROLOGY

## 2019-08-07 PROCEDURE — 81001 URINALYSIS AUTO W/SCOPE: CPT | Performed by: UROLOGY

## 2019-08-07 RX ORDER — MIRABEGRON 50 MG/1
50 TABLET, EXTENDED RELEASE ORAL DAILY
Qty: 30 TABLET | Refills: 1 | Status: SHIPPED | OUTPATIENT
Start: 2019-08-07 | End: 2019-12-04

## 2019-08-07 NOTE — PROGRESS NOTES
F/u OAB wet, 250 bladder cap, BETO, s/p sling, Detrol LA 4, lasix 20    Compliant; takes the lasix BID.    States that bladder control has worsened, now more wet. Wears one med pad at nite and 2 med pads + Depend during the day. Denies fecal incont.    Denies dysuria, gross hematuria, frequency. Drinks 2-4 [place] per day, otherwise moderate.       Current Outpatient Medications   Medication     acetaminophen (TYLENOL) 500 MG tablet     albuterol (2.5 MG/3ML) 0.083% neb solution     albuterol (2.5 MG/3ML) 0.083% neb solution     CALCIUM + D 600-200 MG-UNIT OR TABS     codeine 30 MG tablet     furosemide (LASIX) 20 MG tablet     levothyroxine (SYNTHROID) 88 MCG tablet     losartan (COZAAR) 100 MG tablet     metoprolol tartrate (LOPRESSOR) 25 MG tablet     neomycin-polymyxin-dexamethasone (MAXITROL) 3.5-13588-9.1 OINT ophthalmic ointment     OMEGA-3 COMPLEX OR     order for DME     order for DME     order for DME     ORDER FOR DME     simvastatin (ZOCOR) 40 MG tablet     tolterodine (DETROL LA) 4 MG 24 hr capsule     traZODone (DESYREL) 50 MG tablet     VITAMIN D 2000 UNIT OR TABS     VITAMIN D3 1000 UNIT OR CAPS     VITAMIN E COMPLEX PO     warfarin (COUMADIN) 2.5 MG tablet     warfarin (COUMADIN) 5 MG tablet     Current Facility-Administered Medications   Medication     betamethasone acet & sod phos (CELESTONE) injection 6 mg     ropivacaine (NAROPIN) injection 3 mL         Results for orders placed or performed in visit on 08/07/19   UA reflex to Microscopic   Result Value Ref Range    Color Urine Yellow     Appearance Urine Clear     Glucose Urine Negative NEG^Negative mg/dL    Bilirubin Urine Negative NEG^Negative    Ketones Urine Negative NEG^Negative mg/dL    Specific Gravity Urine 1.010 1.003 - 1.035    Blood Urine Trace (A) NEG^Negative    pH Urine 6.5 5.0 - 7.0 pH    Protein Albumin Urine Negative NEG^Negative mg/dL    Urobilinogen Urine 1.0 0.2 - 1.0 EU/dL    Nitrite Urine Negative NEG^Negative    Leukocyte  Esterase Urine Negative NEG^Negative    Source Midstream Urine        IMP:  1. OAB wet, room for improvement  2. Other medical conditions  3. Brother is [employer] in ph[employer]      PLAN:  1. Discussed situation with patient in detail.  2. Consider trial Myrbetriq 50; discussed in detail rationale, mech of action, potential side effect, etc; pt elects trial  3. RTC 2 mos to review progress  4. Set realistic expectations  5. 15 minutes spent with patient, more than 50% spent in counseling and coordination of care for OAB.

## 2019-08-07 NOTE — TELEPHONE ENCOUNTER
codeine 30 MG tablet      Last Written Prescription Date:  7/3/2019  Last Fill Quantity: 60 tabs,   # refills: 0  Last Office Visit: 5/24/2019  Future Office visit:    Next 5 appointments (look out 90 days)    Aug 07, 2019  1:30 PM CDT  Return Visit with René Warner MD  [facility] ([address] [address]  [phone]   Aug 15, 2019 12:00 PM CDT  SHORT with Anay Miner NP  [facility] [address] [place] [id number] [phone]           Routing refill request to provider for review/approval because:  Drug not on the [facility], [facility] or [facility] refill protocol or controlled substance

## 2019-08-09 ENCOUNTER — TELEPHONE (OUTPATIENT)
Dept: FAMILY MEDICINE | Facility: CLINIC | Age: 84
End: 2019-08-09

## 2019-08-09 RX ORDER — CODEINE SULFATE 30 MG/1
60 TABLET ORAL AT BEDTIME
Qty: 60 TABLET | Refills: 0 | Status: SHIPPED | OUTPATIENT
Start: 2019-08-09 | End: 2019-09-12

## 2019-08-09 NOTE — TELEPHONE ENCOUNTER
Patient was looked up in [facility] and there are not indications for concern regarding use of controlled substances on record at this time.     Last filled 7/5/19.  Refill approved and placed into team michelle lock-box.  Let patient know ready for pick-up.    Anay Miner, DNP, APRN, CNP

## 2019-08-09 NOTE — TELEPHONE ENCOUNTER
Ashlie Cueva was recently given a prescription for Myrbetriq which can impact her prescription for metoprolol.  I contacted her prescriber Dr. Hernandez to advise on the interaction and his clinic put a message into you in Epic indicating they would like you to manage the interaction.  Myrbetriq can increase the level of metoprolol in the patient's system.  The recommendation is to reduce the dose.  Would you like to reduce the metoprolol dose of 1/2 tablet twice daily or have the patient start the Myrbetriq but come to the pharmacy in a week or two to see how her blood pressure is doing?    Dory Guo PharmD, [facility]  Pharmacist Manager   [facility]  [phone]

## 2019-08-09 NOTE — TELEPHONE ENCOUNTER
"Reviewed messages in CareEverywhere from Dr. Hernandez in Cardiology (see below).    It looks like her BPs have been ranging 120-130's/70-80's with HR 70-80's.  I would suggest keeping the dose the same and closely following up.  Follow-up in 1 week to review symptoms, and check BP/heart rate.    Please let patient know.    Anay Miner, DNP, APRN, CNP    Telephone Encounter - Lori Hernandez MD - 08/09/2019 7:17 AM CDT  \"Patient has atrial fib and resultant cardiomyopathy in the past due to rapid ventricular response .   Rate is now controlled .   The heart rate and symptoms should be monitored by the primary care physician and decrease the metoprolol as deemed appropriate .   Please forward this request to primary care physician and request for a close follow up.\"    Lori Hernandez MD .................... 8/9/2019 7:18 AM      Electronically signed by Lori Hernandez MD at 08/09/2019 7:19 AM CDT      Telephone Encounter - Hien Jones RN - 08/07/2019 4:15 PM CDT  \"[facility] in [place] called, wanting to update Dr. Lori Hernandez that Ashlie is starting a new medication mirabegron (MYRBETRIQ) 50 MG 24 hr tablet, 1 tab daily for overactive bladder. Myrbetriq can affect metabolism of Metoprolol/Lopressor, increasing the amount of Metoprolol/Lopressor in the system. [facility] is wondering if Dr. Lori Hernandez would want to adjust the dose of Metoprolol/Lopressor, or continue to monitor at this point? Will route to Dr. Lori Hernandez for review/recommendation.  Last office visit with Dr. Lori Hernandez on 7/1/2019  BP 98/72 Pulse 72\"    Hien Jones RN, BSN [facility] Clinician ...........8/7/2019 4:18 PM.    "

## 2019-08-12 ENCOUNTER — ANTICOAGULATION THERAPY VISIT (OUTPATIENT)
Dept: NURSING | Facility: CLINIC | Age: 84
End: 2019-08-12
Payer: COMMERCIAL

## 2019-08-12 DIAGNOSIS — Z79.01 LONG TERM CURRENT USE OF ANTICOAGULANT THERAPY: ICD-10-CM

## 2019-08-12 DIAGNOSIS — I48.91 ATRIAL FIBRILLATION (H): ICD-10-CM

## 2019-08-12 LAB — INR POINT OF CARE: 2.4 (ref 0.86–1.14)

## 2019-08-12 PROCEDURE — 99207 ZZC NO CHARGE NURSE ONLY: CPT

## 2019-08-12 PROCEDURE — 36416 COLLJ CAPILLARY BLOOD SPEC: CPT

## 2019-08-12 PROCEDURE — 85610 PROTHROMBIN TIME: CPT | Mod: QW

## 2019-08-12 NOTE — PATIENT INSTRUCTIONS
Please call [phone] for questions.    New Hours at [place] [facility]  Monday  730-330 pm  Tuesday 10:00- 6 pm   Friday     12:30- 4:30 pm  Closed on Wed and Thursday        Ask at your apt about a DOAC or testing for factor 10 blood work since your inr is all over the place.

## 2019-08-12 NOTE — PROGRESS NOTES
ANTICOAGULATION FOLLOW-UP CLINIC VISIT    Patient Name:  Ashlie Brantley  Date:  2019  Contact Type:  Face to Face  Patient saw urology and was prescribed detral that can interfere with warfarin.  She is waiting for pcp to ok she also is considering a doac daughter is looking into cost etc.    SUBJECTIVE:  Patient Findings     Comments:   Bleeding Signs/Symptoms: NO  Thromboembolic Signs/Symptoms: NO     Medication Changes:  NO  Dietary Changes:  NO  Bacterial/Viral Infection: NO     Missed Coumadin Doses: NO  Other Concerns:  NO          Clinical Outcomes     Negatives:   Major bleeding event, Thromboembolic event, Anticoagulation-related hospital admission, Anticoagulation-related ED visit, Anticoagulation-related fatality    Comments:   Bleeding Signs/Symptoms: NO  Thromboembolic Signs/Symptoms: NO     Medication Changes:  NO  Dietary Changes:  NO  Bacterial/Viral Infection: NO     Missed Coumadin Doses: NO  Other Concerns:  NO             OBJECTIVE    INR Protime   Date Value Ref Range Status   2019 2.4 (A) 0.86 - 1.14 Final       ASSESSMENT / PLAN  INR assessment THER    Recheck INR In: 2 WEEKS    INR Location Clinic      Anticoagulation Summary  As of 2019    INR goal:   2.0-3.0   TTR:   31.6 % (1.4 y)   INR used for dosin.4 (2019)   Warfarin maintenance plan:   2.5 mg (2.5 mg x 1) every Tue, Thu; 5 mg (2.5 mg x 2) all other days   Full warfarin instructions:   2.5 mg every Tue, Thu; 5 mg all other days   Weekly warfarin total:   30 mg   Plan last modified:   Lizy Mercado RN (2019)   Next INR check:   2019   Target end date:   Indefinite    Indications    Atrial fibrillation (H) [I48.91] [I48.91]  Long term current use of anticoagulant therapy [Z79.01]             Anticoagulation Episode Summary     INR check location:       Preferred lab:       Send INR reminders to:   AGGIE MURILLO    Comments:         Anticoagulation Care Providers     Provider Role  Specialty Phone number    Tad Mendoza,  [facility] [phone]            See the Encounter Report to view Anticoagulation Flowsheet and Dosing Calendar (Go to Encounters tab in chart review, and find the Anticoagulation Therapy Visit)        Lizy Mercado RN

## 2019-08-15 ENCOUNTER — OFFICE VISIT (OUTPATIENT)
Dept: FAMILY MEDICINE | Facility: CLINIC | Age: 84
End: 2019-08-15
Payer: COMMERCIAL

## 2019-08-15 ENCOUNTER — TELEPHONE (OUTPATIENT)
Dept: UROLOGY | Facility: CLINIC | Age: 84
End: 2019-08-15

## 2019-08-15 ENCOUNTER — TELEPHONE (OUTPATIENT)
Dept: FAMILY MEDICINE | Facility: CLINIC | Age: 84
End: 2019-08-15

## 2019-08-15 VITALS
TEMPERATURE: 98.7 F | SYSTOLIC BLOOD PRESSURE: 110 MMHG | BODY MASS INDEX: 31.14 KG/M2 | HEART RATE: 68 BPM | DIASTOLIC BLOOD PRESSURE: 80 MMHG | OXYGEN SATURATION: 93 % | WEIGHT: 149 LBS

## 2019-08-15 DIAGNOSIS — I48.91 ATRIAL FIBRILLATION, UNSPECIFIED TYPE (H): Primary | ICD-10-CM

## 2019-08-15 DIAGNOSIS — H53.9 VISION DISORDER: ICD-10-CM

## 2019-08-15 DIAGNOSIS — J84.112 IPF (IDIOPATHIC PULMONARY FIBROSIS) (H): ICD-10-CM

## 2019-08-15 DIAGNOSIS — G89.4 CHRONIC PAIN SYNDROME: ICD-10-CM

## 2019-08-15 DIAGNOSIS — E03.4 HYPOTHYROIDISM DUE TO ACQUIRED ATROPHY OF THYROID: ICD-10-CM

## 2019-08-15 DIAGNOSIS — R01.1 HEART MURMUR: ICD-10-CM

## 2019-08-15 DIAGNOSIS — I10 HYPERTENSION GOAL BP (BLOOD PRESSURE) < 140/90: ICD-10-CM

## 2019-08-15 DIAGNOSIS — E78.5 HYPERLIPIDEMIA LDL GOAL <130: ICD-10-CM

## 2019-08-15 DIAGNOSIS — R53.83 FATIGUE, UNSPECIFIED TYPE: ICD-10-CM

## 2019-08-15 DIAGNOSIS — Z79.01 LONG TERM CURRENT USE OF ANTICOAGULANT THERAPY: ICD-10-CM

## 2019-08-15 DIAGNOSIS — Z99.81 ON HOME OXYGEN THERAPY: ICD-10-CM

## 2019-08-15 PROCEDURE — 99214 OFFICE O/P EST MOD 30 MIN: CPT | Performed by: NURSE PRACTITIONER

## 2019-08-15 PROCEDURE — 99207 C PAF COMPLETED  NO CHARGE: CPT | Performed by: NURSE PRACTITIONER

## 2019-08-15 ASSESSMENT — PATIENT HEALTH QUESTIONNAIRE - PHQ9
SUM OF ALL RESPONSES TO PHQ QUESTIONS 1-9: 1
5. POOR APPETITE OR OVEREATING: MORE THAN HALF THE DAYS

## 2019-08-15 ASSESSMENT — PAIN SCALES - GENERAL: PAINLEVEL: NO PAIN (0)

## 2019-08-15 ASSESSMENT — ANXIETY QUESTIONNAIRES
2. NOT BEING ABLE TO STOP OR CONTROL WORRYING: MORE THAN HALF THE DAYS
3. WORRYING TOO MUCH ABOUT DIFFERENT THINGS: MORE THAN HALF THE DAYS
7. FEELING AFRAID AS IF SOMETHING AWFUL MIGHT HAPPEN: NOT AT ALL
GAD7 TOTAL SCORE: 6
IF YOU CHECKED OFF ANY PROBLEMS ON THIS QUESTIONNAIRE, HOW DIFFICULT HAVE THESE PROBLEMS MADE IT FOR YOU TO DO YOUR WORK, TAKE CARE OF THINGS AT HOME, OR GET ALONG WITH OTHER PEOPLE: NOT DIFFICULT AT ALL
1. FEELING NERVOUS, ANXIOUS, OR ON EDGE: NOT AT ALL
5. BEING SO RESTLESS THAT IT IS HARD TO SIT STILL: NOT AT ALL
6. BECOMING EASILY ANNOYED OR IRRITABLE: NOT AT ALL

## 2019-08-15 NOTE — LETTER
[facility]  08/15/19    Patient: Ashlie Brantley  YOB: 1931  Medical Record Number: [id number]                                                                  Opioid / Opioid Plus Controlled Substance Agreement    I understand that my care provider has prescribed an opioid (narcotic) controlled substance to help manage my condition(s). I am taking this medicine to help me function or work. I know this is strong medicine, and that it can cause serious side effects. Opioid medicine can be sedating, addicting and may cause a dependency on the drug. They can affect my ability to drive or think, and cause depression. They need to be taken exactly as prescribed. Combining opioids with certain medicines or chemicals (such as cocaine, sedatives and tranquilizers, sleeping pills, meth) can be dangerous or even fatal. Also, if I stop opioids suddenly, I may have severe withdrawal symptoms. Last, I understand that opioids do not work for all types of pain nor for all patients. If not helpful, I may be asked to stop them.      The risks, benefits, and side effects of these medicine(s) were explained to me. I agree that:    1. I will take part in other treatments as advised by my care team. This may be psychiatry or counseling, physical therapy, behavioral therapy, group treatment or a referral to a pain clinic. I will reduce or stop my medicine when my care team tells me to do so.  2. I will take my medicines as prescribed. I will not change the dose or schedule unless my care team tells me to. There will be no refills if I  run out early.   I may be contactedwithout warning and asked to complete a urine drug test or pill count at any time.   3. I will keep all my appointments, and understand this is part of the monitoring of opioids. My care team may require an office visit for EVERY opioid/controlled substance refill. If I miss appointments or don t follow instructions, my care team may stop  my medicine.  4. I will not ask other providers to prescribe controlled substances, and I will not accept controlled substances from other people. If I need another prescribed controlled substance for a new reason, I will tell my care team within 1 business day.  5. I will use one pharmacy to fill all of my controlled substance prescriptions, and it is up to me to make sure that I do not run out of my medicines on weekends or holidays. If my care team is willing to refill my opioid prescription without a visit, I must request refills only during office hours, refills may take up to 3 days to process, and it may take up to 5 to 7 days for my medicine to be mailed and ready at my pharmacy. Prescriptions will not be mailed anywhere except my pharmacy.        [id number]  Rev 12/18         Registration to scan to EHR                             Page 1 of 2               Controlled Substance Agreement Opioid        [facility]  08/15/19  Patient: Ashlie Brantley  YOB: 1931  Medical Record Number: [id number]                                                                  6. I am responsible for my prescriptions. If the medicine/prescription is lost or stolen, it will not be replaced. I also agree not to share controlled substance medicines with anyone.  7. I agree to not use ANY illegal or recreational drugs. This includes marijuana, cocaine, bath salts or other drugs. I agree not to use alcohol unless my care team says I may.          I agree to give urine samples whenever asked. If I don t give a urine sample, the care team may stop my medicine.    8. If I enroll in the [place] Medical Marijuana program, I will tell my care team. I will also sign an agreement to share my medical records with my care team.   9. I will bring in my list of medicines (or my medicine bottles) each time I come to the clinic.   10. I will tell my care team right away if I become pregnant or have a new medical  problem treated outside of my regular clinic.  11. I understand that this medicine can affect my thinking and judgment. It may be unsafe for me to drive, use machinery and do dangerous tasks. I will not do any of these things until I know how the medicine affects me. If my dose changes, I will wait to see how it affects me. I will contact my care team if I have concerns about medicine side effects.    I understand that if I do not follow any of the conditions above, my prescriptions or treatment may be stopped.      I agree that my provider, clinic care team, and pharmacy may work with any city, state or federal law enforcement agency that investigates the misuse, sale, or other diversion of my controlled medicine. I will allow my provider to discuss my care with or share a copy of this agreement with any other treating provider, pharmacy or emergency room where I receive care. I agree to give up (waive) any right of privacy or confidentiality with respect to these consents.     I have read this agreement and have asked questions about anything I did not understand.      ________________________________________________________________________  Patient signature - Date/Time -  Ashlie Brantley                                      ________________________________________________________________________  Witness signature                                                            ________________________________________________________________________  Provider signature - Anay Miner, ANAI      [id number]  Rev 12/18         Registration to scan to EHR                         Page 2 of 2                   Controlled Substance Agreement Opioid           Page 1 of 2  Opioid Pain Medicines (also known as Narcotics)  What You Need to Know    What are opioids?   Opioids are pain medicines that must be prescribed by a doctor.  They are also known as narcotics.    Examples are:     morphine (MS Contin, Saira)    oxycodone  (Oxycontin)    oxycodone and acetaminophen (Percocet)    hydrocodone and acetaminophen (Vicodin, Norco)     fentanyl patch (Duragesic)     hydromorphone (Dilaudid)     methadone     What do opioids do well?   Opioids are best for short-term pain after a surgery or injury. They also work well for cancer pain. Unlike other pain medicines, they do not cause liver or kidney failure or ulcers. They may help some people with long-lasting (chronic) pain.     What do opioids NOT do well?   Opioids never get rid of pain entirely, and they do not work well for most patients with chronic pain. Opioids do not reduce swelling, one of the causes of pain. They also don t work well for nerve pain.                           For informational purposes only.  Not to replace the advice of your care provider.  Copyright [address] [facility]. All right reserved. [employer] 949324-Umx 02/18.      Page 2 of 2    Risks and side effects   Talk to your doctor before you start or decide to keep taking one of these medicines. Side effects include:    Lowering your breathing rate enough to cause death    Overdose, including death, especially if taking higher than prescribed doses    Long-term opioid use    Worse depression symptoms; less pleasure in things you usually enjoy    Feeling tired or sluggish    Slower thoughts or cloudy thinking    Being more sensitive to pain over time; pain is harder to control    Trouble sleeping or restless sleep    Changes in hormone levels (for example, less testosterone)    Changes in sex drive or ability to have sex    Constipation    Unsafe driving    Itching and sweating    Feeling dizzy    Nausea, vomiting and dry mouth    What else should I know about opioids?  When someone takes opioids for too long or too often, they become dependent. This means that if you stop or reduce the medicine too quickly, you will have withdrawal symptoms.    Dependence is not the same as addiction. Addiction is when  people keep using a substance that harms their body, their mind or their relations with others. If you have a history of drug or alcohol abuse, taking opioids can cause a relapse.    Over time, opioids don t work as well. Most people will need higher and higher doses. The higher the dose, the more serious the side effects. We don t know the long-term effects of opioids.      Prescribed opioids aren't the best way to manage chronic pain    Other ways to manage pain include:      Ibuprofen or acetaminophen.  You should always try this first.      Treat health problems that may be causing pain.      acupuncture or massage, deep breathing, meditation, visual imagery, aromatherapy.      Use heat or ice at the pain site      Physical therapy and exercise      Stop smoking      See a counselor or therapist                                                  People who have used opioids for a long time may have a lower quality of life, worse depression, higher levels of pain and more visits to doctors.    Never share your opioids with others. Be sure to store opioids in a secure place, locked if possible.Young children can easily swallow them and overdose.     You can overdose on opioids.  Signs of overdose include decrease or loss of consciousness, slowed breathing, trouble waking and blue lips.  If someone is worried about overdose, they should call 911.    If you are at risk for overdose, you may get naloxone (Narcan, a medicine that reverses the effects of opioids.  If you overdose, a friend or family member can give you Narcan while waiting for the ambulance.  They need to know the signs of overdose and how to give Narcan.    While you're taking opioids:    Don't use alcohol or street drugs. Taking them together can cause death.    Don't take any of these medicines unless your doctor says its okay.  Taking these with opioids can cause death.    Benzodiazepines (such as lorazepam         or diazepam)    Muscle relaxers  (such as cyclobenzaprine)    sleeping pills    other opioids    Safe disposal of opioids  Find your area drug take-back program, your pharmacy mail-back program, buy a special disposal bag (such as Deterra) from your pharmacy or flush them down the toilet.  Use the guidelines at:  www.fda.gov/drugs/resourcesforyou

## 2019-08-15 NOTE — TELEPHONE ENCOUNTER
Reason for Call:  Other call back    Detailed comments: Patients daughter(Mariela) calling to  Request call back from nurse or doctor.  Couple questions regarding appointment 8/15/19     Phone Number Patient can be reached at: Home number on file [phone] (home)    Best Time: any     Can we leave a detailed message on this number? YES    Call taken on 8/15/2019 at 4:18 PM by Zee Farrell

## 2019-08-15 NOTE — PROGRESS NOTES
Subjective     Ashlie Brantley is a 88 year old female who presents to clinic today for the following health issues:    HPI   Pt. Wants to discuss medication change.   Ask about appt. To test for factor 10 blood work    Patient was recently prescribed Myrbetriq for overactive bladder by Dr. Warner.  There is a drug-drug interaction with Myrbetriq and Metoprolol in that Metoprolol effects can be increased.  Patient asked for advice how to manage this.  She has not yet started the Myrbetriq.    Atrial fibrillation on coumadin for anticoagulation.   Patient states her INR is have been out of range lately.  She is wondering about an alternative to Coumadin for anticoagulation.    She has been bothered by fatigue.    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     vocal cord dystonia     Hyperlipidemia LDL goal <130     Anxiety     Hypertension goal BP (blood pressure) < 140/90     LVH (left ventricular hypertrophy)     Advance Care Planning     Cough     Dermatochalasis     Health Care Home     Trochanteric bursitis - bilateral     PVD (posterior vitreous detachment) OU     Pseudophakia, OU     IPF (idiopathic pulmonary fibrosis) (H)     Hypothyroidism due to acquired atrophy of thyroid     Chronic pain syndrome     Urinary incontinence, unspecified type     Atrial fibrillation (H) [I48.91]     Long term current use of anticoagulant therapy     Chronic left shoulder pain     On home oxygen therapy     Overactive bladder     Past Surgical History:   Procedure Laterality Date     C NONSPECIFIC PROCEDURE      BACK SURGERY     C NONSPECIFIC PROCEDURE      VOCAL CORD POLYP     C SHOULDER SURG PROC UNLISTED       C STOMACH SURGERY PROCEDURE UNLISTED       C TOTAL KNEE ARTHROPLASTY      LT 1998  /  RT 2001     CATARACT IOL, RT/LT       HC REMOVAL GALLBLADDER       HERNIA REPAIR, INGUINAL RT/LT      RT      HYSTERECTOMY, CERVIX STATUS UNKNOWN      DUB     PHACOEMULSIFICATION WITH STANDARD INTRAOCULAR LENS IMPLANT  Rt 6/11/09, Lt 8/3/09    both eyes Dr. Barnett     ROTATOR CUFF REPAIR RT/LT      RT       Social History     Tobacco Use     Smoking status: Never Smoker     Smokeless tobacco: Never Used   Substance Use Topics     Alcohol use: Yes     Alcohol/week: 0.0 oz     Comment: 1-2 drinks/month     Family History   Problem Relation Age of Onset     Diabetes Mother      C.A.D. Father      Cerebrovascular Disease Brother          Current Outpatient Medications   Medication Sig Dispense Refill     CALCIUM + D 600-200 MG-UNIT OR TABS ONE TABLET TWICE A DAY WITH MEALS 0 0     codeine 30 MG tablet Take 2 tablets (60 mg) by mouth At Bedtime 60 tablet 0     furosemide (LASIX) 20 MG tablet Take 20 mg by mouth       levothyroxine (SYNTHROID) 88 MCG tablet Take 1 tablet (88 mcg) by mouth daily 90 tablet 3     losartan (COZAAR) 100 MG tablet Take 1 tablet (100 mg) by mouth daily 90 tablet 1     metoprolol tartrate (LOPRESSOR) 25 MG tablet Take 25 mg by mouth 2 times daily       mirabegron (MYRBETRIQ) 50 MG 24 hr tablet Take 1 tablet (50 mg) by mouth daily for 30 doses.  NOT TAKING 30 tablet 1     neomycin-polymyxin-dexamethasone (MAXITROL) 3.5-90810-6.1 OINT ophthalmic ointment Place 1 Application into both eyes At Bedtime Apply a small squirt into each eye at bedtime 1 Tube 6     OMEGA-3 COMPLEX OR 1 capsule daily       order for DME Please provide patient with portable oxygen.  Patient requires 2 LPM oxygen with activity.  Patient is already on nocturnal oxygen.  Please provide patient with POC as soon as available.  Patient is elderly and will have difficulty managing oxygen tanks. 1 Device 0     simvastatin (ZOCOR) 40 MG tablet TAKE ONE TABLET BY MOUTH EVERY NIGHT AT BEDTIME 90 tablet 2     VITAMIN D 2000 UNIT OR TABS 1 tablet daily 0 0     VITAMIN D3 1000 UNIT OR CAPS 1 CAPSULE DAILY       VITAMIN E COMPLEX PO Take  by mouth.        warfarin (COUMADIN) 2.5 MG tablet Taking 2.5 mg mon wed Friday 5 mg ( 2 tablets all other days) other wise as directed by anticoag. 90 tablet 3     warfarin (COUMADIN) 5 MG tablet Take as directed by [facility]. Current dose is 5 mg mon tue wed thur and sat 2.5 mg all other days. 90 tablet 3     acetaminophen (TYLENOL) 500 MG tablet Take 2 tablets (1,000 mg) by mouth every 8 hours as needed for mild pain (Patient not taking: Reported on 8/15/2019) 1 Bottle 1     albuterol (2.5 MG/3ML) 0.083% neb solution Take 1 vial (2.5 mg) by nebulization every 6 hours as needed for shortness of breath / dyspnea or wheezing (Patient not taking: Reported on 8/15/2019) 3 mL 0     albuterol (2.5 MG/3ML) 0.083% neb solution Take 1 vial (2.5 mg) by nebulization every 6 hours as needed for shortness of breath / dyspnea or wheezing (Patient not taking: Reported on 8/15/2019) 1 Box 1     Allergies   Allergen Reactions     Nkda [No Known Drug Allergies]      BP Readings from Last 3 Encounters:   08/15/19 110/80   08/07/19 136/79   06/06/19 122/80    Wt Readings from Last 3 Encounters:   08/15/19 67.6 kg (149 lb)   05/10/19 67.1 kg (148 lb)   04/10/19 68.9 kg (151 lb 12.8 oz)                    Reviewed and updated as needed this visit by Provider  Allergies  Problems  Med Hx  Surg Hx         Review of Systems   ROS COMP: Constitutional, HEENT, cardiovascular, pulmonary, gi and gu systems are negative, except as otherwise noted.      Objective    /80 (BP Location: Right arm, Patient Position: Sitting, Cuff Size: Adult Regular)   Pulse 68   Temp 98.7  F (37.1  C) (Oral)   Wt 67.6 kg (149 lb)   SpO2 93%   BMI 31.14 kg/m    Body mass index is 31.14 kg/m .  Physical Exam   GENERAL: healthy, alert, no distress and over weight  RESP: lungs clear to auscultation - no rales, rhonchi or wheezes  CV: regular rate and rhythm, normal S1 S2, no S3 or S4, II-III/VI systolic murmur, click or rub  PSYCH: mentation appears normal, affect  "normal/bright    Diagnostic Test Results:  Labs reviewed in Epic  Results for orders placed or performed in visit on 08/12/19   INR point of care   Result Value Ref Range    INR Protime 2.4 (A) 0.86 - 1.14           Assessment & Plan     1. Atrial fibrillation, unspecified type (H)  2. Long term current use of anticoagulant therapy  - Advised patient to get echocardiogram now.  If results are concerning then would advised to follow-up with Dr. Hernandez, [facility].  - Pending echocardiogram, will consider changing to alternative coagulation such as Xarelto however would need to discuss with patient's cardiologist first.    3. Hypertension goal BP (blood pressure) < 140/90  Chronic, stable, continue current treatment.  - losartan (COZAAR) 100 MG tablet; Take 1 tablet (100 mg) by mouth daily  Dispense: 90 tablet; Refill: 1    Decrease the Metoprolol to 1/2 tablet (12.5 mg) twice daily when you start the Myrbetriq.  Closely check your Blood Pressure and Heart Rate at home.    4. Fatigue, unspecified type  5. Heart murmur    - Echocardiogram Complete; Future    IPF (idiopathic pulmonary fibrosis) (H)  8. On home oxygen therapy  Followed by Pulmonary.  Controlled substance agreement reviewed and completed today with patient- in regards to codeine 60 mg at bedtime daily.    9. Hyperlipidemia LDL goal <130  Chronic, stable, continue current treatment.    10. Hypothyroidism due to acquired atrophy of thyroid  Chronic, stable, continue current treatment.    11. Vision disorder    - OPHTHALMOLOGY ADULT REFERRAL     BMI:   Estimated body mass index is 31.14 kg/m  as calculated from the following:    Height as of 6/6/19: 1.473 m (4' 10\").    Weight as of this encounter: 67.6 kg (149 lb).         Return in about 2 weeks (around 8/29/2019) after starting Myrbetriq/decreasing Metoprolol for BP Recheck.    Anay Miner, NP  [facility]    "

## 2019-08-15 NOTE — TELEPHONE ENCOUNTER
Reason for Call:  Other call back    Detailed comments: Patient's daughter wants to know if patient can get an alternative medication for mirabegron (MYRBETRIQ) 50 MG due to the expenses of the prescription. Also want to know if she should still make a 2 week follow.      Phone Number Patient can be reached at: Other phone number:  [phone]    Best Time: any    Can we leave a detailed message on this number? YES    Call taken on 8/15/2019 at 4:26 PM by Penny Sinclair

## 2019-08-15 NOTE — PATIENT INSTRUCTIONS
Decrease the Metoprolol to 1/2 tablet (12.5 mg) twice daily when you start the Myrbetriq.  Closely check your Blood Pressure and Heart Rate at home.

## 2019-08-16 ASSESSMENT — ANXIETY QUESTIONNAIRES: GAD7 TOTAL SCORE: 6

## 2019-08-16 NOTE — TELEPHONE ENCOUNTER
Called and spoke to patient's daughter.   This medication is next step up from [facility].   Information given to caller about manufacturers savings program.   Milla Jason RN

## 2019-08-16 NOTE — TELEPHONE ENCOUNTER
I called daughter Mariela back but she was not reachable.  I left a general voicemail for Mariela to call us back if she had any questions/concerns.    Anay Miner, DNP, APRN, CNP

## 2019-08-18 PROBLEM — H92.21 EAR BLEEDING, RIGHT: Status: RESOLVED | Noted: 2018-10-24 | Resolved: 2019-08-18

## 2019-08-18 PROBLEM — N32.81 OVERACTIVE BLADDER: Status: ACTIVE | Noted: 2019-08-18

## 2019-08-18 RX ORDER — LOSARTAN POTASSIUM 100 MG/1
100 TABLET ORAL DAILY
Qty: 90 TABLET | Refills: 1 | Status: SHIPPED | OUTPATIENT
Start: 2019-08-18 | End: 2019-12-02

## 2019-08-20 ENCOUNTER — OFFICE VISIT (OUTPATIENT)
Dept: ORTHOPEDICS | Facility: CLINIC | Age: 84
End: 2019-08-20
Payer: COMMERCIAL

## 2019-08-20 VITALS — WEIGHT: 149 LBS | HEIGHT: 58 IN | BODY MASS INDEX: 31.28 KG/M2

## 2019-08-20 DIAGNOSIS — G89.29 CHRONIC LEFT SHOULDER PAIN: ICD-10-CM

## 2019-08-20 DIAGNOSIS — M25.512 CHRONIC LEFT SHOULDER PAIN: ICD-10-CM

## 2019-08-20 DIAGNOSIS — M67.912 TENDINOPATHY OF LEFT ROTATOR CUFF: Primary | ICD-10-CM

## 2019-08-20 PROCEDURE — 99213 OFFICE O/P EST LOW 20 MIN: CPT | Mod: 25 | Performed by: FAMILY MEDICINE

## 2019-08-20 PROCEDURE — 20611 DRAIN/INJ JOINT/BURSA W/US: CPT | Mod: LT | Performed by: FAMILY MEDICINE

## 2019-08-20 RX ORDER — BETAMETHASONE SODIUM PHOSPHATE AND BETAMETHASONE ACETATE 3; 3 MG/ML; MG/ML
6 INJECTION, SUSPENSION INTRA-ARTICULAR; INTRALESIONAL; INTRAMUSCULAR; SOFT TISSUE
Status: DISCONTINUED | OUTPATIENT
Start: 2019-08-20 | End: 2019-12-04

## 2019-08-20 RX ORDER — ROPIVACAINE HYDROCHLORIDE 5 MG/ML
3 INJECTION, SOLUTION EPIDURAL; INFILTRATION; PERINEURAL
Status: DISCONTINUED | OUTPATIENT
Start: 2019-08-20 | End: 2019-12-04

## 2019-08-20 RX ADMIN — ROPIVACAINE HYDROCHLORIDE 3 ML: 5 INJECTION, SOLUTION EPIDURAL; INFILTRATION; PERINEURAL at 11:45

## 2019-08-20 RX ADMIN — BETAMETHASONE SODIUM PHOSPHATE AND BETAMETHASONE ACETATE 6 MG: 3; 3 INJECTION, SUSPENSION INTRA-ARTICULAR; INTRALESIONAL; INTRAMUSCULAR; SOFT TISSUE at 11:45

## 2019-08-20 ASSESSMENT — MIFFLIN-ST. JEOR: SCORE: 995.61

## 2019-08-20 NOTE — PATIENT INSTRUCTIONS
[facility] Injection Discharge Instructions      You may shower, however avoid swimming, tub baths or hot tubs for 24 hours following your procedure    You may have a mild to moderate increase in pain for several days following the injection.    It may take up to 14 days for the steroid medication to start working although you may feel the effect as early as a few days after the procedure.    You may use ice packs for 10-15 minutes, 3 to 4 times a day at the injection site for comfort    You may use anti-inflammatory medications (such as Ibuprofen or Aleve or Advil) or Tylenol for pain control if necessary    If you were fasting, you may resume your normal diet and medications after the procedure    If you experience any of the following, call [facility] @ [phone] or [phone]  -Fever over 100 degree F  -Swelling, bleeding, redness, drainage, warmth at the injection site  - New or worsening pain

## 2019-08-20 NOTE — PROGRESS NOTES
ASSESSMENT & PLAN  Ashlie was seen today for pain.    Diagnoses and all orders for this visit:    Tendinopathy of left rotator cuff    Chronic left shoulder pain    Other orders  -     Large Joint Injection/Arthocentesis: L subacromial bursa      Patient is a 88 year old female presenting for evaluation of   Chief Complaint   Patient presents with     Left Shoulder - Pain    Chronic Left Shoulder Pain: Recurrent pain after initial injury about 6 mon ago.  XR previously done neg for OA.  Pain over lateral shoulder with pain on empty can without weakness.  Likely rotator cuff tendinpathy.  Given this a repeat steroid injection completed today with HEP and f/u PRN.  Pt noted sig improvement of pain afterwards  Treatment: subacromial steroid injection  Physical Therapy HEP in one week when pain improving  Injection completed today  Medications  Limited NSAIDs/Tylenol    Concerning signs/sx that would warrant urgent evaluation were discussed.  All questions were answered, patient understands and agrees with plan.      Return if symptoms worsen or fail to improve.    -----    SUBJECTIVE  Ashlie Brantley is a/an 88 year old Right handed female who is seen in consultation at the request of  Anay Miner N.P. for evaluation of left shoulder pain. The patient is seen with their daughter.    Onset: 6 month(s) ago. Patient describes injury as pulling chair out. Re-injury 3 months ago.  Pain with movement/lifting  Location of Pain: left shoulder diffuse    Rating of Pain at worst: 7/10  Rating of Pain Currently: 7/10  Worsened by: reaching back  Better with: rest, not moving  Treatments tried: physical therapy and prednisone, swim aquatics   Associated symptoms: no distal numbness or tingling; denies swelling or warmth  Orthopedic history: NO  Relevant surgical history: NO    Interim History - August 20, 2019  Since last visit on 6/6/2019 patient has worsening pain over the few last day.  Left subacromial bursa injection on  6/6/19 provided great relief.  No interim injury.   Pain worse after PT which was before last injection.      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     sees [facility]     Cataract     IOL both     DJD (degenerative joint disease)     KNEES     Elevated cholesterol      GERD (gastroesophageal reflux disease)      Hoarseness      HTN (hypertension)      Hypothyroid      Laryngeal dystonia      LVH (left ventricular hypertrophy) due to hypertensive disease      Osteopenia     MILD     Urinary stress incontinence      Social History     Socioeconomic History     Marital status:      Spouse name: None     Number of children: 6     Years of education: None     Highest education level: None   Occupational History     Employer: RETIRED   Social Needs     Financial resource strain: None     Food insecurity:     Worry: None     Inability: None     Transportation needs:     Medical: None     Non-medical: None   Tobacco Use     Smoking status: Never Smoker     Smokeless tobacco: Never Used   Substance and Sexual Activity     Alcohol use: Yes     Alcohol/week: 0.0 oz     Comment: 1-2 drinks/month     Drug use: No     Sexual activity: Not Currently   Lifestyle     Physical activity:     Days per week: None     Minutes per session: None     Stress: None   Relationships     Social connections:     Talks on phone: None     Gets together: None     Attends [employer] service: None     Active member of club or organization: None     Attends meetings of clubs or organizations: None     Relationship status: None     Intimate partner violence:     Fear of current or ex partner: None     Emotionally abused: None     Physically abused: None     Forced sexual activity: None   Other Topics Concern      Service No     Blood Transfusions No     Caffeine Concern No     Occupational Exposure No     Hobby Hazards No     Sleep Concern No     Stress Concern No     Weight Concern Yes     Special Diet No     Back Care  "No     Exercise Yes     Comment: a litttle     Bike Helmet Not Asked     Seat Belt Yes     Self-Exams Yes     Parent/sibling w/ CABG, MI or angioplasty before 65F 55M? No   Social History Narrative     None         Patient's past medical, surgical, social, and family histories were reviewed today and no changes are noted.    REVIEW OF SYSTEMS:  10 point ROS is negative other than symptoms noted above in HPI, Past Medical History or as stated below  Constitutional: NEGATIVE for fever, chills, change in weight  Skin: NEGATIVE for worrisome rashes, moles or lesions  GI/: NEGATIVE for bowel or bladder changes  Neuro: NEGATIVE for weakness, dizziness or paresthesias    OBJECTIVE:  BP (P) 118/64   Ht 1.473 m (4' 10\")   Wt 67.6 kg (149 lb)   BMI 31.14 kg/m     General: healthy, alert and in no distress  HEENT: no scleral icterus or conjunctival erythema, pt quiet due to underlying dysphonia  Skin: no suspicious lesions or rash. No jaundice.  CV: regular rhythm by palpation  Resp: normal respiratory effort without conversational dyspnea   Psych: normal mood and affect  Gait: normal steady gait with appropriate coordination and balance  Neuro: normal light touch sensory exam of the bilateral upper extremities.    MSK:  LEFT SHOULDER  Inspection:    no swelling, bruising, discoloration, or obvious deformity or asymmetry  Palpation:    Tender about the anterior capsule, proximal humerus and supraspinatus insertion. Remainder of bony and tendinous landmarks are nontender.    Active Range of Motion:     Abduction 1500, FF 1500, , IR hip pocket.       Strength:    Scapular plane abduction 5-/5, painful,  ER 5/5, IR 5/5, biceps 5/5, triceps 5/5  Special Tests:    Positive: Neer's, Styles', supraspinatus (empty can), [place]'s and Speed's    Negative: Yergason's    CERVICAL SPINE  Inspection:    normal cervical lordosis present, rounded shoulders, forward head posture  Palpation:  Mild left paraspinal muscle " tenderness  Special Tests:    Positive: None    Negative: Spurling's (bilateral)    Independent visualization of the below image:  No results found for this or any previous visit (from the past 24 hour(s)).  SHOULDER TWO VIEWS LEFT 4/10/2019 9:53 AM      HISTORY: Left shoulder pain chronic, anterior. Left shoulder pain,  unspecified chronicity.     COMPARISON: None.     FINDINGS: There is no significant degenerative change. The  acromioclavicular and coracoclavicular distances appear within normal  limits. The subacromial space is maintained. There is no acute  fracture or demonstrated dislocation. There are no worrisome bony  lesions.                                                                       IMPRESSION: No acute osseous abnormality demonstrated.     FLORA MENDES MD    I visualized and reviewed these images with the patient    Large Joint Injection/Arthocentesis: L subacromial bursa  Date/Time: 8/20/2019 11:45 AM  Performed by: Kj Dowling MD  Authorized by: Kj Dowling MD     Indications:  Pain  Needle Size:  25 G  Guidance: ultrasound    Approach:  Anterolateral  Location:  Shoulder      Site:  L subacromial bursa  Medications:  6 mg betamethasone acet & sod phos 6 (3-3) MG/ML; 3 mL ropivacaine 5 MG/ML  Medications comment:  Actual amount of ropivacaine used 4 mL  Outcome:  Tolerated well, no immediate complications  Procedure discussed: discussed risks, benefits, and alternatives    Consent Given by:  Patient  Timeout: timeout called immediately prior to procedure    Prep: patient was prepped and draped in usual sterile fashion     Patient reported significant improvement of pain after injection.  Aftercare instructions given to patient.  Plan to follow-up as discussed above.     Kj Dowling MD [facility]          Patient's conditions were thoroughly discussed during today's visit with greater than 50% of the visit spent counseling the patient with  total time spent face-to-face with the patient being 30 minutes.    Kj Dowling MD [facility]

## 2019-08-20 NOTE — LETTER
8/20/2019         RE: Ashlie Brantley  [address] [place] [address]        Dear Colleague,    Thank you for referring your patient, Ahslie Brantley, to the [facility]. Please see a copy of my visit note below.    ASSESSMENT & PLAN  Ashlie was seen today for pain.    Diagnoses and all orders for this visit:    Tendinopathy of left rotator cuff    Chronic left shoulder pain    Other orders  -     Large Joint Injection/Arthocentesis: L subacromial bursa      Patient is a 88 year old female presenting for evaluation of   Chief Complaint   Patient presents with     Left Shoulder - Pain    Chronic Left Shoulder Pain: Recurrent pain after initial injury about 6 mon ago.  XR previously done neg for OA.  Pain over lateral shoulder with pain on empty can without weakness.  Likely rotator cuff tendinpathy.  Given this a repeat steroid injection completed today with HEP and f/u PRN.  Pt noted sig improvement of pain afterwards  Treatment: subacromial steroid injection  Physical Therapy HEP in one week when pain improving  Injection completed today  Medications  Limited NSAIDs/Tylenol    Concerning signs/sx that would warrant urgent evaluation were discussed.  All questions were answered, patient understands and agrees with plan.      Return if symptoms worsen or fail to improve.    -----    SUBJECTIVE  Ashlie Brantley is a/an 88 year old Right handed female who is seen in consultation at the request of  Anay Miner N.P. for evaluation of left shoulder pain. The patient is seen with their daughter.    Onset: 6 month(s) ago. Patient describes injury as pulling chair out. Re-injury 3 months ago.  Pain with movement/lifting  Location of Pain: left shoulder diffuse    Rating of Pain at worst: 7/10  Rating of Pain Currently: 7/10  Worsened by: reaching back  Better with: rest, not moving  Treatments tried: physical therapy and prednisone, swim aquatics   Associated symptoms:  no distal numbness or tingling; denies swelling or warmth  Orthopedic history: NO  Relevant surgical history: NO    Interim History - August 20, 2019  Since last visit on 6/6/2019 patient has worsening pain over the few last day.  Left subacromial bursa injection on 6/6/19 provided great relief.  No interim injury.   Pain worse after PT which was before last injection.      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     sees [facility]     Cataract     IOL both     DJD (degenerative joint disease)     KNEES     Elevated cholesterol      GERD (gastroesophageal reflux disease)      Hoarseness      HTN (hypertension)      Hypothyroid      Laryngeal dystonia      LVH (left ventricular hypertrophy) due to hypertensive disease      Osteopenia     MILD     Urinary stress incontinence      Social History     Socioeconomic History     Marital status:      Spouse name: None     Number of children: 6     Years of education: None     Highest education level: None   Occupational History     Employer: RETIRED   Social Needs     Financial resource strain: None     Food insecurity:     Worry: None     Inability: None     Transportation needs:     Medical: None     Non-medical: None   Tobacco Use     Smoking status: Never Smoker     Smokeless tobacco: Never Used   Substance and Sexual Activity     Alcohol use: Yes     Alcohol/week: 0.0 oz     Comment: 1-2 drinks/month     Drug use: No     Sexual activity: Not Currently   Lifestyle     Physical activity:     Days per week: None     Minutes per session: None     Stress: None   Relationships     Social connections:     Talks on phone: None     Gets together: None     Attends Zoroastrian service: None     Active member of club or organization: None     Attends meetings of clubs or organizations: None     Relationship status: None     Intimate partner violence:     Fear of current or ex partner: None     Emotionally abused: None     Physically abused: None     Forced  "sexual activity: None   Other Topics Concern      Service No     Blood Transfusions No     Caffeine Concern No     Occupational Exposure No     Hobby Hazards No     Sleep Concern No     Stress Concern No     Weight Concern Yes     Special Diet No     Back Care No     Exercise Yes     Comment: a litttle     Bike Helmet Not Asked     Seat Belt Yes     Self-Exams Yes     Parent/sibling w/ CABG, MI or angioplasty before 65F 55M? No   Social History Narrative     None         Patient's past medical, surgical, social, and family histories were reviewed today and no changes are noted.    REVIEW OF SYSTEMS:  10 point ROS is negative other than symptoms noted above in HPI, Past Medical History or as stated below  Constitutional: NEGATIVE for fever, chills, change in weight  Skin: NEGATIVE for worrisome rashes, moles or lesions  GI/: NEGATIVE for bowel or bladder changes  Neuro: NEGATIVE for weakness, dizziness or paresthesias    OBJECTIVE:  BP (P) 118/64   Ht 1.473 m (4' 10\")   Wt 67.6 kg (149 lb)   BMI 31.14 kg/m      General: healthy, alert and in no distress  HEENT: no scleral icterus or conjunctival erythema, pt quiet due to underlying dysphonia  Skin: no suspicious lesions or rash. No jaundice.  CV: regular rhythm by palpation  Resp: normal respiratory effort without conversational dyspnea   Psych: normal mood and affect  Gait: normal steady gait with appropriate coordination and balance  Neuro: normal light touch sensory exam of the bilateral upper extremities.    MSK:  LEFT SHOULDER  Inspection:    no swelling, bruising, discoloration, or obvious deformity or asymmetry  Palpation:    Tender about the anterior capsule, proximal humerus and supraspinatus insertion. Remainder of bony and tendinous landmarks are nontender.    Active Range of Motion:     Abduction 1500, FF 1500, , IR hip pocket.       Strength:    Scapular plane abduction 5-/5, painful,  ER 5/5, IR 5/5, biceps 5/5, triceps 5/5  Special " Tests:    Positive: Neer's, Styles', supraspinatus (empty can), [place]'s and Speed's    Negative: Yergason's    CERVICAL SPINE  Inspection:    normal cervical lordosis present, rounded shoulders, forward head posture  Palpation:  Mild left paraspinal muscle tenderness  Special Tests:    Positive: None    Negative: Spurling's (bilateral)    Independent visualization of the below image:  No results found for this or any previous visit (from the past 24 hour(s)).  SHOULDER TWO VIEWS LEFT 4/10/2019 9:53 AM      HISTORY: Left shoulder pain chronic, anterior. Left shoulder pain,  unspecified chronicity.     COMPARISON: None.     FINDINGS: There is no significant degenerative change. The  acromioclavicular and coracoclavicular distances appear within normal  limits. The subacromial space is maintained. There is no acute  fracture or demonstrated dislocation. There are no worrisome bony  lesions.                                                                       IMPRESSION: No acute osseous abnormality demonstrated.     FLORA MENDES MD    I visualized and reviewed these images with the patient    Large Joint Injection/Arthocentesis: L subacromial bursa  Date/Time: 8/20/2019 11:45 AM  Performed by: Kj Dowling MD  Authorized by: Kj Dowling MD     Indications:  Pain  Needle Size:  25 G  Guidance: ultrasound    Approach:  Anterolateral  Location:  Shoulder      Site:  L subacromial bursa  Medications:  6 mg betamethasone acet & sod phos 6 (3-3) MG/ML; 3 mL ropivacaine 5 MG/ML  Medications comment:  Actual amount of ropivacaine used 4 mL  Outcome:  Tolerated well, no immediate complications  Procedure discussed: discussed risks, benefits, and alternatives    Consent Given by:  Patient  Timeout: timeout called immediately prior to procedure    Prep: patient was prepped and draped in usual sterile fashion     Patient reported significant improvement of pain after injection.  Aftercare instructions given to  patient.  Plan to follow-up as discussed above.     MD PENG [facility]          Patient's conditions were thoroughly discussed during today's visit with greater than 50% of the visit spent counseling the patient with total time spent face-to-face with the patient being 30 minutes.    MD IGGY [facility]      Again, thank you for allowing me to participate in the care of your patient.        Sincerely,        Kj Dowling MD

## 2019-08-22 ENCOUNTER — ANCILLARY PROCEDURE (OUTPATIENT)
Dept: CARDIOLOGY | Facility: CLINIC | Age: 84
End: 2019-08-22
Attending: NURSE PRACTITIONER
Payer: COMMERCIAL

## 2019-08-22 DIAGNOSIS — R01.1 HEART MURMUR: ICD-10-CM

## 2019-08-22 PROCEDURE — 40000264 ZZHC STATISTIC IV PUSH SINGLE INITIAL SUBSTANCE: Performed by: INTERNAL MEDICINE

## 2019-08-22 PROCEDURE — 93306 TTE W/DOPPLER COMPLETE: CPT | Performed by: INTERNAL MEDICINE

## 2019-08-22 RX ADMIN — Medication 3 ML: at 16:30

## 2019-08-23 ENCOUNTER — OFFICE VISIT (OUTPATIENT)
Dept: OPHTHALMOLOGY | Facility: CLINIC | Age: 84
End: 2019-08-23
Payer: COMMERCIAL

## 2019-08-23 DIAGNOSIS — H52.12 MYOPIA OF LEFT EYE: ICD-10-CM

## 2019-08-23 DIAGNOSIS — Z01.01 ENCOUNTER FOR EXAMINATION OF EYES AND VISION WITH ABNORMAL FINDINGS: Primary | ICD-10-CM

## 2019-08-23 DIAGNOSIS — H52.203 ASTIGMATISM OF BOTH EYES, UNSPECIFIED TYPE: ICD-10-CM

## 2019-08-23 DIAGNOSIS — H02.839 DERMATOCHALASIS, UNSPECIFIED LATERALITY: ICD-10-CM

## 2019-08-23 DIAGNOSIS — H43.813 PVD (POSTERIOR VITREOUS DETACHMENT), BILATERAL: ICD-10-CM

## 2019-08-23 DIAGNOSIS — Z96.1 PSEUDOPHAKIA: ICD-10-CM

## 2019-08-23 DIAGNOSIS — H52.4 PRESBYOPIA: ICD-10-CM

## 2019-08-23 PROCEDURE — 92015 DETERMINE REFRACTIVE STATE: CPT | Performed by: STUDENT IN AN ORGANIZED HEALTH CARE EDUCATION/TRAINING PROGRAM

## 2019-08-23 PROCEDURE — 92014 COMPRE OPH EXAM EST PT 1/>: CPT | Performed by: STUDENT IN AN ORGANIZED HEALTH CARE EDUCATION/TRAINING PROGRAM

## 2019-08-23 RX ORDER — NEOMYCIN SULFATE, POLYMYXIN B SULFATE, AND DEXAMETHASONE 3.5; 10000; 1 MG/G; [USP'U]/G; MG/G
0.25 OINTMENT OPHTHALMIC AT BEDTIME
Qty: 3.5 G | Refills: 3 | Status: SHIPPED | OUTPATIENT
Start: 2019-08-23

## 2019-08-23 ASSESSMENT — REFRACTION_MANIFEST
OD_AXIS: 173
OS_AXIS: 030
OS_ADD: +2.75
OD_ADD: +2.75
OS_SPHERE: -1.00
OS_CYLINDER: +0.50
OD_SPHERE: -1.00
OD_CYLINDER: +1.00

## 2019-08-23 ASSESSMENT — VISUAL ACUITY
OS_PH_SC: 20/30
OD_PH_SC+: -1
OD_CC: 2+
OS_SC: 20/60
OD_PH_SC: 20/25
METHOD: SNELLEN - LINEAR
OD_SC: 20/40
OS_CC: 2+
OS_PH_SC+: -2

## 2019-08-23 ASSESSMENT — REFRACTION_WEARINGRX
OS_CYLINDER: SPHERE
OD_CYLINDER: SPHERE
OD_SPHERE: +1.75
SPECS_TYPE: OTC
OS_SPHERE: +1.75

## 2019-08-23 ASSESSMENT — TONOMETRY
IOP_METHOD: APPLANATION
OD_IOP_MMHG: 13
OS_IOP_MMHG: 12

## 2019-08-23 ASSESSMENT — CUP TO DISC RATIO
OS_RATIO: 0.2
OD_RATIO: 0.3

## 2019-08-23 ASSESSMENT — EXTERNAL EXAM - LEFT EYE: OS_EXAM: NORMAL

## 2019-08-23 ASSESSMENT — EXTERNAL EXAM - RIGHT EYE: OD_EXAM: NORMAL

## 2019-08-23 ASSESSMENT — CONF VISUAL FIELD
OD_NORMAL: 1
OS_NORMAL: 1

## 2019-08-23 ASSESSMENT — SLIT LAMP EXAM - LIDS
COMMENTS: 3+ DERMATOCHALASIS - UPPER LID
COMMENTS: 3+ DERMATOCHALASIS - UPPER LID

## 2019-08-23 NOTE — PROGRESS NOTES
Current Eye Medications:  maxitrol ointment both eyelids at bedtime, as needed, but not every evening.     Subjective:  Comprehensive Eye Exam.  No vision changes or concerns.  Over-the-counter readers are working well.      Objective:  See Ophthalmology Exam.       Assessment:  Ashlie Brantley is a 88 year old female who presents with:     Pseudophakia, OU      PVD (posterior vitreous detachment), bilateral      Dermatochalasis, unspecified laterality        Plan:  Glasses prescription given - fill prescription if distance vision feels blurry;  Otherwise continue over the counter readers    Ok to continue Maxitrol ointment at bedtime in both eyes     Sarah Ryan MD  [phone]

## 2019-08-23 NOTE — LETTER
8/23/2019         RE: Ashlie Brantley  [address] [place] [address]        Dear Colleague,    Thank you for referring your patient, Ashlie Brantley, to the [facility]. Please see a copy of my visit note below.     Current Eye Medications:  maxitrol ointment both eyelids at bedtime, as needed, but not every evening.       Subjective:  Comprehensive Eye Exam.  No vision changes or concerns.  Over-the-counter readers are working well.      Objective:  See Ophthalmology Exam.       Assessment:      Plan:   See Patient Instructions.         Again, thank you for allowing me to participate in the care of your patient.        Sincerely,        Sarah Ryan MD

## 2019-08-23 NOTE — PATIENT INSTRUCTIONS
Glasses prescription given - fill prescription if distance vision feels blurry;  Otherwise continue over the counter readers    Ok to continue Maxitrol ointment at bedtime in both eyes     Sarah Ryan MD  [phone]

## 2019-08-26 ENCOUNTER — TELEPHONE (OUTPATIENT)
Dept: FAMILY MEDICINE | Facility: CLINIC | Age: 84
End: 2019-08-26

## 2019-08-26 ENCOUNTER — ANTICOAGULATION THERAPY VISIT (OUTPATIENT)
Dept: NURSING | Facility: CLINIC | Age: 84
End: 2019-08-26
Payer: COMMERCIAL

## 2019-08-26 DIAGNOSIS — I48.91 ATRIAL FIBRILLATION (H): ICD-10-CM

## 2019-08-26 DIAGNOSIS — Z79.01 LONG TERM CURRENT USE OF ANTICOAGULANT THERAPY: ICD-10-CM

## 2019-08-26 LAB — INR POINT OF CARE: 3 (ref 0.86–1.14)

## 2019-08-26 PROCEDURE — 99207 ZZC NO CHARGE NURSE ONLY: CPT

## 2019-08-26 PROCEDURE — 36416 COLLJ CAPILLARY BLOOD SPEC: CPT

## 2019-08-26 PROCEDURE — 85610 PROTHROMBIN TIME: CPT | Mod: QW

## 2019-08-26 NOTE — TELEPHONE ENCOUNTER
Please call Ashlie to let her know that her echocardiogram shows a change from her previous one- it shows a suspected severe aortic stenosis.  Upon reviewing her previous complete echocardiogram in 1/2018 through [facility] ([facility]), she previously had mild aortic stenosis.    She is followed by a Dr. Hernandez, [facility] in [place], [place] ([phone]).    I recommend that she schedules a follow up with Dr. Hernandez for further evaluation.    Please also FAX over the echocardiogram report to Dr. Hernandez's office.    Anay iMner, DNP, APRN, CNP

## 2019-08-26 NOTE — TELEPHONE ENCOUNTER
Spoke with patient's daughter Mariela to notify of results and recommendations (patient does not talk on phone and there is a consent on file). Understanding voiced, and Mariela was provided with the number for Dr. Hernandez's office for scheduling. I called them for fax and faxed echocardiogram report to their office at [phone]. Will close encounter at this time.    Migdalia Leslie RN

## 2019-08-26 NOTE — PROGRESS NOTES
ANTICOAGULATION FOLLOW-UP CLINIC VISIT    Patient Name:  Ashlie Brantley  Date:  8/26/2019  Contact Type:  Face to Face    SUBJECTIVE:  Patient Findings         Clinical Outcomes     Negatives:   Major bleeding event, Thromboembolic event, Anticoagulation-related hospital admission, Anticoagulation-related ED visit, Anticoagulation-related fatality           OBJECTIVE    INR Protime   Date Value Ref Range Status   08/26/2019 3.0 (A) 0.86 - 1.14 Final       ASSESSMENT / PLAN  INR assessment THER    Recheck INR In: 3 WEEKS    INR Location Clinic      Anticoagulation Summary  As of 8/26/2019    INR goal:   2.0-3.0   TTR:   33.4 % (1.4 y)   INR used for dosing:   3.0 (8/26/2019)   Warfarin maintenance plan:   2.5 mg (2.5 mg x 1) every Tue, Thu; 5 mg (2.5 mg x 2) all other days   Full warfarin instructions:   2.5 mg every Tue, Thu; 5 mg all other days   Weekly warfarin total:   30 mg   No change documented:   Lizy Mercado RN   Plan last modified:   Lizy Mercado RN (8/12/2019)   Next INR check:   9/16/2019   Target end date:   Indefinite    Indications    Atrial fibrillation (H) [I48.91] [I48.91]  Long term current use of anticoagulant therapy [Z79.01]             Anticoagulation Episode Summary     INR check location:       Preferred lab:       Send INR reminders to:   AGGIE MURILLO    Comments:         Anticoagulation Care Providers     Provider Role Specialty Phone number    Tad Mendoza  [facility] [phone]            See the Encounter Report to view Anticoagulation Flowsheet and Dosing Calendar (Go to Encounters tab in chart review, and find the Anticoagulation Therapy Visit)        Lizy Mercado RN

## 2019-08-26 NOTE — TELEPHONE ENCOUNTER
Dr. Miner,    Patient has now declined to  the Myrbetriq as it costs $240 for the first month.  Patient has not yet met her deductible with Medicare.  She said she will discuss with with her provider.    Dory Guo PharmD, [facility]  Pharmacist Manager   [facility]  [phone]

## 2019-08-27 NOTE — TELEPHONE ENCOUNTER
Noted message.    Will route to Dr. Warner in Urology, who is the prescribing provider of Myrbetriq as GOVIND.    Anay Miner, DNP, APRN, CNP

## 2019-08-28 ENCOUNTER — TELEPHONE (OUTPATIENT)
Dept: FAMILY MEDICINE | Facility: CLINIC | Age: 84
End: 2019-08-28

## 2019-08-28 NOTE — TELEPHONE ENCOUNTER
Called and spoke to patient's daughter.   Advised about Botox.   Follow up appointment scheduled.   Milla Jason RN

## 2019-08-28 NOTE — TELEPHONE ENCOUNTER
Reason for Call:  Other call back    Detailed comments: Patient's daughter is calling to talk to a nurse regarding her illness.  They have questions, they would like to have answered.  Patient was diagnosed with severe Aortic Stenosis  They were wanting to know:  What it is?  Where is it located?    What is the treatment?  They  have a follow up scheduled to see her cardiologist.  Phone Number Patient can be reached at:   [phone] Mariela (daughter)    Best Time: Any    Can we leave a detailed message on this number? YES    Call taken on 8/28/2019 at 2:50 PM by Kath Fay

## 2019-09-06 ENCOUNTER — TRANSFERRED RECORDS (OUTPATIENT)
Dept: HEALTH INFORMATION MANAGEMENT | Facility: CLINIC | Age: 84
End: 2019-09-06

## 2019-09-11 DIAGNOSIS — J84.10 FIBROSIS, LUNG (H): ICD-10-CM

## 2019-09-11 NOTE — TELEPHONE ENCOUNTER
codeine 30 MG tablet      Last Written Prescription Date:  8/9/2019  Last Fill Quantity: 60 tablet,   # refills: 0  Last Office Visit: 8/15/2019 w/ CORBIN Miner    Future Office visit:    Next 5 appointments (look out 90 days)    Oct 09, 2019  9:00 AM CDT  Return Visit with René Warner MD  [facility] ([facility] [address] [place] [address]  [facility] [address]  [phone]           Routing refill request to provider for review/approval because:  Drug not on the [facility], [facility] or [facility] refill protocol or controlled substance

## 2019-09-12 RX ORDER — CODEINE SULFATE 30 MG/1
60 TABLET ORAL AT BEDTIME
Qty: 60 TABLET | Refills: 0 | Status: SHIPPED | OUTPATIENT
Start: 2019-09-12 | End: 2019-10-10

## 2019-09-15 PROBLEM — I35.0 SEVERE AORTIC STENOSIS: Status: ACTIVE | Noted: 2019-09-15

## 2019-09-16 ENCOUNTER — ANTICOAGULATION THERAPY VISIT (OUTPATIENT)
Dept: NURSING | Facility: CLINIC | Age: 84
End: 2019-09-16
Payer: COMMERCIAL

## 2019-09-16 DIAGNOSIS — I48.91 ATRIAL FIBRILLATION (H): ICD-10-CM

## 2019-09-16 DIAGNOSIS — Z79.01 LONG TERM CURRENT USE OF ANTICOAGULANT THERAPY: ICD-10-CM

## 2019-09-16 LAB — INR POINT OF CARE: 2.8 (ref 0.86–1.14)

## 2019-09-16 PROCEDURE — 99207 ZZC NO CHARGE NURSE ONLY: CPT

## 2019-09-16 PROCEDURE — 85610 PROTHROMBIN TIME: CPT | Mod: QW

## 2019-09-16 PROCEDURE — 36416 COLLJ CAPILLARY BLOOD SPEC: CPT

## 2019-09-16 NOTE — PROGRESS NOTES
ANTICOAGULATION FOLLOW-UP CLINIC VISIT    Patient Name:  Ashlie Brantley  Date:  2019  Contact Type:  Face to Face    SUBJECTIVE:  Patient Findings     Comments:   Bleeding Signs/Symptoms: NO  Thromboembolic Signs/Symptoms: NO     Medication Changes:  NO  Dietary Changes:  NO  Bacterial/Viral Infection: NO     Missed Coumadin Doses: NO  Other Concerns:  NO  Seeing cardiology on  to discuss possible TVAR        Clinical Outcomes     Negatives:   Major bleeding event, Thromboembolic event, Anticoagulation-related hospital admission, Anticoagulation-related ED visit, Anticoagulation-related fatality    Comments:   Bleeding Signs/Symptoms: NO  Thromboembolic Signs/Symptoms: NO     Medication Changes:  NO  Dietary Changes:  NO  Bacterial/Viral Infection: NO     Missed Coumadin Doses: NO  Other Concerns:  NO  Seeing cardiology on  to discuss possible TVAR           OBJECTIVE    INR Protime   Date Value Ref Range Status   2019 2.8 (A) 0.86 - 1.14 Final       ASSESSMENT / PLAN  INR assessment THER    Recheck INR In: 4 WEEKS    INR Location Clinic      Anticoagulation Summary  As of 2019    INR goal:   2.0-3.0   TTR:   36.0 % (1.5 y)   INR used for dosin.8 (2019)   Warfarin maintenance plan:   2.5 mg (2.5 mg x 1) every Tue, Thu; 5 mg (2.5 mg x 2) all other days   Full warfarin instructions:   2.5 mg every Tue, Thu; 5 mg all other days   Weekly warfarin total:   30 mg   No change documented:   Lizy Mercado RN   Plan last modified:   Lizy Mercado RN (2019)   Next INR check:   10/15/2019   Target end date:   Indefinite    Indications    Atrial fibrillation (H) [I48.91] [I48.91]  Long term current use of anticoagulant therapy [Z79.01]             Anticoagulation Episode Summary     INR check location:       Preferred lab:       Send INR reminders to:   AGGIE MURILLO    Comments:         Anticoagulation Care Providers     Provider Role Specialty Phone number    Reji  Tad Chavarria DO [facility] [phone]            See the Encounter Report to view Anticoagulation Flowsheet and Dosing Calendar (Go to Encounters tab in chart review, and find the Anticoagulation Therapy Visit)        Lizy Mercado RN

## 2019-10-07 DIAGNOSIS — J84.10 FIBROSIS, LUNG (H): ICD-10-CM

## 2019-10-07 NOTE — TELEPHONE ENCOUNTER
codeine 30 MG tablet      Last Written Prescription Date:  9/12/2019  Last Fill Quantity: 60 tablet,   # refills: 0  Last Office Visit: 8/15/2019 w/ CORBIN Miner  Prescribing Provider's NPI: [id number] Genna Brumfield    Future Office visit:    Next 5 appointments (look out 90 days)    Oct 09, 2019  9:00 AM CDT  Return Visit with René Warner MD  [facility] ([address] [address]  [phone]           Routing refill request to provider for review/approval because:  Drug not on the [facility], [facility] or [facility] refill protocol or controlled substance

## 2019-10-08 ENCOUNTER — OFFICE VISIT (OUTPATIENT)
Dept: FAMILY MEDICINE | Facility: CLINIC | Age: 84
End: 2019-10-08
Payer: COMMERCIAL

## 2019-10-08 VITALS
SYSTOLIC BLOOD PRESSURE: 120 MMHG | DIASTOLIC BLOOD PRESSURE: 66 MMHG | TEMPERATURE: 98.6 F | RESPIRATION RATE: 16 BRPM | OXYGEN SATURATION: 99 % | HEART RATE: 89 BPM

## 2019-10-08 DIAGNOSIS — M54.50 ACUTE BILATERAL LOW BACK PAIN WITHOUT SCIATICA: Primary | ICD-10-CM

## 2019-10-08 PROCEDURE — 99213 OFFICE O/P EST LOW 20 MIN: CPT | Performed by: NURSE PRACTITIONER

## 2019-10-08 RX ORDER — CYCLOBENZAPRINE HCL 10 MG
10 TABLET ORAL 3 TIMES DAILY PRN
Qty: 21 TABLET | Refills: 0 | Status: SHIPPED | OUTPATIENT
Start: 2019-10-08 | End: 2019-12-04

## 2019-10-08 NOTE — PROGRESS NOTES
Subjective     Ashlie Brantley is a 88 year old female who presents to clinic today for the following health issues:    HPI   Back Pain       Duration: Saturday night         Specific cause: none    Description:   Location of pain: middle of back bilateral  Character of pain: sore pain   Pain radiation:none  New numbness or weakness in legs, not attributed to pain:  no     Intensity: moderate    History:   Pain interferes with job: Not applicable  History of back problems: no prior back problems  Any previous MRI or X-rays: None  Sees a specialist for back pain:  No  Therapies tried without relief: heat    Alleviating factors:   Improved by: none      Precipitating factors:  Worsened by: Bending, Standing and Walking        Accompanying Signs & Symptoms:  Risk of Fracture:  None  Risk of Cauda Equina:  None  Risk of Infection:  None  Risk of Cancer:  None  Risk of Ankylosing Spondylitis:  Onset at age <35, male, AND morning back stiffness. 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     sees [facility]     Cataract     IOL both     DJD (degenerative joint disease)     KNEES     Elevated cholesterol      GERD (gastroesophageal reflux disease)      Hoarseness      HTN (hypertension)      Hypothyroid      Laryngeal dystonia      LVH (left ventricular hypertrophy) due to hypertensive disease      Osteopenia     MILD     Urinary stress incontinence      Current Outpatient Medications   Medication     CALCIUM + D 600-200 MG-UNIT OR TABS     codeine 30 MG tablet     cyclobenzaprine (FLEXERIL) 10 MG tablet     furosemide (LASIX) 20 MG tablet     levothyroxine (SYNTHROID) 88 MCG tablet     losartan (COZAAR) 100 MG tablet     metoprolol tartrate (LOPRESSOR) 25 MG tablet     neomycin-polymyxin-dexamethasone (MAXITROL) 3.5-63934-5.1 ophthalmic ointment     OMEGA-3 COMPLEX OR     order for DME     simvastatin (ZOCOR) 40 MG tablet     VITAMIN D 2000 UNIT OR TABS     VITAMIN D3 1000 UNIT OR CAPS     VITAMIN E  COMPLEX PO     warfarin (COUMADIN) 2.5 MG tablet     warfarin (COUMADIN) 5 MG tablet     acetaminophen (TYLENOL) 500 MG tablet     albuterol (2.5 MG/3ML) 0.083% neb solution     albuterol (2.5 MG/3ML) 0.083% neb solution     Current Facility-Administered Medications   Medication     betamethasone acet & sod phos (CELESTONE) injection 6 mg     ropivacaine (NAROPIN) injection 3 mL        Allergies   Allergen Reactions     Nkda [No Known Drug Allergies]               Review of Systems   ROS COMP: Constitutional, HEENT, cardiovascular, pulmonary, GI, , musculoskeletal, neuro, skin, endocrine and psych systems are negative, except as otherwise noted.      Objective    /66   Pulse 89   Temp 98.6  F (37  C) (Oral)   Resp 16   SpO2 99%     Physical Exam   GEN: Alert, awake, mild distress appears uncomfortable sitting  NECK: no adenopathy, no asymmetry, masses, or scars and thyroid normal to palpation  RESP: lungs clear to auscultation - no rales, rhonchi or wheezes  CV: regular rate and rhythm, normal S1 S2, no S3 or S4, no murmur, click or rub, no peripheral edema and peripheral pulses strong  MS: no gross musculoskeletal defects noted, no edema  Back examination: Back symmetric, no curvature. ROM normal. No CVA tenderness. Tender lower back generalized, no radiation  SKIN: no suspicious lesions or rashes  NEURO: Normal strength and tone, mentation intact and speech normal  PSYCH: mentation appears normal, affect normal/bright      Assessment & Plan     1. Acute bilateral low back pain without sciatica    - cyclobenzaprine (FLEXERIL) 10 MG tablet; Take 1 tablet (10 mg) by mouth 3 times daily as needed for muscle spasms  Dispense: 21 tablet; Refill: 0     Continue stretching and strengthening exercises.    Continue prn heat or ice application.     Follow up if not improving or new worsening symptoms    MARY Brooks CNP  [facility]

## 2019-10-10 RX ORDER — CODEINE SULFATE 30 MG/1
60 TABLET ORAL AT BEDTIME
Qty: 60 TABLET | Refills: 0 | Status: SHIPPED | OUTPATIENT
Start: 2019-10-10 | End: 2019-11-11

## 2019-10-10 NOTE — TELEPHONE ENCOUNTER
Codeine refill approved and sent electronically.    Anay Miner, AZUCENA, APRN, CNP   Pt was not seen in  Urgent Care last evening.  Pt has [facility] appointment today.

## 2019-10-14 ENCOUNTER — TRANSFERRED RECORDS (OUTPATIENT)
Dept: HEALTH INFORMATION MANAGEMENT | Facility: CLINIC | Age: 84
End: 2019-10-14

## 2019-10-15 ENCOUNTER — TELEPHONE (OUTPATIENT)
Dept: FAMILY MEDICINE | Facility: CLINIC | Age: 84
End: 2019-10-15

## 2019-10-15 ENCOUNTER — ANTICOAGULATION THERAPY VISIT (OUTPATIENT)
Dept: NURSING | Facility: CLINIC | Age: 84
End: 2019-10-15
Payer: COMMERCIAL

## 2019-10-15 DIAGNOSIS — I48.91 ATRIAL FIBRILLATION (H): ICD-10-CM

## 2019-10-15 DIAGNOSIS — Z79.01 LONG TERM CURRENT USE OF ANTICOAGULANT THERAPY: ICD-10-CM

## 2019-10-15 LAB — INR POINT OF CARE: 2.6 (ref 0.86–1.14)

## 2019-10-15 PROCEDURE — 99207 ZZC NO CHARGE NURSE ONLY: CPT

## 2019-10-15 PROCEDURE — 36416 COLLJ CAPILLARY BLOOD SPEC: CPT

## 2019-10-15 PROCEDURE — 85610 PROTHROMBIN TIME: CPT | Mod: QW

## 2019-10-15 NOTE — TELEPHONE ENCOUNTER
Reason for Call:  Medication or medication refill:  Medication Question    Do you use a [facility]?  Yes    yesName of the pharmacy and phone number for the current request:  [facility] [place] [address] [place] [address].    Name of the medication requested: Codeine    Other request: Patient's daughter, Mariela, is requesting a call back from a nurse.  She has a question about the codeine.      Can we leave a detailed message on this number? YES    Phone number caller can be reached at: [phone]  Mariela    Best Time: yes    Call taken on 10/15/2019 at 11:59 AM by Kath Fay

## 2019-10-15 NOTE — PROGRESS NOTES
ANTICOAGULATION FOLLOW-UP CLINIC VISIT    Patient Name:  Ashlie Brantley  Date:  10/15/2019  Contact Type:  Face to Face    SUBJECTIVE:  Patient Findings     Comments:   Bleeding Signs/Symptoms: NO  Thromboembolic Signs/Symptoms: NO     Medication Changes:  NO  Dietary Changes:  NO  Bacterial/Viral Infection: NO     Missed Coumadin Doses: NO  Other Concerns:  NO          Clinical Outcomes     Negatives:   Major bleeding event, Thromboembolic event, Anticoagulation-related hospital admission, Anticoagulation-related ED visit, Anticoagulation-related fatality    Comments:   Bleeding Signs/Symptoms: NO  Thromboembolic Signs/Symptoms: NO     Medication Changes:  NO  Dietary Changes:  NO  Bacterial/Viral Infection: NO     Missed Coumadin Doses: NO  Other Concerns:  NO             OBJECTIVE    INR Protime   Date Value Ref Range Status   10/15/2019 2.6 (A) 0.86 - 1.14 Final       ASSESSMENT / PLAN  INR assessment THER    Recheck INR In: 3 WEEKS    INR Location Clinic      Anticoagulation Summary  As of 10/15/2019    INR goal:   2.0-3.0   TTR:   39.2 % (1.6 y)   INR used for dosin.6 (10/15/2019)   Warfarin maintenance plan:   2.5 mg (2.5 mg x 1) every Tue, Thu; 5 mg (2.5 mg x 2) all other days   Full warfarin instructions:   10/27: Hold; 10/28: Hold; Otherwise 2.5 mg every Tue, Thu; 5 mg all other days   Weekly warfarin total:   30 mg   Plan last modified:   Lizy Mercado RN (2019)   Next INR check:   2019   Target end date:   Indefinite    Indications    Atrial fibrillation (H) [I48.91] [I48.91]  Long term current use of anticoagulant therapy [Z79.01]             Anticoagulation Episode Summary     INR check location:       Preferred lab:       Send INR reminders to:   AGGIE MURILLO    Comments:         Anticoagulation Care Providers     Provider Role Specialty Phone number    RejiTad DO [facility] [phone]            See the Encounter Report to view  Anticoagulation Flowsheet and Dosing Calendar (Go to Encounters tab in chart review, and find the Anticoagulation Therapy Visit)        Lizy Mercado RN

## 2019-10-15 NOTE — TELEPHONE ENCOUNTER
Patient/family was instructed to return call to [facility], directly on the RN Call back line at [phone].  Toñito Garcia RN

## 2019-10-16 NOTE — TELEPHONE ENCOUNTER
Daughter wanted to confirm Anay Miner, DNP, APRN, CNP was listed as her primary, which she is.  Consent on file.     Toñito Garcia RN

## 2019-10-29 NOTE — PROGRESS NOTES
ANTICOAGULATION FOLLOW-UP CLINIC VISIT    Patient Name:  Ashlie Brantley  Date:  4/22/2019  Contact Type:  Face to Face    SUBJECTIVE:     Patient Findings            OBJECTIVE    INR Protime   Date Value Ref Range Status   04/22/2019 3.8 (A) 0.86 - 1.14 Final       ASSESSMENT / PLAN  INR assessment SUPRA    Recheck INR In: 1 WEEK    INR Location Clinic      Anticoagulation Summary  As of 4/22/2019    INR goal:   2.0-3.0   TTR:   34.8 % (1.1 y)   INR used for dosing:   3.8! (4/22/2019)   Warfarin maintenance plan:   2.5 mg (5 mg x 0.5) every Wed; 5 mg (5 mg x 1) all other days   Full warfarin instructions:   4/22: 2.5 mg; 4/26: 2.5 mg; Otherwise 2.5 mg every Wed; 5 mg all other days   Weekly warfarin total:   32.5 mg   Plan last modified:   Lizy Mercado RN (1/7/2019)   Next INR check:      Target end date:   Indefinite    Indications    Atrial fibrillation (H) [I48.91] [I48.91]  Long term current use of anticoagulant therapy [Z79.01]             Anticoagulation Episode Summary     INR check location:       Preferred lab:       Send INR reminders to:   [facility]    Comments:         Anticoagulation Care Providers     Provider Role Specialty Phone number    Tad Mendoza DO [facility] [phone]            See the Encounter Report to view Anticoagulation Flowsheet and Dosing Calendar (Go to Encounters tab in chart review, and find the Anticoagulation Therapy Visit)        Lizy Mercado RN                  Na: 148 likely due to dehydration  s/p IV hydration  F/u BMP

## 2019-11-08 ENCOUNTER — ANTICOAGULATION THERAPY VISIT (OUTPATIENT)
Dept: NURSING | Facility: CLINIC | Age: 84
End: 2019-11-08
Payer: COMMERCIAL

## 2019-11-08 ENCOUNTER — HEALTH MAINTENANCE LETTER (OUTPATIENT)
Age: 84
End: 2019-11-08

## 2019-11-08 DIAGNOSIS — I48.91 ATRIAL FIBRILLATION (H): ICD-10-CM

## 2019-11-08 DIAGNOSIS — Z79.01 LONG TERM CURRENT USE OF ANTICOAGULANT THERAPY: ICD-10-CM

## 2019-11-08 LAB — INR POINT OF CARE: 2.2 (ref 0.86–1.14)

## 2019-11-08 PROCEDURE — 85610 PROTHROMBIN TIME: CPT | Mod: QW

## 2019-11-08 PROCEDURE — 99207 ZZC NO CHARGE NURSE ONLY: CPT

## 2019-11-08 PROCEDURE — 36416 COLLJ CAPILLARY BLOOD SPEC: CPT

## 2019-11-08 NOTE — PROGRESS NOTES
ANTICOAGULATION FOLLOW-UP CLINIC VISIT    Patient Name:  Ashlie Brantley  Date:  2019  Contact Type:  Face to Face    SUBJECTIVE:  Patient Findings     Comments:     Assessed for S/S bleeding, clotting, medication, diet, health, activity and alcohol changes          Clinical Outcomes     Negatives:   Major bleeding event, Thromboembolic event, Anticoagulation-related hospital admission, Anticoagulation-related ED visit, Anticoagulation-related fatality    Comments:     Assessed for S/S bleeding, clotting, medication, diet, health, activity and alcohol changes             OBJECTIVE    INR Protime   Date Value Ref Range Status   2019 2.2 (A) 0.86 - 1.14 Final       ASSESSMENT / PLAN  INR assessment THER    Recheck INR In: 3 WEEKS    INR Location Clinic      Anticoagulation Summary  As of 2019    INR goal:   2.0-3.0   TTR:   41.6 % (1.6 y)   INR used for dosin.2 (2019)   Warfarin maintenance plan:   2.5 mg (2.5 mg x 1) every Tue, Thu; 5 mg (2.5 mg x 2) all other days   Full warfarin instructions:   2.5 mg every Tue, Thu; 5 mg all other days   Weekly warfarin total:   30 mg   No change documented:   Lizy Mercado RN   Plan last modified:   Lizy Mercado RN (2019)   Next INR check:   2019   Target end date:   Indefinite    Indications    Atrial fibrillation (H) [I48.91] [I48.91]  Long term current use of anticoagulant therapy [Z79.01]             Anticoagulation Episode Summary     INR check location:       Preferred lab:       Send INR reminders to:   AGGIE MURILLO    Comments:         Anticoagulation Care Providers     Provider Role Specialty Phone number    Anay Miner, NP  Nurse Practitioner - Family [phone]            See the Encounter Report to view Anticoagulation Flowsheet and Dosing Calendar (Go to Encounters tab in chart review, and find the Anticoagulation Therapy Visit)        Lizy Mercado RN

## 2019-11-11 ENCOUNTER — TELEPHONE (OUTPATIENT)
Dept: FAMILY MEDICINE | Facility: CLINIC | Age: 84
End: 2019-11-11

## 2019-11-11 DIAGNOSIS — J84.10 FIBROSIS, LUNG (H): ICD-10-CM

## 2019-11-11 RX ORDER — CODEINE SULFATE 30 MG/1
60 TABLET ORAL AT BEDTIME
Qty: 60 TABLET | Refills: 0 | Status: SHIPPED | OUTPATIENT
Start: 2019-11-11 | End: 2019-12-16

## 2019-11-11 NOTE — TELEPHONE ENCOUNTER
Requested Prescriptions   Pending Prescriptions Disp Refills     codeine 30 MG tablet        Last Written Prescription Date:  10/10/2019  Last Fill Quantity: 60 tablet,   # refills: 0  Last Office Visit: 8/15/2019  lynda/ CORBIN Miner  Future Office visit:       Routing refill request to provider for review/approval because:  Drug not on the [facility], [facility] or  Health refill protocol or controlled substance 60 tablet 0     Sig: Take 2 tablets (60 mg) by mouth At Bedtime       There is no refill protocol information for this order

## 2019-11-14 NOTE — TELEPHONE ENCOUNTER
Patient is calling to check the status of the refill.  She is out of the medication and is getting anxious.  Please call [phone] Mariela.    Dawn Fay  Patient Representative

## 2019-11-14 NOTE — TELEPHONE ENCOUNTER
Patient's daughter called and advised that the prescription was sent to [facility] on 11/11. Daughter will try and call pharmacy to see if ready to .    Liliana Cornejo RN

## 2019-12-02 ENCOUNTER — ANTICOAGULATION THERAPY VISIT (OUTPATIENT)
Dept: NURSING | Facility: CLINIC | Age: 84
End: 2019-12-02
Payer: COMMERCIAL

## 2019-12-02 DIAGNOSIS — I10 HYPERTENSION GOAL BP (BLOOD PRESSURE) < 140/90: ICD-10-CM

## 2019-12-02 DIAGNOSIS — I48.91 ATRIAL FIBRILLATION (H): ICD-10-CM

## 2019-12-02 DIAGNOSIS — Z79.01 LONG TERM CURRENT USE OF ANTICOAGULANT THERAPY: ICD-10-CM

## 2019-12-02 LAB
INR POINT OF CARE: 1 (ref 0.86–1.14)
INR POINT OF CARE: 1 (ref 0.86–1.14)

## 2019-12-02 PROCEDURE — 99207 ZZC NO CHARGE NURSE ONLY: CPT

## 2019-12-02 PROCEDURE — 36416 COLLJ CAPILLARY BLOOD SPEC: CPT

## 2019-12-02 PROCEDURE — 85610 PROTHROMBIN TIME: CPT | Mod: QW

## 2019-12-02 RX ORDER — LOSARTAN POTASSIUM 100 MG/1
100 TABLET ORAL DAILY
Qty: 90 TABLET | Refills: 0 | Status: SHIPPED | OUTPATIENT
Start: 2019-12-02 | End: 2020-01-23 | Stop reason: DRUGHIGH

## 2019-12-02 NOTE — TELEPHONE ENCOUNTER
"Requested Prescriptions   Pending Prescriptions Disp Refills     losartan (COZAAR) 100 MG tablet  Last Written Prescription Date:  8/18/2019  Last Fill Quantity: 90 tabs,  # refills: 1   Last office visit: 10/8/2019 with prescribing provider:  Isidra   Future Office Visit:   Next 5 appointments (look out 90 days)    Dec 04, 2019  9:00 AM CST  Pre-Op physical with Anay Miner, NP  [facility] [address] [place] [id number] [phone]        90 tablet 1     Sig: Take 1 tablet (100 mg) by mouth daily       Angiotensin-II Receptors Passed - 12/2/2019  8:54 AM        Passed - Last blood pressure under 140/90 in past 12 months     BP Readings from Last 3 Encounters:   10/08/19 120/66   08/20/19 (P) 118/64   08/15/19 110/80                 Passed - Recent (12 mo) or future (30 days) visit within the authorizing provider's specialty     Patient has had an office visit with the authorizing provider or a provider within the authorizing providers department within the previous 12 mos or has a future within next 30 days. See \"Patient Info\" tab in inbasket, or \"Choose Columns\" in Meds & Orders section of the refill encounter.              Passed - Medication is active on med list        Passed - Patient is age 18 or older        Passed - No active pregnancy on record        Passed - Normal serum creatinine on file in past 12 months     Recent Labs   Lab Test 04/10/19  0951   CR 0.80             Passed - Normal serum potassium on file in past 12 months     Recent Labs   Lab Test 04/10/19  0951   POTASSIUM 4.2                    Passed - No positive pregnancy test in past 12 months         "

## 2019-12-02 NOTE — PROGRESS NOTES
ANTICOAGULATION FOLLOW-UP CLINIC VISIT    Patient Name:  Ashlie Brantley  Date:  2019  Contact Type:  Face to Face  Unsure why she is so low has been stable for so long. Spoke to her daughter who sets up her medications and states she is indeed using the peach 5 mg tablets and ashlie is taking them. Diet has not changed. Will give a bump in dose and recheck her in 3 days at her physical  SUBJECTIVE:  Patient Findings     Comments:     Assessed for S/S bleeding, clotting, medication, diet, health, activity and alcohol changes          Clinical Outcomes     Negatives:   Major bleeding event, Thromboembolic event, Anticoagulation-related hospital admission, Anticoagulation-related ED visit, Anticoagulation-related fatality    Comments:     Assessed for S/S bleeding, clotting, medication, diet, health, activity and alcohol changes             OBJECTIVE    INR Protime   Date Value Ref Range Status   2019 1.0 0.86 - 1.14 Final   2019 1.0 0.86 - 1.14 Final       ASSESSMENT / PLAN  INR assessment SUB    Recheck INR In: 3 DAYS    INR Location Clinic      Anticoagulation Summary  As of 2019    INR goal:   2.0-3.0   TTR:   38.4 % (1 y)   INR used for dosin.0! (2019)   Warfarin maintenance plan:   2.5 mg (5 mg x 0.5) every Tue, Thu; 5 mg (5 mg x 1) all other days   Full warfarin instructions:   : 10 mg; 12/3: 7.5 mg; Otherwise 2.5 mg every Tue, Thu; 5 mg all other days   Weekly warfarin total:   30 mg   Plan last modified:   Lizy Mercado RN (2019)   Next INR check:   2019   Target end date:   Indefinite    Indications    Atrial fibrillation (H) [I48.91] [I48.91]  Long term current use of anticoagulant therapy [Z79.01]             Anticoagulation Episode Summary     INR check location:       Preferred lab:       Send INR reminders to:   AGGIE MURILLO    Comments:         Anticoagulation Care Providers     Provider Role Specialty Phone number    Anay Miner NP   Nurse Practitioner - Family [phone]            See the Encounter Report to view Anticoagulation Flowsheet and Dosing Calendar (Go to Encounters tab in chart review, and find the Anticoagulation Therapy Visit)    Some signs and symptoms of clots include: pain or tenderness in arm or leg, swelling in arm or leg, changes in skin color, or area is warm to touch, shortness or breath, trouble breathing.  Numbness or weakness especially on 1 side of the body, sudden trouble speaking or swallowing, sudden trouble seeing, sudden confusion, dizzy spells or headache.  If you have these please call 911 or seek medical care immediately.      Lizy Mercado RN

## 2019-12-02 NOTE — TELEPHONE ENCOUNTER
Prescription approved per [facility] Refill Protocol.    Anny Stanton, RN, BSN, PHN  [facility]: [place]

## 2019-12-04 ENCOUNTER — TELEPHONE (OUTPATIENT)
Dept: FAMILY MEDICINE | Facility: CLINIC | Age: 84
End: 2019-12-04

## 2019-12-04 ENCOUNTER — OFFICE VISIT (OUTPATIENT)
Dept: FAMILY MEDICINE | Facility: CLINIC | Age: 84
End: 2019-12-04
Payer: COMMERCIAL

## 2019-12-04 VITALS
SYSTOLIC BLOOD PRESSURE: 130 MMHG | DIASTOLIC BLOOD PRESSURE: 72 MMHG | OXYGEN SATURATION: 92 % | TEMPERATURE: 98.4 F | BODY MASS INDEX: 30.64 KG/M2 | HEART RATE: 76 BPM | HEIGHT: 58 IN | WEIGHT: 146 LBS

## 2019-12-04 DIAGNOSIS — I10 HYPERTENSION GOAL BP (BLOOD PRESSURE) < 140/90: ICD-10-CM

## 2019-12-04 DIAGNOSIS — I48.91 ATRIAL FIBRILLATION (H): ICD-10-CM

## 2019-12-04 DIAGNOSIS — Z01.818 PREOP GENERAL PHYSICAL EXAM: Primary | ICD-10-CM

## 2019-12-04 DIAGNOSIS — I48.91 ATRIAL FIBRILLATION, UNSPECIFIED TYPE (H): ICD-10-CM

## 2019-12-04 DIAGNOSIS — Z79.01 LONG TERM CURRENT USE OF ANTICOAGULANT THERAPY: ICD-10-CM

## 2019-12-04 DIAGNOSIS — I35.0 SEVERE AORTIC STENOSIS: ICD-10-CM

## 2019-12-04 DIAGNOSIS — E03.4 HYPOTHYROIDISM DUE TO ACQUIRED ATROPHY OF THYROID: ICD-10-CM

## 2019-12-04 DIAGNOSIS — Z99.81 ON HOME OXYGEN THERAPY: ICD-10-CM

## 2019-12-04 DIAGNOSIS — J84.112 IPF (IDIOPATHIC PULMONARY FIBROSIS) (H): ICD-10-CM

## 2019-12-04 LAB
CAPILLARY BLOOD COLLECTION: NORMAL
INR PPP: 1.2 (ref 0.86–1.14)

## 2019-12-04 PROCEDURE — 99215 OFFICE O/P EST HI 40 MIN: CPT | Performed by: NURSE PRACTITIONER

## 2019-12-04 PROCEDURE — 85610 PROTHROMBIN TIME: CPT | Performed by: NURSE PRACTITIONER

## 2019-12-04 PROCEDURE — 36416 COLLJ CAPILLARY BLOOD SPEC: CPT | Performed by: NURSE PRACTITIONER

## 2019-12-04 ASSESSMENT — MIFFLIN-ST. JEOR: SCORE: 982

## 2019-12-04 NOTE — TELEPHONE ENCOUNTER
Patient had INR done at Preop Appt today.  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 Mane at [facility]. Patient's INR have been sub therapeutic and Clinic ACC appts.  Clinic nurse confirmed with Daughter that coumadin medication was correct (see Acc note dated 12/2/2019).      Will await Preop Note to be completed for further coumadin dosing directions.  Luz Allen, RN  Anticoagulation Nurse - [facility], [place]

## 2019-12-04 NOTE — TELEPHONE ENCOUNTER
Reached out to [facility] at [phone].  Left detailed VM for Dr. Mane' RN/care team and requested return call to RN line at [phone].    Migdalia Leslie RN

## 2019-12-04 NOTE — PATIENT INSTRUCTIONS
Before Your Surgery      Call your surgeon if there is any change in your health. This includes signs of a cold or flu (such as a sore throat, runny nose, cough, rash or fever).    Do not smoke, drink alcohol or take over the counter medicine (unless your surgeon or primary care doctor tells you to) for the 24 hours before and after surgery.    If you take prescribed drugs: Follow your doctor s orders about which medicines to take and which to stop until after surgery.    Eating and drinking prior to surgery: follow the instructions from your surgeon    Take a shower or bath the night before surgery. Use the soap your surgeon gave you to gently clean your skin. If you do not have soap from your surgeon, use your regular soap. Do not shave or scrub the surgery site.  Wear clean pajamas and have clean sheets on your bed.       Pre-operation Instructions:    - Stop Aspirin and NSAIDS (Ibuprofen, Motrin, Advil, Aleve, Naproxen, Naprosyn) 7 days prior to the surgery/procedure or follow surgeon's direction.    - Stop all Vitamins and Herbal Supplements (including Vitamin E, Fish Oil, Omega 3 Fatty Acids, Ginseng, Gingko) 7 days prior to the surgery/procedure or follow surgeon's direction.    - Medications:  Continue your medications the morning of your surgery/procedure.    - If you become sick before your surgery/procedure (such as a fever, cough, congestion, or sore throat) you should call your primary care provider and surgeon.    - Unless given permission by your surgeon, do not eat or drink after midnight in the evening prior to your surgery/procedure.      Check INR today  Hold Coumadin 4-5 days before your surgery (hold 4 days if your INR is <1.9)  Hold the Lasix the morning of surgery

## 2019-12-04 NOTE — TELEPHONE ENCOUNTER
Please call this patient's heart doctor Dr. Mane through [facility] who will be doing this patient's TAVR surgery 12/11/19.     From a primary care standpoint, recommend holding Coumadin for 4 days (due to her INR is currently <1.9) without bridging and holding Lasix the morning of surgery.    I would like to review above plan with her heart doctor to see if from a cardiac standpoint there are any other pre op recommendations?    Of note, patient told me today that next week Monday 12/9/19  she is getting a chest x-ray, EKG, and labs done for additional pre op tests.    Anay Miner, DNP, APRN, CNP

## 2019-12-04 NOTE — PROGRESS NOTES
[address] [id number] [phone]  Dept: [phone]    PRE-OP EVALUATION:  Today's date: 2019    Ashlie Brantley (: 1931) presents for pre-operative evaluation assessment as requested by Dr. Mane.  She requires evaluation and anesthesia risk assessment prior to undergoing surgery/procedure for treatment of TRANSCATHETER AORTIC VALVE REPLACEMENT,  RIGHT FEMORAL APPROACH    Fax number for surgical facility: [phone]  Primary Physician: Anay Miner  Type of Anesthesia Anticipated: General    Patient has a Health Care Directive or Living Will:  YES ON FILE    Preop Questions 2019   Who is doing your surgery? Dr Mane   What are you having done? TAVR   Date of Surgery/Procedure: Dec 11,2019   Facility or Hospital where procedure/surgery will be performed: [facility]   1.  Do you have a history of Heart attack, stroke, stent, coronary bypass surgery, or other heart surgery? No   2.  Do you ever have any pain or discomfort in your chest? No   3.  Do you have a history of  Heart Failure? NO   4.   Are you troubled by shortness of breath when:  walking on a level surface, or up a slight hill, or at night? YES - related to severe aortic stenosis   5.  Do you currently have a cold, bronchitis or other respiratory infection? No   6.  Do you have a cough, shortness of breath, or wheezing? YES - due to idiopathic pulmonary fibrosis   7.  Do you sometimes get pains in the calves of your legs when you walk? No   8. Do you or anyone in your family have previous history of blood clots? No   9.  Do you or does anyone in your family have a serious bleeding problem such as prolonged bleeding following surgeries or cuts? No   10. Have you ever had problems with anemia or been told to take iron pills? No   11. Have you had any abnormal blood loss such as black, tarry or bloody stools, or abnormal vaginal bleeding? No   12. Have you ever  had a blood transfusion? No   13. Have you or any of your relatives ever had problems with anesthesia? No   14. Do you have sleep apnea, excessive snoring or daytime drowsiness? No   15. Do you have any prosthetic heart valves? No   16. Do you have prosthetic joints? UNKNOWN - has h/o knee surgery   17. Is there any chance that you may be pregnant? No         HPI:     HPI related to upcoming procedure: severe aortic stenosis causing fatigue and scheduled for TAVR with Dr. Mane    Next Monday having a CHEST X-RAY, EKG, and labs for pre op testing.    See problem list for active medical problems.  Problems all longstanding and stable, except as noted/documented.  See ROS for pertinent symptoms related to these conditions.      MEDICAL HISTORY:     Patient Active Problem List    Diagnosis Date Noted     Severe aortic stenosis 09/15/2019     Priority: Medium     Overactive bladder 08/18/2019     Priority: Medium     On home oxygen therapy 08/15/2019     Priority: Medium     2L NC at bedtime and on exertion       Chronic left shoulder pain 04/16/2019     Priority: Medium     Evaluated by Orthopedic, Dr. Dowling.  Injections       Atrial fibrillation (H) [I48.91] 03/08/2018     Priority: Medium     Dr. Hernandez, [facility] in [place] (last seen 7/1/19).  Follow up in 1 year       Long term current use of anticoagulant therapy 03/08/2018     Priority: Medium     Urinary incontinence, unspecified type 09/18/2017     Priority: Medium     Chronic pain syndrome 12/21/2016     Priority: Medium     Patient is followed by Anay Miner, DNP, APRN, CNP for ongoing prescription of pain medication.  All refills should be approved by this provider, or covering partner.    She is on chronic codeine due to cough from pulmonary fibrosis, not because of pain.  Pulmonary specialist agrees with chronic codeine use for cough (see 5/10/19 Pulmonary note).    Medication(s): codeine.   Maximum quantity per  month: 60  Clinic visit frequency required: Q 6  months     Controlled substance agreement:  8/15/19  Encounter-Level CSA:     There are no encounter-level csa.        Pain Clinic evaluation in the past: No    DIRE Total Score(s):  No flowsheet data found.    Last [facility] website verification:  done on 12/21/16   [email or web address]         Hypothyroidism due to acquired atrophy of thyroid 12/03/2015     Priority: Medium     IPF (idiopathic pulmonary fibrosis) (H) 12/30/2013     Priority: Medium     Followed by pulmonology  Previous Dr manuel patient, on daily codeine  Patient is followed by Data Unavailable for ongoing controlled substance prescription. Med: codeine.   Maximum use per month: 60  Expected duration: longterm  Controlled substance agreement on file: YES  Clinic visit recommended: Q 6  Months    Found incidentally on cxr/ct following URI in 2011. Previous w/u negative (HP panel, rheum panel, exposure Hx). PFT's have slowly declined previously, but is stable in the past year. Remains active. CT in 2014 did show 5mm pulm nodule near minor fissure. She has declined to undergo further chest imaging. She is a non-smoker. 6 minute walk test 11/2018 showed desaturation with walking on room air and was started on 2L/min oxygen.  --Patient has been on codeine for the past 7 years with good symptomatic improvement in her cough. Is not having any side effects from taking codeine. Will prescribe current dose 60mg qhs. Can consider adding Tessalon pearls in the future.        PVD (posterior vitreous detachment) OU 04/12/2013     Priority: Medium     Pseudophakia, OU 04/12/2013     Priority: Medium     Trochanteric bursitis - bilateral 01/25/2013     Priority: Medium     Health Care Home 11/15/2012     Priority: Medium     Meg Vo RN-PHN  [facility] / [facility]   [phone]    DX V65.8 REPLACED WITH [id number]ALTH CARE HOME (04/08/2013)       Dermatochalasis 03/19/2012      Priority: Medium     Cough 11/14/2011     Priority: Medium     Long history of cough.  In past has tried prednisone, flovent, flonase, albuterol.  Has had chest CT, cxr, normal spirometry.  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  Has seen ENT and multiple other providers for this.  Pulmonary fibrosis, followed by pulmonology.  On daily codeine with much improvement of cough.       Advance Care Planning 09/21/2011     Priority: Medium     Advance Care Planning 5/29/2015: Receipt of ACP document:  Received: Health Care Directive which was witnessed or notarized on 2-27-15. Has a previous HCD dated 7-23-13  Document previously scanned on 4-22-15.  Order reviewed and found to be valid.  Code Status reflects choices in most recent ACP document.  Confirmed/documented designated decision maker(s).  Added by Stacie Ibarra  Advance Care Planning: Followup facilitation and documentation of choices:  Ashlie Brantley presented for follow-up session regarding ACP at the clinic.  She was accompanied by her , who is the health care agent.  Previous ACP discussions reviewed and questions answered. At this time she has completed a HCD reflecting current values, goals, and choices. Health Care Agent understands responsibility of role and choices.  Documents completed at this visit: HCD. Signed and witnessed. Validation form completed and sent with copy of document to be scanned. Confirmed/documented designated decision maker(s). See permanent comments of demographics in clinical tab. Confirmed current code status reflects current choices. View document(s) and details by clicking on code status.  Added by Jarod Weaver on 4/22/2015  Discussed advance care planning with patient; information given to patient to review. 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     sees [facility]     Cataract     IOL both     DJD (degenerative joint disease)     KNEES     Elevated cholesterol      GERD (gastroesophageal reflux disease)      Hoarseness      HTN (hypertension)      Hypothyroid      Laryngeal dystonia      LVH (left ventricular hypertrophy) due to hypertensive disease      Osteopenia     MILD     Urinary stress incontinence      Past Surgical History:   Procedure Laterality Date     C NONSPECIFIC PROCEDURE      BACK SURGERY     C NONSPECIFIC PROCEDURE      VOCAL CORD POLYP     C SHOULDER SURG PROC UNLISTED       C STOMACH SURGERY PROCEDURE UNLISTED       C TOTAL KNEE ARTHROPLASTY      LT 1998  /  RT 2001     CATARACT IOL, RT/LT       HC REMOVAL GALLBLADDER       HERNIA REPAIR, INGUINAL RT/LT      RT     HYSTERECTOMY, CERVIX STATUS UNKNOWN      DUB     PHACOEMULSIFICATION WITH STANDARD INTRAOCULAR LENS IMPLANT  Rt 6/11/09, Lt 8/3/09    both eyes Dr. Barnett     ROTATOR CUFF REPAIR RT/LT      RT     Current Outpatient Medications   Medication Sig Dispense Refill     CALCIUM + D 600-200 MG-UNIT OR TABS ONE TABLET TWICE A DAY WITH MEALS 0 0     codeine 30 MG tablet Take 2 tablets (60 mg) by mouth At Bedtime 60 tablet 0     furosemide (LASIX) 20 MG tablet Take 20 mg by mouth       levothyroxine (SYNTHROID) 88 MCG tablet Take 1 tablet (88 mcg) by mouth daily 90 tablet 3     losartan (COZAAR) 100 MG tablet Take 1  tablet (100 mg) by mouth daily 90 tablet 0     metoprolol tartrate (LOPRESSOR) 25 MG tablet Take 25 mg by mouth 2 times daily       neomycin-polymyxin-dexamethasone (MAXITROL) 3.5-64675-8.1 ophthalmic ointment Place 0.25 inches into both eyes At Bedtime Apply a small squirt into each eye at bedtime 3.5 g 3     OMEGA-3 COMPLEX OR 1 capsule daily       order for DME Please provide patient with portable oxygen.  Patient requires 2 LPM oxygen with activity.  Patient is already on nocturnal oxygen.  Please provide patient with POC as soon as available.  Patient is elderly and will have difficulty managing oxygen tanks. 1 Device 0     simvastatin (ZOCOR) 40 MG tablet TAKE ONE TABLET BY MOUTH EVERY NIGHT AT BEDTIME 90 tablet 2     VITAMIN D 2000 UNIT OR TABS 1 tablet daily 0 0     VITAMIN D3 1000 UNIT OR CAPS 1 CAPSULE DAILY       warfarin (COUMADIN) 5 MG tablet Take as directed by ACC. Current dose is 5 mg mon tue wed thur and sat 2.5 mg all other days. 90 tablet 3     acetaminophen (TYLENOL) 500 MG tablet Take 2 tablets (1,000 mg) by mouth every 8 hours as needed for mild pain (Patient not taking: Reported on 8/15/2019) 1 Bottle 1     albuterol (2.5 MG/3ML) 0.083% neb solution Take 1 vial (2.5 mg) by nebulization every 6 hours as needed for shortness of breath / dyspnea or wheezing (Patient not taking: Reported on 8/15/2019) 3 mL 0     VITAMIN E COMPLEX PO Take  by mouth.       OTC products: None, except as noted above    Allergies   Allergen Reactions     Nkda [No Known Drug Allergies]       Latex Allergy: NO    Social History     Tobacco Use     Smoking status: Never Smoker     Smokeless tobacco: Never Used   Substance Use Topics     Alcohol use: Yes     Alcohol/week: 0.0 standard drinks     Comment: 1-2 drinks/month     History   Drug Use No       REVIEW OF SYSTEMS:   Constitutional, neuro, ENT, endocrine, pulmonary, cardiac, gastrointestinal, genitourinary, musculoskeletal, integument and psychiatric systems are  "negative, except as otherwise noted.    EXAM:   /72   Pulse 76   Temp 98.4  F (36.9  C) (Oral)   Ht 1.473 m (4' 10\")   Wt 66.2 kg (146 lb)   SpO2 92%   BMI 30.51 kg/m      GENERAL APPEARANCE: healthy, alert and no distress     EYES: EOMI, PERRL     HENT: ear canals and TM's normal and nose and mouth without ulcers or lesions     NECK: no adenopathy, no asymmetry, masses, or scars and thyroid normal to palpation     RESP: lungs clear to auscultation - no rales, rhonchi or wheezes     CV: irregularly irregular rhythm and III/VI systolic murmur     SKIN: no suspicious lesions or rashes     NEURO: Normal strength and tone, sensory exam grossly normal, mentation intact and speech normal     PSYCH: mentation appears normal. and affect normal/bright     LYMPHATICS: No cervical adenopathy    DIAGNOSTICS:     Labs Resulted Today:   Results for orders placed or performed in visit on 12/04/19   INR     Status: Abnormal   Result Value Ref Range    INR 1.20 (H) 0.86 - 1.14   Capillary Blood Collection     Status: None   Result Value Ref Range    Capillary Blood Collection Capillary collection performed        Recent Labs   Lab Test 12/02/19 11/08/19  04/10/19  0951  10/15/18  1222  04/24/18  1342   HGB  --   --   --   --   --  12.0  --  12.1   PLT  --   --   --   --   --  180  --   --    INR 1.0  1.0 2.2*   < >  --    < >  --    < >  --    NA  --   --   --  139  --   --   --  139   POTASSIUM  --   --   --  4.2  --   --   --  4.1   CR  --   --   --  0.80  --   --   --  0.77    < > = values in this interval not displayed.      9/12/19:  Echo stress pharm w/o contrast through [facility]  FINAL CONCLUSIONS   LVEF at the lower limits of normal.   Appropriate increase in LV function with exercise.   Moderate mitral annular calcification with mild MR.   Mean gradient at baseline is 16 mm Hg, GILBERT .7 cm2.   Mean gradient at 5 mm Hg = 21 mm Hg.   Mean gradeint 1t 10 mm Hg is 26;  Mean gradient at 20 is 24.    IMPRESSION:   Reason " for surgery/procedure: TAVR  Diagnosis/reason for consult: severe aortic stenosis    The proposed surgical procedure is considered INTERMEDIATE risk.    REVISED CARDIAC RISK INDEX  The patient has the following serious cardiovascular risks for perioperative complications such as (MI, PE, VFib and 3  AV Block):  High risk surgery (>5% cardiac complication risk)  INTERPRETATION: 1 risks: Class II (low risk - 0.9% complication rate)    The patient has the following additional risks for perioperative complications:  No identified additional risks      ICD-10-CM    1. Preop general physical exam Z01.818 Capillary Blood Collection   2. Severe aortic stenosis I35.0    3. Hypertension goal BP (blood pressure) < 140/90 I10  chronic, stable   4. Atrial fibrillation, unspecified type (H) I48.91  chronic, stable   5. Long term current use of anticoagulant therapy Z79.01 INR   6. IPF (idiopathic pulmonary fibrosis) (H) J84.112  chronic, stable   7. On home oxygen therapy Z99.81 2L NC at bedtime and on exertion   8. Hypothyroidism due to acquired atrophy of thyroid E03.4  chronic, stable   Patient has additional preop testing including EKG, chest x-ray, and labs on 12/9/2019.    RECOMMENDATIONS:       Cardiovascular Risk  Patient is already on a Beta Blocker. Continue Betablocker therapy after surgery, using Beta blocker order set as necessary for NPO status.      Pulmonary Risk  Incentive spirometry post op  Respiratory Therapy (Respiratory Care IP Consult)  post op  NG tube decompression if abdominal distension or significant vomiting         Anticoagulant or Antiplatelet Medication Use  WARFARIN: Thromboembolic risk is moderate (e.g. DVT/PE 3-12 months ago, recurrent DVT/PE, A fib and [id number] = 5 to 6 without prior CVA/TIA), hold warfarin 5 days without bridging before procedure.  LASIX: Hold the morning of surgery.      Contacted [facility] team to review above management plan for holding plan prior  to surgery, and per Cardiology okay for above plan (see telephone encounter 12/4/19).    APPROVAL GIVEN to proceed with proposed procedure, without further diagnostic evaluation       Signed Electronically by: Anay Miner NP    Copy of this evaluation report is provided to requesting physician.    [place] Preop Guidelines    Revised Cardiac Risk Index

## 2019-12-05 ENCOUNTER — TELEPHONE (OUTPATIENT)
Dept: FAMILY MEDICINE | Facility: CLINIC | Age: 84
End: 2019-12-05

## 2019-12-05 DIAGNOSIS — Z79.01 LONG TERM CURRENT USE OF ANTICOAGULANT THERAPY: ICD-10-CM

## 2019-12-05 DIAGNOSIS — I48.91 ATRIAL FIBRILLATION (H): ICD-10-CM

## 2019-12-05 NOTE — TELEPHONE ENCOUNTER
To PCP:    Mariela (Ashlie's daughter) called requesting direction on INR.  She was advised that INR clinic was awaiting response from cardiology in response to PCP request for approval of recommendation for warfarin dosing.  INR central after speaking with pharmacist returned call to Mariela to give dosing directions.    Patient daughter reports that cardiology advised that Ashlie take 7.5 mg tonight and then start a five day hold tomorrow (12/6/19). Writer does not see any notes in chart from cardiology that patient was contacted or confirms these directions.     Please advise if you agree with cardiology's response.    Lyla Robbins, RN  Anticoagulation Clinic

## 2019-12-05 NOTE — TELEPHONE ENCOUNTER
It appears Migdalia Leslie RN called the Cardiology office yesterday requesting a call back (see below) but I do not see that they called back yet.    It would be important to confirm directly with Cardiology office the recommendations below to hold warfarin 12/6/19 for 5 days prior to surgery.    From a primary standpoint, I would agree with above holding coumadin plan.      Anay Miner, DNP, APRN, CNP

## 2019-12-05 NOTE — TELEPHONE ENCOUNTER
EDWARDI to PCP and ACC/Lyla Robbins RN    Phone call to Irina with [facility] heart and vascular.They have communicated their coumadin hold plan to patient and also state they will discuss with patient at a visit early next week which other medications to hold for surgery.    Jarod Weaver RN

## 2019-12-05 NOTE — TELEPHONE ENCOUNTER
"Patient's daughter Mariela Harris Called to request dosing instructions for warfarin.  Patient has upcoming procedure on 12/11/19.  Patient was boosted with 10 mg on Monday and 7.5 mg on Tues.  Patient daughter reports giving dosing of 10 mg on Wednes 12/4 due to \"you people never called me to tell me what to do so I decided to give her 2 tablets since her INR was low\"  INR clinic is waiting on recommendation from cardiology in order to give further instruction for dosing of warfarin.  Patient daughter was given instruction that someone would be calling at some point today to update with instruction once cardiology could be reached.    Best number to call daughter:  Mariela  [phone]    Lyla Robbins RN  "

## 2019-12-06 NOTE — TELEPHONE ENCOUNTER
Received this message from mother telephone encounter from 12/4/2019:      FYI to PCP and ACC/Lyla Robbins RN     Phone call to Irina with [facility] heart and vascular.They have communicated their coumadin hold plan to patient and also state they will discuss with patient at a visit early next week which other medications to hold for surgery.     Jarod Weaver, RN      Please call family to make sure that patient is aware to hold Coumadin for 5 days prior to surgery.    Anay Miner, DNP, APRN, CNP

## 2019-12-06 NOTE — TELEPHONE ENCOUNTER
Daughter was already aware of holding coumadin for 5 days prior to surgery for Ashlie.    Closing encounter.    Liliana Cornejo RN

## 2019-12-12 PROBLEM — M25.512 CHRONIC LEFT SHOULDER PAIN: Status: RESOLVED | Noted: 2019-04-16 | Resolved: 2019-12-12

## 2019-12-12 PROBLEM — G89.29 CHRONIC LEFT SHOULDER PAIN: Status: RESOLVED | Noted: 2019-04-16 | Resolved: 2019-12-12

## 2019-12-12 NOTE — PROGRESS NOTES
Discharge Note    Progress reporting period is from last progress note on   to Apr 26, 2019.    Ashlie failed to follow up and current status is unknown.  Please see information below for last relevant information on current status.  Patient seen for 2 visits.    SUBJECTIVE  Subjective changes noted by patient:  Patient reports feeling better since last PT session with improvments in pain and function.  .  Current pain level is 3/10.     Previous pain level was  5/10.   Changes in function:  Yes (See Goal flowsheet attached for changes in current functional level)  Adverse reaction to treatment or activity: None    OBJECTIVE  Changes noted in objective findings: AROM flexion 105 deg, painful with upper trap sub. abduction 80 deg, painful UT sub     ASSESSMENT/PLAN  Diagnosis: Left shoulder pain RTC   Updated problem list and treatment plan:   Pain - HEP  Decreased ROM/flexibility - HEP  Decreased function - HEP  Decreased strength - HEP  STG/LTGs have been met or progress has been made towards goals:  Yes, please see goal flowsheet for most current information  Assessment of Progress: current status is unknown.    Last current status: Pt is progressing slower than anticipated   Self Management Plans:  HEP  I have re-evaluated this patient and find that the nature, scope, duration and intensity of the therapy is appropriate for the medical condition of the patient.  Ashlie continues to require the following intervention to meet STG and LTG's:  HEP.    Recommendations:  Discharge with current home program.  Patient to follow up with MD as needed.    Please refer to the daily flowsheet for treatment today, total treatment time and time spent performing 1:1 timed codes.

## 2019-12-12 NOTE — TELEPHONE ENCOUNTER
See TE dated 12/5/2019.  Luz Allen, RN  Anticoagulation Nurse - [facility], [place]

## 2019-12-13 ENCOUNTER — MEDICAL CORRESPONDENCE (OUTPATIENT)
Dept: HEALTH INFORMATION MANAGEMENT | Facility: CLINIC | Age: 84
End: 2019-12-13

## 2019-12-13 ENCOUNTER — TELEPHONE (OUTPATIENT)
Dept: FAMILY MEDICINE | Facility: CLINIC | Age: 84
End: 2019-12-13

## 2019-12-13 DIAGNOSIS — E78.5 HYPERLIPIDEMIA LDL GOAL <130: ICD-10-CM

## 2019-12-13 NOTE — TELEPHONE ENCOUNTER
Anticoagulation Management     Patient daughter called to schedule appt with INR nurse for Monday 12/16/19.  Patient was scheduled for 3 pm.    Anticoagulation clinic to follow up    Lyla Robbins RN

## 2019-12-15 PROBLEM — I63.9 STROKE (CEREBRUM) (H): Status: ACTIVE | Noted: 2019-12-12

## 2019-12-16 ENCOUNTER — ANTICOAGULATION THERAPY VISIT (OUTPATIENT)
Dept: NURSING | Facility: CLINIC | Age: 84
End: 2019-12-16
Payer: COMMERCIAL

## 2019-12-16 DIAGNOSIS — Z79.01 LONG TERM CURRENT USE OF ANTICOAGULANT THERAPY: ICD-10-CM

## 2019-12-16 DIAGNOSIS — J84.10 FIBROSIS, LUNG (H): ICD-10-CM

## 2019-12-16 DIAGNOSIS — I48.91 ATRIAL FIBRILLATION (H): ICD-10-CM

## 2019-12-16 LAB — INR POINT OF CARE: 1.9 (ref 0.86–1.14)

## 2019-12-16 PROCEDURE — 99207 ZZC NO CHARGE NURSE ONLY: CPT

## 2019-12-16 PROCEDURE — 36416 COLLJ CAPILLARY BLOOD SPEC: CPT

## 2019-12-16 PROCEDURE — 85610 PROTHROMBIN TIME: CPT | Mod: QW

## 2019-12-16 RX ORDER — SIMVASTATIN 40 MG
TABLET ORAL
Qty: 90 TABLET | Refills: 0 | Status: SHIPPED | OUTPATIENT
Start: 2019-12-16 | End: 2020-03-18

## 2019-12-16 RX ORDER — CODEINE SULFATE 30 MG/1
60 TABLET ORAL AT BEDTIME
Qty: 60 TABLET | Refills: 0 | Status: SHIPPED | OUTPATIENT
Start: 2019-12-16 | End: 2020-01-20

## 2019-12-16 NOTE — PROGRESS NOTES
ANTICOAGULATION FOLLOW-UP CLINIC VISIT    Patient Name:  Ashlie Brantley  Date:  2019  Contact Type:  Face to Face    SUBJECTIVE:  Patient Findings     Positives:   Change in health (had tvr and so had a hold and hospital stay is back on maintanence and will recheck in a week)    Comments:     Assessed for S/S bleeding, clotting, medication, diet, health, activity and alcohol changes          Clinical Outcomes     Negatives:   Major bleeding event, Thromboembolic event, Anticoagulation-related hospital admission, Anticoagulation-related ED visit, Anticoagulation-related fatality    Comments:     Assessed for S/S bleeding, clotting, medication, diet, health, activity and alcohol changes             OBJECTIVE    INR Protime   Date Value Ref Range Status   2019 1.9 (A) 0.86 - 1.14 Final       ASSESSMENT / PLAN  INR assessment SUB    Recheck INR In: 1 WEEK    INR Location Clinic      Anticoagulation Summary  As of 2019    INR goal:   2.0-3.0   TTR:   38.4 % (1 y)   INR used for dosin.9! (2019)   Warfarin maintenance plan:   2.5 mg (5 mg x 0.5) every Tue, Thu; 5 mg (5 mg x 1) all other days   Full warfarin instructions:   2.5 mg every Tue, Thu; 5 mg all other days   Weekly warfarin total:   30 mg   No change documented:   Lizy Mercado RN   Plan last modified:   Lizy Mercado RN (2019)   Next INR check:   2019   Target end date:   Indefinite    Indications    Atrial fibrillation (H) [I48.91] [I48.91]  Long term current use of anticoagulant therapy [Z79.01]             Anticoagulation Episode Summary     INR check location:       Preferred lab:       Send INR reminders to:   AGGIE MURILLO    Comments:         Anticoagulation Care Providers     Provider Role Specialty Phone number    Anay Miner, NP  Nurse Practitioner - Family [phone]            See the Encounter Report to view Anticoagulation Flowsheet and Dosing Calendar (Go to Encounters tab in  chart review, and find the Anticoagulation Therapy Visit)        Lizy Mercado RN

## 2019-12-16 NOTE — TELEPHONE ENCOUNTER
"Requested Prescriptions   Pending Prescriptions Disp Refills     simvastatin (ZOCOR) 40 MG tablet [Pharmacy Med Name: SIMVASTATIN 40MG TABS]  Last Written Prescription Date:  2/26/2019  Last Fill Quantity: 90 tablet,  # refills: 2   Last office visit: 10/29/2018 with prescribing provider:  ERON Mendoza   Future Office Visit:   90 tablet 2     Sig: TAKE ONE TABLET BY MOUTH AT BEDTIME       Statins Protocol Passed - 12/13/2019  5:01 PM        Passed - LDL on file in past 12 months     Recent Labs   Lab Test 01/07/19  0925   LDL 52             Passed - No abnormal creatine kinase in past 12 months     No lab results found.             Passed - Recent (12 mo) or future (30 days) visit within the authorizing provider's specialty     Patient has had an office visit with the authorizing provider or a provider within the authorizing providers department within the previous 12 mos or has a future within next 30 days. See \"Patient Info\" tab in inbasket, or \"Choose Columns\" in Meds & Orders section of the refill encounter.              Passed - Medication is active on med list        Passed - Patient is age 18 or older        Passed - No active pregnancy on record        Passed - No positive pregnancy test in past 12 months        " 1.7

## 2019-12-16 NOTE — TELEPHONE ENCOUNTER
Routing refill request to provider for review/approval because:  Drug not on the [facility] refill protocol     Codeine  Last Written Prescription Date:  11/11/19  Last Fill Quantity: 60,  # refills: 0   Last office visit: 12/4/2019 with prescribing provider:  Anay Miner, AZUCENA, APRN, CNP   Future Office Visit:   Next 5 appointments (look out 90 days)    Dec 19, 2019 10:45 AM CST  Return Visit with Sarah Ryan MD  [facility] ([address] [address]  [phone]         Jarod Weaver RN

## 2019-12-16 NOTE — TELEPHONE ENCOUNTER
Patient was last seen 12/4/19 by Anay Miner.    Plan:    Instructions         Return in about 3 months (around 3/4/2020) for Routine Visit.       Prescription approved per [facility] [place] Protocol.    Irina Barbosa RN  [facility]

## 2019-12-19 ENCOUNTER — OFFICE VISIT (OUTPATIENT)
Dept: OPHTHALMOLOGY | Facility: CLINIC | Age: 84
End: 2019-12-19
Payer: COMMERCIAL

## 2019-12-19 DIAGNOSIS — H34.11 CRAO (CENTRAL RETINAL ARTERY OCCLUSION), RIGHT: Primary | ICD-10-CM

## 2019-12-19 DIAGNOSIS — H43.813 PVD (POSTERIOR VITREOUS DETACHMENT), BILATERAL: ICD-10-CM

## 2019-12-19 DIAGNOSIS — Z96.1 PSEUDOPHAKIA: ICD-10-CM

## 2019-12-19 PROCEDURE — 92014 COMPRE OPH EXAM EST PT 1/>: CPT | Performed by: STUDENT IN AN ORGANIZED HEALTH CARE EDUCATION/TRAINING PROGRAM

## 2019-12-19 ASSESSMENT — VISUAL ACUITY
METHOD: SNELLEN - LINEAR
OD_SC: CF
OS_SC: 20/50

## 2019-12-19 ASSESSMENT — CUP TO DISC RATIO
OD_RATIO: 0.3
OS_RATIO: 0.2

## 2019-12-19 ASSESSMENT — REFRACTION_MANIFEST
OS_AXIS: 030
OS_CYLINDER: +0.50
OS_SPHERE: -1.00

## 2019-12-19 ASSESSMENT — TONOMETRY
OS_IOP_MMHG: 13
IOP_METHOD: ICARE
OD_IOP_MMHG: 11

## 2019-12-19 ASSESSMENT — SLIT LAMP EXAM - LIDS
COMMENTS: 3+ DERMATOCHALASIS - UPPER LID
COMMENTS: 3+ DERMATOCHALASIS - UPPER LID

## 2019-12-19 ASSESSMENT — EXTERNAL EXAM - LEFT EYE: OS_EXAM: NORMAL

## 2019-12-19 ASSESSMENT — EXTERNAL EXAM - RIGHT EYE: OD_EXAM: NORMAL

## 2019-12-19 NOTE — PATIENT INSTRUCTIONS
"Recommend filling glasses prescription     Use artificial tears up to four times a day (like Refresh Optive, Systane Balance, TheraTears, or generic artificial tears are ok. Avoid \"get the red out\" drops).     Return in 3 months for eye pressure check    Sarah Ryan MD  [phone]    "

## 2019-12-19 NOTE — PROGRESS NOTES
" Current Eye Medications:  no     Subjective:  Follow up CRAO right eye    Pt reports the vision in her right eye had become much worse. Pt was hospitalized about a week ago for surgery for aortic stenosis . It was found pt had developed a CRAO and was referred to us for a follow up. Was seen by Dr. De La Rosa from [facility] while hospitalized.      Objective:  See Ophthalmology Exam.       Assessment:  Ashlie Brantley is a 88 year old female who presents with:   Encounter Diagnoses   Name Primary?     CRAO (central retinal artery occlusion), right 1 week ago. Recommend wearing polycarbonate glasses to protect left eye.      Pseudophakia, OU      PVD (posterior vitreous detachment), bilateral      Plan:  Recommend filling glasses prescription     Use artificial tears up to four times a day (like Refresh Optive, Systane Balance, TheraTears, or generic artificial tears are ok. Avoid \"get the red out\" drops).     Return in 3 months for eye pressure check    Sarah Ryan MD  [phone]      "

## 2019-12-19 NOTE — LETTER
"    12/19/2019         RE: Ashlie Brantley  [address] [place] [address]        Dear Colleague,    Thank you for referring your patient, Ashlie Brantley, to the [facility]. Please see a copy of my visit note below.     Current Eye Medications:  no     Subjective:  Follow up CRAO right eye    Pt reports the vision in her right eye had become much worse. Pt was hospitalized about a week ago for surgery for aortic stenosis . It was found pt had developed a CRAO and was referred to us for a follow up. Was seen by Dr. De La Rosa from [facility] while hospitalized.      Objective:  See Ophthalmology Exam.       Assessment:  Ashlie Brantley is a 88 year old female who presents with:   Encounter Diagnoses   Name Primary?     CRAO (central retinal artery occlusion), right 1 week ago. Recommend wearing polycarbonate glasses to protect left eye.      Pseudophakia, OU      PVD (posterior vitreous detachment), bilateral      Plan:  Recommend filling glasses prescription     Use artificial tears up to four times a day (like Refresh Optive, Systane Balance, TheraTears, or generic artificial tears are ok. Avoid \"get the red out\" drops).     Return in 3 months for eye pressure check    Sarah Ryan MD  [phone]        Again, thank you for allowing me to participate in the care of your patient.        Sincerely,        Sarah Ryan MD    "

## 2019-12-23 ENCOUNTER — ANTICOAGULATION THERAPY VISIT (OUTPATIENT)
Dept: NURSING | Facility: CLINIC | Age: 84
End: 2019-12-23
Payer: COMMERCIAL

## 2019-12-23 DIAGNOSIS — Z79.01 LONG TERM CURRENT USE OF ANTICOAGULANT THERAPY: ICD-10-CM

## 2019-12-23 DIAGNOSIS — Z95.2 HEART VALVE REPLACED: ICD-10-CM

## 2019-12-23 DIAGNOSIS — I48.91 ATRIAL FIBRILLATION (H): ICD-10-CM

## 2019-12-23 DIAGNOSIS — I63.9 CEREBRAL VASCULAR ACCIDENT (H): Primary | ICD-10-CM

## 2019-12-23 LAB
ERYTHROCYTE [DISTWIDTH] IN BLOOD BY AUTOMATED COUNT: 15.2 % (ref 10–15)
HCT VFR BLD AUTO: 36.2 % (ref 35–47)
HGB BLD-MCNC: 11.5 G/DL (ref 11.7–15.7)
INR POINT OF CARE: 2.4 (ref 0.86–1.14)
INR PPP: 2.05 (ref 0.86–1.14)
MCH RBC QN AUTO: 31.5 PG (ref 26.5–33)
MCHC RBC AUTO-ENTMCNC: 31.8 G/DL (ref 31.5–36.5)
MCV RBC AUTO: 99 FL (ref 78–100)
PLATELET # BLD AUTO: 227 10E9/L (ref 150–450)
RBC # BLD AUTO: 3.65 10E12/L (ref 3.8–5.2)
WBC # BLD AUTO: 8.5 10E9/L (ref 4–11)

## 2019-12-23 PROCEDURE — 85610 PROTHROMBIN TIME: CPT | Performed by: NURSE PRACTITIONER

## 2019-12-23 PROCEDURE — 36415 COLL VENOUS BLD VENIPUNCTURE: CPT | Performed by: NURSE PRACTITIONER

## 2019-12-23 PROCEDURE — 99207 ZZC NO CHARGE NURSE ONLY: CPT

## 2019-12-23 PROCEDURE — 85610 PROTHROMBIN TIME: CPT | Mod: QW

## 2019-12-23 PROCEDURE — 85027 COMPLETE CBC AUTOMATED: CPT | Performed by: NURSE PRACTITIONER

## 2019-12-23 NOTE — PROGRESS NOTES
ANTICOAGULATION FOLLOW-UP CLINIC VISIT    Patient Name:  Ashlie Brantley  Date:  2019  Contact Type:  Face to Face    SUBJECTIVE:  Patient Findings     Positives:   Change in health (using less oxygen), Hospital admission (post TAVR), Other complaints (death in family last week)    Comments:   INR 2.4 via finger poke, cardiologist wants venous.        Clinical Outcomes     Negatives:   Major bleeding event, Thromboembolic event, Anticoagulation-related hospital admission, Anticoagulation-related ED visit, Anticoagulation-related fatality    Comments:   INR 2.4 via finger poke, cardiologist wants venous.           OBJECTIVE    INR Protime   Date Value Ref Range Status   2019 2.4 (A) 0.86 - 1.14 Final       ASSESSMENT / PLAN  INR assessment THER    Recheck INR In: 2 WEEKS    INR Location Clinic      Anticoagulation Summary  As of 2019    INR goal:   2.0-3.0   TTR:   39.9 % (1 y)   INR used for dosin.4 (2019)   Warfarin maintenance plan:   2.5 mg (5 mg x 0.5) every Tue, Thu; 5 mg (5 mg x 1) all other days   Full warfarin instructions:   2.5 mg every Tue, Thu; 5 mg all other days   Weekly warfarin total:   30 mg   Plan last modified:   Lizy Mercado RN (2019)   Next INR check:   2020   Target end date:   Indefinite    Indications    Atrial fibrillation (H) [I48.91] [I48.91]  Long term current use of anticoagulant therapy [Z79.01]             Anticoagulation Episode Summary     INR check location:       Preferred lab:       Send INR reminders to:   AGGIE MURILLO    Comments:         Anticoagulation Care Providers     Provider Role Specialty Phone number    Anay Miner, NP  Nurse Practitioner - Family [phone]            See the Encounter Report to view Anticoagulation Flowsheet and Dosing Calendar (Go to Encounters tab in chart review, and find the Anticoagulation Therapy Visit)    Ashlie is feeling well, and using oxygen only at night now since TAVR  placement.  Cardiology has requested venous INR for their records.  POCT used in office to manage dosing, and since in normal range, expect venous to be close.  Long discussion regarding vitamin K content and gave information sheet with serving sizes and vitamin K content to daughter to help with meal planning.    Bridgett Ibarra [facility]

## 2019-12-24 NOTE — PROGRESS NOTES
Spoke with daughter Mariela regarding venous INR result.  Still within range despite difference between venous and POCT, does not change dosing recommendation.    Gave central INR phone number if need to re-fax venous INR to cardiologist Thursday.    Bridgett Ibarra, PharmD BCACP  Anticoagulation Clinical Pharmacist

## 2020-01-05 DIAGNOSIS — E03.4 HYPOTHYROIDISM DUE TO ACQUIRED ATROPHY OF THYROID: ICD-10-CM

## 2020-01-06 ENCOUNTER — ANTICOAGULATION THERAPY VISIT (OUTPATIENT)
Dept: NURSING | Facility: CLINIC | Age: 85
End: 2020-01-06
Payer: COMMERCIAL

## 2020-01-06 DIAGNOSIS — I48.91 ATRIAL FIBRILLATION (H): ICD-10-CM

## 2020-01-06 DIAGNOSIS — Z79.01 LONG TERM CURRENT USE OF ANTICOAGULANT THERAPY: ICD-10-CM

## 2020-01-06 LAB — INR POINT OF CARE: 2.9 (ref 0.86–1.14)

## 2020-01-06 PROCEDURE — 99207 ZZC NO CHARGE NURSE ONLY: CPT

## 2020-01-06 PROCEDURE — 36416 COLLJ CAPILLARY BLOOD SPEC: CPT

## 2020-01-06 PROCEDURE — 85610 PROTHROMBIN TIME: CPT | Mod: QW

## 2020-01-06 NOTE — TELEPHONE ENCOUNTER
"Requested Prescriptions   Pending Prescriptions Disp Refills     levothyroxine (SYNTHROID/LEVOTHROID) 88 MCG tablet [Pharmacy Med Name: LEVOTHYROXINE SODIUM 88MCG TABS]  Last Written Prescription Date:  1/3/2019  Last Fill Quantity: 90 tablet,  # refills: 3   Last office visit: 10/29/2018 with prescribing provider:  ERON Mendoza   Future Office Visit:   Next 5 appointments (look out 90 days)    Mar 18, 2020 12:45 PM CDT  Return Visit with Sarah Ryan MD  [facility] ([facility]) [address] [place] [address]  [phone]        90 tablet 3     Sig: TAKE ONE TABLET BY MOUTH EVERY DAY       Thyroid Protocol Passed - 1/5/2020  9:10 AM        Passed - Patient is 12 years or older        Passed - Recent (12 mo) or future (30 days) visit within the authorizing provider's specialty     Patient has had an office visit with the authorizing provider or a provider within the authorizing providers department within the previous 12 mos or has a future within next 30 days. See \"Patient Info\" tab in inbasket, or \"Choose Columns\" in Meds & Orders section of the refill encounter.              Passed - Medication is active on med list        Passed - Normal TSH on file in past 12 months     Recent Labs   Lab Test 04/10/19  0951   TSH 0.90              Passed - No active pregnancy on record     If patient is pregnant or has had a positive pregnancy test, please check TSH.          Passed - No positive pregnancy test in past 12 months     If patient is pregnant or has had a positive pregnancy test, please check TSH.          "

## 2020-01-06 NOTE — PROGRESS NOTES
ANTICOAGULATION FOLLOW-UP CLINIC VISIT    Patient Name:  Ashlie Brantley  Date:  2020  Contact Type:  Face to Face    SUBJECTIVE:  Patient Findings     Comments:     Assessed for S/S bleeding, clotting, medication, diet, health, activity and alcohol changes          Clinical Outcomes     Negatives:   Major bleeding event, Thromboembolic event, Anticoagulation-related hospital admission, Anticoagulation-related ED visit, Anticoagulation-related fatality    Comments:     Assessed for S/S bleeding, clotting, medication, diet, health, activity and alcohol changes             OBJECTIVE    INR Protime   Date Value Ref Range Status   2020 2.9 (A) 0.86 - 1.14 Final       ASSESSMENT / PLAN  INR assessment THER    Recheck INR In: 4 WEEKS    INR Location Clinic      Anticoagulation Summary  As of 2020    INR goal:   2.0-3.0   TTR:   43.6 % (1 y)   INR used for dosin.9 (2020)   Warfarin maintenance plan:   2.5 mg (5 mg x 0.5) every Tue, Thu; 5 mg (5 mg x 1) all other days   Full warfarin instructions:   2.5 mg every Tue, Thu; 5 mg all other days   Weekly warfarin total:   30 mg   No change documented:   Lizy Mercado RN   Plan last modified:   Lizy Mercado RN (2019)   Next INR check:   2/3/2020   Priority:   High   Target end date:   Indefinite    Indications    Atrial fibrillation (H) [I48.91] [I48.91]  Long term current use of anticoagulant therapy [Z79.01]             Anticoagulation Episode Summary     INR check location:       Preferred lab:       Send INR reminders to:   AGGIE MURILLO    Comments:         Anticoagulation Care Providers     Provider Role Specialty Phone number    Anay Miner, NP  Nurse Practitioner - Family [phone]            See the Encounter Report to view Anticoagulation Flowsheet and Dosing Calendar (Go to Encounters tab in chart review, and find the Anticoagulation Therapy Visit)        Lizy Mercado RN

## 2020-01-07 RX ORDER — LEVOTHYROXINE SODIUM 88 UG/1
TABLET ORAL
Qty: 90 TABLET | Refills: 0 | Status: SHIPPED | OUTPATIENT
Start: 2020-01-07 | End: 2020-03-23

## 2020-01-07 NOTE — TELEPHONE ENCOUNTER
Prescription approved per [facility] Refill Protocol.    Anny Stanton, RN, BSN, PHN  [facility]

## 2020-01-20 ENCOUNTER — TELEPHONE (OUTPATIENT)
Dept: FAMILY MEDICINE | Facility: CLINIC | Age: 85
End: 2020-01-20

## 2020-01-20 DIAGNOSIS — J84.10 FIBROSIS, LUNG (H): ICD-10-CM

## 2020-01-20 RX ORDER — CODEINE SULFATE 30 MG/1
60 TABLET ORAL AT BEDTIME
Qty: 60 TABLET | Refills: 0 | Status: SHIPPED | OUTPATIENT
Start: 2020-01-20 | End: 2020-02-10

## 2020-01-20 NOTE — TELEPHONE ENCOUNTER
Requested Prescriptions   Pending Prescriptions Disp Refills     codeine 30 MG tablet 60 tablet 0     Sig: Take 2 tablets (60 mg) by mouth At Bedtime         Last Written Prescription Date:  12/16/2019  Last Fill Quantity: 60 tabs,   # refills: 0  Last Office Visit: 12/4/2019  Future Office visit:    Next 5 appointments (look out 90 days)    Mar 18, 2020 12:45 PM CDT  Return Visit with Sarah Ryan MD  [facility] ([facility]) [address] [place] [place] [address] [address]  [phone]           Routing refill request to provider for review/approval because:  Drug not on the [facility], [facility] or  Health refill protocol or controlled substance

## 2020-01-20 NOTE — TELEPHONE ENCOUNTER
Patient is all out of this medication.      Dory Guo PharmD, [facility]  Pharmacist Manager   [facility]  [phone]

## 2020-01-21 NOTE — TELEPHONE ENCOUNTER
It appears this is an appropriate refill based on the chart / problem list.       Thanks.  René Allen, MPAS, PA-C

## 2020-01-23 ENCOUNTER — TELEPHONE (OUTPATIENT)
Dept: FAMILY MEDICINE | Facility: CLINIC | Age: 85
End: 2020-01-23

## 2020-01-23 ENCOUNTER — OFFICE VISIT (OUTPATIENT)
Dept: FAMILY MEDICINE | Facility: CLINIC | Age: 85
End: 2020-01-23
Payer: COMMERCIAL

## 2020-01-23 VITALS
HEART RATE: 56 BPM | DIASTOLIC BLOOD PRESSURE: 52 MMHG | TEMPERATURE: 98.6 F | SYSTOLIC BLOOD PRESSURE: 120 MMHG | WEIGHT: 147.8 LBS | BODY MASS INDEX: 30.89 KG/M2

## 2020-01-23 DIAGNOSIS — I48.91 ATRIAL FIBRILLATION, UNSPECIFIED TYPE (H): ICD-10-CM

## 2020-01-23 DIAGNOSIS — I10 HYPERTENSION GOAL BP (BLOOD PRESSURE) < 140/90: ICD-10-CM

## 2020-01-23 DIAGNOSIS — Z79.01 LONG TERM CURRENT USE OF ANTICOAGULANT THERAPY: ICD-10-CM

## 2020-01-23 DIAGNOSIS — A08.4 VIRAL GASTROENTERITIS: Primary | ICD-10-CM

## 2020-01-23 PROCEDURE — 90715 TDAP VACCINE 7 YRS/> IM: CPT | Performed by: NURSE PRACTITIONER

## 2020-01-23 PROCEDURE — 99214 OFFICE O/P EST MOD 30 MIN: CPT | Mod: 25 | Performed by: NURSE PRACTITIONER

## 2020-01-23 PROCEDURE — 90471 IMMUNIZATION ADMIN: CPT | Performed by: NURSE PRACTITIONER

## 2020-01-23 RX ORDER — LOSARTAN POTASSIUM 50 MG/1
50 TABLET ORAL DAILY
COMMUNITY
Start: 2020-01-16 | End: 2022-01-01

## 2020-01-23 RX ORDER — METOPROLOL TARTRATE 50 MG
50 TABLET ORAL 2 TIMES DAILY
COMMUNITY
Start: 2020-01-16

## 2020-01-23 ASSESSMENT — PAIN SCALES - GENERAL: PAINLEVEL: NO PAIN (0)

## 2020-01-23 NOTE — PROGRESS NOTES
Subjective     Ashlie Brantley is a 88 year old female who presents to clinic today for the following health issues:    HPI   Patient said she mentioned this at her appointment with cardiologist but all they did was give her some warm blankets to warm her up     Acute Illness   Acute illness concerns: stomach issues   Onset: 3 days ago     Fever: no     Chills/Sweats: YES    Headache (location?): no     Sinus Pressure:no    Conjunctivitis:  no    Ear Pain: no    Rhinorrhea: no     Congestion: no     Sore Throat: no      Cough: no    Wheeze: no     Decreased Appetite: no     Nausea: YES    Vomiting: no     Diarrhea:  YES- 1 time    Dysuria/Freq.: YES- on lasix    Fatigue/Achiness: YES- Fatigue but not achiness    Sick/Strep Exposure: no      Therapies Tried and outcome: None  Nausea was bad 2 days ago.  No vomiting.  Feeling much better now.  Had diarrhea 2 days ago but resolved.  Feels better today.        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     vocal cord dystonia     Hyperlipidemia LDL goal <130     Anxiety     Hypertension goal BP (blood pressure) < 140/90     LVH (left ventricular hypertrophy)     Advance Care Planning     Cough     Dermatochalasis     Health Care Home     Trochanteric bursitis - bilateral     PVD (posterior vitreous detachment) OU     Pseudophakia, OU     IPF (idiopathic pulmonary fibrosis) (H)     Hypothyroidism due to acquired atrophy of thyroid     Chronic pain syndrome     Urinary incontinence, unspecified type     Atrial fibrillation (H) [I48.91]     Long term current use of anticoagulant therapy     On home oxygen therapy     Overactive bladder     Severe aortic stenosis     Stroke (cerebrum) (H)     Past Surgical History:   Procedure Laterality Date     C NONSPECIFIC PROCEDURE      BACK SURGERY     C NONSPECIFIC PROCEDURE      VOCAL CORD POLYP     C SHOULDER SURG PROC  UNLISTED       C STOMACH SURGERY PROCEDURE UNLISTED       C TOTAL KNEE ARTHROPLASTY      LT 1998  /  RT 2001     CATARACT IOL, RT/LT       HC REMOVAL GALLBLADDER       HERNIA REPAIR, INGUINAL RT/LT      RT     HYSTERECTOMY, CERVIX STATUS UNKNOWN      DUB     PHACOEMULSIFICATION WITH STANDARD INTRAOCULAR LENS IMPLANT  Rt 6/11/09, Lt 8/3/09    both eyes Dr. Barnett     ROTATOR CUFF REPAIR RT/LT      RT     TRANSCATHETER AORTIC-VALVE REPLACEMENT  12/11/2019    TRANSCATHETER AORTIC VALVE REPLACEMENT, RIGHT FEMORAL APPROACH.  Surgeon:  Dr. Mane       Social History     Tobacco Use     Smoking status: Never Smoker     Smokeless tobacco: Never Used   Substance Use Topics     Alcohol use: Yes     Alcohol/week: 0.0 standard drinks     Comment: 1-2 drinks/month     Family History   Problem Relation Age of Onset     Diabetes Mother      C.A.D. Father      Cerebrovascular Disease Brother          Current Outpatient Medications   Medication Sig Dispense Refill     codeine 30 MG tablet Take 2 tablets (60 mg) by mouth At Bedtime 60 tablet 0     furosemide (LASIX) 20 MG tablet Take 20 mg by mouth       levothyroxine (SYNTHROID/LEVOTHROID) 88 MCG tablet TAKE ONE TABLET BY MOUTH EVERY DAY 90 tablet 0     losartan (COZAAR) 50 MG tablet Take 50 mg by mouth daily       metoprolol tartrate (LOPRESSOR) 50 MG tablet Take 50 mg by mouth 2 times daily       order for DME Please provide patient with portable oxygen.  Patient requires 2 LPM oxygen with activity.  Patient is already on nocturnal oxygen.  Please provide patient with POC as soon as available.  Patient is elderly and will have difficulty managing oxygen tanks. 1 Device 0     simvastatin (ZOCOR) 40 MG tablet TAKE ONE TABLET BY MOUTH AT BEDTIME 90 tablet 0     warfarin (COUMADIN) 5 MG tablet Take as directed by [facility]. Current dose is 5 mg mon tue wed thur and sat 2.5 mg all other days. 90 tablet 3     acetaminophen (TYLENOL) 500 MG tablet Take 2 tablets (1,000 mg) by mouth every 8  hours as needed for mild pain (Patient not taking: Reported on 8/15/2019) 1 Bottle 1     albuterol (2.5 MG/3ML) 0.083% neb solution Take 1 vial (2.5 mg) by nebulization every 6 hours as needed for shortness of breath / dyspnea or wheezing (Patient not taking: Reported on 8/15/2019) 3 mL 0     CALCIUM + D 600-200 MG-UNIT OR TABS ONE TABLET TWICE A DAY WITH MEALS (Patient not taking: No sig reported) 0 0     neomycin-polymyxin-dexamethasone (MAXITROL) 3.5-96188-9.1 ophthalmic ointment Place 0.25 inches into both eyes At Bedtime Apply a small squirt into each eye at bedtime (Patient not taking: Reported on 1/23/2020) 3.5 g 3     OMEGA-3 COMPLEX OR 1 capsule daily       VITAMIN D 2000 UNIT OR TABS 1 tablet daily (Patient not taking: No sig reported) 0 0     VITAMIN D3 1000 UNIT OR CAPS 1 CAPSULE DAILY       VITAMIN E COMPLEX PO Take  by mouth.       Allergies   Allergen Reactions     Nkda [No Known Drug Allergies]      BP Readings from Last 3 Encounters:   01/23/20 120/52   12/04/19 130/72   10/08/19 120/66    Wt Readings from Last 3 Encounters:   01/23/20 67 kg (147 lb 12.8 oz)   12/04/19 66.2 kg (146 lb)   08/20/19 67.6 kg (149 lb)                    Reviewed and updated as needed this visit by Provider  Tobacco  Allergies  Meds  Problems  Med Hx  Surg Hx  Fam Hx         Review of Systems   ROS COMP: Constitutional, HEENT, cardiovascular, pulmonary, gi and gu systems are negative, except as otherwise noted.      Objective    /52 (BP Location: Right arm, Patient Position: Sitting, Cuff Size: Adult Regular)   Pulse 56   Temp 98.6  F (37  C) (Oral)   Wt 67 kg (147 lb 12.8 oz)   BMI 30.89 kg/m    Body mass index is 30.89 kg/m .  Physical Exam   GENERAL APPEARANCE: healthy, alert and no distress  EYES: Eyes grossly normal to inspection, PERRL and conjunctivae and sclerae normal  HENT: ear canals and TM's normal, nose and mouth without ulcers or lesions and oropharynx clear  RESP: lungs clear to  "auscultation - no rales, rhonchi or wheezes  CV: normal S1 S2, no S3 or S4, no murmur, click or rub and irregularly irregular rhythm  ABDOMEN: soft, nontender, without hepatosplenomegaly or masses and bowel sounds normal  PSYCH: mentation appears normal and affect normal/bright            Assessment & Plan     1. Viral gastroenteritis  Supportive cares discussed including bland diet until symptoms resolve.    2. Hypertension goal BP (blood pressure) < 140/90  Chronic, stable, continue current treatment.    A-fib on chronic anticoagulation  Chronic, stable, continue current treatment.        BMI:   Estimated body mass index is 30.89 kg/m  as calculated from the following:    Height as of 12/4/19: 1.473 m (4' 10\").    Weight as of this encounter: 67 kg (147 lb 12.8 oz).               Return in about 3 months (around 4/23/2020) for Physical Exam/thyroid.    Anay Miner NP  [facility]    "

## 2020-01-23 NOTE — PATIENT INSTRUCTIONS
[facility]     Discharged by : Lizzie Wright CMA    If you have any questions regarding your visit please contact your care team:     Team Jeannine              Clinic Hours Telephone Number     Dr. Christian Miner, CNP   7am-7pm  Monday - Thursday   7am-5pm  Fridays  [phone]   (Appointment scheduling available 24/7)     RN Line  [phone] option 2     Urgent Care - [facility] and [place] [facility] - 11am-9pm Monday-Friday Saturday-Sunday- 9am-5pm     [place] -   5pm-9pm Monday-Friday Saturday-Sunday- 9am-5pm    [phone] - [facility]rano    [phone] - [place]     For a Price Quote for your services, please call our [employer] Price Line at [phone].     What options do I have for visits at the clinic other than the traditional office visit?     To expand how we care for you, many of our providers are utilizing electronic visits (e-visits) and telephone visits, when medically appropriate, for interactions with their patients rather than a visit in the clinic. We also offer nurse visits for many medical concerns. Just like any other service, we will bill your insurance company for this type of visit based on time spent on the phone with your provider. Not all insurance companies cover these visits. Please check with your medical insurance if this type of visit is covered. You will be responsible for any charges that are not paid by your insurance.     E-visits via [email or web address]: generally incur a $45.00 fee.     Telephone visits:  Time spent on the phone: *charged based on time that is spent on the phone in increments of 10 minutes. Estimated cost:   5-10 mins $30.00   11-20 mins. $59.00   21-30 mins. $85.00       Use [employer] (secure email communication and access to your chart) to send your primary care provider a message or make an appointment. Ask someone on your Team how to sign up for [employer].     As always, Thank you for trusting  us with your health care needs!      [facility]:    Scheduling Appointments  [facility]  Call: [phone]    [facility]  Call: [phone]    [facility]  Call: [phone]    For Gastroenterology referrals   [facility]   Clinics and Surgery Center, 4th Floor   [address] [place] [address]   Appointments: [phone]    WHERE TO GO FOR CARE?    Clinic    Make an appointment if you:       Are sick (cold, cough, flu, sore throat, earache or in pain).       Have a small injury (sprain, small cut, burn or broken bone).       Need a physical exam, Pap smear, vaccine or prescription refill.       Have questions about your health or medicines.    To reach us:      Call [phone] ([phone]). Open 24 hours every day. (For counseling services, call [phone].)    Log into [employer] at [employer]. (Visit [email or web address] to create an account.) Hospital emergency room    An emergency is a serious or life- threatening problem that must be treated right away.    Call 530 or get to the hospital if you have:      Very bad or sudden:            - Chest pain or pressure         - Bleeding         - Head or belly pain         - Dizziness or trouble seeing, walking or                          Speaking      Problems breathing      Blood in your vomit or you are coughing up blood      A major injury (knocked out, loss of a finger or limb, rape, broken bone protruding from skin)    A mental health crisis. (Or call the Mental Health Crisis line at 1-585.734.4855 or Suicide Prevention Hotline at 1-260.572.9777.)    Open 24 hours every day. You don't need an appointment.     Urgent care    Visit urgent care for sickness or small injuries when the clinic is closed. You don't need an appointment. To check hours or find an urgent care near you, visit [email or web address]. Online care    Get  online care from [facility] for more than 70 common problems, like colds, allergies and infections. Open 24 hours every day at:   [email or web address]   Need help deciding?    For advice about where to be seen, you may call your clinic and ask to speak with a nurse. We're here for you 24 hours every day.         If you are deaf or hard of hearing, please let us know. We provide many free services including sign language interpreters, oral interpreters, TTYs, telephone amplifiers, note takers and written materials.

## 2020-01-23 NOTE — TELEPHONE ENCOUNTER
Reason for call:  Patient reporting a symptom    Symptom or request: Patient's daughter Mariela calling stating patient had a TAVR on 12/11. Patient has been feeling nauseated, achy, a little less energy. She seen cardiology on 01/21 but was feeling a little better at that visit. She does have an appointment with Dr Rush at 1PM today but is hoping that she could see PCP instead today. TC will route as high priority to review due to appointment already scheduled today.    Duration (how long have symptoms been present): initial symptoms started over the weekend and then was feeling a little better the beginning of the week but now is not feeling well again    Have you been treated for this before? No    Additional comments: Patient uses [facility]    Phone Number patient can be reached at:  Other phone number:  [phone]    Best Time:  any    Can we leave a detailed message on this number:  YES    Call taken on 1/23/2020 at 9:10 AM by Tressa Dallas

## 2020-01-30 NOTE — TELEPHONE ENCOUNTER
Keep K greater than 4 mag greater than 2  Has been on Xarelto- hold for now given concern of UGIB and in setting of HEDY  Rate control   Spoke to Mariela she is wondering about what the next steps will be so she can give her mom more of a heads up. She is very worried about her echo results. Mariela was instructed to call the cardiologist office that they would have better answers and possibly get her in sooner than later  Lizy Mercado,Clinic Rn  [place]

## 2020-02-03 ENCOUNTER — ANTICOAGULATION THERAPY VISIT (OUTPATIENT)
Dept: NURSING | Facility: CLINIC | Age: 85
End: 2020-02-03
Payer: COMMERCIAL

## 2020-02-03 DIAGNOSIS — I48.91 ATRIAL FIBRILLATION (H): ICD-10-CM

## 2020-02-03 DIAGNOSIS — Z79.01 LONG TERM CURRENT USE OF ANTICOAGULANT THERAPY: ICD-10-CM

## 2020-02-03 LAB — INR POINT OF CARE: 3 (ref 0.86–1.14)

## 2020-02-03 PROCEDURE — 99207 ZZC NO CHARGE NURSE ONLY: CPT

## 2020-02-03 PROCEDURE — 36416 COLLJ CAPILLARY BLOOD SPEC: CPT

## 2020-02-03 PROCEDURE — 85610 PROTHROMBIN TIME: CPT | Mod: QW

## 2020-02-03 NOTE — PROGRESS NOTES
ANTICOAGULATION FOLLOW-UP CLINIC VISIT    Patient Name:  Ashlie Brantley  Date:  2/3/2020  Contact Type:  Face to Face    SUBJECTIVE:  Patient Findings     Positives:   Change in health (GI illness/nausea 01/23/2020), Change in activity (cardiac rehab)    Comments:   Assessed for S/S bleeding, clotting, medication, diet, health, activity and alcohol changes.          Clinical Outcomes     Negatives:   Major bleeding event, Thromboembolic event, Anticoagulation-related hospital admission, Anticoagulation-related ED visit, Anticoagulation-related fatality    Comments:   Assessed for S/S bleeding, clotting, medication, diet, health, activity and alcohol changes.             OBJECTIVE    INR Protime   Date Value Ref Range Status   02/03/2020 3.0 (A) 0.86 - 1.14 Final       ASSESSMENT / PLAN  INR assessment THER    Recheck INR In: 5 WEEKS    INR Location Clinic      Anticoagulation Summary  As of 2/3/2020    INR goal:   2.0-3.0   TTR:   50.6 % (1 y)   INR used for dosing:   3.0 (2/3/2020)   Warfarin maintenance plan:   2.5 mg (5 mg x 0.5) every Tue, Thu; 5 mg (5 mg x 1) all other days   Full warfarin instructions:   2.5 mg every Tue, Thu; 5 mg all other days   Weekly warfarin total:   30 mg   No change documented:   [facility]   Plan last modified:   Lizy Mercado RN (12/2/2019)   Next INR check:   3/9/2020   Priority:   High   Target end date:   Indefinite    Indications    Atrial fibrillation (H) [I48.91] [I48.91]  Long term current use of anticoagulant therapy [Z79.01]             Anticoagulation Episode Summary     INR check location:       Preferred lab:       Send INR reminders to:   AGGIE MURILLO    Comments:         Anticoagulation Care Providers     Provider Role Specialty Phone number    Anay Miner, NP  Nurse Practitioner - Family [phone]            See the Encounter Report to view Anticoagulation Flowsheet and Dosing Calendar (Go to Encounters tab in chart review, and  find the Anticoagulation Therapy Visit)    Feeling well, cardiac rehab is going well.  Family has been helping to keep foods and diet more consistent.  Since has been stable and since is at top of range  OK to go 5 weeks since even with an increase in activity as cardiac rehab progresses & adds more exercise, INR should stay in range.    Bridgett Ibarra, [facility]

## 2020-02-10 ENCOUNTER — OFFICE VISIT (OUTPATIENT)
Dept: FAMILY MEDICINE | Facility: CLINIC | Age: 85
End: 2020-02-10
Payer: COMMERCIAL

## 2020-02-10 ENCOUNTER — TELEPHONE (OUTPATIENT)
Dept: FAMILY MEDICINE | Facility: CLINIC | Age: 85
End: 2020-02-10

## 2020-02-10 VITALS
WEIGHT: 152.6 LBS | RESPIRATION RATE: 16 BRPM | BODY MASS INDEX: 32.03 KG/M2 | HEIGHT: 58 IN | OXYGEN SATURATION: 94 % | SYSTOLIC BLOOD PRESSURE: 122 MMHG | DIASTOLIC BLOOD PRESSURE: 80 MMHG | HEART RATE: 64 BPM | TEMPERATURE: 97.4 F

## 2020-02-10 DIAGNOSIS — M25.511 RIGHT SHOULDER PAIN, UNSPECIFIED CHRONICITY: Primary | ICD-10-CM

## 2020-02-10 DIAGNOSIS — J84.10 FIBROSIS, LUNG (H): ICD-10-CM

## 2020-02-10 DIAGNOSIS — M54.2 NECK PAIN: ICD-10-CM

## 2020-02-10 PROCEDURE — 99214 OFFICE O/P EST MOD 30 MIN: CPT | Performed by: NURSE PRACTITIONER

## 2020-02-10 RX ORDER — CODEINE SULFATE 30 MG/1
60 TABLET ORAL AT BEDTIME
Qty: 60 TABLET | Refills: 0 | Status: SHIPPED | OUTPATIENT
Start: 2020-04-16 | End: 2020-03-18

## 2020-02-10 RX ORDER — CODEINE SULFATE 30 MG/1
60 TABLET ORAL AT BEDTIME
Qty: 60 TABLET | Refills: 0 | Status: SHIPPED | OUTPATIENT
Start: 2020-03-18 | End: 2020-04-15

## 2020-02-10 RX ORDER — CODEINE SULFATE 30 MG/1
60 TABLET ORAL AT BEDTIME
Qty: 60 TABLET | Refills: 0 | Status: SHIPPED | OUTPATIENT
Start: 2020-02-18 | End: 2020-04-15

## 2020-02-10 ASSESSMENT — MIFFLIN-ST. JEOR: SCORE: 1011.94

## 2020-02-10 ASSESSMENT — PAIN SCALES - GENERAL: PAINLEVEL: EXTREME PAIN (8)

## 2020-02-10 NOTE — TELEPHONE ENCOUNTER
Routing to PCP for review.  Are you able to fit patient in today?    Returned call to patient.   Daughter Mariela states patient is at cardiac rehab, so unable to triage.  Mariela states that patient has been having some pain with deep breaths. Denies cardiac symptoms such as constant chest pain/pressure, dizziness, palpitations.  Denies respiratory distress such as severe SOB, wheezing or speaking in partial sentences, etc.  Mariela states patient just has some pain with deep breaths, has had pleurisy in the past, and they want to rule this and pneumonia, etc out.  RN instructed that PCP has no openings, will see if she can fit patient in, otherwise will need to go to urgent care today.  They'll await a return call.    Migdalia Leslie RN

## 2020-02-10 NOTE — PATIENT INSTRUCTIONS
Patient Education     Exercises for Shoulder Flexibility: Wall Walk    Improving your flexibility can reduce pain. Stretching exercises also can help increase your range of pain-free motion. Breathe normally when you exercise. Use smooth, fluid movements.  Note: Follow any special instructions you are given. If you feel pain, stop the exercise. If the pain continues after stopping, call your healthcare provider:    Stand with your shoulder about 2 feet from the wall.    Raise your arm to shoulder level and gently  walk  your fingers up the wall as high as you can.    Hold for a few seconds. Then walk your fingers back down.    Repeat 3 times. Move closer to the wall as you repeat.    Build up to holding each stretch for 30 seconds.  Caution: Do this stretch only if your healthcare provider recommends it. Don t do it when you are first injured.  Date Last Reviewed: 3/1/2018    6168-3124 [employer]. [address] [place] [address]. All rights reserved. This information is not intended as a substitute for professional medical care. Always follow your healthcare professional's instructions.           Patient Education     Exercises for Shoulder Flexibility: Back Scratch    Improving your flexibility can reduce pain. Stretching exercises also can help increase your range of pain-free motion. Breathe normally when you exercise. Try to use smooth, fluid movements. Never force a stretch.  Note: Follow any special instructions you are given. If you feel pain, stop the exercise. If the pain continues after stopping, call your healthcare provider.    Stand straight, placing the back of your hand on the side you want to stretch flat against your lower back.    Throw one end of a towel over your shoulder. Grab it behind your back with your other hand.    Pull down gently on the towel with your front arm. Let your back arm slide up as high as is comfortable. You ll feel a stretch in your shoulder. Hold  the stretch for a few seconds.    Repeat 3 to 5 times. Build up to holding each stretch for 30 to 60 seconds.  For your safety, check with your healthcare provider before starting an exercise program.  Date Last Reviewed: 3/1/2018    7139-7670 [employer]. [address] [place] [address]. All rights reserved. This information is not intended as a substitute for professional medical care. Always follow your healthcare professional's instructions.           Patient Education     Exercises for Shoulder Flexibility: External Rotation    This stretch can help restore shoulder flexibility and relieve pain over time. When stretching, be sure to breathe deeply. Follow any special instructions from your healthcare provider or physical therapist:  1.  a doorway. Grasp the doorjamb with the hand on the frozen side. Your arm should be bent.  2. With the other hand, hold the elbow on the side with the involved frozen (stiff) shoulder firmly against your body.  3. Standing in the same spot, rotate your body away from the doorjamb. Stop when you feel the stretch in the shoulder. At first, try to hold the stretch for 5 seconds.  4. Work up to doing 3 sets of this stretch, 3 times a day. Work up to holding the stretch for 30 to 60 seconds.  Note: Keep your arms as still as you can. Over time, rotate your body a little more to enhance the stretch. But be careful not to twist your back.  Frozen shoulder is another name for adhesive capsulitis, which causes restricted movement in the shoulder. If you have frozen shoulder, this stretch may cause discomfort, especially when you first get started. A few months may pass before you achieve the results you want. But once your shoulder heals, it rarely becomes frozen again. So stick to your stretching program. If you have any questions, be sure to ask your healthcare provider.  Date Last Reviewed: 3/1/2018    9594-0055 [employer]. [address] [place] [address]  [place] [address]. All rights reserved. This information is not intended as a substitute for professional medical care. Always follow your healthcare professional's instructions.           Patient Education     Exercises for Shoulder Flexibility: Internal Rotation    This stretch can help restore shoulder flexibility and relieve pain over time. When stretching, be sure to breathe deeply. Follow any special instructions from your healthcare provider or physical therapist.  1. While seated, move the arm on the side you want to stretch toward the middle of your back. The palm of your hand should face out.  2. Cup your other hand under the hand that s behind your back. Gently push your cupped hand upward until you feel the stretch in the shoulder. Try to hold the stretch for 5 seconds.  3. Work up to doing 3 sets of this stretch, 3 times a day. Work up to holding the stretch for 30 to 60 seconds.  Note: Keep your back straight. It s OK if your hand can t reach the middle of your back. Instead, start the stretch with your hand as close as you can get it to the middle of your back.  Frozen shoulder  Frozen shoulder is another name for adhesive capsulitis. This causes restricted movement in the shoulder. If you have frozen shoulder, this stretch may cause discomfort, especially when you first get started. A few months may pass before you achieve the results you want. But once your shoulder heals, it rarely becomes frozen again. So stick to your stretching program. If you have any questions, be sure to ask your healthcare provider.   Date Last Reviewed: 12/1/2017 2000-2019 [employer]. [address] [place] [address]. All rights reserved. This information is not intended as a substitute for professional medical care. Always follow your healthcare professional's instructions.           Patient Education     Shoulder Squeeze Exercise    To start, sit in a chair with your feet flat on the  floor. Shift your weight slightly forward to avoid rounding your back. Relax. Keep your ears, shoulders, and hips aligned:    Raise your arms to shoulder height, elbows bent and palms forward.    Move your arms back, squeezing your shoulder blades together.    Hold for 10 seconds. Return to starting position.     Repeat 5 times.   For your safety, check with your healthcare provider before starting an exercise program.   Date Last Reviewed: 11/1/2017 2000-2019 [employer]. [address] [place] [address]. All rights reserved. This information is not intended as a substitute for professional medical care. Always follow your healthcare professional's instructions.           Patient Education     Shoulder Shrug Exercise    To start, sit in a chair with your feet flat on the floor. Shift your weight slightly forward to avoid rounding your back. Relax. Keep your ears, shoulders, and hips aligned:    Raise both of your shoulders as high as you can, as if you were trying to touch them to your ears. Keep your head and neck still and relaxed.    Hold for a count of 10. Release.    Repeat 5 times.  For your safety, check with your healthcare provider before starting an exercise program.  Date Last Reviewed: 11/1/2017 2000-2019 [employer]. [address] [place] [address]. All rights reserved. This information is not intended as a substitute for professional medical care. Always follow your healthcare professional's instructions.           Patient Education     Shoulder Clock Exercise    To start, stand tall with your ears, shoulders, and hips in line. Your feet should be slightly apart, positioned just under your hips. Focus your eyes directly in front of you.  this position for a few seconds before starting your exercise. This helps increase your awareness of proper posture.    Imagine that your right shoulder is the center of a clock. With the outer point of your  shoulder, roll it around to slowly trace the outer edge of the clock.    Move clockwise first, then counterclockwise.    Repeat 3 to 5 times. Switch shoulders.  Date Last Reviewed: 3/1/2018    5090-9984 [employer]. [address] [place] [address]. All rights reserved. This information is not intended as a substitute for professional medical care. Always follow your healthcare professional's instructions.           Patient Education     Shoulder and Upper Back Stretch  To start, stand tall with your ears, shoulders, and hips in line. Your feet should be slightly apart, positioned just under your hips. Focus your eyes directly in front of you.  this position for a few seconds before starting your exercise. This helps increase your awareness of proper posture.          Reach overhead and slightly back with both arms. Keep your shoulders and neck aligned and your elbows behind your shoulders:    With your palms facing the ceiling, turn your fingers inward.    Take a deep breath. Breathe out, and lower your elbows toward your buttocks. Hold for 5 seconds, then return to starting position.    Repeat 3 times.  Date Last Reviewed: 11/1/2017 2000-2019 [employer]. [address] [place] [address]. All rights reserved. This information is not intended as a substitute for professional medical care. Always follow your healthcare professional's instructions.           For your Codeine prescription I will sign off for it to be filled around 2/18/20 and for March and April so the pharmacy will have 3 months from February to April.  Contact the pharmacy when you needed.

## 2020-02-10 NOTE — TELEPHONE ENCOUNTER
Reason for call:  Patient reporting a symptom    Symptom or request: pain under left breast    Duration (how long have symptoms been present): 1 day    Have you been treated for this before? Yes    Additional comments: patient states in the past she has has similar symptoms and was treated, patient was wanting to see PCP today    Phone Number patient can be reached at:  Home number on file [phone] (home)    Best Time:  anytime    Can we leave a detailed message on this number:  YES    Call taken on 2/10/2020 at 9:54 AM by Quyen Ritter

## 2020-02-10 NOTE — PROGRESS NOTES
"Subjective     Ashlie Brantley is a 88 year old female who presents to clinic today for the following health issues:    HPI     Nurse telephone note from this morning:  \"Daughter Mariela states patient is at cardiac rehab, so unable to triage.  Mariela states that patient has been having some pain with deep breaths. Denies cardiac symptoms such as constant chest pain/pressure, dizziness, palpitations.  Denies respiratory distress such as severe SOB, wheezing or speaking in partial sentences, etc.  Mariela states patient just has some pain with deep breaths, has had pleurisy in the past, and they want to rule this and pneumonia, etc out.  RN instructed that PCP has no openings, will see if she can fit patient in, otherwise will need to go to urgent care today.  They'll await a return call.\"    Does have tension in neck and right sholder.  Tolerated cardiac rehab today without increase in the pain with activity.  The pain did wake her up at 0400 this morning, she took 2 tylenol and the pain under left rib and right shoulder improved.    Musculoskeletal problem/pain      Duration: 2 days     Description  Location: right shoulder     Intensity:  mild, moderate    Accompanying signs and symptoms: none    History  Previous similar problem: YES  Previous evaluation:  none    Precipitating or alleviating factors:  Trauma or overuse: no   Aggravating factors include: none    Therapies tried and outcome: acetaminophen     pain left ribs under left breast       Duration: 1 day    Description (location/character/radiation): left ribs     Intensity:  mild    Accompanying signs and symptoms: hurts to breath  / hx of plureisy     History (similar episodes/previous evaluation): yes     Precipitating or alleviating factors: better today     Therapies tried and outcome: tylenol - did help        Patient Active Problem List   Diagnosis     GERD (gastroesophageal reflux disease)     DJD (degenerative joint disease)     Osteopenia "     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     vocal cord dystonia     Hyperlipidemia LDL goal <130     Anxiety     Hypertension goal BP (blood pressure) < 140/90     LVH (left ventricular hypertrophy)     Advance Care Planning     Cough     Dermatochalasis     Health Care Home     Trochanteric bursitis - bilateral     PVD (posterior vitreous detachment) OU     Pseudophakia, OU     IPF (idiopathic pulmonary fibrosis) (H)     Hypothyroidism due to acquired atrophy of thyroid     Chronic pain syndrome     Urinary incontinence, unspecified type     Atrial fibrillation (H) [I48.91]     Long term current use of anticoagulant therapy     On home oxygen therapy     Overactive bladder     Severe aortic stenosis     Stroke (cerebrum) (H)     Past Surgical History:   Procedure Laterality Date     C NONSPECIFIC PROCEDURE      BACK SURGERY     C NONSPECIFIC PROCEDURE      VOCAL CORD POLYP     C SHOULDER SURG PROC UNLISTED       C STOMACH SURGERY PROCEDURE UNLISTED       C TOTAL KNEE ARTHROPLASTY      LT 1998  /  RT 2001     CATARACT IOL, RT/LT       HC REMOVAL GALLBLADDER       HERNIA REPAIR, INGUINAL RT/LT      RT     HYSTERECTOMY, CERVIX STATUS UNKNOWN      DUB     PHACOEMULSIFICATION WITH STANDARD INTRAOCULAR LENS IMPLANT  Rt 6/11/09, Lt 8/3/09    both eyes Dr. Barnett     ROTATOR CUFF REPAIR RT/LT      RT     TRANSCATHETER AORTIC-VALVE REPLACEMENT  12/11/2019    TRANSCATHETER AORTIC VALVE REPLACEMENT, RIGHT FEMORAL APPROACH.  Surgeon:  Dr. Mane       Social History     Tobacco Use     Smoking status: Never Smoker     Smokeless tobacco: Never Used   Substance Use Topics     Alcohol use: Yes     Alcohol/week: 0.0 standard drinks     Comment: 1-2 drinks/month     Family History   Problem Relation Age of Onset     Diabetes Mother      C.A.D. Father      Cerebrovascular Disease Brother          Current Outpatient Medications   Medication Sig Dispense Refill     acetaminophen (TYLENOL) 500 MG tablet Take 2 tablets (1,000 mg) by mouth every 8 hours  as needed for mild pain 1 Bottle 1     albuterol (2.5 MG/3ML) 0.083% neb solution Take 1 vial (2.5 mg) by nebulization every 6 hours as needed for shortness of breath / dyspnea or wheezing 3 mL 0     CALCIUM + D 600-200 MG-UNIT OR TABS ONE TABLET TWICE A DAY WITH MEALS 0 0     [START ON 4/16/2020] codeine 30 MG tablet Take 2 tablets (60 mg) by mouth At Bedtime 60 tablet 0     [START ON 3/18/2020] codeine 30 MG tablet Take 2 tablets (60 mg) by mouth At Bedtime 60 tablet 0     [START ON 2/18/2020] codeine 30 MG tablet Take 2 tablets (60 mg) by mouth At Bedtime 60 tablet 0     furosemide (LASIX) 20 MG tablet Take 20 mg by mouth       levothyroxine (SYNTHROID/LEVOTHROID) 88 MCG tablet TAKE ONE TABLET BY MOUTH EVERY DAY 90 tablet 0     losartan (COZAAR) 50 MG tablet Take 50 mg by mouth daily       metoprolol tartrate (LOPRESSOR) 50 MG tablet Take 50 mg by mouth 2 times daily       neomycin-polymyxin-dexamethasone (MAXITROL) 3.5-90092-0.1 ophthalmic ointment Place 0.25 inches into both eyes At Bedtime Apply a small squirt into each eye at bedtime 3.5 g 3     OMEGA-3 COMPLEX OR 1 capsule daily       order for DME Please provide patient with portable oxygen.  Patient requires 2 LPM oxygen with activity.  Patient is already on nocturnal oxygen.  Please provide patient with POC as soon as available.  Patient is elderly and will have difficulty managing oxygen tanks. 1 Device 0     simvastatin (ZOCOR) 40 MG tablet TAKE ONE TABLET BY MOUTH AT BEDTIME 90 tablet 0     VITAMIN D 2000 UNIT OR TABS 1 tablet daily 0 0     VITAMIN D3 1000 UNIT OR CAPS 1 CAPSULE DAILY       VITAMIN E COMPLEX PO Take  by mouth.       warfarin (COUMADIN) 5 MG tablet Take as directed by [facility]. Current dose is 5 mg mon tue wed thur and sat 2.5 mg all other days. 90 tablet 3     Allergies   Allergen Reactions     Nkda [No Known Drug Allergies]      BP Readings from Last 3 Encounters:   02/10/20 122/80   01/23/20 120/52   12/04/19 130/72    Wt Readings from  "Last 3 Encounters:   02/10/20 69.2 kg (152 lb 9.6 oz)   01/23/20 67 kg (147 lb 12.8 oz)   12/04/19 66.2 kg (146 lb)                    Reviewed and updated as needed this visit by Provider  Tobacco  Allergies  Meds  Problems  Med Hx  Surg Hx  Fam Hx         Review of Systems   ROS COMP: Constitutional, HEENT, cardiovascular, pulmonary, musculoskeletal and gu systems are negative, except as otherwise noted.      Objective    /80 (BP Location: Right arm, Patient Position: Chair, Cuff Size: Adult Regular)   Pulse 64   Temp 97.4  F (36.3  C) (Oral)   Resp 16   Ht 1.473 m (4' 10\")   Wt 69.2 kg (152 lb 9.6 oz)   SpO2 94%   BMI 31.89 kg/m    Body mass index is 31.89 kg/m .  Physical Exam   GENERAL: healthy, alert and no distress  NECK:  Tenderness to trapezius with tightness  RESP: lungs clear to auscultation - no rales, rhonchi or wheezes  CV: irregularly irregular rate and rhythm, normal S1 S2, no S3 or S4, no murmur  MS: no tenderness to chest wall left side.  Mild tenderness to right shoulder bilateral shoulders..  Mild decrease internal rotation           Assessment & Plan     1. Right shoulder pain, unspecified chronicity  2. Neck pain  Discussed muscular tightness with patient today.  She uses a walker and suspect some of her tension and muscular pain may be due to this.  She does not stretch her shoulders or neck regularly and I recommended she starts to do gentle stretches of this as tolerated.  She declines physical therapy referral today.  She will continue Tylenol as needed for pain management.    3. Fibrosis, lung (H)  Patient has been on codeine long-term for her cough related to fibrosis.  She states it is effective and denies side effects.  She has is followed by pulmonary who had stated maintaining codeine for cough was reasonable to continue.  I refilled this medication for patient today and sent 3 electronic prescriptions to the pharmacy for the next 3 months.  - codeine 30 MG tablet; " "Take 2 tablets (60 mg) by mouth At Bedtime  Dispense: 60 tablet; Refill: 0  - codeine 30 MG tablet; Take 2 tablets (60 mg) by mouth At Bedtime  Dispense: 60 tablet; Refill: 0  - codeine 30 MG tablet; Take 2 tablets (60 mg) by mouth At Bedtime  Dispense: 60 tablet; Refill: 0     BMI:   Estimated body mass index is 31.89 kg/m  as calculated from the following:    Height as of this encounter: 1.473 m (4' 10\").    Weight as of this encounter: 69.2 kg (152 lb 9.6 oz).       Return in about 4 weeks (around 3/9/2020) for if symptoms worsen or fail to improve.    Anay Miner NP  [facility]      "

## 2020-02-10 NOTE — TELEPHONE ENCOUNTER
Huddled with PCP, who states she can fit patient in today.  Returned call to Mariela, and she had already gotten a call from someone, patient scheduled for 2pm today. Closing encounter.    Migdalia Leslie RN

## 2020-02-19 ENCOUNTER — TELEPHONE (OUTPATIENT)
Dept: FAMILY MEDICINE | Facility: CLINIC | Age: 85
End: 2020-02-19

## 2020-02-19 NOTE — TELEPHONE ENCOUNTER
Called Malaika back to get more information. She stated these symptoms have been happening since Friday on and off. Patient was seen recently and is wanting to come in again. Her main complaints are shoulder pain, trouble sleeping and nausea. No fever or cough.  Patient scheduled with PCP this Friday. Nurse advised if in the mean time if symptoms worsen to call back or to be evaluated at [facility] if needed. She verbalized understanding.     Shannan Sanders RN

## 2020-02-19 NOTE — TELEPHONE ENCOUNTER
Reason for call:  Patient reporting a symptom    Symptom or request: Patient's daughter Malaika calling stating patient has been suffering with flu symptoms. She knows that patient seen PCP last week but is unsure of what that visit was for. Malaika states patient feels weak, nauseous, having trouble sleeping, neck and shoulder pain. PCP has previously told patient that she can work her in if there are no appointments. Malaika is wondering if patient can be seen by PCP. There is consent to communicate with Malaika on file.     Duration (how long have symptoms been present): Patient has been expressing all of this to Malaika since last week    Have you been treated for this before? No    Additional comments: Patient uses [facility]    Phone Number patient can be reached at:  Other phone number:  [phone]    Best Time:  any    Can we leave a detailed message on this number:  YES    Call taken on 2/19/2020 at 12:39 PM by Tressa Dallas

## 2020-02-23 ENCOUNTER — HEALTH MAINTENANCE LETTER (OUTPATIENT)
Age: 85
End: 2020-02-23

## 2020-02-26 ENCOUNTER — TELEPHONE (OUTPATIENT)
Dept: FAMILY MEDICINE | Facility: CLINIC | Age: 85
End: 2020-02-26

## 2020-02-26 NOTE — TELEPHONE ENCOUNTER
Reason for Call:  Other call back    Detailed comments: Patient daughter calling for a fib follow up, would like to see about an earlier appointment. Earliest available is showing 03/04/2020. Please call for further assistance.     Phone Number Patient can be reached at: Home number on file [phone] (home)    Best Time: anytime    Can we leave a detailed message on this number? YES    Call taken on 2/26/2020 at 4:50 PM by Benjamín Dahl

## 2020-02-27 NOTE — TELEPHONE ENCOUNTER
Attempt # 1  Called patient at home number.[phone] (home)  Was call answered?  Yes, andrey was at [facility] rehab and went into A-Fib was brought to ER and was told needs to follow up with PCP - scheduled Friday 2/28 with Isidra right after daughters appointment.      Routing to PCP as GOVIND Boss RN  [facility]

## 2020-02-28 ENCOUNTER — OFFICE VISIT (OUTPATIENT)
Dept: FAMILY MEDICINE | Facility: CLINIC | Age: 85
End: 2020-02-28
Payer: COMMERCIAL

## 2020-02-28 VITALS
OXYGEN SATURATION: 94 % | WEIGHT: 148 LBS | HEIGHT: 58 IN | TEMPERATURE: 98.7 F | HEART RATE: 85 BPM | DIASTOLIC BLOOD PRESSURE: 68 MMHG | SYSTOLIC BLOOD PRESSURE: 128 MMHG | BODY MASS INDEX: 31.07 KG/M2

## 2020-02-28 DIAGNOSIS — J84.112 IPF (IDIOPATHIC PULMONARY FIBROSIS) (H): ICD-10-CM

## 2020-02-28 DIAGNOSIS — Z79.01 LONG TERM CURRENT USE OF ANTICOAGULANT THERAPY: ICD-10-CM

## 2020-02-28 DIAGNOSIS — E78.2 MIXED HYPERLIPIDEMIA: ICD-10-CM

## 2020-02-28 DIAGNOSIS — I48.11 LONGSTANDING PERSISTENT ATRIAL FIBRILLATION (H): Primary | ICD-10-CM

## 2020-02-28 DIAGNOSIS — E03.4 HYPOTHYROIDISM DUE TO ACQUIRED ATROPHY OF THYROID: ICD-10-CM

## 2020-02-28 PROCEDURE — 99213 OFFICE O/P EST LOW 20 MIN: CPT | Performed by: NURSE PRACTITIONER

## 2020-02-28 ASSESSMENT — MIFFLIN-ST. JEOR: SCORE: 986.07

## 2020-02-28 NOTE — PROGRESS NOTES
"Subjective     Ashlie Brantley is a 89 year old female who presents to clinic today for the following health issues:    HPI     ED/UC Followup:    Facility:  [facility]  Date of visit: 2/26/20  Reason for visit: Afib  Current Status: Doing fine-- seeing Cardiologist later this afternoon 1pm  Denies any palpitations, chest pain, shortness of breath.     Her INR was supratherapeutic so she will has been holding her Coumadin for the past 2 days.  She plans to take Coumadin tonight.  Her next INR check is 3/9/20.    ED notes:  \"Ashlie Brantley is a 89 y.o. female with a history of atrial fibrillation, anticoagulated on Coumadin, aortic stenosis, stroke, and pulmonary fibrosis who presents to the emergency department for evaluation of palpitations. The patient reports onset of mild palpitations while doing physical therapy at cardiac rehab that have since resolved. She denies associated chest pain, shortness of breath, or dizziness. Staff checked patient's vitals and noted an irregular pattern. She was referred to the ED with concern for a-fib, and so she presents to the ED today. Patient notes her vitals remained stable during rehab 2 days ago. She endorses urinary frequency at baseline. She denies fevers, cough, rash, vision changes, hearing changes, leg swelling, vomiting, diarrhea, dysuria, hematuria, headaches, or numbness.     ECG:  Time Obtained: 1138   Indication: CARDIAC RULE OUT   Atrial Rate: 70  Ventricular Rate: 117  QRS Duration: 104  R Axis: -32  Interpretation: Undetermined rhythm  Left axis deviation  Possible Anterior infarct (cited on or before 21-JAN-2018)  ST & T wave abnormality, consider lateral ischemia  Abnormal ECG  When compared with ECG of 21-JAN-2020 09:43,  Current undetermined rhythm precludes rhythm comparison, needs review  Serial changes of Anterior infarct Present    Impression and Plan:  Ashlie Brantley is a 89 y.o. female sent from cardiac rehab for severe " "tachycardia thought to be in a-fib with RVR. She has chronic atrial fibrillation, on Warfarin. She was given a dose of IV metoprolol for rate control although her rate really was in the 110's maximum in the ED. She does not have any symptoms at all and feels well and hasn't been dehydrated or any other illnesses recently. Her INR was checked and found to be 3.5. She was told to hold two doses of her Warfarin. Plan to discharge home with continuation of her usual cardiac rehab. Should she have any recurrent symptoms or episodes, she should followup with her cardiologist sooner. She was last seen in Dr. 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     vocal cord dystonia     Hyperlipidemia LDL goal <130     Anxiety     Hypertension goal BP (blood pressure) < 140/90     LVH (left ventricular hypertrophy)     Advance Care Planning     Cough     Dermatochalasis     Health Care Home     Trochanteric bursitis - bilateral     PVD (posterior vitreous detachment) OU     Pseudophakia, OU     IPF (idiopathic pulmonary fibrosis) (H)     Hypothyroidism due to acquired atrophy of thyroid     Chronic pain syndrome     Urinary incontinence, unspecified type     Atrial fibrillation (H) [I48.91]     Long term current use of anticoagulant therapy     On home oxygen therapy     Overactive bladder     Severe aortic stenosis     Stroke (cerebrum) (H)     Past Surgical History:   Procedure Laterality Date     C NONSPECIFIC PROCEDURE      BACK SURGERY     C NONSPECIFIC PROCEDURE      VOCAL CORD POLYP     C SHOULDER SURG PROC UNLISTED       C STOMACH SURGERY PROCEDURE UNLISTED       C TOTAL KNEE ARTHROPLASTY      LT 1998  /  RT 2001     CATARACT IOL, RT/LT       HC REMOVAL GALLBLADDER       HERNIA REPAIR, INGUINAL RT/LT      RT     HYSTERECTOMY, CERVIX STATUS UNKNOWN      DUB     " PHACOEMULSIFICATION WITH STANDARD INTRAOCULAR LENS IMPLANT  Rt 6/11/09, Lt 8/3/09    both eyes Dr. Barnett     ROTATOR CUFF REPAIR RT/LT      RT     TRANSCATHETER AORTIC-VALVE REPLACEMENT  12/11/2019    TRANSCATHETER AORTIC VALVE REPLACEMENT, RIGHT FEMORAL APPROACH.  Surgeon:  Dr. Mane       Social History     Tobacco Use     Smoking status: Never Smoker     Smokeless tobacco: Never Used   Substance Use Topics     Alcohol use: Yes     Alcohol/week: 0.0 standard drinks     Comment: 1-2 drinks/month     Family History   Problem Relation Age of Onset     Diabetes Mother      C.A.D. Father      Cerebrovascular Disease Brother          Current Outpatient Medications   Medication Sig Dispense Refill     acetaminophen (TYLENOL) 500 MG tablet Take 2 tablets (1,000 mg) by mouth every 8 hours as needed for mild pain 1 Bottle 1     albuterol (2.5 MG/3ML) 0.083% neb solution Take 1 vial (2.5 mg) by nebulization every 6 hours as needed for shortness of breath / dyspnea or wheezing 3 mL 0     CALCIUM + D 600-200 MG-UNIT OR TABS ONE TABLET TWICE A DAY WITH MEALS 0 0     [START ON 4/16/2020] codeine 30 MG tablet Take 2 tablets (60 mg) by mouth At Bedtime 60 tablet 0     [START ON 3/18/2020] codeine 30 MG tablet Take 2 tablets (60 mg) by mouth At Bedtime 60 tablet 0     codeine 30 MG tablet Take 2 tablets (60 mg) by mouth At Bedtime 60 tablet 0     furosemide (LASIX) 20 MG tablet Take 20 mg by mouth       levothyroxine (SYNTHROID/LEVOTHROID) 88 MCG tablet TAKE ONE TABLET BY MOUTH EVERY DAY 90 tablet 0     losartan (COZAAR) 50 MG tablet Take 50 mg by mouth daily       metoprolol tartrate (LOPRESSOR) 50 MG tablet Take 50 mg by mouth 2 times daily       neomycin-polymyxin-dexamethasone (MAXITROL) 3.5-92274-0.1 ophthalmic ointment Place 0.25 inches into both eyes At Bedtime Apply a small squirt into each eye at bedtime 3.5 g 3     OMEGA-3 COMPLEX OR 1 capsule daily       order for DME Please provide patient with portable oxygen.   "Patient requires 2 LPM oxygen with activity.  Patient is already on nocturnal oxygen.  Please provide patient with POC as soon as available.  Patient is elderly and will have difficulty managing oxygen tanks. 1 Device 0     simvastatin (ZOCOR) 40 MG tablet TAKE ONE TABLET BY MOUTH AT BEDTIME 90 tablet 0     VITAMIN D 2000 UNIT OR TABS 1 tablet daily 0 0     VITAMIN D3 1000 UNIT OR CAPS 1 CAPSULE DAILY       VITAMIN E COMPLEX PO Take  by mouth.       warfarin (COUMADIN) 5 MG tablet Take as directed by ACC. Current dose is 5 mg mon tue wed thur and sat 2.5 mg all other days. 90 tablet 3     Allergies   Allergen Reactions     Nkda [No Known Drug Allergies]      BP Readings from Last 3 Encounters:   02/28/20 128/68   02/10/20 122/80   01/23/20 120/52    Wt Readings from Last 3 Encounters:   02/28/20 67.1 kg (148 lb)   02/10/20 69.2 kg (152 lb 9.6 oz)   01/23/20 67 kg (147 lb 12.8 oz)                    Reviewed and updated as needed this visit by Provider  Tobacco  Allergies  Meds  Problems  Med Hx  Surg Hx  Fam Hx         Review of Systems   ROS COMP: Constitutional, HEENT, cardiovascular, pulmonary, gi and gu systems are negative, except as otherwise noted.      Objective    /68 (BP Location: Right arm, Patient Position: Sitting, Cuff Size: Adult Regular)   Pulse 85   Temp 98.7  F (37.1  C) (Oral)   Ht 1.473 m (4' 10\")   Wt 67.1 kg (148 lb)   SpO2 94%   BMI 30.93 kg/m    Body mass index is 30.93 kg/m .  Physical Exam   GENERAL: healthy, alert and no distress  RESP: lungs clear to auscultation - no rales, rhonchi or wheezes  CV: irregularly irregular rhythm with controlled rate, normal S1 S2, no S3 or S4 and soft systolic murmur          Assessment & Plan     1. Longstanding persistent atrial fibrillation  2. Long term current use of anticoagulant therapy  Patient is rate controlled today.  She has follow-up with cardiology office this afternoon at 1 PM.  Continue current treatment.  Next INR check " "3/9/2020.    3. IPF (idiopathic pulmonary fibrosis) (H)  She is on chronic codeine due to cough from pulmonary fibrosis, not because of pain.  Pulmonary specialist agrees with chronic codeine use for cough (see 5/10/19 Pulmonary note).    Patient showed me a letter for her insurance stating that coding will no longer be covered.  Discussed with her that we can pursue PA when she is due for her next refill.  Dory from her pharmacy estimates that cost would be about $60 out-of-pocket.  I did find a good Rx coupon for [employer] that would charge about $20 out-of-pocket.  I did tell patient that while PA is getting pursued she could fill codeine at [facility] with [employer] coupon for about $20 if needed.    4. Hypothyroidism due to acquired atrophy of thyroid  Chronic, stable, continue current treatment.  Recheck thyroid labs at upcoming lab appointment.  - **TSH with free T4 reflex FUTURE anytime; Future    5. Mixed hyperlipidemia  Chronic, stable, continue current treatment.   - Lipid panel reflex to direct LDL Fasting; Future     BMI:   Estimated body mass index is 30.93 kg/m  as calculated from the following:    Height as of this encounter: 1.473 m (4' 10\").    Weight as of this encounter: 67.1 kg (148 lb).       Return in about 10 days (around 3/9/2020) for lab visit already scheduled.    Anay Miner NP  [facility]    "

## 2020-02-28 NOTE — PATIENT INSTRUCTIONS
[facility]     Discharged by : Lizzie Wright CMA  If you have any questions regarding your visit please contact your care team:     Team Jeannine              Clinic Hours Telephone Number     Dr. Christian Miner, CNP   7am-7pm  Monday - Thursday   7am-5pm  Fridays  [phone]   (Appointment scheduling available 24/7)     RN Line  [phone] option 2     Urgent Care - [place] and [place] [facility] - 11am-9pm Monday-Friday Saturday-Sunday- 9am-5pm     [place] -   5pm-9pm Monday-Friday Saturday-Sunday- 9am-5pm    [phone] - [place]    [phone] - [place]     For a Price Quote for your services, please call our [facility] management Price Line at [phone].     What options do I have for visits at the clinic other than the traditional office visit?     To expand how we care for you, many of our providers are utilizing electronic visits (e-visits) and telephone visits, when medically appropriate, for interactions with their patients rather than a visit in the clinic. We also offer nurse visits for many medical concerns. Just like any other service, we will bill your insurance company for this type of visit based on time spent on the phone with your provider. Not all insurance companies cover these visits. Please check with your medical insurance if this type of visit is covered. You will be responsible for any charges that are not paid by your insurance.     E-visits via [employer]: generally incur a $45.00 fee.     Telephone visits:  Time spent on the phone: *charged based on time that is spent on the phone in increments of 10 minutes. Estimated cost:   5-10 mins $30.00   11-20 mins. $59.00   21-30 mins. $85.00       Use [employer] (secure email communication and access to your chart) to send your primary care provider a message or make an appointment. Ask someone on your Team how to sign up for [employer].     As always, Thank you for trusting us  with your health care needs!      [facility]:    Scheduling Appointments  [facility]  Call: [phone]    [place]  Call: [phone]    [facility]  Call: [phone]    For Gastroenterology referrals   [facility]   Clinics and Surgery Center, 4th Floor   [address] [place] [address]   Appointments: [phone]    WHERE TO GO FOR CARE?    Clinic    Make an appointment if you:       Are sick (cold, cough, flu, sore throat, earache or in pain).       Have a small injury (sprain, small cut, burn or broken bone).       Need a physical exam, Pap smear, vaccine or prescription refill.       Have questions about your health or medicines.    To reach us:      Call [phone] ([phone]). Open 24 hours every day. (For counseling services, call [phone].)    Log into [employer]. (Visit [email or web address] to create an account.) Hospital emergency room    An emergency is a serious or life- threatening problem that must be treated right away.    Call [phone] or get to the hospital if you have:      Very bad or sudden:            - Chest pain or pressure         - Bleeding         - Head or belly pain         - Dizziness or trouble seeing, walking or                          Speaking      Problems breathing      Blood in your vomit or you are coughing up blood      A major injury (knocked out, loss of a finger or limb, rape, broken bone protruding from skin)    A mental health crisis. (Or call the Mental Health Crisis line at 1-999.904.7828 or Suicide Prevention Hotline at 1-350.605.5719.)    Open 24 hours every day. You don't need an appointment.     Urgent care    Visit urgent care for sickness or small injuries when the clinic is closed. You don't need an appointment. To check hours or find an urgent care near you, visit [email or web address]. Online care    Get online  care from [facility] for more than 70 common problems, like colds, allergies and infections. Open 24 hours every day at:   [email or web address]   Need help deciding?    For advice about where to be seen, you may call your clinic and ask to speak with a nurse. We're here for you 24 hours every day.         If you are deaf or hard of hearing, please let us know. We provide many free services including sign language interpreters, oral interpreters, TTYs, telephone amplifiers, note takers and written materials.

## 2020-03-02 ENCOUNTER — TELEPHONE (OUTPATIENT)
Dept: FAMILY MEDICINE | Facility: CLINIC | Age: 85
End: 2020-03-02

## 2020-03-02 NOTE — TELEPHONE ENCOUNTER
Prior Authorization Retail Medication Request    Medication/Dose: codeine - patient received letter saying this would not longer be covered  ICD code (if different than what is on RX):    Previously Tried and Failed:    Rationale:      Insurance Name:  [facility] Part [facility]  Insurance ID:  [id number]      Pharmacy Information (if different than what is on RX)  Name:    Phone:      Dory Guo PharmD, [facility]  Pharmacist Manager   [facility]  [phone]

## 2020-03-04 NOTE — TELEPHONE ENCOUNTER
Central Prior Authorization Team   Phone: [phone]      PA Initiation    Medication: codeine   Insurance Company: [employer] - Phone [phone] Fax [phone]  Pharmacy Filling the Rx: [facility] - [place], [place] - [address] [place] [address].  Filling Pharmacy Phone: [phone]  Filling Pharmacy Fax:    Start Date: 3/4/2020

## 2020-03-04 NOTE — TELEPHONE ENCOUNTER
Prior Authorization Approval    Authorization Effective Date: 2/3/2020  Authorization Expiration Date: 3/4/2021  Medication: codeine   Approved Dose/Quantity:    Reference #: [id number]   Insurance Company: [employer] - Phone [phone] Fax [phone]  Expected CoPay:       CoPay Card Available:      Foundation Assistance Needed:    Which Pharmacy is filling the prescription (Not needed for infusion/clinic administered): [facility] - [place], [place] - [address] [place] [address]  Pharmacy Notified: Yes  Patient Notified: Yes **Instructed pharmacy to notify patient when script is ready to /ship.**

## 2020-03-09 ENCOUNTER — ANTICOAGULATION THERAPY VISIT (OUTPATIENT)
Dept: NURSING | Facility: CLINIC | Age: 85
End: 2020-03-09
Payer: COMMERCIAL

## 2020-03-09 ENCOUNTER — TELEPHONE (OUTPATIENT)
Dept: FAMILY MEDICINE | Facility: CLINIC | Age: 85
End: 2020-03-09

## 2020-03-09 DIAGNOSIS — Z79.01 LONG TERM CURRENT USE OF ANTICOAGULANT THERAPY: ICD-10-CM

## 2020-03-09 DIAGNOSIS — I48.11 LONGSTANDING PERSISTENT ATRIAL FIBRILLATION (H): Primary | ICD-10-CM

## 2020-03-09 DIAGNOSIS — I48.91 ATRIAL FIBRILLATION (H): ICD-10-CM

## 2020-03-09 DIAGNOSIS — I48.11 LONGSTANDING PERSISTENT ATRIAL FIBRILLATION (H): ICD-10-CM

## 2020-03-09 DIAGNOSIS — E78.2 MIXED HYPERLIPIDEMIA: ICD-10-CM

## 2020-03-09 DIAGNOSIS — E03.4 HYPOTHYROIDISM DUE TO ACQUIRED ATROPHY OF THYROID: ICD-10-CM

## 2020-03-09 LAB
CHOLEST SERPL-MCNC: 112 MG/DL
HDLC SERPL-MCNC: 49 MG/DL
INR POINT OF CARE: 3.5 (ref 0.86–1.14)
LDLC SERPL CALC-MCNC: 48 MG/DL
NONHDLC SERPL-MCNC: 63 MG/DL
TRIGL SERPL-MCNC: 77 MG/DL
TSH SERPL DL<=0.005 MIU/L-ACNC: 0.46 MU/L (ref 0.4–4)

## 2020-03-09 PROCEDURE — 36416 COLLJ CAPILLARY BLOOD SPEC: CPT

## 2020-03-09 PROCEDURE — 85610 PROTHROMBIN TIME: CPT | Mod: QW

## 2020-03-09 PROCEDURE — 84443 ASSAY THYROID STIM HORMONE: CPT | Performed by: NURSE PRACTITIONER

## 2020-03-09 PROCEDURE — 99207 ZZC NO CHARGE NURSE ONLY: CPT

## 2020-03-09 PROCEDURE — 80061 LIPID PANEL: CPT | Performed by: NURSE PRACTITIONER

## 2020-03-09 NOTE — TELEPHONE ENCOUNTER
Has the patient previously taken warfarin? yes  If yes, for what indication? A fib    Does the patient have any of the following indications for a higher range of 2.5-3.5:    Mitral position mechanical valve? no    Leanna-Shiley, Ball and Cage or Monoleaflet valve (regardless of position) no    Other (if yes, please explain) no    Annual INR referral needed.  Orders teed up.    Kerri Brink RN - [facility]

## 2020-03-09 NOTE — PATIENT INSTRUCTIONS
Anticoagulation Clinic:  Please call [phone] with any problems or questions regarding your Warfarin therapy.    If already taken today, hold tomorrow's dose.

## 2020-03-09 NOTE — PROGRESS NOTES
ANTICOAGULATION FOLLOW-UP CLINIC VISIT    Patient Name:  Ashlie Brantley  Date:  3/9/2020  Contact Type:  Face to Face    SUBJECTIVE:  Patient Findings     Positives:   Change in health    Comments:   Patient states she has been short of breath and nauseated.         Clinical Outcomes     Negatives:   Major bleeding event, Thromboembolic event, Anticoagulation-related hospital admission, Anticoagulation-related ED visit, Anticoagulation-related fatality    Comments:   Patient states she has been short of breath and nauseated.            OBJECTIVE    INR Protime   Date Value Ref Range Status   03/09/2020 3.5 (A) 0.86 - 1.14 Final       ASSESSMENT / PLAN  No question data found.  Anticoagulation Summary  As of 3/9/2020    INR goal:   2.0-3.0   TTR:   48.7 % (1 y)   INR used for dosing:   3.5! (3/9/2020)   Warfarin maintenance plan:   2.5 mg (5 mg x 0.5) every Tue, Thu; 5 mg (5 mg x 1) all other days   Full warfarin instructions:   3/9: Hold; Otherwise 2.5 mg every Tue, Thu; 5 mg all other days   Weekly warfarin total:   30 mg   Plan last modified:   Lizy Mercado RN (12/2/2019)   Next INR check:   3/23/2020   Priority:   High   Target end date:   Indefinite    Indications    Atrial fibrillation (H) [I48.91] [I48.91]  Long term current use of anticoagulant therapy [Z79.01]             Anticoagulation Episode Summary     INR check location:       Preferred lab:       Send INR reminders to:   AGGIE MURILLO    Comments:         Anticoagulation Care Providers     Provider Role Specialty Phone number    ClemenciapeterAnay, NP  Nurse Practitioner - Family [phone]            See the Encounter Report to view Anticoagulation Flowsheet and Dosing Calendar (Go to Encounters tab in chart review, and find the Anticoagulation Therapy Visit)        Kerri Brink RN

## 2020-03-15 DIAGNOSIS — E78.5 HYPERLIPIDEMIA LDL GOAL <130: ICD-10-CM

## 2020-03-18 DIAGNOSIS — J84.10 FIBROSIS, LUNG (H): ICD-10-CM

## 2020-03-18 RX ORDER — SIMVASTATIN 40 MG
TABLET ORAL
Qty: 90 TABLET | Refills: 3 | Status: SHIPPED | OUTPATIENT
Start: 2020-03-18 | End: 2021-02-01

## 2020-03-18 RX ORDER — CODEINE SULFATE 30 MG/1
60 TABLET ORAL AT BEDTIME
Qty: 60 TABLET | Refills: 0 | Status: SHIPPED | OUTPATIENT
Start: 2020-04-16 | End: 2020-03-20

## 2020-03-18 NOTE — TELEPHONE ENCOUNTER
Last rx was written 02/10/2020.  This medication can not be filled if the written date is more than the dispense date (03/18/2020).  Please send new rx.    Thanks,    Edelmira Talley  [facility]

## 2020-03-19 ENCOUNTER — TELEPHONE (OUTPATIENT)
Dept: FAMILY MEDICINE | Facility: CLINIC | Age: 85
End: 2020-03-19

## 2020-03-19 DIAGNOSIS — I48.11 LONGSTANDING PERSISTENT ATRIAL FIBRILLATION (H): Primary | ICD-10-CM

## 2020-03-19 NOTE — TELEPHONE ENCOUNTER
INR orders placed. Notified daughter of time change from 9 am to 9:15 am and that it will be with lab not [facility]. She verbalized understanding    Kerri Brink, RN - [facility]

## 2020-03-20 ENCOUNTER — TELEPHONE (OUTPATIENT)
Dept: FAMILY MEDICINE | Facility: CLINIC | Age: 85
End: 2020-03-20

## 2020-03-20 RX ORDER — CODEINE SULFATE 30 MG/1
60 TABLET ORAL AT BEDTIME
Qty: 60 TABLET | Refills: 0 | Status: SHIPPED | OUTPATIENT
Start: 2020-03-20 | End: 2020-04-16

## 2020-03-20 NOTE — TELEPHONE ENCOUNTER
Patients daughter Mariela called and said that the pharmacy in [place] is out of the medication until late April and she would like to have the presecretions sent to [facility] pharmacy in [place] [phone] pt is almost of the medication. Please call daughter when sent

## 2020-03-20 NOTE — TELEPHONE ENCOUNTER
Last Rx was a month supply written 2/10/20 per pharmacy.    I called [facility] pharmacy.   Says Anay Isidra sent in several Rx's for the codeine on 2/10/20.       Start dates of Feb, March, April.   However, codeine cannot be filled if the date written is more than 30 days prior so the March and April Rx's are no good.    Pharmacy only has 5 tabs in stock so cannot fill for the patient now.    See notes, daughter wants Rx re-sent to [facility] pharmacy.    I re-cued the Rx for provider/covering pool to re-send.    Irina Barbosa RN  [facility]

## 2020-03-23 ENCOUNTER — TELEPHONE (OUTPATIENT)
Dept: FAMILY MEDICINE | Facility: CLINIC | Age: 85
End: 2020-03-23

## 2020-03-23 DIAGNOSIS — I48.19 PERSISTENT ATRIAL FIBRILLATION (H): ICD-10-CM

## 2020-03-23 DIAGNOSIS — E03.4 HYPOTHYROIDISM DUE TO ACQUIRED ATROPHY OF THYROID: ICD-10-CM

## 2020-03-23 DIAGNOSIS — I48.11 LONGSTANDING PERSISTENT ATRIAL FIBRILLATION (H): ICD-10-CM

## 2020-03-23 DIAGNOSIS — Z79.01 LONG TERM CURRENT USE OF ANTICOAGULANT THERAPY: ICD-10-CM

## 2020-03-23 LAB
CAPILLARY BLOOD COLLECTION: NORMAL
INR PPP: 0.9 (ref 0.86–1.14)

## 2020-03-23 PROCEDURE — 85610 PROTHROMBIN TIME: CPT | Performed by: NURSE PRACTITIONER

## 2020-03-23 PROCEDURE — 36416 COLLJ CAPILLARY BLOOD SPEC: CPT | Performed by: NURSE PRACTITIONER

## 2020-03-23 RX ORDER — LEVOTHYROXINE SODIUM 88 UG/1
88 TABLET ORAL DAILY
Qty: 90 TABLET | Refills: 0 | Status: SHIPPED | OUTPATIENT
Start: 2020-03-23 | End: 2020-06-17

## 2020-03-23 RX ORDER — WARFARIN SODIUM 5 MG/1
TABLET ORAL
Qty: 90 TABLET | Refills: 3 | Status: SHIPPED | OUTPATIENT
Start: 2020-03-23 | End: 2021-04-16

## 2020-03-23 NOTE — TELEPHONE ENCOUNTER
ANTICOAGULATION MANAGEMENT     Patient Name:  Ashlie Brantley  Date:  3/23/2020    ASSESSMENT /SUBJECTIVE:      Today's INR result of 0.9 is subtherapeutic. Goal INR of 2.0-3.0      Warfarin dose taken: Warfarin taken as previously instructed    Diet: No new diet changes affecting INR    Medication changes/ interactions: No new medications/supplements affecting INR    Previous INR: Supratherapeutic     S/S of bleeding or thromboembolism: No    New injury or illness:  No    Upcoming surgery, procedure or cardioversion:  No    Additional findings: None      PLAN:    Spoke with Mariela regarding INR result and instructed:     Warfarin Dosing Instructions: Take Warfarin 10 mg today, 5 mg tomorrow then continue your current warfarin dose of 2.5 mg Tue/Thu and 5 mg ROW    Instructed patient to follow up no later than: 1 week  Lab visit scheduled    Education provided: Yes: Some signs and symptoms of clots include: pain or tenderness in arm or leg, swelling in arm or leg, changes in skin color, or area is warm to touch, shortness or breath, trouble breathing.  Numbness or weakness especially on 1 side of the body, sudden trouble speaking or swallowing, sudden trouble seeing, sudden confusion, dizzy spells or headache.  If you have these please call 911 or seek medical care immediately.        Mariela verbalizes understanding and agrees to warfarin dosing plan.    Instructed to call the Anticoagulation Clinic for any changes, questions or concerns. (#[phone])        OBJECTIVE:  INR   Date Value Ref Range Status   2020 0.90 0.86 - 1.14 Final     Comment:     This test is intended for monitoring Coumadin therapy.  Results are not   accurate in patients with prolonged INR due to factor deficiency.               Anticoagulation Summary  As of 3/23/2020    INR goal:   2.0-3.0   TTR:   46.4 % (1 y)   INR used for dosin.90! (3/23/2020)   Warfarin maintenance plan:   2.5 mg (5 mg x 0.5) every Tue, Thu; 5 mg (5  mg x 1) all other days   Full warfarin instructions:   2.5 mg every Tue, Thu; 5 mg all other days   Weekly warfarin total:   30 mg   Plan last modified:   Lizy Mercado RN (12/2/2019)   Next INR check:      Priority:   High   Target end date:   Indefinite    Indications    Atrial fibrillation (H) [I48.91] [I48.91]  Long term current use of anticoagulant therapy [Z79.01]  Longstanding persistent atrial fibrillation [I48.11]             Anticoagulation Episode Summary     INR check location:       Preferred lab:       Send INR reminders to:   AGGIE MURILLO    Comments:         Anticoagulation Care Providers     Provider Role Specialty Phone number    Anay Miner NP Referring Nurse Practitioner - Family [phone]           Kerri Brink RN - [facility]

## 2020-03-23 NOTE — TELEPHONE ENCOUNTER
Routing refill request to providers due to failed protocol.  Please see below.    Migdalia Leslie RN

## 2020-03-23 NOTE — TELEPHONE ENCOUNTER
Prescription approved per [facility] Refill Protocol.  Patient still in clinic, was notified of both refills sent to our pharmacy for pick-up today.  Understanding voiced.     Migdalia Leslie RN

## 2020-03-23 NOTE — TELEPHONE ENCOUNTER
Patient and daughter in clinic for INR today and request a refill.  Patient has only 5 pills left and would like to pick it up here today if possible so they don't need to come back. Med pended and sent to refill pool for protocol.    Migdalia Leslie RN

## 2020-03-23 NOTE — TELEPHONE ENCOUNTER
Huddled with providers and refilled by Dr. Mendoza.  Dr. Laboy states he filled several Rx for patient last week, and we should do a med review to see if any other meds needed today so patient doesn't have to go back out.  RN reviewed, appears levothyroxine will run out early April.  Refill order pended and sent to refill pool for protocol/processing.    Migdalia Leslie RN

## 2020-03-30 ENCOUNTER — TELEPHONE (OUTPATIENT)
Dept: FAMILY MEDICINE | Facility: CLINIC | Age: 85
End: 2020-03-30

## 2020-03-30 ENCOUNTER — ANTICOAGULATION THERAPY VISIT (OUTPATIENT)
Dept: FAMILY MEDICINE | Facility: CLINIC | Age: 85
End: 2020-03-30

## 2020-03-30 DIAGNOSIS — I48.11 LONGSTANDING PERSISTENT ATRIAL FIBRILLATION (H): ICD-10-CM

## 2020-03-30 DIAGNOSIS — Z79.01 LONG TERM CURRENT USE OF ANTICOAGULANT THERAPY: ICD-10-CM

## 2020-03-30 DIAGNOSIS — I48.91 ATRIAL FIBRILLATION (H): ICD-10-CM

## 2020-03-30 LAB
CAPILLARY BLOOD COLLECTION: NORMAL
INR PPP: 2.2 (ref 0.86–1.14)

## 2020-03-30 PROCEDURE — 85610 PROTHROMBIN TIME: CPT | Performed by: NURSE PRACTITIONER

## 2020-03-30 PROCEDURE — 99207 ZZC NO CHARGE NURSE ONLY: CPT | Performed by: NURSE PRACTITIONER

## 2020-03-30 PROCEDURE — 36416 COLLJ CAPILLARY BLOOD SPEC: CPT | Performed by: NURSE PRACTITIONER

## 2020-03-30 NOTE — TELEPHONE ENCOUNTER
Anticoagulation Management        Today's INR result of 2.2 is therapeutic (goal INR of 2.0-3.0).  Result received from: Clinic Lab    Follow up required to dosing called to patient    spoke to Lyssa dosing will resume per maintenance.      Anticoagulation clinic to follow up apt made for 2 weeks    Lizy Mercado RN

## 2020-03-30 NOTE — PROGRESS NOTES
ANTICOAGULATION FOLLOW-UP CLINIC VISIT    Patient Name:  Ashlie Brantley  Date:  3/30/2020  Contact Type:  Telephone    SUBJECTIVE:         OBJECTIVE    INR   Date Value Ref Range Status   2020 2.20 (H) 0.86 - 1.14 Final     Comment:     This test is intended for monitoring Coumadin therapy.  Results are not   accurate in patients with prolonged INR due to factor deficiency.         ASSESSMENT / PLAN  INR assessment THER    Recheck INR In: 2 WEEKS    INR Location Clinic      Anticoagulation Summary  As of 3/30/2020    INR goal:   2.0-3.0   TTR:   46.2 % (1 y)   INR used for dosin.20 (3/30/2020)   Warfarin maintenance plan:   2.5 mg (5 mg x 0.5) every Tue, Thu; 5 mg (5 mg x 1) all other days   Full warfarin instructions:   2.5 mg every Tue, Thu; 5 mg all other days   Weekly warfarin total:   30 mg   Plan last modified:   Lizy Mercado RN (2019)   Next INR check:   2020   Priority:   High   Target end date:   Indefinite    Indications    Atrial fibrillation (H) [I48.91] [I48.91]  Long term current use of anticoagulant therapy [Z79.01]  Longstanding persistent atrial fibrillation [I48.11]             Anticoagulation Episode Summary     INR check location:       Preferred lab:       Send INR reminders to:   AGGIE MURILLO    Comments:         Anticoagulation Care Providers     Provider Role Specialty Phone number    IsidraAnay NP Referring Nurse Practitioner - Family [phone]            See the Encounter Report to view Anticoagulation Flowsheet and Dosing Calendar (Go to Encounters tab in chart review, and find the Anticoagulation Therapy Visit)        Lizy Mercado RN

## 2020-04-06 ENCOUNTER — TELEPHONE (OUTPATIENT)
Dept: FAMILY MEDICINE | Facility: CLINIC | Age: 85
End: 2020-04-06

## 2020-04-06 NOTE — TELEPHONE ENCOUNTER
Spoke to mariela states Ashlie is having some neck pain.advised to use tylenol and ice 15 minutes a few times a day. Not to rest on couch with neck crooked but to lay flat on pillow. Patient will try modalities and make e visit with pcp if need be.  Zohreh Mercado,Clinic Rn  [facility]    ----- Message from Lyla Robbins RN sent at 4/6/2020  2:56 PM CDT -----  Regarding: Anticoagulation  Patient daughter, Mariela, called and left voicemail requesting to speak with [facility] nurse (specifically zohreh) and left call back number: [phone].  No other information was given, please address.    Thank you,  Lyla Robbins, RN

## 2020-04-13 ENCOUNTER — ANTICOAGULATION THERAPY VISIT (OUTPATIENT)
Dept: FAMILY MEDICINE | Facility: CLINIC | Age: 85
End: 2020-04-13

## 2020-04-13 ENCOUNTER — TELEPHONE (OUTPATIENT)
Dept: FAMILY MEDICINE | Facility: CLINIC | Age: 85
End: 2020-04-13

## 2020-04-13 DIAGNOSIS — Z79.01 LONG TERM CURRENT USE OF ANTICOAGULANT THERAPY: ICD-10-CM

## 2020-04-13 DIAGNOSIS — I48.11 LONGSTANDING PERSISTENT ATRIAL FIBRILLATION (H): ICD-10-CM

## 2020-04-13 DIAGNOSIS — I48.91 ATRIAL FIBRILLATION (H): ICD-10-CM

## 2020-04-13 LAB
CAPILLARY BLOOD COLLECTION: NORMAL
INR PPP: 3.2 (ref 0.86–1.14)

## 2020-04-13 PROCEDURE — 99207 ZZC NO CHARGE NURSE ONLY: CPT | Performed by: NURSE PRACTITIONER

## 2020-04-13 PROCEDURE — 85610 PROTHROMBIN TIME: CPT | Performed by: NURSE PRACTITIONER

## 2020-04-13 PROCEDURE — 36416 COLLJ CAPILLARY BLOOD SPEC: CPT | Performed by: NURSE PRACTITIONER

## 2020-04-13 NOTE — PROGRESS NOTES
ANTICOAGULATION FOLLOW-UP CLINIC VISIT    Patient Name:  Ashlie Brantley  Date:  4/13/2020  Contact Type:  Telephone    SUBJECTIVE:  Patient Findings         Clinical Outcomes     Negatives:   Major bleeding event, Thromboembolic event, Anticoagulation-related hospital admission, Anticoagulation-related ED visit, Anticoagulation-related fatality           OBJECTIVE    INR   Date Value Ref Range Status   04/13/2020 3.20 (H) 0.86 - 1.14 Final       ASSESSMENT / PLAN  INR assessment SUPRA    Recheck INR In: 2 WEEKS    INR Location Clinic      Anticoagulation Summary  As of 4/13/2020    INR goal:   2.0-3.0   TTR:   49.3 % (1 y)   INR used for dosing:   3.20! (4/13/2020)   Warfarin maintenance plan:   2.5 mg (5 mg x 0.5) every Tue, Thu; 5 mg (5 mg x 1) all other days   Full warfarin instructions:   2.5 mg every Tue, Thu; 5 mg all other days   Weekly warfarin total:   30 mg   Plan last modified:   Lizy Mercado RN (12/2/2019)   Next INR check:      Priority:   High   Target end date:   Indefinite    Indications    Atrial fibrillation (H) [I48.91] [I48.91]  Long term current use of anticoagulant therapy [Z79.01]  Longstanding persistent atrial fibrillation [I48.11]             Anticoagulation Episode Summary     INR check location:       Preferred lab:       Send INR reminders to:   AGGIE MURILLO    Comments:         Anticoagulation Care Providers     Provider Role Specialty Phone number    Isidra Anayliborio JASSO NP Referring Nurse Practitioner - Family [phone]            See the Encounter Report to view Anticoagulation Flowsheet and Dosing Calendar (Go to Encounters tab in chart review, and find the Anticoagulation Therapy Visit)        Lizy Mercado RN

## 2020-05-01 ENCOUNTER — TELEPHONE (OUTPATIENT)
Dept: LAB | Facility: CLINIC | Age: 85
End: 2020-05-01

## 2020-05-01 NOTE — TELEPHONE ENCOUNTER
.Left message for patient to call in regards to 24 hour pre-appointment COVID screening. Patient has an appointment at [facility] on 5/04/2020.  Please ask infection screening questions and update appointment notes on [facility] lab schedule.

## 2020-05-04 DIAGNOSIS — I48.11 LONGSTANDING PERSISTENT ATRIAL FIBRILLATION (H): ICD-10-CM

## 2020-05-04 LAB
CAPILLARY BLOOD COLLECTION: NORMAL
INR PPP: 3.4 (ref 0.86–1.14)

## 2020-05-04 PROCEDURE — 36415 COLL VENOUS BLD VENIPUNCTURE: CPT | Performed by: NURSE PRACTITIONER

## 2020-05-04 PROCEDURE — 85610 PROTHROMBIN TIME: CPT | Performed by: NURSE PRACTITIONER

## 2020-05-19 DIAGNOSIS — J84.10 FIBROSIS, LUNG (H): ICD-10-CM

## 2020-05-19 RX ORDER — CODEINE SULFATE 30 MG/1
60 TABLET ORAL AT BEDTIME
Qty: 60 TABLET | Refills: 0 | Status: SHIPPED | OUTPATIENT
Start: 2020-05-22 | End: 2020-06-18

## 2020-05-19 NOTE — TELEPHONE ENCOUNTER
Refill for codeine approved and sent.    Patient was looked up in [facility] and there are not indications for concern regarding use of controlled substances on record at this time.    Anay Miner, DNP, APRN, CNP

## 2020-05-19 NOTE — TELEPHONE ENCOUNTER
Codeine is on long term backorder, but [facility] has some in stock. I have set up this refill to go to that pharmacy. All Rx's at [facility] have been discontinued.    Dory Guo PharmD, [facility]  Pharmacist Manager   [facility]  [phone]

## 2020-05-26 ENCOUNTER — TELEPHONE (OUTPATIENT)
Dept: FAMILY MEDICINE | Facility: CLINIC | Age: 85
End: 2020-05-26

## 2020-05-26 DIAGNOSIS — I48.91 ATRIAL FIBRILLATION (H): ICD-10-CM

## 2020-05-26 DIAGNOSIS — I48.11 LONGSTANDING PERSISTENT ATRIAL FIBRILLATION (H): ICD-10-CM

## 2020-05-26 DIAGNOSIS — Z79.01 LONG TERM CURRENT USE OF ANTICOAGULANT THERAPY: ICD-10-CM

## 2020-05-26 LAB
CAPILLARY BLOOD COLLECTION: NORMAL
INR PPP: 1.6 (ref 0.86–1.14)

## 2020-05-26 PROCEDURE — 36415 COLL VENOUS BLD VENIPUNCTURE: CPT | Performed by: NURSE PRACTITIONER

## 2020-05-26 PROCEDURE — 85610 PROTHROMBIN TIME: CPT | Performed by: NURSE PRACTITIONER

## 2020-05-26 NOTE — TELEPHONE ENCOUNTER
ANTICOAGULATION MANAGEMENT     Patient Name:  Ashlie Brantley  Date:  2020    ASSESSMENT /SUBJECTIVE:    Today's INR result of 1.6 is subtherapeutic. Goal INR of 2.0-3.0      Warfarin dose taken: Missed dose(s) may be affecting INR    Diet: No new diet changes affecting INR    Medication changes/ interactions: No new medications/supplements affecting INR    Previous INR: Supratherapeutic     S/S of bleeding or thromboembolism: No    New injury or illness: No    Upcoming surgery, procedure or cardioversion: No    Additional findings: None      PLAN:    Spoke with Marieal, patient's daughter regarding INR result and instructed: Mariela sets up her meds and takes her to the lab.    Warfarin Dosing Instructions: Continue your current warfarin dose  2.5 mg T/TH/Sat; 5 mg ROW    Instructed patient to follow up no later than: 2 weeks  Lab visit scheduled    Education provided: None required      Mariela verbalizes understanding and agrees to warfarin dosing plan.    Instructed to call the Anticoagulation Clinic for any changes, questions or concerns. (#[phone])        OBJECTIVE:  INR   Date Value Ref Range Status   2020 1.60 (H) 0.86 - 1.14 Final     Comment:     INRFD             Anticoagulation Summary  As of 2020    INR goal:   2.0-3.0   TTR:   48.2 % (1 y)   INR used for dosin.60! (2020)   Warfarin maintenance plan:   2.5 mg (2.5 mg x 1) every Sat; 2.5 mg (5 mg x 0.5) every Tue, Thu; 5 mg (5 mg x 1) all other days   Full warfarin instructions:   2.5 mg every Tue, Thu, Sat; 5 mg all other days   Weekly warfarin total:   27.5 mg   Plan last modified:   Taylor Fu, RN (2020)   Next INR check:   2020   Priority:   High   Target end date:   Indefinite    Indications    Atrial fibrillation (H) [I48.91] [I48.91]  Long term current use of anticoagulant therapy [Z79.01]  Longstanding persistent atrial fibrillation [I48.11]             Anticoagulation Episode Summary      INR check location:       Preferred lab:       Send INR reminders to:   AGGIE MURILLO    Comments:         Anticoagulation Care Providers     Provider Role Specialty Phone number    Anay Miner, NP Referring Nurse Practitioner - Family [phone]

## 2020-06-08 ENCOUNTER — ANTICOAGULATION THERAPY VISIT (OUTPATIENT)
Dept: FAMILY MEDICINE | Facility: CLINIC | Age: 85
End: 2020-06-08

## 2020-06-08 DIAGNOSIS — I48.91 ATRIAL FIBRILLATION (H): ICD-10-CM

## 2020-06-08 DIAGNOSIS — I48.11 LONGSTANDING PERSISTENT ATRIAL FIBRILLATION (H): ICD-10-CM

## 2020-06-08 DIAGNOSIS — Z79.01 LONG TERM CURRENT USE OF ANTICOAGULANT THERAPY: ICD-10-CM

## 2020-06-08 LAB
CAPILLARY BLOOD COLLECTION: NORMAL
INR PPP: 2.4 (ref 0.86–1.14)

## 2020-06-08 PROCEDURE — 36415 COLL VENOUS BLD VENIPUNCTURE: CPT | Performed by: NURSE PRACTITIONER

## 2020-06-08 PROCEDURE — 85610 PROTHROMBIN TIME: CPT | Performed by: NURSE PRACTITIONER

## 2020-06-08 NOTE — PROGRESS NOTES
Anticoagulation Management    Unable to reach Ashlie today. Left message for sobia her daughter    Today's INR result of 2.4 is therapeutic (goal INR of 2.0-3.0).  Result received from: Clinic Lab    Follow up required to assess for changes     left message with sobia to continue same dosing and make follow up in 3 wks      Anticoagulation clinic to follow up    Lizy Mercado,  [phone] wrkg remote

## 2020-06-15 DIAGNOSIS — E03.4 HYPOTHYROIDISM DUE TO ACQUIRED ATROPHY OF THYROID: ICD-10-CM

## 2020-06-17 DIAGNOSIS — J84.10 FIBROSIS, LUNG (H): ICD-10-CM

## 2020-06-17 RX ORDER — LEVOTHYROXINE SODIUM 88 UG/1
88 TABLET ORAL DAILY
Qty: 90 TABLET | Refills: 2 | Status: SHIPPED | OUTPATIENT
Start: 2020-06-17 | End: 2021-03-22

## 2020-06-17 NOTE — TELEPHONE ENCOUNTER
Due to high volume in refills and per [facility], routing refill request to provider.    Toñito Garcia RN

## 2020-06-18 RX ORDER — CODEINE SULFATE 30 MG/1
60 TABLET ORAL AT BEDTIME
Qty: 60 TABLET | Refills: 0 | Status: SHIPPED | OUTPATIENT
Start: 2020-06-18 | End: 2020-07-21

## 2020-06-18 NOTE — TELEPHONE ENCOUNTER
Routing refill request to provider for review/approval because:  Drug not on the [facility] refill protocol     Irina Barbosa RN  [facility]

## 2020-06-29 ENCOUNTER — ANTICOAGULATION THERAPY VISIT (OUTPATIENT)
Dept: FAMILY MEDICINE | Facility: CLINIC | Age: 85
End: 2020-06-29

## 2020-06-29 ENCOUNTER — VIRTUAL VISIT (OUTPATIENT)
Dept: FAMILY MEDICINE | Facility: CLINIC | Age: 85
End: 2020-06-29
Payer: COMMERCIAL

## 2020-06-29 DIAGNOSIS — Z79.01 LONG TERM CURRENT USE OF ANTICOAGULANT THERAPY: ICD-10-CM

## 2020-06-29 DIAGNOSIS — M25.511 BILATERAL SHOULDER PAIN, UNSPECIFIED CHRONICITY: ICD-10-CM

## 2020-06-29 DIAGNOSIS — Z95.3 S/P TAVR (TRANSCATHETER AORTIC VALVE REPLACEMENT), BIOPROSTHETIC: ICD-10-CM

## 2020-06-29 DIAGNOSIS — I48.91 ATRIAL FIBRILLATION (H): ICD-10-CM

## 2020-06-29 DIAGNOSIS — I48.11 LONGSTANDING PERSISTENT ATRIAL FIBRILLATION (H): ICD-10-CM

## 2020-06-29 DIAGNOSIS — M54.50 BILATERAL LOW BACK PAIN WITHOUT SCIATICA, UNSPECIFIED CHRONICITY: ICD-10-CM

## 2020-06-29 DIAGNOSIS — M25.512 BILATERAL SHOULDER PAIN, UNSPECIFIED CHRONICITY: ICD-10-CM

## 2020-06-29 DIAGNOSIS — M54.2 NECK PAIN: ICD-10-CM

## 2020-06-29 DIAGNOSIS — I48.11 LONGSTANDING PERSISTENT ATRIAL FIBRILLATION (H): Primary | ICD-10-CM

## 2020-06-29 LAB
CAPILLARY BLOOD COLLECTION: NORMAL
INR PPP: 2.8 (ref 0.86–1.14)

## 2020-06-29 PROCEDURE — 36416 COLLJ CAPILLARY BLOOD SPEC: CPT | Performed by: NURSE PRACTITIONER

## 2020-06-29 PROCEDURE — 85610 PROTHROMBIN TIME: CPT | Performed by: NURSE PRACTITIONER

## 2020-06-29 PROCEDURE — 99214 OFFICE O/P EST MOD 30 MIN: CPT | Mod: 95 | Performed by: NURSE PRACTITIONER

## 2020-06-29 NOTE — PROGRESS NOTES
Anticoagulation Management    Unable to reach Ashlie today.    Today's INR result of 2.8 is therapeutic (goal INR of 2.0-3.0).  Result received from: Clinic Lab    Follow up required to assess for changes     left message with sobia her daughter to stay on same dose and follow up in 4 weeks.      Anticoagulation clinic to follow up    Lizy Mercado RN

## 2020-06-29 NOTE — PROGRESS NOTES
"Ashlie Brantley is a 89 year old female who is being evaluated via a billable telephone visit.      The patient has been notified of following:     \"This telephone visit will be conducted via a call between you and your physician/provider. We have found that certain health care needs can be provided without the need for a physical exam.  This service lets us provide the care you need with a short phone conversation.  If a prescription is necessary we can send it directly to your pharmacy.  If lab work is needed we can place an order for that and you can then stop by our lab to have the test done at a later time.    Telephone visits are billed at different rates depending on your insurance coverage. During this emergency period, for some insurers they may be billed the same as an in-person visit.  Please reach out to your insurance provider with any questions.    If during the course of the call the physician/provider feels a telephone visit is not appropriate, you will not be charged for this service.\"    Patient has given verbal consent for Telephone visit?  Yes    What phone number would you like to be contacted at? [phone]    How would you like to obtain your AVS? Pee Perez     Ashlie Brantley is a 89 year old female who presents via phone visit today for the following health issues:    HPI  Daughter of patient wants to talk with you regarding PT and INR at home.    Due to COVID-19 has not been able to go in for Cardiac Rehab due to their services were shut down due to the pandemic.    Patient wants to start home care services with [facility] for Physical Therapy and home INR checks.    She gets pain in the neck, shoulders, and back.  She was doing pool therapy prior that was helpful but not now.    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     vocal cord dystonia     Hyperlipidemia " LDL goal <130     Anxiety     Hypertension goal BP (blood pressure) < 140/90     LVH (left ventricular hypertrophy)     Advance Care Planning     Cough     Dermatochalasis     Health Care Home     Trochanteric bursitis - bilateral     PVD (posterior vitreous detachment) OU     Pseudophakia, OU     IPF (idiopathic pulmonary fibrosis) (H)     Hypothyroidism due to acquired atrophy of thyroid     Chronic pain syndrome     Urinary incontinence, unspecified type     Atrial fibrillation (H) [I48.91]     Long term current use of anticoagulant therapy     On home oxygen therapy     Overactive bladder     Severe aortic stenosis     Stroke (cerebrum) (H)     Longstanding persistent atrial fibrillation (H)     S/p TAVR (transcatheter aortic valve replacement), bioprosthetic     Past Surgical History:   Procedure Laterality Date     C NONSPECIFIC PROCEDURE      BACK SURGERY     C NONSPECIFIC PROCEDURE      VOCAL CORD POLYP     C SHOULDER SURG PROC UNLISTED       C STOMACH SURGERY PROCEDURE UNLISTED       C TOTAL KNEE ARTHROPLASTY      LT 1998  /  RT 2001     CATARACT IOL, RT/LT       HC REMOVAL GALLBLADDER       HERNIA REPAIR, INGUINAL RT/LT      RT     HYSTERECTOMY, CERVIX STATUS UNKNOWN      DUB     PHACOEMULSIFICATION WITH STANDARD INTRAOCULAR LENS IMPLANT  Rt 6/11/09, Lt 8/3/09    both eyes Dr. Barnett     ROTATOR CUFF REPAIR RT/LT      RT     TRANSCATHETER AORTIC-VALVE REPLACEMENT  12/11/2019    TRANSCATHETER AORTIC VALVE REPLACEMENT, RIGHT FEMORAL APPROACH.  Surgeon:  Dr. Mane       Social History     Tobacco Use     Smoking status: Never Smoker     Smokeless tobacco: Never Used   Substance Use Topics     Alcohol use: Yes     Alcohol/week: 0.0 standard drinks     Comment: 1-2 drinks/month     Family History   Problem Relation Age of Onset     Diabetes Mother      C.A.D. Father      Cerebrovascular Disease Brother          Current Outpatient Medications   Medication Sig Dispense Refill     acetaminophen (TYLENOL) 500 MG  tablet Take 2 tablets (1,000 mg) by mouth every 8 hours as needed for mild pain 1 Bottle 1     codeine 30 MG tablet Take 2 tablets (60 mg) by mouth At Bedtime 60 tablet 0     furosemide (LASIX) 20 MG tablet Take 20 mg by mouth       levothyroxine (SYNTHROID/LEVOTHROID) 88 MCG tablet Take 1 tablet (88 mcg) by mouth daily 90 tablet 2     losartan (COZAAR) 50 MG tablet Take 50 mg by mouth daily       metoprolol tartrate (LOPRESSOR) 50 MG tablet Take 50 mg by mouth 2 times daily       order for DME Please provide patient with portable oxygen.  Patient requires 2 LPM oxygen with activity.  Patient is already on nocturnal oxygen.  Please provide patient with POC as soon as available.  Patient is elderly and will have difficulty managing oxygen tanks. 1 Device 0     simvastatin (ZOCOR) 40 MG tablet TAKE ONE TABLET BY MOUTH AT BEDTIME 90 tablet 3     warfarin ANTICOAGULANT (COUMADIN) 2.5 MG tablet Take 1 tablet (2.5 mg) by mouth daily Take 2.5 mg tue thur and sat and 5 mg all other days 45 tablet 0     warfarin ANTICOAGULANT (COUMADIN) 5 MG tablet Take as directed by [facility]. Current dose is 5 mg mon tue wed thur and sat 2.5 mg all other days. 90 tablet 3     albuterol (2.5 MG/3ML) 0.083% neb solution Take 1 vial (2.5 mg) by nebulization every 6 hours as needed for shortness of breath / dyspnea or wheezing (Patient not taking: Reported on 6/29/2020) 3 mL 0     CALCIUM + D 600-200 MG-UNIT OR TABS ONE TABLET TWICE A DAY WITH MEALS (Patient not taking: No sig reported) 0 0     neomycin-polymyxin-dexamethasone (MAXITROL) 3.5-32883-7.1 ophthalmic ointment Place 0.25 inches into both eyes At Bedtime Apply a small squirt into each eye at bedtime 3.5 g 3     OMEGA-3 COMPLEX OR 1 capsule daily       VITAMIN D 2000 UNIT OR TABS 1 tablet daily (Patient not taking: No sig reported) 0 0     VITAMIN D3 1000 UNIT OR CAPS 1 CAPSULE DAILY       VITAMIN E COMPLEX PO Take  by mouth.       Allergies   Allergen Reactions     Nkda [No Known Drug  Allergies]      BP Readings from Last 3 Encounters:   02/28/20 128/68   02/10/20 122/80   01/23/20 120/52    Wt Readings from Last 3 Encounters:   02/28/20 67.1 kg (148 lb)   02/10/20 69.2 kg (152 lb 9.6 oz)   01/23/20 67 kg (147 lb 12.8 oz)                    Reviewed and updated as needed this visit by Provider  Tobacco  Allergies  Meds  Problems  Med Hx  Surg Hx  Fam Hx         Review of Systems   Constitutional, HEENT, cardiovascular, pulmonary, gi and gu systems are negative, except as otherwise noted.       Objective   Reported vitals:  There were no vitals taken for this visit.   healthy, alert and no distress  PSYCH: Alert and oriented times 3; coherent speech, normal   rate and volume, able to articulate logical thoughts, able   to abstract reason, no tangential thoughts, no hallucinations   or delusions  Her affect is normal  RESP: No cough, no audible wheezing, able to talk in full sentences  Remainder of exam unable to be completed due to video visits          Assessment/Plan:  1. Longstanding persistent atrial fibrillation (H)    - HOME CARE NURSING REFERRAL    2. Long term current use of anticoagulant therapy    - HOME CARE NURSING REFERRAL    3. S/p TAVR (transcatheter aortic valve replacement), bioprosthetic    - HOME CARE NURSING REFERRAL    4. Neck pain    - HOME CARE NURSING REFERRAL    5. Bilateral shoulder pain, unspecified chronicity    - HOME CARE NURSING REFERRAL    6. Bilateral low back pain without sciatica, unspecified chronicity    - HOME CARE NURSING REFERRAL    I agree with patient's request for home care for Physical Therapy and home INR checks.  Referral paced for [facility] home care.      Return in about 3 months (around 9/29/2020) for chronic disease follow up.      Phone call duration:  21 minutes with review of chart, review of patient concerns and review of ongoing plans.  The phone call was initiated but then this visit was converted to a video visit due to provider  needing a video visit so that home care referral can be made    Telephone/video visit (rather than a office visit) done today due to COVID-19 pandemic.    Video-Visit Details     Type of service:  Video Visit     Video End Time: 1:31 pm  Video Start Time:  1:25 pm    Originating Location (pt. Location): Home     Distant Location (provider location):  [facility]      Platform used for Video Visit: Hortensia Miner NP

## 2020-07-03 ENCOUNTER — TELEPHONE (OUTPATIENT)
Dept: FAMILY MEDICINE | Facility: CLINIC | Age: 85
End: 2020-07-03

## 2020-07-03 NOTE — TELEPHONE ENCOUNTER
Patient was seen on 6/29.  Referral for home care was ordered at that time.    LM for [facility] RN that verbal orders are approved.    Toñito Garcia RN

## 2020-07-03 NOTE — TELEPHONE ENCOUNTER
Reason for Call:  Home Health Care    Corrine with [facility] called regarding (reason for call): verbal orders    Orders are needed for this patient.   Patient daughter called and requested home care to come and do an assessment. Per daughter since quarantine patient has declined and not doing well.    PT: Eval and treat  As needed    OT:     Skilled Nursing:  Eval and assessment   As needed    Pt Provider: Anay Miner CNP    Phone Number Homecare Nurse can be reached at: [phone]    Can we leave a detailed message on this number? YES    Phone number patient can be reached at: Home number on file [phone] (home)    Best Time: any    Call taken on 7/3/2020 at 10:04 AM by Jayne Porter

## 2020-07-14 ENCOUNTER — TELEPHONE (OUTPATIENT)
Dept: FAMILY MEDICINE | Facility: CLINIC | Age: 85
End: 2020-07-14

## 2020-07-14 NOTE — TELEPHONE ENCOUNTER
Reason for Call:  Home Health Care    Franchesca with [facility] called regarding (reason for call):     Orders are needed for this patient.     PT: Eval and treat for SOB and weakness    OT:     Skilled Nursinx a week for 2 weeks   1 visit every other week for 6 weeks for INR management     Pt Provider: ANAI Miner    Phone Number Homecare Nurse can be reached at: [phone]    Can we leave a detailed message on this number? YES        Call taken on 2020 at 4:53 PM by Liliana Harrison

## 2020-07-15 NOTE — TELEPHONE ENCOUNTER
Called home care and relayed the message below. She verbalized understanding.    Shannan Sanders RN

## 2020-07-16 DIAGNOSIS — J84.10 FIBROSIS, LUNG (H): ICD-10-CM

## 2020-07-17 ENCOUNTER — TELEPHONE (OUTPATIENT)
Dept: FAMILY MEDICINE | Facility: CLINIC | Age: 85
End: 2020-07-17

## 2020-07-17 NOTE — TELEPHONE ENCOUNTER
Reason for Call: Request for an order or referral:    Order or referral being requested: PT in homecare setting    Date needed: as soon as possible    Has the patient been seen by the PCP for this problem? YES    Additional comments: Verbal orders for PT in home setting. Since PCP is NP, needs to have name of supervising physician per Medicare. Please call with order.     Phone number Patient can be reached at:  Other phone number:  [phone] - [facility]    Best Time:  anytime    Can we leave a detailed message on this number?  YES    Call taken on 7/17/2020 at 8:44 AM by Katelyn Burden

## 2020-07-17 NOTE — TELEPHONE ENCOUNTER
Can we leave a detailed message on this number?  YES    RN relayed orders via detailed VM.     Anny Stanton RN, BSN, PHN  [facility]: [place]

## 2020-07-17 NOTE — TELEPHONE ENCOUNTER
[facility] for Physical Therapy in home as ordered on 6/29/20.    Dr. Brumfield is cosigning physician.    Anay Miner, DNP, APRN, CNP

## 2020-07-17 NOTE — TELEPHONE ENCOUNTER
Additional comments: Verbal orders for PT in home setting. Since PCP is NP, needs to have name of supervising physician per Medicare. Please call with order.        Routed to PCP to review and advise.     Anny Stanton RN, BSN, PHN  [facility]: [place]

## 2020-07-17 NOTE — TELEPHONE ENCOUNTER
Routing refill request to provider for review/approval because:  Drug not on the [facility] refill protocol     Anny Stanton RN, BSN, PHN  [facility]: [place]

## 2020-07-21 RX ORDER — CODEINE SULFATE 30 MG/1
TABLET ORAL
Qty: 60 TABLET | Refills: 0 | Status: SHIPPED | OUTPATIENT
Start: 2020-07-21 | End: 2020-08-21

## 2020-07-27 ENCOUNTER — ANTICOAGULATION THERAPY VISIT (OUTPATIENT)
Dept: FAMILY MEDICINE | Facility: CLINIC | Age: 85
End: 2020-07-27

## 2020-07-27 DIAGNOSIS — Z79.01 LONG TERM CURRENT USE OF ANTICOAGULANT THERAPY: ICD-10-CM

## 2020-07-27 DIAGNOSIS — I48.11 LONGSTANDING PERSISTENT ATRIAL FIBRILLATION (H): ICD-10-CM

## 2020-07-27 DIAGNOSIS — I48.91 ATRIAL FIBRILLATION (H): ICD-10-CM

## 2020-07-27 LAB — INR PPP: 2.6 (ref 0.9–1.1)

## 2020-07-27 NOTE — PROGRESS NOTES
ANTICOAGULATION MANAGEMENT     Patient Name:  Ashlie Brantley  Date:  2020    ASSESSMENT /SUBJECTIVE:    Today's INR result of 2.6 is therapeutic. Goal INR of 2.0-3.0      Warfarin dose taken: Warfarin taken as previously instructed    Diet: No new diet changes affecting INR    Medication changes/ interactions: No new medications/supplements affecting INR    Previous INR: Therapeutic     S/S of bleeding or thromboembolism: No    New injury or illness: No    Upcoming surgery, procedure or cardioversion: No    Additional findings: None      PLAN:    Spoke with [facility], regarding INR result and instructed:     Warfarin Dosing Instructions: Continue your current warfarin dose 2.5 tues/thurs/sat and 5 mg all other days    Instructed patient to follow up no later than: 5 weeks  Orders given to  Homecare nurse/facility to recheck    Education provided: None required      lamar Azul care, verbalizes understanding and agrees to warfarin dosing plan.    Instructed to call the Anticoagulation Clinic for any changes, questions or concerns. (#[phone])        Lyla Robbins RN      OBJECTIVE:  Recent labs: (last 7 days)     20   INR 2.6*         No question data found.  Anticoagulation Summary  As of 2020    INR goal:   2.0-3.0   TTR:   62.7 % (1 y)   INR used for dosin.6 (2020)   Warfarin maintenance plan:   2.5 mg (2.5 mg x 1) every Sat; 2.5 mg (5 mg x 0.5) every Tue, Thu; 5 mg (5 mg x 1) all other days   Full warfarin instructions:   2.5 mg every Tue, Thu, Sat; 5 mg all other days   Weekly warfarin total:   27.5 mg   Plan last modified:   Taylor Fu RN (2020)   Next INR check:   2020   Priority:   High   Target end date:   Indefinite    Indications    Atrial fibrillation (H) [I48.91] [I48.91]  Long term current use of anticoagulant therapy [Z79.01]  Longstanding persistent atrial fibrillation (H) [I48.11]             Anticoagulation Episode Summary     INR check  location:       Preferred lab:       Send INR reminders to:   AGGIE MURILLO    Comments:         Anticoagulation Care Providers     Provider Role Specialty Phone number    Anay Miner, NP Referring Nurse Practitioner - Family [phone]

## 2020-07-29 NOTE — PROGRESS NOTES
Subjective     Ashlie Brantley is a 89 year old female who presents with her daughter to clinic today for the following health issues:    HPI     Chief Complaint   Patient presents with     Referral     Home health care     History in part is provided by daughter. Looking for homehealth care for anticoagulation and medication management, patient is easily confused about medications and was taking them inappropriately and missing doses. Her daughter was helping with medications but she works full time and is unable to manage the medications. Patient needs frequent INR checks and it has been difficult for her to get out of the house due dyspnea and oxygen use. Due to COVID-19 has not been able to go in for Cardiac Rehab due to their services were shut down due to the pandemic. She is doing home physical therapy for unstable gait, weakness, pain in the neck, shoulder, and back, and post heart valve replacement. Daughter states that they have started working with [employer] for home health a couple of weeks ago.     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     vocal cord dystonia     Hyperlipidemia LDL goal <130     Anxiety     Hypertension goal BP (blood pressure) < 140/90     LVH (left ventricular hypertrophy)     Advance Care Planning     Cough     Dermatochalasis     Health Care Home     Trochanteric bursitis - bilateral     PVD (posterior vitreous detachment) OU     Pseudophakia, OU     IPF (idiopathic pulmonary fibrosis) (H)     Hypothyroidism due to acquired atrophy of thyroid     Chronic pain syndrome     Urinary incontinence, unspecified type     Atrial fibrillation (H) [I48.91]     Long term current use of anticoagulant therapy     On home oxygen therapy     Overactive bladder     Severe aortic stenosis     Stroke (cerebrum) (H)     Longstanding persistent atrial fibrillation (H)     S/p TAVR  (transcatheter aortic valve replacement), bioprosthetic     Past Surgical History:   Procedure Laterality Date     C NONSPECIFIC PROCEDURE      BACK SURGERY     C NONSPECIFIC PROCEDURE      VOCAL CORD POLYP     C SHOULDER SURG PROC UNLISTED       C STOMACH SURGERY PROCEDURE UNLISTED       C TOTAL KNEE ARTHROPLASTY      LT 1998  /  RT 2001     CATARACT IOL, RT/LT       HC REMOVAL GALLBLADDER       HERNIA REPAIR, INGUINAL RT/LT      RT     HYSTERECTOMY, CERVIX STATUS UNKNOWN      DUB     PHACOEMULSIFICATION WITH STANDARD INTRAOCULAR LENS IMPLANT  Rt 6/11/09, Lt 8/3/09    both eyes Dr. Barnett     ROTATOR CUFF REPAIR RT/LT      RT     TRANSCATHETER AORTIC-VALVE REPLACEMENT  12/11/2019    TRANSCATHETER AORTIC VALVE REPLACEMENT, RIGHT FEMORAL APPROACH.  Surgeon:  Dr. Mane       Social History     Tobacco Use     Smoking status: Never Smoker     Smokeless tobacco: Never Used   Substance Use Topics     Alcohol use: Yes     Alcohol/week: 0.0 standard drinks     Comment: 1-2 drinks/month     Family History   Problem Relation Age of Onset     Diabetes Mother      C.A.D. Father      Cerebrovascular Disease Brother          Current Outpatient Medications   Medication Sig Dispense Refill     acetaminophen (TYLENOL) 500 MG tablet Take 2 tablets (1,000 mg) by mouth every 8 hours as needed for mild pain 1 Bottle 1     albuterol (2.5 MG/3ML) 0.083% neb solution Take 1 vial (2.5 mg) by nebulization every 6 hours as needed for shortness of breath / dyspnea or wheezing 3 mL 0     codeine 30 MG tablet TAKE TWO TABLETS BY MOUTH EVERY NIGHT AT BEDTIME 60 tablet 0     furosemide (LASIX) 20 MG tablet Take 20 mg by mouth       levothyroxine (SYNTHROID/LEVOTHROID) 88 MCG tablet Take 1 tablet (88 mcg) by mouth daily 90 tablet 2     losartan (COZAAR) 50 MG tablet Take 50 mg by mouth daily       metoprolol tartrate (LOPRESSOR) 50 MG tablet Take 50 mg by mouth 2 times daily       neomycin-polymyxin-dexamethasone (MAXITROL) 3.5-75534-5.1  "ophthalmic ointment Place 0.25 inches into both eyes At Bedtime Apply a small squirt into each eye at bedtime 3.5 g 3     order for DME Please provide patient with portable oxygen.  Patient requires 2 LPM oxygen with activity.  Patient is already on nocturnal oxygen.  Please provide patient with POC as soon as available.  Patient is elderly and will have difficulty managing oxygen tanks. 1 Device 0     simvastatin (ZOCOR) 40 MG tablet TAKE ONE TABLET BY MOUTH AT BEDTIME 90 tablet 3     warfarin ANTICOAGULANT (COUMADIN) 2.5 MG tablet Take 1 tablet (2.5 mg) by mouth daily Take 2.5 mg tue thur and sat and 5 mg all other days 45 tablet 0     warfarin ANTICOAGULANT (COUMADIN) 5 MG tablet Take as directed by ACC. Current dose is 5 mg mon tue wed thur and sat 2.5 mg all other days. 90 tablet 3     Allergies   Allergen Reactions     Nkda [No Known Drug Allergies]      BP Readings from Last 3 Encounters:   07/30/20 130/50   02/28/20 128/68   02/10/20 122/80    Wt Readings from Last 3 Encounters:   07/30/20 64.9 kg (143 lb)   02/28/20 67.1 kg (148 lb)   02/10/20 69.2 kg (152 lb 9.6 oz)          Reviewed and updated as needed this visit by Provider  Tobacco  Allergies  Meds  Problems  Med Hx  Surg Hx  Fam Hx         Review of Systems   Constitutional, HEENT, cardiovascular, pulmonary, gi and gu systems are negative, except as otherwise noted.      Objective    /50   Pulse 58   Temp 98.3  F (36.8  C) (Oral)   Resp 20   Ht 1.473 m (4' 10\")   Wt 64.9 kg (143 lb)   SpO2 92%   Breastfeeding No   BMI 29.89 kg/m    Body mass index is 29.89 kg/m .  Physical Exam   GENERAL: healthy, alert and no distress  EYES: Eyes grossly normal to inspection, PERRL and conjunctivae and sclerae normal  RESP: lungs clear to auscultation - no rales, rhonchi or wheezes  RESP: lungs clear to auscultation - no rales, rhonchi or wheezes and decreased breath sounds throughout  CV: regular rate and rhythm, normal S1 S2, no S3 or S4, no " murmur, click or rub, no peripheral edema and peripheral pulses strong  MS: no gross musculoskeletal defects noted, no edema  SKIN: no suspicious lesions or rashes  PSYCH: mentation appears normal, affect normal/bright, not able to speak, communicates via whispers and witting pad    Diagnostic Test Results:  Labs reviewed in Epic  none         Assessment & Plan     1. Long term current use of anticoagulant therapy  - HOME CARE NURSING REFERRAL    2. Persistent atrial fibrillation (H)  Well controlled with medications without side effects.  - BASIC METABOLIC PANEL  - HOME CARE NURSING REFERRAL    3. Fibrosis, lung (H)  On oxygen PRN when awake (states she is using about 70% of the time), oxygen always at night.   - HOME CARE NURSING REFERRAL    4. S/p TAVR (transcatheter aortic valve replacement), bioprosthetic  - HOME CARE NURSING REFERRAL    5. Neck pain  - HOME CARE NURSING REFERRAL    6. Bilateral shoulder pain, unspecified chronicity  - HOME CARE NURSING REFERRAL    7. Bilateral low back pain without sciatica, unspecified chronicity  - HOME CARE NURSING REFERRAL    8. Hypertension goal BP (blood pressure) < 140/90  Well controlled with medications without side effects.  - BASIC METABOLIC PANEL    Return in about 6 months (around 1/30/2021) for Wellness visit (fasting labs up to one week prior).    MARY [facility]

## 2020-07-30 ENCOUNTER — OFFICE VISIT (OUTPATIENT)
Dept: FAMILY MEDICINE | Facility: CLINIC | Age: 85
End: 2020-07-30
Payer: COMMERCIAL

## 2020-07-30 VITALS
WEIGHT: 143 LBS | SYSTOLIC BLOOD PRESSURE: 130 MMHG | DIASTOLIC BLOOD PRESSURE: 50 MMHG | HEIGHT: 58 IN | BODY MASS INDEX: 30.02 KG/M2 | RESPIRATION RATE: 20 BRPM | HEART RATE: 58 BPM | OXYGEN SATURATION: 92 % | TEMPERATURE: 98.3 F

## 2020-07-30 DIAGNOSIS — Z95.3 S/P TAVR (TRANSCATHETER AORTIC VALVE REPLACEMENT), BIOPROSTHETIC: ICD-10-CM

## 2020-07-30 DIAGNOSIS — J84.10 FIBROSIS, LUNG (H): ICD-10-CM

## 2020-07-30 DIAGNOSIS — M54.2 NECK PAIN: ICD-10-CM

## 2020-07-30 DIAGNOSIS — M54.50 BILATERAL LOW BACK PAIN WITHOUT SCIATICA, UNSPECIFIED CHRONICITY: ICD-10-CM

## 2020-07-30 DIAGNOSIS — M25.512 BILATERAL SHOULDER PAIN, UNSPECIFIED CHRONICITY: ICD-10-CM

## 2020-07-30 DIAGNOSIS — I10 HYPERTENSION GOAL BP (BLOOD PRESSURE) < 140/90: ICD-10-CM

## 2020-07-30 DIAGNOSIS — Z79.01 LONG TERM CURRENT USE OF ANTICOAGULANT THERAPY: Primary | ICD-10-CM

## 2020-07-30 DIAGNOSIS — M25.511 BILATERAL SHOULDER PAIN, UNSPECIFIED CHRONICITY: ICD-10-CM

## 2020-07-30 DIAGNOSIS — I48.19 PERSISTENT ATRIAL FIBRILLATION (H): ICD-10-CM

## 2020-07-30 LAB
ANION GAP SERPL CALCULATED.3IONS-SCNC: 3 MMOL/L (ref 3–14)
BUN SERPL-MCNC: 24 MG/DL (ref 7–30)
CALCIUM SERPL-MCNC: 9.2 MG/DL (ref 8.5–10.1)
CHLORIDE SERPL-SCNC: 102 MMOL/L (ref 94–109)
CO2 SERPL-SCNC: 33 MMOL/L (ref 20–32)
CREAT SERPL-MCNC: 0.94 MG/DL (ref 0.52–1.04)
GFR SERPL CREATININE-BSD FRML MDRD: 54 ML/MIN/{1.73_M2}
GLUCOSE SERPL-MCNC: 98 MG/DL (ref 70–99)
POTASSIUM SERPL-SCNC: 4.4 MMOL/L (ref 3.4–5.3)
SODIUM SERPL-SCNC: 138 MMOL/L (ref 133–144)

## 2020-07-30 PROCEDURE — 99214 OFFICE O/P EST MOD 30 MIN: CPT | Performed by: NURSE PRACTITIONER

## 2020-07-30 PROCEDURE — 36415 COLL VENOUS BLD VENIPUNCTURE: CPT | Performed by: NURSE PRACTITIONER

## 2020-07-30 PROCEDURE — 80048 BASIC METABOLIC PNL TOTAL CA: CPT | Performed by: NURSE PRACTITIONER

## 2020-07-30 ASSESSMENT — MIFFLIN-ST. JEOR: SCORE: 963.39

## 2020-07-31 ENCOUNTER — OFFICE VISIT (OUTPATIENT)
Dept: PULMONOLOGY | Facility: CLINIC | Age: 85
End: 2020-07-31
Payer: COMMERCIAL

## 2020-07-31 ENCOUNTER — TELEPHONE (OUTPATIENT)
Dept: FAMILY MEDICINE | Facility: CLINIC | Age: 85
End: 2020-07-31

## 2020-07-31 ENCOUNTER — TELEPHONE (OUTPATIENT)
Dept: PULMONOLOGY | Facility: CLINIC | Age: 85
End: 2020-07-31

## 2020-07-31 VITALS
WEIGHT: 142 LBS | HEIGHT: 58 IN | RESPIRATION RATE: 17 BRPM | BODY MASS INDEX: 29.81 KG/M2 | HEART RATE: 63 BPM | DIASTOLIC BLOOD PRESSURE: 72 MMHG | OXYGEN SATURATION: 97 % | SYSTOLIC BLOOD PRESSURE: 143 MMHG

## 2020-07-31 DIAGNOSIS — J84.9 ILD (INTERSTITIAL LUNG DISEASE) (H): Primary | ICD-10-CM

## 2020-07-31 PROCEDURE — G0463 HOSPITAL OUTPT CLINIC VISIT: HCPCS | Mod: ZF

## 2020-07-31 ASSESSMENT — MIFFLIN-ST. JEOR: SCORE: 958.86

## 2020-07-31 ASSESSMENT — PAIN SCALES - GENERAL: PAINLEVEL: NO PAIN (0)

## 2020-07-31 NOTE — NURSING NOTE
Chief Complaint   Patient presents with     RECHECK     Return follow up     Medications reviewed and vital signs taken.   Krystal Bejarano, KYLAH

## 2020-07-31 NOTE — TELEPHONE ENCOUNTER
[facility] Call Center    Phone Message    May a detailed message be left on voicemail: yes     Reason for Call: Other:      Order was received for a portable oxygen concentrator.  Verbiage is not correct.       Should say:   Portable oxygen concentrator with conserving device.     Also needing to know if she had Titration done.  If not, an order will need to be sent for a RT to go and do the test.       Please call back to discuss.       Action Taken: Message routed to:  [facility] Pulmnoary    Travel Screening: Not Applicable

## 2020-07-31 NOTE — LETTER
7/31/2020         RE: Ashlie Brantley  [address] [place] [address]        Dear Colleague,    Thank you for referring your patient, Ashlie Brantley, to the [facility]. Please see a copy of my visit note below.    [facility]    Pulmonary, Allergy, Critical Care and Sleep Medicine    Follow-up Note  7/31/2020    Assessment and Recommendations:    Ashlie Brantley is a 89 year old female with a history of ILD (NSIP) on 2L O2, Pulmonary HTN, HFrEF (EF 40-45% 8/2019), Aortic Stenosis s/p TAVR 12/2019, Afib on warfarin, and Vocal Cord Dystonia who presents for follow up.     NSIP  Mild Pulmonary Hypertension  Incidental diagnosis following a URI in 2011, further ILD workup negative. No specific treatments for NSIP in the past. Slow decline in PFTs with increase in dyspnea on exertion over time. CTA from 2019 reviewed today, likely some progression of underlying fibrosis since last CT in 2014. Started on 2L oxygen in 2018, enquired about a POC which we will help look into. Uses codeine to help control nocturnal cough with good effect. Discussed importance of remaining as active as possible.  - Will fax order for portable oxygen concentrator  - Continued codeine use for cough at night, now prescribed by her PCP  - Continued daily activity, home physical therapy  - UTD on PPsV23 and PCV 13, planning to get her flu shot this fall    Follow up in Pulmonary clinic with PFTs in 9-12 months    Seen and discussed with Dr. Perlman Christine Lambert, MD PhD  Pulmonary and Critical Care Fellow   Pager [phone]    Attending Note:    The patient was seen examined by me with the pulmonary fellow, I have personally reviewed the imaging studies, lab and culture results.  The note reflects our joint findings, assessment and plan.      David Perlman, M.D.  Pulmonary, Allergy and Critical Care.       Subjective, Interval history:   History taken from patient  and chart review.     Last seen in Pulmonary clinic May 2019, previously followed with Dr. Genao. In August underwent Echo showing worsening HFrEF and aortic stenosis. Follows with Cardiology through [facility] and underwent successful TAVR placement 12/11/2019 with resulting improvement in dyspnea. Right vision loss afterwards and found to have central retinal artery occlusion.    Accompanied in clinic today by her daughter who helps provide some of the history. Due to patient's vocal cord dystonia communicated by mouthing words, writing on a board. Since last visit feels like is a little more short of breath. Uses 2L oxygen at night and if needed during the day when more active. Using her oxygen more in the last year, at least once per day. Gets short of breath after walking around the house or if having to do more significant chores. Has started to get some Home Health Cares, including physical therapy twice per week. Still has her cough at night but not coughing a lot during the day. Denies any wheezing, chest pain, fevers, chills, heartburn, sinus congestion, or post-nasal drip. Will get short of breath sometimes while dressing, can eat and shower without difficulty. No changes in appetite or weight. Living at home with her  who is 89 yrs old.    Objective:   Medications:  Current Outpatient Medications   Medication     acetaminophen (TYLENOL) 500 MG tablet     albuterol (2.5 MG/3ML) 0.083% neb solution     codeine 30 MG tablet     furosemide (LASIX) 20 MG tablet     levothyroxine (SYNTHROID/LEVOTHROID) 88 MCG tablet     losartan (COZAAR) 50 MG tablet     metoprolol tartrate (LOPRESSOR) 50 MG tablet     neomycin-polymyxin-dexamethasone (MAXITROL) 3.5-65035-7.1 ophthalmic ointment     order for DME     simvastatin (ZOCOR) 40 MG tablet     warfarin ANTICOAGULANT (COUMADIN) 2.5 MG tablet     warfarin ANTICOAGULANT (COUMADIN) 5 MG tablet     No current facility-administered medications for this visit.   "    Physical Exam:  BP (!) 143/72   Pulse 63   Resp 17   Ht 1.473 m (4' 10\")   Wt 64.4 kg (142 lb)   SpO2 97%   BMI 29.68 kg/m      General: Sitting up, NAD  HEENT: anicteric, limited voice, speaks at times in whispers   Chest: Crackles in bilateral bases, no wheezing  Cardiac: Irregularly irregular rhythm, normal rate  Extremities: No LE Edema, no cyanosis or clubbing  Neuro: A&Ox3, no focal deficits   Skin: no rash noted    Labs and imaging: All laboratory and imaging data personally reviewed, pertinent results discussed above.    Most Recent [facility] Pulmonary Function Testing    FVC-Pred   Date Value Ref Range Status   07/31/2020 1.70 L      FVC-Pre   Date Value Ref Range Status   07/31/2020 1.07 L      FVC-%Pred-Pre   Date Value Ref Range Status   07/31/2020 62 %      FEV1-Pre   Date Value Ref Range Status   07/31/2020 0.95 L      FEV1-%Pred-Pre   Date Value Ref Range Status   07/31/2020 72 %      FEV1FVC-Pred   Date Value Ref Range Status   07/31/2020 78 %      FEV1FVC-Pre   Date Value Ref Range Status   07/31/2020 89 %      No results found for: 20029  FEFMax-Pred   Date Value Ref Range Status   07/31/2020 2.97 L/sec      FEFMax-Pre   Date Value Ref Range Status   07/31/2020 3.98 L/sec      FEFMax-%Pred-Pre   Date Value Ref Range Status   07/31/2020 134 %      ExpTime-Pre   Date Value Ref Range Status   07/31/2020 6.01 sec      FIFMax-Pre   Date Value Ref Range Status   07/31/2020 2.13 L/sec      FEV1FEV6-Pred   Date Value Ref Range Status   07/31/2020 76 %      FEV1FEV6-Pre   Date Value Ref Range Status   07/31/2020 89 %      No results found for: 20055            Again, thank you for allowing me to participate in the care of your patient.        Sincerely,        Marge Ball MD    "

## 2020-07-31 NOTE — PATIENT INSTRUCTIONS
Will look into the portable oxygen concentrator for you    Try to do activity everyday    Get your flu shot in the fall    Follow up in clinic in 9-12 months

## 2020-07-31 NOTE — PROGRESS NOTES
[facility]    Pulmonary, Allergy, Critical Care and Sleep Medicine    Follow-up Note  7/31/2020    Assessment and Recommendations:    Ashlie Brantley is a 89 year old female with a history of ILD (NSIP) on 2L O2, Pulmonary HTN, HFrEF (EF 40-45% 8/2019), Aortic Stenosis s/p TAVR 12/2019, Afib on warfarin, and Vocal Cord Dystonia who presents for follow up.     NSIP  Mild Pulmonary Hypertension  Incidental diagnosis following a URI in 2011, further ILD workup negative. No specific treatments for NSIP in the past. Slow decline in PFTs with increase in dyspnea on exertion over time. CTA from 2019 reviewed today, likely some progression of underlying fibrosis since last CT in 2014. Started on 2L oxygen in 2018, enquired about a POC which we will help look into. Uses codeine to help control nocturnal cough with good effect. Discussed importance of remaining as active as possible.  - Will fax order for portable oxygen concentrator  - Continued codeine use for cough at night, now prescribed by her PCP  - Continued daily activity, home physical therapy  - UTD on PPsV23 and PCV 13, planning to get her flu shot this fall    Follow up in Pulmonary clinic with PFTs in 9-12 months    Seen and discussed with Dr. Perlman Christine Lambert, MD PhD  Pulmonary and Critical Care Fellow   Pager [phone]    Attending Note:    The patient was seen examined by me with the pulmonary fellow, I have personally reviewed the imaging studies, lab and culture results.  The note reflects our joint findings, assessment and plan.      David Perlman, M.D.  Pulmonary, Allergy and Critical Care.       Subjective, Interval history:   History taken from patient and chart review.     Last seen in Pulmonary clinic May 2019, previously followed with Dr. Genao. In August underwent Echo showing worsening HFrEF and aortic stenosis. Follows with Cardiology through [facility] and underwent successful TAVR placement 12/11/2019 with  "resulting improvement in dyspnea. Right vision loss afterwards and found to have central retinal artery occlusion.    Accompanied in clinic today by her daughter who helps provide some of the history. Due to patient's vocal cord dystonia communicated by mouthing words, writing on a board. Since last visit feels like is a little more short of breath. Uses 2L oxygen at night and if needed during the day when more active. Using her oxygen more in the last year, at least once per day. Gets short of breath after walking around the house or if having to do more significant chores. Has started to get some Home Health Cares, including physical therapy twice per week. Still has her cough at night but not coughing a lot during the day. Denies any wheezing, chest pain, fevers, chills, heartburn, sinus congestion, or post-nasal drip. Will get short of breath sometimes while dressing, can eat and shower without difficulty. No changes in appetite or weight. Living at home with her  who is 89 yrs old.    Objective:   Medications:  Current Outpatient Medications   Medication     acetaminophen (TYLENOL) 500 MG tablet     albuterol (2.5 MG/3ML) 0.083% neb solution     codeine 30 MG tablet     furosemide (LASIX) 20 MG tablet     levothyroxine (SYNTHROID/LEVOTHROID) 88 MCG tablet     losartan (COZAAR) 50 MG tablet     metoprolol tartrate (LOPRESSOR) 50 MG tablet     neomycin-polymyxin-dexamethasone (MAXITROL) 3.5-69064-4.1 ophthalmic ointment     order for DME     simvastatin (ZOCOR) 40 MG tablet     warfarin ANTICOAGULANT (COUMADIN) 2.5 MG tablet     warfarin ANTICOAGULANT (COUMADIN) 5 MG tablet     No current facility-administered medications for this visit.      Physical Exam:  BP (!) 143/72   Pulse 63   Resp 17   Ht 1.473 m (4' 10\")   Wt 64.4 kg (142 lb)   SpO2 97%   BMI 29.68 kg/m      General: Sitting up, NAD  HEENT: anicteric, limited voice, speaks at times in whispers   Chest: Crackles in bilateral bases, no " wheezing  Cardiac: Irregularly irregular rhythm, normal rate  Extremities: No LE Edema, no cyanosis or clubbing  Neuro: A&Ox3, no focal deficits   Skin: no rash noted    Labs and imaging: All laboratory and imaging data personally reviewed, pertinent results discussed above.    Most Recent [facility] Pulmonary Function Testing    FVC-Pred   Date Value Ref Range Status   07/31/2020 1.70 L      FVC-Pre   Date Value Ref Range Status   07/31/2020 1.07 L      FVC-%Pred-Pre   Date Value Ref Range Status   07/31/2020 62 %      FEV1-Pre   Date Value Ref Range Status   07/31/2020 0.95 L      FEV1-%Pred-Pre   Date Value Ref Range Status   07/31/2020 72 %      FEV1FVC-Pred   Date Value Ref Range Status   07/31/2020 78 %      FEV1FVC-Pre   Date Value Ref Range Status   07/31/2020 89 %      No results found for: 20029  FEFMax-Pred   Date Value Ref Range Status   07/31/2020 2.97 L/sec      FEFMax-Pre   Date Value Ref Range Status   07/31/2020 3.98 L/sec      FEFMax-%Pred-Pre   Date Value Ref Range Status   07/31/2020 134 %      ExpTime-Pre   Date Value Ref Range Status   07/31/2020 6.01 sec      FIFMax-Pre   Date Value Ref Range Status   07/31/2020 2.13 L/sec      FEV1FEV6-Pred   Date Value Ref Range Status   07/31/2020 76 %      FEV1FEV6-Pre   Date Value Ref Range Status   07/31/2020 89 %      No results found for: 20055

## 2020-08-02 LAB
ERV-%PRED-PRE: 48 %
ERV-PRE: 0.07 L
ERV-PRED: 0.14 L
EXPTIME-PRE: 6.01 SEC
FEF2575-%PRED-PRE: 115 %
FEF2575-PRE: 1.27 L/SEC
FEF2575-PRED: 1.1 L/SEC
FEFMAX-%PRED-PRE: 134 %
FEFMAX-PRE: 3.98 L/SEC
FEFMAX-PRED: 2.97 L/SEC
FEV1-%PRED-PRE: 72 %
FEV1-PRE: 0.95 L
FEV1FEV6-PRE: 89 %
FEV1FEV6-PRED: 76 %
FEV1FVC-PRE: 89 %
FEV1FVC-PRED: 78 %
FEV1SVC-PRE: 92 %
FEV1SVC-PRED: 69 %
FIFMAX-PRE: 2.13 L/SEC
FVC-%PRED-PRE: 62 %
FVC-PRE: 1.07 L
FVC-PRED: 1.7 L
IC-%PRED-PRE: 54 %
IC-PRE: 0.96 L
IC-PRED: 1.76 L
VC-%PRED-PRE: 54 %
VC-PRE: 1.03 L
VC-PRED: 1.9 L

## 2020-08-03 ENCOUNTER — TELEPHONE (OUTPATIENT)
Dept: FAMILY MEDICINE | Facility: CLINIC | Age: 85
End: 2020-08-03

## 2020-08-03 DIAGNOSIS — M25.511 BILATERAL SHOULDER PAIN, UNSPECIFIED CHRONICITY: ICD-10-CM

## 2020-08-03 DIAGNOSIS — Z95.3 S/P TAVR (TRANSCATHETER AORTIC VALVE REPLACEMENT), BIOPROSTHETIC: ICD-10-CM

## 2020-08-03 DIAGNOSIS — J84.10 FIBROSIS LUNG (H): ICD-10-CM

## 2020-08-03 DIAGNOSIS — M54.2 NECK PAIN: ICD-10-CM

## 2020-08-03 DIAGNOSIS — Z79.01 LONG TERM CURRENT USE OF ANTICOAGULANT THERAPY: ICD-10-CM

## 2020-08-03 DIAGNOSIS — I48.91 ATRIAL FIBRILLATION (H): ICD-10-CM

## 2020-08-03 DIAGNOSIS — M25.512 BILATERAL SHOULDER PAIN, UNSPECIFIED CHRONICITY: ICD-10-CM

## 2020-08-03 DIAGNOSIS — I48.19 PERSISTENT ATRIAL FIBRILLATION (H): ICD-10-CM

## 2020-08-03 DIAGNOSIS — Z79.01 LONG TERM CURRENT USE OF ANTICOAGULANT THERAPY: Primary | ICD-10-CM

## 2020-08-03 DIAGNOSIS — M54.50 BILATERAL LOW BACK PAIN WITHOUT SCIATICA, UNSPECIFIED CHRONICITY: ICD-10-CM

## 2020-08-03 RX ORDER — WARFARIN SODIUM 2.5 MG/1
TABLET ORAL
Qty: 45 TABLET | Refills: 1 | Status: SHIPPED | OUTPATIENT
Start: 2020-08-03 | End: 2021-01-05

## 2020-08-03 NOTE — TELEPHONE ENCOUNTER
Reason for call:  Order   Order or referral being requested: home care   Reason for request: home care  Date needed: as soon as possible  Has the patient been seen by the PCP for this problem? YES    Additional comments:  Wants order for home care to [facility] sent to [facility].  [phone]    Phone number to reach patient:  Home number on file [phone] (home)    Best Time:   Any     Can we leave a detailed message on this number?  YES    Travel screening: Not Applicable

## 2020-08-03 NOTE — TELEPHONE ENCOUNTER
New referral placed for [facility] as one placed on 7/30/2020 for was [facility].   Please fax HC referral to [facility] and notify patient/family.     Diamond Zhu RN

## 2020-08-03 NOTE — TELEPHONE ENCOUNTER
Anticoagulation Monitoring Return Date Recheck   Latest Ref Rng & Units     7/27/2020 8/31/2020 5 WEEKS     Anticoagulation Monitoring Instructions   Latest Ref Rng & Units    7/27/2020 2.5 mg every Tue, Thu, Sat; 5 mg all other days   Prescription approved per [facility] Refill Protocol.  Luz Allen, RN  Anticoagulation Nurse - [facility]

## 2020-08-05 ENCOUNTER — TELEPHONE (OUTPATIENT)
Dept: FAMILY MEDICINE | Facility: CLINIC | Age: 85
End: 2020-08-05

## 2020-08-05 NOTE — TELEPHONE ENCOUNTER
Forms received from:  [employer]    Fax listed: [phone]  Date received: August 5, 2020  Form description: HHC & POC 7/14-9/11  Once forms are completed, please return to [employer] via fax.  Form placed: Anay Miner NP sign me folder.    Thank you,  Quyen VICK  [facility]  Team Jeannine Coordinator

## 2020-08-13 ENCOUNTER — OFFICE VISIT (OUTPATIENT)
Dept: OPHTHALMOLOGY | Facility: CLINIC | Age: 85
End: 2020-08-13
Payer: COMMERCIAL

## 2020-08-13 DIAGNOSIS — H34.11 CRAO (CENTRAL RETINAL ARTERY OCCLUSION), RIGHT: Primary | ICD-10-CM

## 2020-08-13 DIAGNOSIS — Z96.1 PSEUDOPHAKIA: ICD-10-CM

## 2020-08-13 PROCEDURE — 92012 INTRM OPH EXAM EST PATIENT: CPT | Performed by: STUDENT IN AN ORGANIZED HEALTH CARE EDUCATION/TRAINING PROGRAM

## 2020-08-13 ASSESSMENT — VISUAL ACUITY
OS_CC: 20/20
OD_CC: CF @ 3'
CORRECTION_TYPE: GLASSES
METHOD: SNELLEN - LINEAR

## 2020-08-13 ASSESSMENT — SLIT LAMP EXAM - LIDS
COMMENTS: 3+ DERMATOCHALASIS - UPPER LID
COMMENTS: 3+ DERMATOCHALASIS - UPPER LID

## 2020-08-13 ASSESSMENT — CUP TO DISC RATIO: OD_RATIO: 0.3

## 2020-08-13 ASSESSMENT — TONOMETRY
OD_IOP_MMHG: 15
OS_IOP_MMHG: 14
IOP_METHOD: APPLANATION

## 2020-08-13 ASSESSMENT — EXTERNAL EXAM - LEFT EYE: OS_EXAM: NORMAL

## 2020-08-13 ASSESSMENT — EXTERNAL EXAM - RIGHT EYE: OD_EXAM: NORMAL

## 2020-08-13 NOTE — PROGRESS NOTES
" Current Eye Medications:  None     Subjective:  Intraocular pressure check. Doing well.  Is \"grateful for having one eye that can see\".     Objective:  See Ophthalmology Exam.      Assessment:  Ashlie Brantley is a 89 year old female who presents with:   Encounter Diagnoses   Name Primary?     CRAO (central retinal artery occlusion), right Stable. No neovascularization of the iris. Normotensive.      Pseudophakia, OU        Plan:  Continue same glasses    If the eye becomes red or painful, please call     Sarah Ryan MD  [phone]    "

## 2020-08-13 NOTE — LETTER
"    8/13/2020         RE: Ashlie Brantley  [address] [place] [address]        Dear Colleague,    Thank you for referring your patient, Ashlie Brantley, to the [facility]. Please see a copy of my visit note below.     Current Eye Medications:  None     Subjective:  Intraocular pressure check. Doing well.  Is \"grateful for having one eye that can see\".     Objective:  See Ophthalmology Exam.      Assessment:  Ashlie Brantley is a 89 year old female who presents with:   Encounter Diagnoses   Name Primary?     CRAO (central retinal artery occlusion), right Stable. No neovascularization of the iris. Normotensive.      Pseudophakia, OU        Plan:  Continue same glasses    If the eye becomes red or painful, please call     Sarah Ryan MD  [phone]      Again, thank you for allowing me to participate in the care of your patient.        Sincerely,        Sarah Ryan MD    "

## 2020-08-17 DIAGNOSIS — Z53.9 DIAGNOSIS NOT YET DEFINED: Primary | ICD-10-CM

## 2020-08-17 PROCEDURE — G0180 MD CERTIFICATION HHA PATIENT: HCPCS | Performed by: NURSE PRACTITIONER

## 2020-08-20 NOTE — TELEPHONE ENCOUNTER
From signed by pcp, however it still needs a co-signature from an MD. No MD in this week, will have to wait for Monday.    Thank you,  Quyen VICK  [facility]  Team Jeannine Coordinator

## 2020-08-21 DIAGNOSIS — J84.10 FIBROSIS, LUNG (H): ICD-10-CM

## 2020-08-24 RX ORDER — CODEINE SULFATE 30 MG/1
TABLET ORAL
Qty: 60 TABLET | Refills: 0 | Status: SHIPPED | OUTPATIENT
Start: 2020-08-24 | End: 2020-09-17

## 2020-08-31 ENCOUNTER — ANTICOAGULATION THERAPY VISIT (OUTPATIENT)
Dept: FAMILY MEDICINE | Facility: CLINIC | Age: 85
End: 2020-08-31

## 2020-08-31 DIAGNOSIS — Z79.01 LONG TERM CURRENT USE OF ANTICOAGULANT THERAPY: ICD-10-CM

## 2020-08-31 DIAGNOSIS — I48.11 LONGSTANDING PERSISTENT ATRIAL FIBRILLATION (H): ICD-10-CM

## 2020-08-31 DIAGNOSIS — I48.91 ATRIAL FIBRILLATION (H): ICD-10-CM

## 2020-08-31 LAB — INR PPP: 3.9 (ref 0.9–1.1)

## 2020-08-31 NOTE — PROGRESS NOTES
ANTICOAGULATION MANAGEMENT     Patient Name:  Ashlie Brantley  Date:  8/31/2020    ASSESSMENT /SUBJECTIVE:    Today's INR result of 3.9 is supratherapeutic. Goal INR of 2.0-3.0      Warfarin dose taken: Warfarin taken as previously instructed    Diet: family reports less appetite and different types of foods eaten recently d/t grief over loss of son, family bringing all kinds of food over, etc.    Medication changes/ interactions: No new medications/supplements affecting INR    Previous INR: Therapeutic     S/S of bleeding or thromboembolism: No    New injury or illness: No    Upcoming surgery, procedure or cardioversion: No    Additional findings: patient's son recently passed away and she has been grieving and anxious      PLAN:    Spoke with Stephanie regarding INR result and instructed:     Warfarin Dosing Instructions: hold warfarin today Mon 8/31 then change your warfarin dose to      5 mg every Mon, Thu, Sat; 2.5 mg all other days     (9% change)    Instructed patient to follow up no later than: 7-10 days  Orders given to  Homecare nurse/facility to recheck    Education provided: Importance of consistent vitamin K intake, Target INR goal and significance of current INR result and Monitoring for bleeding signs and symptoms      Stephanie verbalizes understanding and agrees to warfarin dosing plan.    Instructed to call the Anticoagulation Clinic for any changes, questions or concerns. (#[phone])        Claudia Maher RN      OBJECTIVE:  Recent labs: (last 7 days)     08/31/20   INR 3.9*         No question data found.  Anticoagulation Summary  As of 8/31/2020    INR goal:   2.0-3.0   TTR:   59.0 % (1 y)   INR used for dosing:   3.9! (8/31/2020)   Warfarin maintenance plan:   5 mg (5 mg x 1) every Mon, Thu, Sat; 2.5 mg (5 mg x 0.5) all other days   Full warfarin instructions:   8/31: Hold; Otherwise 5 mg every Mon, Thu, Sat; 2.5 mg all other days   Weekly warfarin total:   25 mg   Plan last modified:    Claudia Maher, RN (8/31/2020)   Next INR check:   9/11/2020   Priority:   High   Target end date:   Indefinite    Indications    Atrial fibrillation (H) [I48.91] [I48.91]  Long term current use of anticoagulant therapy [Z79.01]  Longstanding persistent atrial fibrillation (H) [I48.11]             Anticoagulation Episode Summary     INR check location:       Preferred lab:       Send INR reminders to:   AGGIE MURILLO    Comments:         Anticoagulation Care Providers     Provider Role Specialty Phone number    Anay Miner NP Referring Nurse Practitioner - Family [phone]

## 2020-09-02 ENCOUNTER — TELEPHONE (OUTPATIENT)
Dept: FAMILY MEDICINE | Facility: CLINIC | Age: 85
End: 2020-09-02

## 2020-09-02 NOTE — TELEPHONE ENCOUNTER
Forms received from: [employer]    Fax listed: [phone]  Date received: September 2, 2020  Form description: SN effective 9/6 1wk1  Once forms are completed, please return to [employer] via fax.  Form placed: Anay Miner NP sign me folder.    Thank you,  Quyen VICK  [facility]  Team Jeannine Coordinator

## 2020-09-09 ENCOUNTER — ANTICOAGULATION THERAPY VISIT (OUTPATIENT)
Dept: FAMILY MEDICINE | Facility: CLINIC | Age: 85
End: 2020-09-09

## 2020-09-09 ENCOUNTER — TELEPHONE (OUTPATIENT)
Dept: FAMILY MEDICINE | Facility: CLINIC | Age: 85
End: 2020-09-09

## 2020-09-09 DIAGNOSIS — I48.91 ATRIAL FIBRILLATION (H): ICD-10-CM

## 2020-09-09 DIAGNOSIS — Z79.01 LONG TERM CURRENT USE OF ANTICOAGULANT THERAPY: ICD-10-CM

## 2020-09-09 DIAGNOSIS — I48.11 LONGSTANDING PERSISTENT ATRIAL FIBRILLATION (H): ICD-10-CM

## 2020-09-09 LAB — INR PPP: 1.7 (ref 0.9–1.1)

## 2020-09-09 NOTE — TELEPHONE ENCOUNTER
Anticoagulation Management    Discussed INR home monitoring program with Ashlie Brantley reviewing:      [employer] requirements: >= 3 months of anticoagulation therapy, indication for chronic anticoagulation and order from provider    Required testing frequency (q1-2 weeks)    Home meters, testing supplies, meter training, and reporting of INR results done through an outside company. Patient would be contacted by home monitoring company to review insurance coverage with home monitoring company prior to enrolling.    [facility] would continue to receive and manage INR results.    Home monitoring application may take several weeks and must continue to follow up with recommended INR monitoring in clinic until receives monitor and training completed.     Home monitoring terms reviewed with patient      Patient agrees to frequency of testing as directed by referring provider ( weekly or biweekly) Yes    Testing to be performed during business hours of [facility] Yes    Patient agrees they have the skill ( or a designated caregiver) necessary to perform the self test Yes    Patient agrees to report all INR results to INR home monitoring company Yes    Patient agrees to have additional INR test in clinic if a home result is critical Yes    Patient agrees to schedule an INR test at a [facility] clinic yearly for technique observation and quality check of INR results with their home meter Yes    Patient agrees to use a [facility] approved service provider and device for home monitoring Yes    [facility]    Referring provider: Anay Miner NP    Referring providers Clinic Fax number [phone]    Ashlie Brantley is interested home INR monitoring and requests order be submitted.

## 2020-09-09 NOTE — PROGRESS NOTES
ANTICOAGULATION MANAGEMENT     Patient Name:  Ashlie Brantley  Date:  2020    ASSESSMENT /SUBJECTIVE:    Today's INR result of 1.7 is subtherapeutic. Goal INR of 2.0-3.0      Warfarin dose taken: Warfarin taken as previously instructed    Diet: Increased greens/vitamin K intake may be affecting INR    Medication changes/ interactions: No new medications/supplements affecting INR    Previous INR: Supratherapeutic     S/S of bleeding or thromboembolism: No    New injury or illness: No    Upcoming surgery, procedure or cardioversion: No    Additional findings: None      PLAN:    Spoke with home care RN regarding INR result and instructed:     Warfarin Dosing Instructions: take 3.75mg today then resume 5mg Mon/Thu/Sat & 2.5mg AOD    Instructed patient to follow up no later than: 1 week Orders given to home care RN  Education provided: Information on home INR monitoring      Lisa RN verbalizes understanding and agrees to warfarin dosing plan.    Instructed to call the Anticoagulation Clinic for any changes, questions or concerns. (#[phone])        Deann Thomason RN      OBJECTIVE:  Recent labs: (last 7 days)     20   INR 1.7*         No question data found.  Anticoagulation Summary  As of 2020    INR goal:   2.0-3.0   TTR:   57.7 % (1 y)   INR used for dosin.7! (2020)   Warfarin maintenance plan:   5 mg (5 mg x 1) every Mon, Thu, Sat; 2.5 mg (5 mg x 0.5) all other days   Full warfarin instructions:   : 3.75 mg; Otherwise 5 mg every Mon, Thu, Sat; 2.5 mg all other days   Weekly warfarin total:   25 mg   Plan last modified:   Claudia Maher, RN (2020)   Next INR check:   2020   Priority:   High   Target end date:   Indefinite    Indications    Atrial fibrillation (H) [I48.91] [I48.91]  Long term current use of anticoagulant therapy [Z79.01]  Longstanding persistent atrial fibrillation (H) [I48.11]             Anticoagulation Episode Summary     INR check location:        Preferred lab:       Send INR reminders to:   AGGIE FRANCIS    Comments:         Anticoagulation Care Providers     Provider Role Specialty Phone number    Anay Miner, NP Referring Nurse Practitioner - Family [phone]         Deann Grant, RN, BSN, PHN

## 2020-09-14 ENCOUNTER — TELEPHONE (OUTPATIENT)
Dept: FAMILY MEDICINE | Facility: CLINIC | Age: 85
End: 2020-09-14

## 2020-09-14 NOTE — TELEPHONE ENCOUNTER
Forms received from [facility]: ongoing warfarin orders, recheck on 9/16 for Anay Miner NP.  Forms placed in provider 'sign me' folder.  Please fax forms to [phone] after completion.    Monster Medellin

## 2020-09-16 ENCOUNTER — ANTICOAGULATION THERAPY VISIT (OUTPATIENT)
Dept: FAMILY MEDICINE | Facility: CLINIC | Age: 85
End: 2020-09-16

## 2020-09-16 DIAGNOSIS — I48.91 ATRIAL FIBRILLATION (H): ICD-10-CM

## 2020-09-16 DIAGNOSIS — I48.11 LONGSTANDING PERSISTENT ATRIAL FIBRILLATION (H): ICD-10-CM

## 2020-09-16 DIAGNOSIS — Z79.01 LONG TERM CURRENT USE OF ANTICOAGULANT THERAPY: ICD-10-CM

## 2020-09-16 LAB — INR PPP: 2.5 (ref 0.9–1.1)

## 2020-09-17 DIAGNOSIS — J84.10 FIBROSIS, LUNG (H): ICD-10-CM

## 2020-09-17 RX ORDER — CODEINE SULFATE 30 MG/1
TABLET ORAL
Qty: 60 TABLET | Refills: 0 | Status: SHIPPED | OUTPATIENT
Start: 2020-09-23 | End: 2020-10-23

## 2020-09-23 ENCOUNTER — TELEPHONE (OUTPATIENT)
Dept: FAMILY MEDICINE | Facility: CLINIC | Age: 85
End: 2020-09-23

## 2020-09-23 DIAGNOSIS — M79.671 RIGHT FOOT PAIN: Primary | ICD-10-CM

## 2020-09-23 RX ORDER — PREDNISONE 20 MG/1
20 TABLET ORAL 2 TIMES DAILY
Qty: 10 TABLET | Refills: 0 | Status: SHIPPED | OUTPATIENT
Start: 2020-09-23 | End: 2020-09-28

## 2020-09-23 NOTE — TELEPHONE ENCOUNTER
Reason for Call:  Other     Detailed comments:  Annabel from [facility] is calling to give an update to provider. Patient woke up yesterday morning with a pain in foot.  When she puts pressure on right foot she has 8 out 10 pain. There is no redness, bruising, swelling or warmth. The pain is on the instep. Patient is taking tylenol and icing it. Patient has a history of gout so Annabel thinks this could be something that might need to be checked out. Please call with direction.    Phone Number Patient can be reached at: Other phone number:  Annabel/ [facility]/ [phone]    Best Time: any    Can we leave a detailed message on this number? YES    Call taken on 9/23/2020 at 11:53 AM by Jayne Porter

## 2020-09-23 NOTE — TELEPHONE ENCOUNTER
Forwarding update to PCP and anticoagulation team.  Anticoagulation team-On license of [facility] patient will be starting prednisone 20mg BID today x5 days.  Would you like any changes to Warfarin, and when do you recommend next INR?  Recheck currently set for around 9/30/20.     Returned call to daughter Mariela to review notes and recommendations from provider below. Mariela and patient report she has been taking Tylenol for a couple of days, and it's not really helping with her foot pain.  Advised on prednisone Rx and sent pended Rx to preferred pharmacy. They are open to having home care draw for uric acid.  RN advised will talk with home care, then also advised that prednisone can affect INR, so will send a message to INR team as well to see if they want to check this week.  Understanding voiced, and patient/family agreeable to f/u next week in clinic should symptoms persist.     [facility] at number below.  Relayed above.  She isn't scheduled to go out to the home again until next week, but could send another nurse out tomorrow or Friday to draw uric acid. Verbal order given for PRN visit for blood draw per provider's note.  Discussed that will be forwarding to INR as well for possible increased monitoring while on prednisone. Will let her know if INR would like a level this week, and nurse can draw this with uric acid if needed when they go out.     Migdalia Leslie RN

## 2020-09-23 NOTE — TELEPHONE ENCOUNTER
Reviewed concerns and notes below.    I think checking a uric acid is fine- if home care can do this.  I pended an order.    Her history shows questionable gout in the past.  If the Tylenol is not controlling her pain, since she is on Warfarin, Prednisone would be the next preferred treatment.  I pended an order for Prednisone, if needed, if the Tylenol is not helping- then okay to start.  If she does start the Prednisone, it can increase effects of Warfarin, so should route to anticoagulation team to let them know for closer monitoring.    Agree with nursing note below, if symptoms persist or do not improve, she should be seen.  I can see her next week if needed.    Anay Miner, DNP, APRN, CNP

## 2020-09-23 NOTE — TELEPHONE ENCOUNTER
Called [employer] and gave orders to have INR recheck on Friday 9/25 due to use of prednisone.   [facility] will follow and manage INR .     Deann Grant, RN, BSN, PHN

## 2020-09-23 NOTE — TELEPHONE ENCOUNTER
RN spoke with patient's daughter Mariela. RN recommended to be seen in clinic for severe 8/10 right foot pain with ambulation. Pain is 1/10 when resting. Denies redness, swelling, or warmth. Denies recent injury.    Patient and daughter believe her pain is related to a gout flare up. Patient reports one many years ago that felt similar.  Patient's daughter request for uric acid level be drawn by [facility] RN, rather than being seen in clinic.     Per last uric acid level on file dated 10/15/18: Every single one of these tests is fine. With a normal uric acid level this is less supportive of a diagnosis of gout.  Luis Enrique Ellis MD     Routing to PCP per patient request to review and advise. Patient and daughter declined RN's recommendation to schedule in person visit for exam.     Anny Stanton, RN, BSN, PHN  [facility]: [place]

## 2020-09-25 ENCOUNTER — ANTICOAGULATION THERAPY VISIT (OUTPATIENT)
Dept: FAMILY MEDICINE | Facility: CLINIC | Age: 85
End: 2020-09-25

## 2020-09-25 ENCOUNTER — TRANSFERRED RECORDS (OUTPATIENT)
Dept: HEALTH INFORMATION MANAGEMENT | Facility: CLINIC | Age: 85
End: 2020-09-25

## 2020-09-25 DIAGNOSIS — I48.91 ATRIAL FIBRILLATION (H): ICD-10-CM

## 2020-09-25 DIAGNOSIS — I48.11 LONGSTANDING PERSISTENT ATRIAL FIBRILLATION (H): ICD-10-CM

## 2020-09-25 DIAGNOSIS — Z79.01 LONG TERM CURRENT USE OF ANTICOAGULANT THERAPY: ICD-10-CM

## 2020-09-25 LAB — INR PPP: 4.6 (ref 0.9–1.1)

## 2020-09-25 NOTE — PROGRESS NOTES
ANTICOAGULATION MANAGEMENT     Patient Name:  Ashlie Brantley  Date:  2020    ASSESSMENT /SUBJECTIVE:    Today's INR result of 4.6 is supratherapeutic. Goal INR of 2.0-3.0      Warfarin dose taken: Warfarin taken as instructed    Diet: No new diet changes affecting INR    Medication changes/ interactions: 5 day prednisone agustin -    Previous INR: Therapeutic     S/S of bleeding or thromboembolism: No    New injury or illness: No    Upcoming surgery, procedure or cardioversion: No    Additional findings: None      PLAN:    Telephone call with home care nurse mazin regarding INR result and instructed:     Warfarin Dosing Instructions: hold tonight and 2.5 mg tomorrow; then continue current dosing of 5 mg MTHSat and 2.5 mg ROW    Instructed patient to follow up no later than: 1 week  Orders given to  Homecare nurse/facility to recheck    Education provided: Monitoring for bleeding signs and symptoms and When to seek medical attention/emergency care      Mazin   verbalizes understanding and agrees to warfarin dosing plan.    Instructed to call the Anticoagulation Clinic for any changes, questions or concerns. (#[phone])        Annamaria Bowers RN      OBJECTIVE:  Recent labs: (last 7 days)     20   INR 4.6*         INR assessment SUPRA    Recheck INR In: 1 WEEK    INR Location Homecare INR      Anticoagulation Summary  As of 2020    INR goal:   2.0-3.0   TTR:   55.1 % (1 y)   INR used for dosin.6! (2020)   Warfarin maintenance plan:   5 mg (5 mg x 1) every Mon, Thu, Sat; 2.5 mg (5 mg x 0.5) all other days   Full warfarin instructions:   : Hold; : 2.5 mg; Otherwise 5 mg every Mon, Thu, Sat; 2.5 mg all other days   Weekly warfarin total:   25 mg   Plan last modified:   Claudia Maher RN (2020)   Next INR check:   10/2/2020   Priority:   High   Target end date:   Indefinite    Indications    Atrial fibrillation (H) [I48.91] [I48.91]  Long term current use of  anticoagulant therapy [Z79.01]  Longstanding persistent atrial fibrillation (H) [I48.11]             Anticoagulation Episode Summary     INR check location:       Preferred lab:       Send INR reminders to:   AGGIE FRANCIS    Comments:   Call Lisa with any questions this is patients Home Care RN: [phone]      Anticoagulation Care Providers     Provider Role Specialty Phone number    Anay Miner NP Referring Nurse Practitioner - Family [phone]

## 2020-09-28 ENCOUNTER — TELEPHONE (OUTPATIENT)
Dept: FAMILY MEDICINE | Facility: CLINIC | Age: 85
End: 2020-09-28

## 2020-09-28 DIAGNOSIS — R32 URINARY INCONTINENCE, UNSPECIFIED TYPE: Primary | ICD-10-CM

## 2020-09-28 DIAGNOSIS — N32.81 OVERACTIVE BLADDER: ICD-10-CM

## 2020-09-28 NOTE — TELEPHONE ENCOUNTER
Reason for Call:  Other - Referral: Urology    Detailed comments: Mariela, patient's daughter, called and asked if Anay Miner could please place a referral for urology. Patient has an appointment scheduled but Mariela wants to make sure there is a referral just in case her insurance requires it.    Phone Number Patient can be reached at: Home number on file [phone] (home)    Best Time: Anytime    Can we leave a detailed message on this number? YES    Call taken on 9/28/2020 at 3:24 PM by Yu Cardona

## 2020-09-28 NOTE — TELEPHONE ENCOUNTER
Referral to Urology placed as requested.  Please let patient/family know.    Anay Miner, AZUCENA, APRN, CNP

## 2020-09-29 NOTE — TELEPHONE ENCOUNTER
patient notified.    Thank you,  Quyen VICK  [facility]  Team Jeannine Coordinator

## 2020-10-01 ENCOUNTER — TELEPHONE (OUTPATIENT)
Dept: FAMILY MEDICINE | Facility: CLINIC | Age: 85
End: 2020-10-01

## 2020-10-01 NOTE — TELEPHONE ENCOUNTER
Forms received from:  [employer]    Fax listed: [phone]  Date received: October 1, 2020  Form description: HHC & POC 9/12-11/10  Once forms are completed, please return to [employer] via fax.  Form placed: Anay Miner NP sign me folder.     Thank you,  Quyen VICK  [facility]  Team Jeannine Coordinator

## 2020-10-01 NOTE — TELEPHONE ENCOUNTER
Forms received from: [employer]    Fax listed: [phone]  Date received: October 1, 2020  Form description: verbal orders received from Carlotta-to check inr on 9/25  Once forms are completed, please return to [employer] via fax.  Form placed: Anay Miner NP sign me folder.    Thank you,  Quyen VICK  [facility]  Team Jeannine Coordinator

## 2020-10-01 NOTE — TELEPHONE ENCOUNTER
Forms received from: [employer]    Fax listed: [phone]  Date received: October 1, 2020  Form description: Orders-Verbal obtained from Migdalia for URIC Acid lab  Once forms are completed, please return to [employer] via fax.  Form placed: Anay Miner NP sign me folder.    Thank you,  Quyen VICK  [facility]  Team Jeannine Coordinator

## 2020-10-02 ENCOUNTER — ANTICOAGULATION THERAPY VISIT (OUTPATIENT)
Dept: FAMILY MEDICINE | Facility: CLINIC | Age: 85
End: 2020-10-02

## 2020-10-02 DIAGNOSIS — Z79.01 LONG TERM CURRENT USE OF ANTICOAGULANT THERAPY: ICD-10-CM

## 2020-10-02 DIAGNOSIS — I48.91 ATRIAL FIBRILLATION (H): ICD-10-CM

## 2020-10-02 DIAGNOSIS — I48.11 LONGSTANDING PERSISTENT ATRIAL FIBRILLATION (H): ICD-10-CM

## 2020-10-02 LAB — INR PPP: 2.8 (ref 0.9–1.1)

## 2020-10-02 NOTE — PROGRESS NOTES
ANTICOAGULATION MANAGEMENT     Patient Name:  Ashlie Brantley  Date:  10/2/2020    ASSESSMENT /SUBJECTIVE:    Today's INR result of 2.8 is therapeutic. Goal INR of 2.0-3.0      Warfarin dose taken: Warfarin taken as instructed    Diet: Increased greens/vitamin K in diet which may be affecting INR; plans to resume previous intake. Was eating more greens every other day while on prednisone.    Medication changes/ interactions: Last dose of prednisone on Monday.    Previous INR: Supratherapeutic     S/S of bleeding or thromboembolism: No    New injury or illness: No    Upcoming surgery, procedure or cardioversion: No    Additional findings: None      PLAN:    Telephone call with home care nurse Stephanie regarding INR result and instructed:     Warfarin Dosing Instructions: Continue your current warfarin dose 5 mg M/Thur/Sat and 2.5 mg ROW    Instructed patient to follow up no later than: 1 week  Orders given to  Homecare nurse/facility to recheck    Education provided: None required      Stephanie verbalizes understanding and agrees to warfarin dosing plan.    Instructed to call the Anticoagulation Clinic for any changes, questions or concerns. (#[phone])        Laya Apple RN      OBJECTIVE:  Recent labs: (last 7 days)     10/02/20   INR 2.8*         No question data found.  Anticoagulation Summary  As of 10/2/2020    INR goal:  2.0-3.0   TTR:  53.4 % (1 y)   INR used for dosin.8 (10/2/2020)   Warfarin maintenance plan:  5 mg (5 mg x 1) every Mon, Thu, Sat; 2.5 mg (5 mg x 0.5) all other days   Full warfarin instructions:  5 mg every Mon, Thu, Sat; 2.5 mg all other days   Weekly warfarin total:  25 mg   No change documented:  Laya Apple, RN   Plan last modified:  Claudia Maher RN (2020)   Next INR check:  10/9/2020   Priority:  High   Target end date:  Indefinite    Indications    Atrial fibrillation (H) [I48.91] [I48.91]  Long term current use of anticoagulant therapy  [Z79.01]  Longstanding persistent atrial fibrillation (H) [I48.11]             Anticoagulation Episode Summary     INR check location:      Preferred lab:      Send INR reminders to:  AGGIE FRANCIS    Comments:  Call Lisa with any questions this is patients Home Care RN: [phone]      Anticoagulation Care Providers     Provider Role Specialty Phone number    Anay Miner NP Referring Nurse Practitioner - Family [phone]

## 2020-10-05 ENCOUNTER — TELEPHONE (OUTPATIENT)
Dept: FAMILY MEDICINE | Facility: CLINIC | Age: 85
End: 2020-10-05

## 2020-10-05 NOTE — TELEPHONE ENCOUNTER
Forms received from [facility]: SN effective 9/27, 2 wk1, 1q2wk6 for Genna Brumfield DO.  Forms placed in provider 'sign me' folder.  Please fax forms to [phone] after completion.    Monster Medellin

## 2020-10-09 ENCOUNTER — ANTICOAGULATION THERAPY VISIT (OUTPATIENT)
Dept: FAMILY MEDICINE | Facility: CLINIC | Age: 85
End: 2020-10-09

## 2020-10-09 DIAGNOSIS — I48.0 PAROXYSMAL ATRIAL FIBRILLATION (H): ICD-10-CM

## 2020-10-09 DIAGNOSIS — I48.11 LONGSTANDING PERSISTENT ATRIAL FIBRILLATION (H): ICD-10-CM

## 2020-10-09 DIAGNOSIS — Z79.01 LONG TERM CURRENT USE OF ANTICOAGULANT THERAPY: ICD-10-CM

## 2020-10-09 LAB — INR PPP: 2.4 (ref 0.9–1.1)

## 2020-10-09 NOTE — PROGRESS NOTES
ANTICOAGULATION MANAGEMENT     Patient Name:  Ashlie Brantley  Date:  10/9/2020    ASSESSMENT /SUBJECTIVE:    Today's INR result of 2.4 is therapeutic. Goal INR of 2.0-3.0      Warfarin dose taken: Warfarin taken as instructed    Diet: No new diet changes affecting INR    Medication changes/ interactions: No new medications/supplements affecting INR    Previous INR: Therapeutic     S/S of bleeding or thromboembolism: No    New injury or illness: No    Upcoming surgery, procedure or cardioversion: No    Additional findings: None      PLAN:    Telephone call with home care nurse Felician [facility] Nurse regarding INR result and instructed:     Warfarin Dosing Instructions: Continue your current warfarin dose 5 mg Mon, Thu, Sat; 2.5 mg all other days    Instructed patient to follow up no later than: 1 week  Orders given to  Homecare nurse/facility to recheck    Education provided: Monitoring for bleeding signs and symptoms and Monitoring for clotting signs and symptoms      Nurse Stephanie verbalizes understanding and agrees to warfarin dosing plan.    Instructed to call the Anticoagulation Clinic for any changes, questions or concerns. (#[phone])        Luz Allen RN      OBJECTIVE:  Recent labs: (last 7 days)     10/09/20   INR 2.4*         No question data found.  Anticoagulation Summary  As of 10/9/2020    INR goal:  2.0-3.0   TTR:  53.4 % (1 y)   INR used for dosin.4 (10/9/2020)   Warfarin maintenance plan:  5 mg (5 mg x 1) every Mon, Thu, Sat; 2.5 mg (5 mg x 0.5) all other days   Full warfarin instructions:  5 mg every Mon, Thu, Sat; 2.5 mg all other days   Weekly warfarin total:  25 mg   No change documented:  Luz Allen RN   Plan last modified:  Claudia Maher RN (2020)   Next INR check:  10/16/2020   Priority:  High   Target end date:  Indefinite    Indications    Atrial fibrillation (H) [I48.91] [I48.91]  Long term current use of anticoagulant therapy [Z79.01]  Longstanding  persistent atrial fibrillation (H) [I48.11]             Anticoagulation Episode Summary     INR check location:      Preferred lab:      Send INR reminders to:  AGGIE FRANCIS    Comments:  Call Lisa with any questions this is patients Home Care RN: [phone]      Anticoagulation Care Providers     Provider Role Specialty Phone number    Anay Miner NP Referring Nurse Practitioner - Family [phone]

## 2020-10-12 ENCOUNTER — TELEPHONE (OUTPATIENT)
Dept: FAMILY MEDICINE | Facility: CLINIC | Age: 85
End: 2020-10-12

## 2020-10-12 NOTE — TELEPHONE ENCOUNTER
Forms received from [facility]/ 2 orders   1) Physician Order. Order dated 9/25/20 - per Taylor RN on behalf of Genna Brumfield new dose due to high INR of 4.6 ETC.          2)Physician Order. Order dated 10/02/20. Per Laya MENDOZA on behalf of Genna Brumfield new does of warfarin 5mg Mondays, Thursdays and Saturdays, ETC  for Anay Miner CNP.  Forms placed in provider 'sign me' folder.  Please fax forms to [phone] after completion.    Jayne Porter  Patient Representative

## 2020-10-14 ENCOUNTER — TELEPHONE (OUTPATIENT)
Dept: FAMILY MEDICINE | Facility: CLINIC | Age: 85
End: 2020-10-14

## 2020-10-14 ENCOUNTER — OFFICE VISIT (OUTPATIENT)
Dept: UROLOGY | Facility: CLINIC | Age: 85
End: 2020-10-14
Payer: COMMERCIAL

## 2020-10-14 DIAGNOSIS — N32.81 OAB (OVERACTIVE BLADDER): Primary | ICD-10-CM

## 2020-10-14 LAB
ALBUMIN UR-MCNC: NEGATIVE MG/DL
APPEARANCE UR: CLEAR
BILIRUB UR QL STRIP: NEGATIVE
COLOR UR AUTO: YELLOW
GLUCOSE UR STRIP-MCNC: NEGATIVE MG/DL
HGB UR QL STRIP: ABNORMAL
KETONES UR STRIP-MCNC: NEGATIVE MG/DL
LEUKOCYTE ESTERASE UR QL STRIP: ABNORMAL
NITRATE UR QL: NEGATIVE
NON-SQ EPI CELLS #/AREA URNS LPF: ABNORMAL /LPF
PH UR STRIP: 5.5 PH (ref 5–7)
RBC #/AREA URNS AUTO: ABNORMAL /HPF
SOURCE: ABNORMAL
SP GR UR STRIP: 1.02 (ref 1–1.03)
UROBILINOGEN UR STRIP-ACNC: 2 EU/DL (ref 0.2–1)
WBC #/AREA URNS AUTO: ABNORMAL /HPF

## 2020-10-14 PROCEDURE — 81001 URINALYSIS AUTO W/SCOPE: CPT | Performed by: UROLOGY

## 2020-10-14 PROCEDURE — 99213 OFFICE O/P EST LOW 20 MIN: CPT | Performed by: UROLOGY

## 2020-10-14 RX ORDER — TOLTERODINE 4 MG/1
4 CAPSULE, EXTENDED RELEASE ORAL DAILY
Qty: 60 CAPSULE | Refills: 0 | Status: SHIPPED | OUTPATIENT
Start: 2020-10-14 | End: 2020-12-08

## 2020-10-14 RX ORDER — OXYBUTYNIN CHLORIDE 5 MG/1
5 TABLET ORAL DAILY
Qty: 60 TABLET | Refills: 0 | Status: SHIPPED | OUTPATIENT
Start: 2020-10-14 | End: 2020-12-08

## 2020-10-14 NOTE — PROGRESS NOTES
F/u OAB wet, 250 bladder cap, BETO, s/p sling, Detrol LA 4, lasix 20  (Presents with her dtr)     Compliant; takes the lasix BID. Has not taken any bladder med in one year; was prescribed Myrbetriq but it was too costly; has not taken any bladder med at all since that visit.    Still on Lasix 20; takes first thing in the morning or when she returns from morning appointments. Her control is fairly good if she holds off on her lasix.    Ambulation is limited as she now uses her walker, ambulates slowly.    On continuous O2.    Denies dysuria, gross hematuria. Denies fecal incontinence.        Current Outpatient Medications   Medication     acetaminophen (TYLENOL) 500 MG tablet     albuterol (2.5 MG/3ML) 0.083% neb solution     codeine 30 MG tablet     furosemide (LASIX) 20 MG tablet     levothyroxine (SYNTHROID/LEVOTHROID) 88 MCG tablet     losartan (COZAAR) 50 MG tablet     metoprolol tartrate (LOPRESSOR) 50 MG tablet     neomycin-polymyxin-dexamethasone (MAXITROL) 3.5-96288-5.1 ophthalmic ointment     order for DME     order for DME     order for DME     simvastatin (ZOCOR) 40 MG tablet     warfarin ANTICOAGULANT (COUMADIN) 2.5 MG tablet     warfarin ANTICOAGULANT (COUMADIN) 5 MG tablet     No current facility-administered medications for this visit.          Results for orders placed or performed in visit on 10/14/20   UA reflex to Microscopic     Status: Abnormal   Result Value Ref Range    Color Urine Yellow     Appearance Urine Clear     Glucose Urine Negative NEG^Negative mg/dL    Bilirubin Urine Negative NEG^Negative    Ketones Urine Negative NEG^Negative mg/dL    Specific Gravity Urine 1.025 1.003 - 1.035    Blood Urine Trace (A) NEG^Negative    pH Urine 5.5 5.0 - 7.0 pH    Protein Albumin Urine Negative NEG^Negative mg/dL    Urobilinogen Urine 2.0 (H) 0.2 - 1.0 EU/dL    Nitrite Urine Negative NEG^Negative    Leukocyte Esterase Urine Small (A) NEG^Negative    Source Midstream Urine        IMP:  1. Complex  urinary incontinence, room for improvement  2. Lasix, anticoagulation  3. Continuous O2, limited ambulation  4. Advanced maturity      PLAN:  1. Discussed situation with patient in detail.  2. Consider restart Detrol LA4; then add Ditropan IR 5 when she takes her lasix  3. RTC 2 mos to review progress; might consider indwelling hester at that time; introduced concept today  4. Set realistic expectations  5. 15 minutes spent with patient, more than 50% spent in counseling and coordination of care for incontinence.

## 2020-10-14 NOTE — TELEPHONE ENCOUNTER
Forms received from: [employer]    Fax listed: [phone]  Date received: October 14, 2020  Form description: Per Luz on behalf of Dr Brumfield keep warfarin 5mg  Once forms are completed, please return to [employer] via fax.  Form placed: Anay Miner NP sign me folder.    Thank you,  Quyen VICK  [facility]  Team Jeannine Coordinator

## 2020-10-16 ENCOUNTER — TELEPHONE (OUTPATIENT)
Dept: UROLOGY | Facility: CLINIC | Age: 85
End: 2020-10-16

## 2020-10-16 ENCOUNTER — ANTICOAGULATION THERAPY VISIT (OUTPATIENT)
Dept: FAMILY MEDICINE | Facility: CLINIC | Age: 85
End: 2020-10-16

## 2020-10-16 DIAGNOSIS — I48.0 PAROXYSMAL ATRIAL FIBRILLATION (H): ICD-10-CM

## 2020-10-16 DIAGNOSIS — I48.11 LONGSTANDING PERSISTENT ATRIAL FIBRILLATION (H): ICD-10-CM

## 2020-10-16 DIAGNOSIS — Z79.01 LONG TERM CURRENT USE OF ANTICOAGULANT THERAPY: ICD-10-CM

## 2020-10-16 LAB — INR PPP: 1.4 (ref 0.9–1.1)

## 2020-10-16 NOTE — TELEPHONE ENCOUNTER
Called and spoke to patient's daughter.   Per Dr. Warner:Consider restart Detrol LA4; then add Ditropan IR 5 when she takes her lasix.   Caller agrees.   Milla Jason RN

## 2020-10-16 NOTE — TELEPHONE ENCOUNTER
Reason for call:  Other   Patient called regarding (reason for call): prescription  Additional comments:  Daughter calling. There were 2 medication prescribed for judson. The pharmacy is questioning that the 2 can be taken together. Please call and advise.     Phone number to reach patient:  Home number on file [phone] (home)    Best Time:  Any     Can we leave a detailed message on this number?  YES    Travel screening: Not Applicable

## 2020-10-16 NOTE — PROGRESS NOTES
ANTICOAGULATION MANAGEMENT     Patient Name:  Ashlie Brantley  Date:  10/16/2020    ASSESSMENT /SUBJECTIVE:    Today's INR result of 1.4 is subtherapeutic. Goal INR of 2.0-3.0      Warfarin dose taken: Missed dose(s) may be affecting INR - missed on Wednesday    Diet: No new diet changes affecting INR    Medication changes/ interactions: No new medications/supplements affecting INR    Previous INR: Therapeutic     S/S of bleeding or thromboembolism: No    New injury or illness: No    Upcoming surgery, procedure or cardioversion: No    Additional findings: None      PLAN:    Telephone call with home care nurse Lisa regarding INR result and instructed:     Warfarin Dosing Instructions: Take 5 mg today only then continue your current warfarin dose of 5 mg every Mon, Thu, Sat; 2.5 mg all other days    Instructed patient to follow up no later than: 1 week  Orders given to  Homecare nurse/facility to recheck    Education provided: Target INR goal and significance of current INR result, Importance of notifying clinic for changes in medications and Importance of notifying clinic for diarrhea, nausea/vomiting, reduced intake and/or illness      Ashlie verbalizes understanding and agrees to warfarin dosing plan.    Instructed to call the Anticoagulation Clinic for any changes, questions or concerns. (#[phone])        Liz Cheng RN      OBJECTIVE:  Recent labs: (last 7 days)     10/16/20   INR 1.4*         No question data found.  Anticoagulation Summary  As of 10/16/2020    INR goal:  2.0-3.0   TTR:  52.2 % (1 y)   INR used for dosin.4 (10/16/2020)   Warfarin maintenance plan:  5 mg (5 mg x 1) every Mon, Thu, Sat; 2.5 mg (5 mg x 0.5) all other days   Full warfarin instructions:  10/16: 5 mg; Otherwise 5 mg every Mon, Thu, Sat; 2.5 mg all other days   Weekly warfarin total:  25 mg   Plan last modified:  Claudia Maher, RN (2020)   Next INR check:  10/23/2020   Priority:  High   Target end date:   Indefinite    Indications    Atrial fibrillation (H) [I48.91] [I48.91]  Long term current use of anticoagulant therapy [Z79.01]  Longstanding persistent atrial fibrillation (H) [I48.11]             Anticoagulation Episode Summary     INR check location:      Preferred lab:  EXTER[facility]    Send INR reminders to:  AGGIE FRANCIS    Comments:  Call Lisa with any questions this is patients Home Care RN: [phone]      Anticoagulation Care Providers     Provider Role Specialty Phone number    Anay Miner NP Referring Nurse Practitioner - Family [phone]

## 2020-10-19 ENCOUNTER — TELEPHONE (OUTPATIENT)
Dept: FAMILY MEDICINE | Facility: CLINIC | Age: 85
End: 2020-10-19

## 2020-10-19 NOTE — TELEPHONE ENCOUNTER
Forms received from: [employer]    Fax listed: [phone]  Date received: October 19, 2020  Form description: Verbal orders obtained form eneida MENDOZA for warfarin 5mg  Once forms are completed, please return to [employer] via fax.  Form placed: Anay Miner NP sign me folder.    Thank you,  Quyen VICK  [facility]  Team Jeannine Coordinator

## 2020-10-23 ENCOUNTER — ANTICOAGULATION THERAPY VISIT (OUTPATIENT)
Dept: FAMILY MEDICINE | Facility: CLINIC | Age: 85
End: 2020-10-23

## 2020-10-23 ENCOUNTER — TELEPHONE (OUTPATIENT)
Dept: FAMILY MEDICINE | Facility: CLINIC | Age: 85
End: 2020-10-23

## 2020-10-23 DIAGNOSIS — Z79.01 LONG TERM CURRENT USE OF ANTICOAGULANT THERAPY: ICD-10-CM

## 2020-10-23 DIAGNOSIS — J84.10 FIBROSIS, LUNG (H): ICD-10-CM

## 2020-10-23 DIAGNOSIS — I48.11 LONGSTANDING PERSISTENT ATRIAL FIBRILLATION (H): ICD-10-CM

## 2020-10-23 DIAGNOSIS — I48.0 PAROXYSMAL ATRIAL FIBRILLATION (H): ICD-10-CM

## 2020-10-23 LAB — INR PPP: 1.6 (ref 0.9–1.1)

## 2020-10-23 RX ORDER — CODEINE SULFATE 30 MG/1
TABLET ORAL
Qty: 60 TABLET | Refills: 0 | Status: SHIPPED | OUTPATIENT
Start: 2020-10-23 | End: 2020-11-24

## 2020-10-23 NOTE — TELEPHONE ENCOUNTER
Reason for Call:  Medication or medication refill:    Do you use a [facility]?  Name of the pharmacy and phone number for the current request:  [facility]     Name of the medication requested: codeine     Other request: Mariela, patient's daughter, called and stated they had not realized her mom was so low on this medication and she will not have enough to cover her during the weekend.  Mariela is requesting this refill today.    Can we leave a detailed message on this number? YES    Phone number patient can be reached at: Home number on file [phone] (home)    Best Time: ASAP    Call taken on 10/23/2020 at 10:30 AM by Raquel Lozano

## 2020-10-23 NOTE — PROGRESS NOTES
ANTICOAGULATION MANAGEMENT     Patient Name:  Ashlie Brantley  Date:  10/23/2020    ASSESSMENT /SUBJECTIVE:    Today's INR result of 1.6 is subtherapeutic. Goal INR of 2.0-3.0      Warfarin dose taken: Warfarin taken as instructed    Diet: Increased greens/vitamin K in diet which may be affecting INR; plans to resume previous intake    Medication changes/ interactions: No new medications/supplements affecting INR    Previous INR: Subtherapeutic     S/S of bleeding or thromboembolism: No    New injury or illness: No    Upcoming surgery, procedure or cardioversion: No    Additional findings: None      PLAN:    Telephone call with home care nurse Lisa regarding INR result and instructed:     Warfarin Dosing Instructions: Take 5mg today then change your dose to 2.5mg Sun/Wed/Fri & 5mg AOD (10% increase)    Instructed patient to follow up no later than: 1 week  Orders given to  Homecare nurse/facility to recheck    Education provided: Please call back if any changes to your diet, medications or how you've been taking warfarin, Importance of consistent vitamin K intake, Impact of vitamin K foods on INR and Vitamin K content of foods      REJI Gonzalez verbalizes understanding and agrees to warfarin dosing plan.    Instructed to call the Anticoagulation Clinic for any changes, questions or concerns. (#[phone])        Deann Thomason RN      OBJECTIVE:  Recent labs: (last 7 days)     10/23/20   INR 1.6*         No question data found.  Anticoagulation Summary  As of 10/23/2020    INR goal:  2.0-3.0   TTR:  50.3 % (1 y)   INR used for dosin.6 (10/23/2020)   Warfarin maintenance plan:  2.5 mg (5 mg x 0.5) every Sun, Wed, Fri; 5 mg (5 mg x 1) all other days   Full warfarin instructions:  10/23: 5 mg; Otherwise 2.5 mg every Sun, Wed, Fri; 5 mg all other days   Weekly warfarin total:  27.5 mg   Plan last modified:  Deann Thomason RN (10/23/2020)   Next INR check:     Priority:  High   Target end date:  Indefinite     Indications    Atrial fibrillation (H) [I48.91] [I48.91]  Long term current use of anticoagulant therapy [Z79.01]  Longstanding persistent atrial fibrillation (H) [I48.11]             Anticoagulation Episode Summary     INR check location:      Preferred lab:  EXTERNAL LAB    Send INR reminders to:  AGGIE FRANCIS    Comments:  Call Lisa with any questions this is patients Home Care RN: [phone]      Anticoagulation Care Providers     Provider Role Specialty Phone number    Anay Miner NP Referring Nurse Practitioner - Family [phone]         Deann Grant, RN, BSN, PHN

## 2020-10-23 NOTE — TELEPHONE ENCOUNTER
Forwarding refill request for codeine to PCP, as this med not on RN protocol.  Flagging as high priority, as patient is out of med.  Last Rx was 9/23/20 for 60 tabs and no refills.  Last provider visit was virtual on 6/29/20.    Migdalia Leslie RN

## 2020-10-27 ENCOUNTER — TELEPHONE (OUTPATIENT)
Dept: FAMILY MEDICINE | Facility: CLINIC | Age: 85
End: 2020-10-27

## 2020-10-27 NOTE — TELEPHONE ENCOUNTER
Forms received from: [employer]    Fax listed: [phone]  Date received: October 27, 2020  Form description: SN Effective 10/25 1wk1, 2Q2wk2  Once forms are completed, please return to [employer] via fax.  Form placed: Anay Miner NP sign me folder.    Thank you,  Quyen VICK  [facility] Jeannine Coordinator

## 2020-10-30 ENCOUNTER — ANTICOAGULATION THERAPY VISIT (OUTPATIENT)
Dept: FAMILY MEDICINE | Facility: CLINIC | Age: 85
End: 2020-10-30

## 2020-10-30 DIAGNOSIS — Z79.01 LONG TERM CURRENT USE OF ANTICOAGULANT THERAPY: ICD-10-CM

## 2020-10-30 DIAGNOSIS — I48.11 LONGSTANDING PERSISTENT ATRIAL FIBRILLATION (H): ICD-10-CM

## 2020-10-30 DIAGNOSIS — I48.91 ATRIAL FIBRILLATION (H): ICD-10-CM

## 2020-10-30 LAB — INR PPP: 1.8 (ref 0.9–1.1)

## 2020-10-30 NOTE — PROGRESS NOTES
ANTICOAGULATION MANAGEMENT     Patient Name:  Ashlie Brantley  Date:  10/30/2020    ASSESSMENT /SUBJECTIVE:    Today's INR result of 1.8 is subtherapeutic. Goal INR of 2.0-3.0      Warfarin dose taken: Warfarin taken as instructed    Diet: No new diet changes affecting INR    Medication changes/ interactions: No new medications/supplements affecting INR    Previous INR: Therapeutic     S/S of bleeding or thromboembolism: No    New injury or illness: No    Upcoming surgery, procedure or cardioversion: No    Additional findings: none      PLAN:    Telephone call with home care nurse Kristen regarding INR result and instructed:     Warfarin Dosing Instructions: 5 mg  then continue your current warfarin dose of 2.5 mg Sun WF; 5 mg ROW    Instructed patient to follow up no later than: 1 week  Orders given to  Homecare nurse/facility to recheck    Education provided: None required      Kristen verbalizes understanding and agrees to warfarin dosing plan.    Instructed to call the Anticoagulation Clinic for any changes, questions or concerns. (#[phone])        Annamaria Bowers RN      OBJECTIVE:  Recent labs: (last 7 days)     10/30/20   INR 1.8*         INR assessment SUB    Recheck INR In: 1 WEEK    INR Location Homecare INR      Anticoagulation Summary  As of 10/30/2020    INR goal:  2.0-3.0   TTR:  48.4 % (1 y)   INR used for dosin.8 (10/30/2020)   Warfarin maintenance plan:  2.5 mg (5 mg x 0.5) every Sun, Wed, Fri; 5 mg (5 mg x 1) all other days   Full warfarin instructions:  10/30: 5 mg; Otherwise 2.5 mg every Sun, Wed, Fri; 5 mg all other days   Weekly warfarin total:  27.5 mg   Plan last modified:  Deann Thomason, RN (10/23/2020)   Next INR check:  2020   Priority:  High   Target end date:  Indefinite    Indications    Atrial fibrillation (H) [I48.91] [I48.91]  Long term current use of anticoagulant therapy [Z79.01]  Longstanding persistent atrial fibrillation (H) [I48.11]             Anticoagulation  Episode Summary     INR check location:      Preferred lab:  EXTERNAL LAB    Send INR reminders to:  AGGIE FRANCIS    Comments:  Call Lisa with any questions this is patients Home Care RN: [phone]      Anticoagulation Care Providers     Provider Role Specialty Phone number    Anay Miner NP Referring Nurse Practitioner - Family [phone]

## 2020-11-03 ENCOUNTER — TELEPHONE (OUTPATIENT)
Dept: FAMILY MEDICINE | Facility: CLINIC | Age: 85
End: 2020-11-03

## 2020-11-03 NOTE — TELEPHONE ENCOUNTER
Forms received from [employer] [facility]/ Physician order- Order date 10/30/20 - INR Via fingerstick: 1.8for Anay Miner CNP.  Forms placed in provider 'sign me' folder.  Please fax forms to [phone] after completion.    Jayne Porter  Patient Representative

## 2020-11-06 ENCOUNTER — ANTICOAGULATION THERAPY VISIT (OUTPATIENT)
Dept: FAMILY MEDICINE | Facility: CLINIC | Age: 85
End: 2020-11-06

## 2020-11-06 DIAGNOSIS — I48.91 ATRIAL FIBRILLATION (H): ICD-10-CM

## 2020-11-06 DIAGNOSIS — Z79.01 LONG TERM CURRENT USE OF ANTICOAGULANT THERAPY: ICD-10-CM

## 2020-11-06 DIAGNOSIS — I48.11 LONGSTANDING PERSISTENT ATRIAL FIBRILLATION (H): ICD-10-CM

## 2020-11-06 LAB — INR PPP: 2 (ref 0.9–1.1)

## 2020-11-06 NOTE — PROGRESS NOTES
ANTICOAGULATION MANAGEMENT     Patient Name:  Ashlie Brantley  Date:  2020    ASSESSMENT /SUBJECTIVE:    Today's INR result of 2.0 is therapeutic. Goal INR of 2.0-3.0      Warfarin dose taken: Warfarin taken as instructed    Diet: No new diet changes affecting INR    Medication changes/ interactions: Taking tolterodine and warfarin may increase risk of bleeding, but not expected to affect INR, Oxybutynin should not affect    Previous INR: Subtherapeutic     S/S of bleeding or thromboembolism: No    New injury or illness: No    Upcoming surgery, procedure or cardioversion: No    Additional findings: None      PLAN:    Telephone call with home care nurse Lisa regarding INR result and instructed:     Warfarin Dosing Instructions: Change your warfarin dose to 2.5 mg on Sundays and thursday and 5 mg all other days    Instructed patient to follow up no later than: 1 week  Orders given to  Homecare nurse/facility to recheck    Education provided: None required      Lisa, home care, verbalizes understanding and agrees to warfarin dosing plan.    Instructed to call the Anticoagulation Clinic for any changes, questions or concerns. (#[phone])        Lyla Robbins RN      OBJECTIVE:  Recent labs: (last 7 days)     20   INR 2.0*         No question data found.  Anticoagulation Summary  As of 2020    INR goal:  2.0-3.0   TTR:  46.5 % (1 y)   INR used for dosin.0 (2020)   Warfarin maintenance plan:  2.5 mg (5 mg x 0.5) every Sun, Thu; 5 mg (5 mg x 1) all other days   Full warfarin instructions:  2.5 mg every Sun, Thu; 5 mg all other days   Weekly warfarin total:  30 mg   Plan last modified:  Lyla Robbins RN (2020)   Next INR check:     Priority:  High   Target end date:  Indefinite    Indications    Atrial fibrillation (H) [I48.91] [I48.91]  Long term current use of anticoagulant therapy [Z79.01]  Longstanding persistent atrial fibrillation (H) [I48.11]             Anticoagulation  Episode Summary     INR check location:      Preferred lab:  EXTERNAL LAB    Send INR reminders to:  AGGIE FRANCIS    Comments:  Call Lisa with any questions this is patients Home Care RN: [phone]      Anticoagulation Care Providers     Provider Role Specialty Phone number    Anay Miner NP Referring Nurse Practitioner - Family [phone]

## 2020-11-09 PROBLEM — K59.09 OTHER CONSTIPATION: Status: ACTIVE | Noted: 2020-11-09

## 2020-11-10 ENCOUNTER — TELEPHONE (OUTPATIENT)
Dept: FAMILY MEDICINE | Facility: CLINIC | Age: 85
End: 2020-11-10

## 2020-11-10 DIAGNOSIS — Z79.01 LONG TERM CURRENT USE OF ANTICOAGULANT THERAPY: Primary | ICD-10-CM

## 2020-11-10 DIAGNOSIS — K21.9 GASTROESOPHAGEAL REFLUX DISEASE WITHOUT ESOPHAGITIS: ICD-10-CM

## 2020-11-10 DIAGNOSIS — K59.09 OTHER CONSTIPATION: ICD-10-CM

## 2020-11-10 DIAGNOSIS — I48.11 LONGSTANDING PERSISTENT ATRIAL FIBRILLATION (H): ICD-10-CM

## 2020-11-10 NOTE — TELEPHONE ENCOUNTER
Reason for Call: Request for an order or referral:    Order or referral being requested: Verbal nursing orders    Date needed: as soon as possible    Has the patient been seen by the PCP for this problem? YES    Additional comments: Recertification today.   Skilled nursing 1x a week for 4 weeks for INR monitoring    Couple of dosing questions:   Verbal to have oxygen 3 liters continuous instead of 2 liters as originally ordered.     Miralax 7grams once daily.     famotidine (PEPCID) 20 MG tablet as needed    Also waiting for doctor to sign request for home INR machine.     Phone number Patient can be reached at:  Other phone number:  [phone]    Best Time:  any    Can we leave a detailed message on this number?  YES    Call taken on 11/10/2020 at 3:46 PM by Katelyn Burden

## 2020-11-10 NOTE — TELEPHONE ENCOUNTER
Routed to PCP.    Please see requested information below    Mckenna Donald, BSN-RN  [facility]

## 2020-11-11 ENCOUNTER — TELEPHONE (OUTPATIENT)
Dept: FAMILY MEDICINE | Facility: CLINIC | Age: 85
End: 2020-11-11

## 2020-11-11 RX ORDER — FAMOTIDINE 20 MG/1
20 TABLET, FILM COATED ORAL 2 TIMES DAILY PRN
Qty: 60 TABLET | Refills: 11 | Status: SHIPPED | OUTPATIENT
Start: 2020-11-11

## 2020-11-11 RX ORDER — POLYETHYLENE GLYCOL 3350 17 G/17G
1 POWDER, FOR SOLUTION ORAL DAILY PRN
Qty: 116 G | Refills: 5 | Status: SHIPPED | OUTPATIENT
Start: 2020-11-11

## 2020-11-11 NOTE — TELEPHONE ENCOUNTER
Kimberly calling from [employer] to see if we can fax this order back by the end of today.    Monster Medellin

## 2020-11-11 NOTE — TELEPHONE ENCOUNTER
RN spoke with REJI Johnson. Relayed provider's message below.     Elizabeth believes there is no other paperwork for INR DME order at this time.     Flagged for TC to fax/address DME order, in provider's folder.     Anny Stanton RN, BSN, PHN  [facility]: [place]

## 2020-11-11 NOTE — TELEPHONE ENCOUNTER
[facility] from [facility]: order date 11/6, start oxybutin 5mg with lasix in am and tolterodine tartrate er 4mg for Anay Miner NP.  Forms placed in provider 'sign me' folder.  Please fax forms to [phone] after completion.    Monster Medellin

## 2020-11-11 NOTE — TELEPHONE ENCOUNTER
Okay for below orders as written:  Recertification today.   Skilled nursing 1x a week for 4 weeks for INR monitoring    Okay for Verbal to have oxygen 3 liters continuous instead of 2 liters as originally ordered    Recommend Miralax 17 g (1 capful) by mouth daily as needed for constipation.  Adjust dosage to have a soft stool daily. (May increase to 1 capful twice daily or decrease to 1 capful every other day.    Recommend Famotidine 20 mg by mouth twice daily as needed for reflux/heartburn    I signed a DME for the home INR machine (I placed the printed copy in my folder if you need it FAXed somewhere).  Is there anything else I need to sign for the home INR machine?  (I did not find any papers to sign for this in my folder)    Anay Miner, AZUCENA, APRN, CNP

## 2020-11-12 ENCOUNTER — MEDICAL CORRESPONDENCE (OUTPATIENT)
Dept: HEALTH INFORMATION MANAGEMENT | Facility: CLINIC | Age: 85
End: 2020-11-12

## 2020-11-13 ENCOUNTER — ANTICOAGULATION THERAPY VISIT (OUTPATIENT)
Dept: FAMILY MEDICINE | Facility: CLINIC | Age: 85
End: 2020-11-13

## 2020-11-13 DIAGNOSIS — I48.11 LONGSTANDING PERSISTENT ATRIAL FIBRILLATION (H): ICD-10-CM

## 2020-11-13 DIAGNOSIS — Z79.01 LONG TERM CURRENT USE OF ANTICOAGULANT THERAPY: ICD-10-CM

## 2020-11-13 LAB — INR PPP: 2.3 (ref 0.9–1.1)

## 2020-11-13 NOTE — PROGRESS NOTES
ANTICOAGULATION MANAGEMENT     Patient Name:  Ashlie Brantley  Date:  2020    ASSESSMENT /SUBJECTIVE:    Today's INR result of 2.3 is therapeutic. Goal INR of 2.0-3.0      Warfarin dose taken: Warfarin taken as instructed    Diet: No new diet changes affecting INR    Medication changes/ interactions: No new medications/supplements affecting INR    Previous INR: Therapeutic     S/S of bleeding or thromboembolism: No    New injury or illness: No    Upcoming surgery, procedure or cardioversion: No    Additional findings: None      PLAN:    Telephone call with lamar Cali, regarding INR result and instructed:     Warfarin Dosing Instructions: Continue your current warfarin dose 2.5 mg on sun/thu and 5 mg all other days    Instructed patient to follow up no later than: 2 weeks  Orders given to  Homecare nurse/facility to recheck    Education provided: None required      lamar Cali care, verbalizes understanding and agrees to warfarin dosing plan.    Instructed to call the Anticoagulation Clinic for any changes, questions or concerns. (#[phone])        Lyla Robbins RN      OBJECTIVE:  Recent labs: (last 7 days)     20   INR 2.3*         No question data found.  Anticoagulation Summary  As of 2020    INR goal:  2.0-3.0   TTR:  47.0 % (1 y)   INR used for dosin.3 (2020)   Warfarin maintenance plan:  2.5 mg (5 mg x 0.5) every Sun, Thu; 5 mg (5 mg x 1) all other days   Full warfarin instructions:  2.5 mg every Sun, Thu; 5 mg all other days   Weekly warfarin total:  30 mg   Plan last modified:  Lyla Robbins RN (2020)   Next INR check:     Priority:  Maintenance   Target end date:  Indefinite    Indications    Atrial fibrillation (H) [I48.91] [I48.91]  Long term current use of anticoagulant therapy [Z79.01]  Longstanding persistent atrial fibrillation (H) [I48.11]             Anticoagulation Episode Summary     INR check location:      Preferred lab:  EXTERNAL LAB    Send INR  reminders to:  AGGIE FRANCIS    Comments:  Call Lisa with any questions this is patients Home Care RN: [phone]      Anticoagulation Care Providers     Provider Role Specialty Phone number    Anay Miner, NP Referring Nurse Practitioner - Family [phone]

## 2020-11-18 ENCOUNTER — TELEPHONE (OUTPATIENT)
Dept: FAMILY MEDICINE | Facility: CLINIC | Age: 85
End: 2020-11-18

## 2020-11-18 NOTE — TELEPHONE ENCOUNTER
Forms received from [employer]/ [employer] (11/11/20-01/09/21)  for Anay TRINIDAD.  Forms placed in provider 'sign me' folder.  Please fax forms to [phone] after completion.    Jayne Porter  Patient Representative

## 2020-11-23 DIAGNOSIS — J84.10 FIBROSIS, LUNG (H): ICD-10-CM

## 2020-11-23 NOTE — TELEPHONE ENCOUNTER
Reason for Call:  Medication or medication refill:    Do you use a [facility]?  Name of the pharmacy and phone number for the current request:  [facility]    Name of the medication requested: codeine 30 MG tablet    Other request: Will be out of medication today, needs to have filled as soon as possible, was advised refills may take up to 72 hours.     Can we leave a detailed message on this number? YES    Phone number patient can be reached at: Home number on file [phone] (home)    Best Time: any    Call taken on 11/23/2020 at 1:07 PM by Katelyn Burden

## 2020-11-24 RX ORDER — CODEINE SULFATE 30 MG/1
TABLET ORAL
Qty: 60 TABLET | Refills: 0 | Status: SHIPPED | OUTPATIENT
Start: 2020-11-24 | End: 2020-12-16

## 2020-11-24 NOTE — TELEPHONE ENCOUNTER
Routing refill request to provider for review/approval because:  Drug not on the [facility] refill protocol     Anny Stanton RN, BSN, PHN  [facility]: [place]

## 2020-11-27 ENCOUNTER — ANTICOAGULATION THERAPY VISIT (OUTPATIENT)
Dept: ANTICOAGULATION | Facility: CLINIC | Age: 85
End: 2020-11-27
Payer: COMMERCIAL

## 2020-11-27 ENCOUNTER — TELEPHONE (OUTPATIENT)
Dept: FAMILY MEDICINE | Facility: CLINIC | Age: 85
End: 2020-11-27

## 2020-11-27 DIAGNOSIS — Z79.01 LONG TERM CURRENT USE OF ANTICOAGULANT THERAPY: ICD-10-CM

## 2020-11-27 DIAGNOSIS — I48.11 LONGSTANDING PERSISTENT ATRIAL FIBRILLATION (H): ICD-10-CM

## 2020-11-27 LAB — INR PPP: 2.3 (ref 0.9–1.1)

## 2020-11-27 PROCEDURE — 99207 PR NO CHARGE NURSE ONLY: CPT | Performed by: NURSE PRACTITIONER

## 2020-11-27 NOTE — TELEPHONE ENCOUNTER
Forms received from [employer] [facility] Physician Order From  ann[employer].  Forms placed in provider 'sign me' folder.  Please fax forms to [phone] after completion.    Jayne Porter  Patient Representative

## 2020-11-27 NOTE — PROGRESS NOTES
ANTICOAGULATION FOLLOW-UP CLINIC VISIT    Patient Name:  Ashlie Brantley  Date:  2020  Contact Type:  Telephone/ nurse/daughter    SUBJECTIVE:  Patient Findings     Comments:  No changes in medications, activity, or diet noted. No concerns with clotting, bleeding, or increased bruising noted. Took warfarin as prescribed.  Patient is to continue maintenance warfarin plan, and check INR in 1 week when homecare will be back out.  Daughter also stated in the background that she would not be bringing the pt back to the clinic for an INR - informed her that per TE, that there was a request for a home meter for the pt. However, I am unsure of the [employer] time frame/schedule.  Stephanie verbalizes understanding and agrees to plan. No further questions or concerns.        Clinical Outcomes     Negatives:  Major bleeding event, Thromboembolic event, Anticoagulation-related hospital admission, Anticoagulation-related ED visit, Anticoagulation-related fatality    Comments:  No changes in medications, activity, or diet noted. No concerns with clotting, bleeding, or increased bruising noted. Took warfarin as prescribed.  Patient is to continue maintenance warfarin plan, and check INR in 1 week when homecare will be back out.  Daughter also stated in the background that she would not be bringing the pt back to the clinic for an INR - informed her that per TE, that there was a request for a home meter for the pt. However, I am unsure of the [employer] time frame/schedule.  Stephanie verbalizes understanding and agrees to plan. No further questions or concerns.           OBJECTIVE    Recent labs: (last 7 days)     20   INR 2.3*       ASSESSMENT / PLAN  INR assessment THER    Recheck INR In: 1 WEEK    INR Location Clinic      Anticoagulation Summary  As of 2020    INR goal:  2.0-3.0   TTR:  50.9 % (1 y)   INR used for dosin.3 (2020)   Warfarin maintenance plan:  2.5 mg (5 mg x 0.5) every Sun, Thu; 5 mg (5 mg x  1) all other days   Full warfarin instructions:  2.5 mg every Sun, Thu; 5 mg all other days   Weekly warfarin total:  30 mg   No change documented:  Lorie Edwards RN   Plan last modified:  Lyla Robbins RN (11/6/2020)   Next INR check:  12/4/2020   Priority:  Maintenance   Target end date:  Indefinite    Indications    Atrial fibrillation (H) [I48.91] [I48.91]  Long term current use of anticoagulant therapy [Z79.01]  Longstanding persistent atrial fibrillation (H) [I48.11]             Anticoagulation Episode Summary     INR check location:      Preferred lab:  EXTERNAL LAB    Send INR reminders to:  AGGIE FRANCIS    Comments:  Call Lisa with any questions this is patients Home Care RN: [phone]      Anticoagulation Care Providers     Provider Role Specialty Phone number    Anay Miner NP Referring Nurse Practitioner - Family [phone]            See the Encounter Report to view Anticoagulation Flowsheet and Dosing Calendar (Go to Encounters tab in chart review, and find the Anticoagulation Therapy Visit)        Lorie Edwards RN

## 2020-11-30 DIAGNOSIS — Z53.9 DIAGNOSIS NOT YET DEFINED: Primary | ICD-10-CM

## 2020-11-30 PROCEDURE — G0179 MD RECERTIFICATION HHA PT: HCPCS | Performed by: FAMILY MEDICINE

## 2020-12-04 ENCOUNTER — ANTICOAGULATION THERAPY VISIT (OUTPATIENT)
Dept: FAMILY MEDICINE | Facility: CLINIC | Age: 85
End: 2020-12-04

## 2020-12-04 DIAGNOSIS — I48.11 LONGSTANDING PERSISTENT ATRIAL FIBRILLATION (H): ICD-10-CM

## 2020-12-04 DIAGNOSIS — Z79.01 LONG TERM CURRENT USE OF ANTICOAGULANT THERAPY: ICD-10-CM

## 2020-12-04 LAB — INR PPP: 4.6 (ref 0.9–1.1)

## 2020-12-04 NOTE — PROGRESS NOTES
ANTICOAGULATION MANAGEMENT     Patient Name:  Ashlie Brantley  Date:  2020    ASSESSMENT /SUBJECTIVE:    Today's INR result of 4.6 is supratherapeutic. Goal INR of 2.0-3.0      Warfarin dose taken: Less warfarin taken than planned which may be affecting INR    Diet: Decreased greens/vitamin K in diet which may be affecting INR; plans to resume previous intake    Medication changes/ interactions: No new medications/supplements affecting INR    Previous INR: Therapeutic     S/S of bleeding or thromboembolism: No    New injury or illness: No    Upcoming surgery, procedure or cardioversion: No    Additional findings: None      PLAN:    Telephone call with home care nurse REJI Arita regarding INR result and instructed:     Warfarin Dosing Instructions: Hold today then continue your current warfarin dose of 2.5mg every Sun & Thu; 5mg all other days of the week.    Instructed patient to follow up no later than: 1 week  Patient to recheck with home meter    Education provided: Impact of vitamin K foods on INR, Target INR goal and significance of current INR result and Importance of therapeutic range      REJI Arita verbalizes understanding and agrees to warfarin dosing plan.    Instructed to call the Anticoagulation Clinic for any changes, questions or concerns. (#[phone])        Gabriel Cohen RN      OBJECTIVE:  Recent labs: (last 7 days)     20   INR 4.6*         No question data found.  Anticoagulation Summary  As of 2020    INR goal:  2.0-3.0   TTR:  51.5 % (1 y)   INR used for dosin.6 (2020)   Warfarin maintenance plan:  2.5 mg (5 mg x 0.5) every Sun, Thu; 5 mg (5 mg x 1) all other days   Full warfarin instructions:  : Hold; Otherwise 2.5 mg every Sun, Thu; 5 mg all other days   Weekly warfarin total:  30 mg   Plan last modified:  Lyla Robbins RN (2020)   Next INR check:  2020   Priority:  Maintenance   Target end date:  Indefinite    Indications    Atrial fibrillation (H)  [I48.91] [I48.91]  Long term current use of anticoagulant therapy [Z79.01]  Longstanding persistent atrial fibrillation (H) [I48.11]             Anticoagulation Episode Summary     INR check location:      Preferred lab:  EXTERNAL LAB    Send INR reminders to:  AGGIE FRANCIS    Comments:  Call Lisa with any questions this is patients Home Care RN: [phone]      Anticoagulation Care Providers     Provider Role Specialty Phone number    Anay Miner NP Referring Nurse Practitioner - Family [phone]

## 2020-12-06 ENCOUNTER — TELEPHONE (OUTPATIENT)
Dept: FAMILY MEDICINE | Facility: CLINIC | Age: 85
End: 2020-12-06

## 2020-12-06 NOTE — TELEPHONE ENCOUNTER
Forms received from [employer] [facility]: recheck INR in 1 week on Friday 12/4 for Anay Miner NP.  Forms placed in provider 'sign me' folder.  Please fax forms to [phone] after completion.    Monster Medellin

## 2020-12-07 ENCOUNTER — TELEPHONE (OUTPATIENT)
Dept: FAMILY MEDICINE | Facility: CLINIC | Age: 85
End: 2020-12-07

## 2020-12-07 DIAGNOSIS — I48.11 LONGSTANDING PERSISTENT ATRIAL FIBRILLATION (H): ICD-10-CM

## 2020-12-07 DIAGNOSIS — Z79.01 LONG TERM CURRENT USE OF ANTICOAGULANT THERAPY: ICD-10-CM

## 2020-12-07 LAB — INR PPP: 2.7 (ref 0.9–1.1)

## 2020-12-07 NOTE — TELEPHONE ENCOUNTER
ANTICOAGULATION MANAGEMENT     Patient Name:  Ashlie Brantley  Date:  2020    ASSESSMENT /SUBJECTIVE:    Today's INR result of 2.7 is therapeutic. Goal INR of 2.0-3.0      Warfarin dose taken: Warfarin taken as instructed    Diet: No new diet changes affecting INR    Medication changes/ interactions: No new medications/supplements affecting INR    Previous INR: Supratherapeutic     S/S of bleeding or thromboembolism: No    New injury or illness: No    Upcoming surgery, procedure or cardioversion: No    Additional findings: None      PLAN:    Telephone call with home care nurse Stephanie regarding INR result and instructed:     Warfarin Dosing Instructions: Continue your current warfarin dose 2.5 mg every Sun, Thu; 5 mg all other days    Instructed patient to follow up no later than: 2020  Orders given to  Homecare nurse/facility to recheck    Education provided: Vitamin K content of foods and Target INR goal and significance of current INR result      Stephanie verbalizes understanding and agrees to warfarin dosing plan.    Instructed to call the Anticoagulation Clinic for any changes, questions or concerns. (#[phone])        Tiburcio Smyth RN      OBJECTIVE:  Recent labs: (last 7 days)     20   INR 4.6*             Anticoagulation Summary  As of 2020    INR goal:  2.0-3.0   TTR:  51.6 % (1 y)   INR used for dosin.7 (2020)   Warfarin maintenance plan:  2.5 mg (5 mg x 0.5) every Sun, Thu; 5 mg (5 mg x 1) all other days   Full warfarin instructions:  2.5 mg every Sun, Thu; 5 mg all other days   Weekly warfarin total:  30 mg   No change documented:  Tiburcio Smyth RN   Plan last modified:  Lyla Robbins RN (2020)   Next INR check:  2020   Priority:  Maintenance   Target end date:  Indefinite    Indications    Atrial fibrillation (H) [I48.91] [I48.91]  Long term current use of anticoagulant therapy [Z79.01]  Longstanding persistent atrial fibrillation (H) [I48.11]              Anticoagulation Episode Summary     INR check location:      Preferred lab:  EXTERNAL LAB    Send INR reminders to:  AGGIE FRANCIS    Comments:  Call Lisa with any questions this is patients Home Care RN: [phone]      Anticoagulation Care Providers     Provider Role Specialty Phone number    Anay Miner NP Referring Nurse Practitioner - Family [phone]

## 2020-12-07 NOTE — TELEPHONE ENCOUNTER
Forms received from [facility] [facility]/ Physician Order - order date 12/04/20 - Per Gabriel MENDOZA on behalf of Genna Brumfield hold Friday Dec 4th dose, 5mg Saturday Dec 5th and 2.5mg on Henry Dec 6th REcheck INR Monday Dec 7th for Anay Q.  Forms placed in provider 'sign me' folder.  Please fax forms to [phone] after completion.    Jayne Porter  Patient Representative

## 2020-12-08 ENCOUNTER — TELEPHONE (OUTPATIENT)
Dept: UROLOGY | Facility: CLINIC | Age: 85
End: 2020-12-08

## 2020-12-08 DIAGNOSIS — N32.81 OAB (OVERACTIVE BLADDER): ICD-10-CM

## 2020-12-08 RX ORDER — TOLTERODINE 4 MG/1
4 CAPSULE, EXTENDED RELEASE ORAL DAILY
Qty: 60 CAPSULE | Refills: 0 | Status: SHIPPED | OUTPATIENT
Start: 2020-12-08 | End: 2021-01-27

## 2020-12-08 RX ORDER — OXYBUTYNIN CHLORIDE 5 MG/1
5 TABLET ORAL DAILY
Qty: 60 TABLET | Refills: 0 | Status: SHIPPED | OUTPATIENT
Start: 2020-12-08 | End: 2022-01-01

## 2020-12-08 NOTE — TELEPHONE ENCOUNTER
Signed Prescriptions:                        Disp   Refills    oxybutynin (DITROPAN) 5 MG tablet          60 tab*0        Sig: Take 1 tablet (5 mg) by mouth daily Take along with           the lasix tablet  Authorizing Provider: BEATRIZ RUIZ  Ordering User: LOVE TIMMONS    tolterodine ER (DETROL LA) 4 MG 24 hr caps*60 cap*0        Sig: Take 1 capsule (4 mg) by mouth daily  Authorizing Provider: BEATRIZ RUIZ  Ordering User: LOVE TIMMONS RN

## 2020-12-08 NOTE — TELEPHONE ENCOUNTER
Reason for Call:  Medication or medication refill:    Do you use a [facility]?  Name of the pharmacy and phone number for the current request:  [facility]    Name of the medication requested: oxybutynin (DITROPAN) / tolterodine ER (DETROL LA)    Other request: Mariela, patient's daughter, called and stated she called in this prescription last week and the pharmacy stated they still have not heard back from Dr. Warner on this. Please call back to Mariela if any questions.    Can we leave a detailed message on this number? YES    Phone number patient can be reached at: Home number on file [phone] (home)    Best Time: Anytime    Call taken on 12/8/2020 at 9:27 AM by Yu Cardona

## 2020-12-09 ENCOUNTER — TELEPHONE (OUTPATIENT)
Dept: FAMILY MEDICINE | Facility: CLINIC | Age: 85
End: 2020-12-09

## 2020-12-09 NOTE — TELEPHONE ENCOUNTER
Forms received from [email or web address] [facility]/ Physician Order - Order date 12/7/20 - per Tiburcio MENDOZA on behalf of Genna Brumfield dose 2.5mg Sundays and Thursdays. 5mg AOD. Recheck INR Thursday 12/17 for Anay Q.  Forms placed in provider 'sign me' folder.  Please fax forms to [phone] after completion.    Jayne Porter  Patient Representative

## 2020-12-16 ENCOUNTER — OFFICE VISIT (OUTPATIENT)
Dept: UROLOGY | Facility: CLINIC | Age: 85
End: 2020-12-16
Payer: COMMERCIAL

## 2020-12-16 DIAGNOSIS — J84.10 FIBROSIS, LUNG (H): ICD-10-CM

## 2020-12-16 DIAGNOSIS — N32.81 OAB (OVERACTIVE BLADDER): Primary | ICD-10-CM

## 2020-12-16 LAB
ALBUMIN UR-MCNC: NEGATIVE MG/DL
APPEARANCE UR: CLEAR
BACTERIA #/AREA URNS HPF: ABNORMAL /HPF
BILIRUB UR QL STRIP: NEGATIVE
COLOR UR AUTO: YELLOW
GLUCOSE UR STRIP-MCNC: NEGATIVE MG/DL
HGB UR QL STRIP: NEGATIVE
KETONES UR STRIP-MCNC: NEGATIVE MG/DL
LEUKOCYTE ESTERASE UR QL STRIP: ABNORMAL
NITRATE UR QL: NEGATIVE
NON-SQ EPI CELLS #/AREA URNS LPF: ABNORMAL /LPF
PH UR STRIP: 5.5 PH (ref 5–7)
RBC #/AREA URNS AUTO: ABNORMAL /HPF
SOURCE: ABNORMAL
SP GR UR STRIP: 1.02 (ref 1–1.03)
UROBILINOGEN UR STRIP-ACNC: 1 EU/DL (ref 0.2–1)
WBC #/AREA URNS AUTO: ABNORMAL /HPF

## 2020-12-16 PROCEDURE — 81001 URINALYSIS AUTO W/SCOPE: CPT | Performed by: UROLOGY

## 2020-12-16 PROCEDURE — 99213 OFFICE O/P EST LOW 20 MIN: CPT | Performed by: UROLOGY

## 2020-12-16 RX ORDER — CODEINE SULFATE 30 MG/1
TABLET ORAL
Qty: 60 TABLET | Refills: 0 | Status: SHIPPED | OUTPATIENT
Start: 2020-12-24 | End: 2021-01-16

## 2020-12-16 RX ORDER — OXYBUTYNIN CHLORIDE 5 MG/1
5 TABLET, EXTENDED RELEASE ORAL DAILY
Qty: 60 TABLET | Refills: 3 | Status: SHIPPED | OUTPATIENT
Start: 2020-12-16 | End: 2021-01-01

## 2020-12-16 NOTE — PROGRESS NOTES
F/u OAB wet, 250 bladder cap, BETO, s/p sling, Ditropan 5, lasix 20  (Presents with her dtr)    Compliant with Dit IR 5; still on lasix 20 BID    Reports marked improvement in both urge and urge incontinence, leaking less, more time to get to the restroom. Some dry mouth, no constipation.    Denies dysuria, gross hematuria. Voids about q 90 minutes. Noc x 1. Nearly dry at nite, security pad, minimally moist.      Current Outpatient Medications   Medication     acetaminophen (TYLENOL) 500 MG tablet     albuterol (PROVENTIL) (2.5 MG/3ML) 0.083% neb solution     codeine 30 MG tablet     famotidine (PEPCID) 20 MG tablet     furosemide (LASIX) 20 MG tablet     GUAIFENESIN PO     levothyroxine (SYNTHROID/LEVOTHROID) 88 MCG tablet     losartan (COZAAR) 50 MG tablet     metoprolol tartrate (LOPRESSOR) 50 MG tablet     neomycin-polymyxin-dexamethasone (MAXITROL) 3.5-12351-8.1 ophthalmic ointment     order for DME     order for DME     order for DME     oxybutynin (DITROPAN) 5 MG tablet     polyethylene glycol (MIRALAX) 17 GM/Dose powder     Sennosides (SENOKOT PO)     simvastatin (ZOCOR) 40 MG tablet     tolterodine ER (DETROL LA) 4 MG 24 hr capsule     warfarin ANTICOAGULANT (COUMADIN) 2.5 MG tablet     warfarin ANTICOAGULANT (COUMADIN) 5 MG tablet     No current facility-administered medications for this visit.        IMP:  1. OAB wet, improving  2. Other medical issues      PLAN:  1. Discussed situation with patient in detail.  2. Try change to Dit ER 5; discussed in detail rationale, mech of action, potential side effect, etc; they elect trial  3. F/u 6 mos or sooner prn  4. Carefully set realistic expectations  5. 15 minutes spent with patient, more than 50% spent in counseling and coordination of care for OAB.

## 2020-12-21 ENCOUNTER — TELEPHONE (OUTPATIENT)
Dept: FAMILY MEDICINE | Facility: CLINIC | Age: 85
End: 2020-12-21

## 2020-12-21 ENCOUNTER — ANTICOAGULATION THERAPY VISIT (OUTPATIENT)
Dept: FAMILY MEDICINE | Facility: CLINIC | Age: 85
End: 2020-12-21

## 2020-12-21 DIAGNOSIS — I48.11 LONGSTANDING PERSISTENT ATRIAL FIBRILLATION (H): ICD-10-CM

## 2020-12-21 DIAGNOSIS — I48.0 PAROXYSMAL ATRIAL FIBRILLATION (H): ICD-10-CM

## 2020-12-21 DIAGNOSIS — Z79.01 LONG TERM CURRENT USE OF ANTICOAGULANT THERAPY: ICD-10-CM

## 2020-12-21 LAB — INR PPP: 2.2 (ref 0.9–1.1)

## 2020-12-21 NOTE — TELEPHONE ENCOUNTER
Forms received from [employer] [facility] of [facility]/ Physician Order date: 12/18/2020 9:45 AM/ Preston Taylor RN on behalf of Genna Brumfield Hold Dose Thursday and Friday, Resume regular dose Saturday of 5 MG and Regular Dose on Sunday of 2.5 MG.  Recheck INR Monday Dec 21st. / SN EFFECTIVE 12/20/2020 1WK1 for RAMO BRUMFIELD.  Forms placed in provider 'sign me' folder.  Please FAX forms to [phone] after completion.    René PEREZ (GREGORIO) (ARRT)  X-Ray Dept  [facility]  PH: [phone]    FX: [phone]

## 2020-12-21 NOTE — PROGRESS NOTES
ANTICOAGULATION MANAGEMENT     Patient Name:  Ashlie Brantley  Date:  2020    ASSESSMENT /SUBJECTIVE:    Today's INR result of 2.2 is therapeutic. Goal INR of 2.0-3.0      Warfarin dose taken: Warfarin taken as instructed    Diet: No new diet changes affecting INR    Medication changes/ interactions: No new medications/supplements affecting INR    Previous INR: Supratherapeutic     S/S of bleeding or thromboembolism: No    New injury or illness: No    Upcoming surgery, procedure or cardioversion: No    Additional findings: None      PLAN:    Telephone call with home care nurse Jayda regarding INR result and instructed:     Warfarin Dosing Instructions: Continue your current warfarin dose 2.5 mg Sun Tue thu; 5 mg all other days    Instructed patient to follow up no later than: 3 days  Orders given to  Homecare nurse/facility to recheck    Education provided: Monitoring for bleeding signs and symptoms and Monitoring for clotting signs and symptoms      Jayda verbalizes understanding and agrees to warfarin dosing plan.    Instructed to call the Anticoagulation Clinic for any changes, questions or concerns. (#[phone])        Luz Allen RN      OBJECTIVE:  Recent labs: (last 7 days)     20   INR 5.8* 2.2*         No question data found.  Anticoagulation Summary  As of 2020    INR goal:  2.0-3.0   TTR:  50.8 % (1 y)   INR used for dosin.2 (2020)   Warfarin maintenance plan:  2.5 mg (5 mg x 0.5) every Sun, Tue, Thu; 5 mg (5 mg x 1) all other days   Full warfarin instructions:  2.5 mg every Sun, Tue, Thu; 5 mg all other days   Weekly warfarin total:  27.5 mg   No change documented:  Luz Allen RN   Plan last modified:  Claudia Maher RN (2020)   Next INR check:  2020   Priority:  High   Target end date:  Indefinite    Indications    Atrial fibrillation (H) [I48.91] [I48.91]  Long term current use of anticoagulant therapy [Z79.01]  Longstanding  persistent atrial fibrillation (H) [I48.11]             Anticoagulation Episode Summary     INR check location:      Preferred lab:  EXTERNAL LAB    Send INR reminders to:  AGGIE FRANCIS    Comments:  Call Lisa with any questions this is patients Home Care RN: [phone]      Anticoagulation Care Providers     Provider Role Specialty Phone number    Anay Miner NP Referring Nurse Practitioner - Family [phone]

## 2020-12-23 ENCOUNTER — TELEPHONE (OUTPATIENT)
Dept: FAMILY MEDICINE | Facility: CLINIC | Age: 85
End: 2020-12-23

## 2020-12-23 NOTE — TELEPHONE ENCOUNTER
Forms received from [employer] [facility] / Physician Order date: 12/21/2020 / New Anticoagulation Dosage: 2.5 mg Sun, Tues, Thurs. 5 mg AOD./ Genna Brumfield MD.  Forms placed in provider 'sign me' folder.  Please Fax forms to [phone] after completion.    René LAMAR (R) (ARRT)  Radiology Dept

## 2020-12-24 ENCOUNTER — ANTICOAGULATION THERAPY VISIT (OUTPATIENT)
Dept: FAMILY MEDICINE | Facility: CLINIC | Age: 85
End: 2020-12-24

## 2020-12-24 DIAGNOSIS — I48.0 PAROXYSMAL ATRIAL FIBRILLATION (H): ICD-10-CM

## 2020-12-24 DIAGNOSIS — Z79.01 LONG TERM CURRENT USE OF ANTICOAGULANT THERAPY: ICD-10-CM

## 2020-12-24 DIAGNOSIS — I48.11 LONGSTANDING PERSISTENT ATRIAL FIBRILLATION (H): ICD-10-CM

## 2020-12-24 LAB — INR PPP: 2 (ref 0.9–1.1)

## 2020-12-24 NOTE — PROGRESS NOTES
ANTICOAGULATION MANAGEMENT     Patient Name:  Ashlie Brantley  Date:  2020    ASSESSMENT /SUBJECTIVE:    Today's INR result of 2.0 is therapeutic. Goal INR of 2.0-3.0      Warfarin dose taken: Warfarin taken as instructed    Diet: Increased greens/vitamin K in diet which may be affecting INR; plans to resume previous intake    Medication changes/ interactions: No new medications/supplements affecting INR    Previous INR: Therapeutic     S/S of bleeding or thromboembolism: No    New injury or illness: No    Upcoming surgery, procedure or cardioversion: No    Additional findings: None      PLAN:    Telephone call with home care nurse Trista regarding INR result and instructed:     Warfarin Dosing Instructions: Continue your current warfarin dose 2.5 mg every Sun, Tue, Thu; 5mg all other days    Instructed patient to follow up no later than: 2 weeks  Orders given to  Homecare nurse/facility to recheck    Education provided: Vitamin K content of foods and Target INR goal and significance of current INR result      Trista verbalizes understanding and agrees to warfarin dosing plan.    Instructed to call the Anticoagulation Clinic for any changes, questions or concerns. (#[phone])        Tiburcio Smyth RN      OBJECTIVE:  Recent labs: (last 7 days)     20   INR 2.2* 2.0*         No question data found.  Anticoagulation Summary  As of 2020    INR goal:  2.0-3.0   TTR:  50.8 % (1 y)   INR used for dosin.0 (2020)   Warfarin maintenance plan:  2.5 mg (5 mg x 0.5) every Sun, Tue, Thu; 5 mg (5 mg x 1) all other days   Full warfarin instructions:  2.5 mg every Sun, Tue, Thu; 5 mg all other days   Weekly warfarin total:  27.5 mg   Plan last modified:  Claudia Maher RN (2020)   Next INR check:  2021   Priority:  High   Target end date:  Indefinite    Indications    Atrial fibrillation (H) [I48.91] [I48.91]  Long term current use of anticoagulant therapy  [Z79.01]  Longstanding persistent atrial fibrillation (H) [I48.11]             Anticoagulation Episode Summary     INR check location:      Preferred lab:  EXTERNAL LAB    Send INR reminders to:  AGGIE FRANCIS    Comments:  Call Lisa with any questions this is patients Home Care RN: [phone]      Anticoagulation Care Providers     Provider Role Specialty Phone number    Anay Miner NP Referring Nurse Practitioner - Family [phone]

## 2020-12-29 ENCOUNTER — TELEPHONE (OUTPATIENT)
Dept: FAMILY MEDICINE | Facility: CLINIC | Age: 85
End: 2020-12-29

## 2020-12-29 NOTE — TELEPHONE ENCOUNTER
Forms received from [facility]/ Physician Order: INR Results: 2.0 (12/24/2020) for Anay Miner  Forms placed in provider 'sign me' folder.  Please fax forms to [phone] after completion.    SUSHIL LAMAR

## 2020-12-30 ENCOUNTER — TRANSFERRED RECORDS (OUTPATIENT)
Dept: HEALTH INFORMATION MANAGEMENT | Facility: CLINIC | Age: 85
End: 2020-12-30

## 2020-12-30 ENCOUNTER — ANTICOAGULATION THERAPY VISIT (OUTPATIENT)
Dept: FAMILY MEDICINE | Facility: CLINIC | Age: 85
End: 2020-12-30

## 2020-12-30 DIAGNOSIS — I48.0 PAROXYSMAL ATRIAL FIBRILLATION (H): ICD-10-CM

## 2020-12-30 DIAGNOSIS — I48.11 LONGSTANDING PERSISTENT ATRIAL FIBRILLATION (H): ICD-10-CM

## 2020-12-30 DIAGNOSIS — Z79.01 LONG TERM CURRENT USE OF ANTICOAGULANT THERAPY: ICD-10-CM

## 2020-12-30 LAB — INR PPP: 2.6 (ref 0.9–1.1)

## 2020-12-30 NOTE — PROGRESS NOTES
Anticoagulation Management    Unable to reach Ashlie/Ginny today.    Today's INR result of 2.6 is therapeutic (goal INR of 2.0-3.0).  Result received from: Home Monitor    Follow up required to confirm warfarin dose taken and assess for changes    Left message to continue current dose of warfarin 5 mg tonight.     First home INR reading received.       Anticoagulation clinic to follow up    Luz Allen RN

## 2020-12-31 NOTE — PROGRESS NOTES
Patient daughter, Vicky, returned call and left VM.     Callback at [phone]    Thank you  Lyla Robbins RN

## 2021-01-01 ENCOUNTER — ANTICOAGULATION THERAPY VISIT (OUTPATIENT)
Dept: FAMILY MEDICINE | Facility: CLINIC | Age: 86
End: 2021-01-01
Payer: COMMERCIAL

## 2021-01-01 ENCOUNTER — TRANSFERRED RECORDS (OUTPATIENT)
Dept: HEALTH INFORMATION MANAGEMENT | Facility: CLINIC | Age: 86
End: 2021-01-01

## 2021-01-01 ENCOUNTER — ANTICOAGULATION THERAPY VISIT (OUTPATIENT)
Dept: FAMILY MEDICINE | Facility: CLINIC | Age: 86
End: 2021-01-01

## 2021-01-01 ENCOUNTER — OFFICE VISIT (OUTPATIENT)
Dept: UROLOGY | Facility: CLINIC | Age: 86
End: 2021-01-01
Payer: COMMERCIAL

## 2021-01-01 ENCOUNTER — TELEPHONE (OUTPATIENT)
Dept: FAMILY MEDICINE | Facility: CLINIC | Age: 86
End: 2021-01-01
Payer: COMMERCIAL

## 2021-01-01 ENCOUNTER — MEDICAL CORRESPONDENCE (OUTPATIENT)
Dept: HEALTH INFORMATION MANAGEMENT | Facility: CLINIC | Age: 86
End: 2021-01-01
Payer: COMMERCIAL

## 2021-01-01 ENCOUNTER — TELEPHONE (OUTPATIENT)
Dept: PULMONOLOGY | Facility: CLINIC | Age: 86
End: 2021-01-01

## 2021-01-01 ENCOUNTER — TRANSFERRED RECORDS (OUTPATIENT)
Dept: HEALTH INFORMATION MANAGEMENT | Facility: CLINIC | Age: 86
End: 2021-01-01
Payer: COMMERCIAL

## 2021-01-01 ENCOUNTER — OFFICE VISIT (OUTPATIENT)
Dept: PULMONOLOGY | Facility: CLINIC | Age: 86
End: 2021-01-01
Attending: INTERNAL MEDICINE
Payer: COMMERCIAL

## 2021-01-01 ENCOUNTER — DOCUMENTATION ONLY (OUTPATIENT)
Dept: FAMILY MEDICINE | Facility: CLINIC | Age: 86
End: 2021-01-01

## 2021-01-01 ENCOUNTER — ANTICOAGULATION THERAPY VISIT (OUTPATIENT)
Dept: ANTICOAGULATION | Facility: CLINIC | Age: 86
End: 2021-01-01

## 2021-01-01 ENCOUNTER — PATIENT OUTREACH (OUTPATIENT)
Dept: CARE COORDINATION | Facility: CLINIC | Age: 86
End: 2021-01-01
Payer: COMMERCIAL

## 2021-01-01 ENCOUNTER — TELEPHONE (OUTPATIENT)
Dept: FAMILY MEDICINE | Facility: CLINIC | Age: 86
End: 2021-01-01

## 2021-01-01 ENCOUNTER — OFFICE VISIT (OUTPATIENT)
Dept: OPHTHALMOLOGY | Facility: CLINIC | Age: 86
End: 2021-01-01
Payer: COMMERCIAL

## 2021-01-01 ENCOUNTER — MYC MEDICAL ADVICE (OUTPATIENT)
Dept: FAMILY MEDICINE | Facility: CLINIC | Age: 86
End: 2021-01-01

## 2021-01-01 ENCOUNTER — VIRTUAL VISIT (OUTPATIENT)
Dept: FAMILY MEDICINE | Facility: CLINIC | Age: 86
End: 2021-01-01
Payer: COMMERCIAL

## 2021-01-01 ENCOUNTER — HEALTH MAINTENANCE LETTER (OUTPATIENT)
Age: 86
End: 2021-01-01

## 2021-01-01 VITALS
HEART RATE: 55 BPM | DIASTOLIC BLOOD PRESSURE: 62 MMHG | WEIGHT: 131 LBS | OXYGEN SATURATION: 98 % | SYSTOLIC BLOOD PRESSURE: 176 MMHG | BODY MASS INDEX: 27.38 KG/M2

## 2021-01-01 VITALS — OXYGEN SATURATION: 95 % | HEART RATE: 51 BPM

## 2021-01-01 DIAGNOSIS — I10 HYPERTENSION GOAL BP (BLOOD PRESSURE) < 140/90: ICD-10-CM

## 2021-01-01 DIAGNOSIS — I48.19 PERSISTENT ATRIAL FIBRILLATION (H): ICD-10-CM

## 2021-01-01 DIAGNOSIS — Z99.81 ON HOME OXYGEN THERAPY: ICD-10-CM

## 2021-01-01 DIAGNOSIS — I48.91 ATRIAL FIBRILLATION (H): Primary | ICD-10-CM

## 2021-01-01 DIAGNOSIS — I48.11 LONGSTANDING PERSISTENT ATRIAL FIBRILLATION (H): Primary | ICD-10-CM

## 2021-01-01 DIAGNOSIS — I48.0 PAROXYSMAL ATRIAL FIBRILLATION (H): ICD-10-CM

## 2021-01-01 DIAGNOSIS — Z79.891 LONG TERM (CURRENT) USE OF OPIATE ANALGESIC: ICD-10-CM

## 2021-01-01 DIAGNOSIS — Z79.01 LONG TERM CURRENT USE OF ANTICOAGULANT THERAPY: ICD-10-CM

## 2021-01-01 DIAGNOSIS — I48.11 LONGSTANDING PERSISTENT ATRIAL FIBRILLATION (H): ICD-10-CM

## 2021-01-01 DIAGNOSIS — J84.10 FIBROSIS, LUNG (H): ICD-10-CM

## 2021-01-01 DIAGNOSIS — M62.81 GENERALIZED MUSCLE WEAKNESS: ICD-10-CM

## 2021-01-01 DIAGNOSIS — I48.0 PAROXYSMAL ATRIAL FIBRILLATION (H): Primary | ICD-10-CM

## 2021-01-01 DIAGNOSIS — H52.223 MYOPIA OF BOTH EYES WITH REGULAR ASTIGMATISM AND PRESBYOPIA: ICD-10-CM

## 2021-01-01 DIAGNOSIS — H43.813 PVD (POSTERIOR VITREOUS DETACHMENT), BILATERAL: ICD-10-CM

## 2021-01-01 DIAGNOSIS — N32.81 OAB (OVERACTIVE BLADDER): ICD-10-CM

## 2021-01-01 DIAGNOSIS — Z96.1 PSEUDOPHAKIA: ICD-10-CM

## 2021-01-01 DIAGNOSIS — H52.13 MYOPIA OF BOTH EYES WITH REGULAR ASTIGMATISM AND PRESBYOPIA: ICD-10-CM

## 2021-01-01 DIAGNOSIS — I27.20 PULMONARY HYPERTENSION (H): ICD-10-CM

## 2021-01-01 DIAGNOSIS — H52.4 MYOPIA OF BOTH EYES WITH REGULAR ASTIGMATISM AND PRESBYOPIA: ICD-10-CM

## 2021-01-01 DIAGNOSIS — J84.112 IPF (IDIOPATHIC PULMONARY FIBROSIS) (H): Primary | ICD-10-CM

## 2021-01-01 DIAGNOSIS — Z79.01 LONG TERM CURRENT USE OF ANTICOAGULANT THERAPY: Primary | ICD-10-CM

## 2021-01-01 DIAGNOSIS — R05.9 COUGH: ICD-10-CM

## 2021-01-01 DIAGNOSIS — I48.91 ATRIAL FIBRILLATION (H): ICD-10-CM

## 2021-01-01 DIAGNOSIS — H34.11 CRAO (CENTRAL RETINAL ARTERY OCCLUSION), RIGHT: ICD-10-CM

## 2021-01-01 DIAGNOSIS — J98.01 ACUTE BRONCHOSPASM: ICD-10-CM

## 2021-01-01 DIAGNOSIS — R06.09 CHRONIC DYSPNEA: Primary | ICD-10-CM

## 2021-01-01 DIAGNOSIS — J84.9 INTERSTITIAL LUNG DISEASE (H): ICD-10-CM

## 2021-01-01 DIAGNOSIS — Z01.00 EXAMINATION OF EYES AND VISION: Primary | ICD-10-CM

## 2021-01-01 DIAGNOSIS — N32.81 OAB (OVERACTIVE BLADDER): Primary | ICD-10-CM

## 2021-01-01 DIAGNOSIS — Z95.3 S/P TAVR (TRANSCATHETER AORTIC VALVE REPLACEMENT), BIOPROSTHETIC: ICD-10-CM

## 2021-01-01 LAB
ALBUMIN UR-MCNC: NEGATIVE MG/DL
APPEARANCE UR: CLEAR
BILIRUB UR QL STRIP: NEGATIVE
COLOR UR AUTO: YELLOW
GLUCOSE UR STRIP-MCNC: NEGATIVE MG/DL
HGB UR QL STRIP: ABNORMAL
INR (EXTERNAL): 1.2 (ref 0.9–1.1)
INR (EXTERNAL): 1.5 (ref 0.9–1.1)
INR (EXTERNAL): 1.8 (ref 0.9–1.1)
INR (EXTERNAL): 1.9 (ref 0.9–1.1)
INR (EXTERNAL): 1.9 (ref 2–3)
INR (EXTERNAL): 2 (ref 0.9–1.1)
INR (EXTERNAL): 2.4 (ref 2–3)
INR (EXTERNAL): 2.4 (ref 2–3)
INR (EXTERNAL): 2.5 (ref 0.9–1.1)
INR (EXTERNAL): 2.5 (ref 0.9–1.1)
INR (EXTERNAL): 2.6 (ref 0.9–1.1)
INR (EXTERNAL): 2.6 (ref 0.9–1.1)
INR (EXTERNAL): 2.7 (ref 2–3)
INR (EXTERNAL): 2.8 (ref 0.9–1.1)
INR (EXTERNAL): 3.3 (ref 0.9–1.1)
INR (EXTERNAL): 3.5 (ref 0.9–1.1)
INR (EXTERNAL): 3.6 (ref 0.9–1.1)
INR (EXTERNAL): 4.1 (ref 0.9–1.1)
INR (EXTERNAL): 4.1 (ref 0.9–1.1)
INR HOME MONITORING: 2.6 (ref 2–3)
INR PPP: 1.5 (ref 0.9–1.1)
INR PPP: 1.7 (ref 0.9–1.1)
INR PPP: 2.3 (ref 0.9–1.1)
INR PPP: 2.5 (ref 0.9–1.1)
INR PPP: 3.5 (ref 0.9–1.1)
KETONES UR STRIP-MCNC: NEGATIVE MG/DL
LEUKOCYTE ESTERASE UR QL STRIP: ABNORMAL
NITRATE UR QL: NEGATIVE
NON-SQ EPI CELLS #/AREA URNS LPF: ABNORMAL /LPF
PH UR STRIP: 6.5 PH (ref 5–7)
RBC #/AREA URNS AUTO: ABNORMAL /HPF
SOURCE: ABNORMAL
SP GR UR STRIP: 1.02 (ref 1–1.03)
UROBILINOGEN UR STRIP-ACNC: 1 EU/DL (ref 0.2–1)
WBC #/AREA URNS AUTO: ABNORMAL /HPF

## 2021-01-01 PROCEDURE — 99213 OFFICE O/P EST LOW 20 MIN: CPT | Mod: GC | Performed by: INTERNAL MEDICINE

## 2021-01-01 PROCEDURE — 92015 DETERMINE REFRACTIVE STATE: CPT | Performed by: STUDENT IN AN ORGANIZED HEALTH CARE EDUCATION/TRAINING PROGRAM

## 2021-01-01 PROCEDURE — 92014 COMPRE OPH EXAM EST PT 1/>: CPT | Performed by: STUDENT IN AN ORGANIZED HEALTH CARE EDUCATION/TRAINING PROGRAM

## 2021-01-01 PROCEDURE — 81001 URINALYSIS AUTO W/SCOPE: CPT | Performed by: UROLOGY

## 2021-01-01 PROCEDURE — 99214 OFFICE O/P EST MOD 30 MIN: CPT | Mod: 95 | Performed by: NURSE PRACTITIONER

## 2021-01-01 PROCEDURE — G0463 HOSPITAL OUTPT CLINIC VISIT: HCPCS

## 2021-01-01 PROCEDURE — 99213 OFFICE O/P EST LOW 20 MIN: CPT | Performed by: UROLOGY

## 2021-01-01 RX ORDER — OXYBUTYNIN CHLORIDE 5 MG/1
5 TABLET, EXTENDED RELEASE ORAL DAILY
Qty: 90 TABLET | Refills: 2 | Status: SHIPPED | OUTPATIENT
Start: 2021-01-01

## 2021-01-01 RX ORDER — CODEINE SULFATE 30 MG/1
TABLET ORAL
Qty: 60 TABLET | Refills: 0 | Status: SHIPPED | OUTPATIENT
Start: 2021-01-01 | End: 2021-01-01

## 2021-01-01 RX ORDER — BENZONATATE 200 MG/1
200 CAPSULE ORAL 3 TIMES DAILY PRN
Qty: 30 CAPSULE | Refills: 1 | Status: SHIPPED | OUTPATIENT
Start: 2021-01-01

## 2021-01-01 RX ORDER — CODEINE SULFATE 30 MG/1
60 TABLET ORAL AT BEDTIME
Qty: 60 TABLET | Refills: 0 | Status: SHIPPED | OUTPATIENT
Start: 2021-01-01 | End: 2022-01-01

## 2021-01-01 RX ORDER — TOLTERODINE 4 MG/1
4 CAPSULE, EXTENDED RELEASE ORAL DAILY
Qty: 90 CAPSULE | Refills: 1 | Status: SHIPPED | OUTPATIENT
Start: 2021-01-01 | End: 2022-01-01

## 2021-01-01 RX ORDER — CODEINE SULFATE 30 MG/1
TABLET ORAL
Qty: 60 TABLET | Refills: 0 | Status: CANCELLED | OUTPATIENT
Start: 2021-01-01

## 2021-01-01 RX ORDER — TOLTERODINE 4 MG/1
4 CAPSULE, EXTENDED RELEASE ORAL DAILY
Qty: 30 CAPSULE | Refills: 0 | Status: SHIPPED | OUTPATIENT
Start: 2021-01-01 | End: 2021-01-01

## 2021-01-01 RX ORDER — ALBUTEROL SULFATE 0.83 MG/ML
2.5 SOLUTION RESPIRATORY (INHALATION) EVERY 6 HOURS PRN
Qty: 90 ML | Refills: 5 | Status: SHIPPED | OUTPATIENT
Start: 2021-01-01

## 2021-01-01 ASSESSMENT — EXTERNAL EXAM - LEFT EYE: OS_EXAM: NORMAL

## 2021-01-01 ASSESSMENT — VISUAL ACUITY
OS_CC+: -1
METHOD: SNELLEN - LINEAR
CORRECTION_TYPE: GLASSES
OD_CC: CF@3
OS_CC: 20/50
OS_PH_CC+: -2
OS_PH_CC: 20/30

## 2021-01-01 ASSESSMENT — REFRACTION_WEARINGRX
OD_AXIS: 145
OD_SPHERE: -0.75
OS_CYLINDER: +0.25
OS_SPHERE: -1.00
OD_ADD: +2.60
OS_ADD: +2.61
OD_CYLINDER: +0.75
SPECS_TYPE: PAL
OS_AXIS: 042

## 2021-01-01 ASSESSMENT — SLIT LAMP EXAM - LIDS
COMMENTS: 3+ DERMATOCHALASIS - UPPER LID
COMMENTS: 3+ DERMATOCHALASIS - UPPER LID

## 2021-01-01 ASSESSMENT — CONF VISUAL FIELD
OD_INFERIOR_NASAL_RESTRICTION: 3
OD_SUPERIOR_NASAL_RESTRICTION: 3
OS_NORMAL: 1

## 2021-01-01 ASSESSMENT — REFRACTION_MANIFEST
OD_CYLINDER: +1.50
OS_SPHERE: -1.25
OD_ADD: +3.00
OD_AXIS: 020
OS_CYLINDER: +1.25
OD_SPHERE: -1.75
OS_ADD: +3.00
OS_AXIS: 175

## 2021-01-01 ASSESSMENT — CUP TO DISC RATIO
OD_RATIO: 0.3
OS_RATIO: 0.2

## 2021-01-01 ASSESSMENT — TONOMETRY
OD_IOP_MMHG: 16
OS_IOP_MMHG: 16
IOP_METHOD: APPLANATION

## 2021-01-01 ASSESSMENT — PAIN SCALES - GENERAL: PAINLEVEL: NO PAIN (0)

## 2021-01-01 ASSESSMENT — EXTERNAL EXAM - RIGHT EYE: OD_EXAM: NORMAL

## 2021-01-06 ENCOUNTER — TELEPHONE (OUTPATIENT)
Dept: FAMILY MEDICINE | Facility: CLINIC | Age: 86
End: 2021-01-06

## 2021-01-06 NOTE — TELEPHONE ENCOUNTER
Geovanna, Home Care Nurse requesting orders to discharge patient from home care.  Patient has met goals and has been taught on how to do INR's at home.  Please advise  Sangeetha Nguyễn RN

## 2021-01-07 ENCOUNTER — MEDICAL CORRESPONDENCE (OUTPATIENT)
Dept: HEALTH INFORMATION MANAGEMENT | Facility: CLINIC | Age: 86
End: 2021-01-07

## 2021-01-07 ENCOUNTER — TELEPHONE (OUTPATIENT)
Dept: FAMILY MEDICINE | Facility: CLINIC | Age: 86
End: 2021-01-07

## 2021-01-07 NOTE — TELEPHONE ENCOUNTER
Forms received from [facility]/ Physician Order (order date 12/7/2020) for Anay Miner NP.  Forms placed in provider 'sign me' folder.  Please fax forms to [phone] after completion.    SUSHIL HARRIS (R)  Radiology Department

## 2021-01-08 NOTE — TELEPHONE ENCOUNTER
RN left  for Mariela (Daughter, Consent to communicate on file) requesting call back to clinic at [phone].    Called to arrange video visit with patient to Discuss plan of care with provider    Marcelino Fregoso RN, BSN, PHN  [facility]

## 2021-01-08 NOTE — TELEPHONE ENCOUNTER
Can you contact the patient to see if she can do a follow up virtual visit with me to review her care plan?    Anay Miner, DNP, APRN, CNP

## 2021-01-14 ENCOUNTER — ANTICOAGULATION THERAPY VISIT (OUTPATIENT)
Dept: FAMILY MEDICINE | Facility: CLINIC | Age: 86
End: 2021-01-14

## 2021-01-14 ENCOUNTER — TRANSFERRED RECORDS (OUTPATIENT)
Dept: HEALTH INFORMATION MANAGEMENT | Facility: CLINIC | Age: 86
End: 2021-01-14

## 2021-01-14 DIAGNOSIS — I48.0 PAROXYSMAL ATRIAL FIBRILLATION (H): ICD-10-CM

## 2021-01-14 DIAGNOSIS — I48.11 LONGSTANDING PERSISTENT ATRIAL FIBRILLATION (H): ICD-10-CM

## 2021-01-14 DIAGNOSIS — Z79.01 LONG TERM CURRENT USE OF ANTICOAGULANT THERAPY: ICD-10-CM

## 2021-01-14 LAB — INR PPP: 3.1 (ref 0.9–1.1)

## 2021-01-14 NOTE — PROGRESS NOTES
ANTICOAGULATION MANAGEMENT     Patient Name:  Ashlie Brantley  Date:  1/14/2021    ASSESSMENT /SUBJECTIVE:    Today's INR result of 3.1 is supratherapeutic. Goal INR of 2.0-3.0      Warfarin dose taken: Warfarin taken as instructed    Diet: No new diet changes affecting INR    Medication changes/ interactions: No new medications/supplements affecting INR    Previous INR: Therapeutic     S/S of bleeding or thromboembolism: No    New injury or illness: No    Upcoming surgery, procedure or cardioversion: No    Additional findings: None      PLAN:    Telephone call with  Daughter Mariela regarding INR result and instructed:     Warfarin Dosing Instructions: Continue your current warfarin dose 2.5 mg Sun Tue Thu; 5 mg all other days    Instructed patient to follow up no later than: 2 weeks  Patient to recheck with home meter    Education provided: Monitoring for bleeding signs and symptoms and Monitoring for clotting signs and symptoms      Mariela daughter verbalizes understanding and agrees to warfarin dosing plan.    Instructed to call the Anticoagulation Clinic for any changes, questions or concerns. (#[phone])        Luz Allen RN      OBJECTIVE:  Recent labs: (last 7 days)     01/14/21   INR 3.1*         No question data found.  Anticoagulation Summary  As of 1/14/2021    INR goal:  2.0-3.0   TTR:  50.0 % (1 y)   INR used for dosing:  3.1 (1/14/2021)   Warfarin maintenance plan:  2.5 mg (5 mg x 0.5) every Sun, Tue, Thu; 5 mg (5 mg x 1) all other days   Full warfarin instructions:  2.5 mg every Sun, Tue, Thu; 5 mg all other days   Weekly warfarin total:  27.5 mg   No change documented:  Luz Allen RN   Plan last modified:  Claudia Maher, RN (12/17/2020)   Next INR check:  1/28/2021   Priority:  High   Target end date:  Indefinite    Indications    Atrial fibrillation (H) [I48.91] [I48.91]  Long term current use of anticoagulant therapy [Z79.01]  Longstanding persistent atrial fibrillation  (H) [I48.11]             Anticoagulation Episode Summary     INR check location:      Preferred lab:  EXTERNAL LAB    Send INR reminders to:  AGGIE FRANCIS    Comments:  Call Lisa with any questions this is patients Home Care RN: [phone] // [facility] Home Monitoring Patient      Anticoagulation Care Providers     Provider Role Specialty Phone number    Anay Miner NP Referring Nurse Practitioner - Family [phone]

## 2021-01-14 NOTE — TELEPHONE ENCOUNTER
RN spoke with patient's daughter. She declines to schedule this time. She will talk with the patient and determine when they would like to schedule a follow up.     Anny Stanton RN, BSN, PHN  [facility] [place]

## 2021-01-15 ENCOUNTER — HEALTH MAINTENANCE LETTER (OUTPATIENT)
Age: 86
End: 2021-01-15

## 2021-01-15 DIAGNOSIS — J84.10 FIBROSIS, LUNG (H): ICD-10-CM

## 2021-01-16 RX ORDER — CODEINE SULFATE 30 MG/1
TABLET ORAL
Qty: 60 TABLET | Refills: 0 | Status: SHIPPED | OUTPATIENT
Start: 2021-01-23 | End: 2021-02-15

## 2021-01-18 ENCOUNTER — TRANSFERRED RECORDS (OUTPATIENT)
Dept: HEALTH INFORMATION MANAGEMENT | Facility: CLINIC | Age: 86
End: 2021-01-18

## 2021-01-26 DIAGNOSIS — N32.81 OAB (OVERACTIVE BLADDER): ICD-10-CM

## 2021-01-27 RX ORDER — TOLTERODINE 4 MG/1
4 CAPSULE, EXTENDED RELEASE ORAL DAILY
Qty: 60 CAPSULE | Refills: 0 | Status: SHIPPED | OUTPATIENT
Start: 2021-01-27 | End: 2021-02-22

## 2021-01-28 ENCOUNTER — DOCUMENTATION ONLY (OUTPATIENT)
Dept: FAMILY MEDICINE | Facility: CLINIC | Age: 86
End: 2021-01-28

## 2021-01-28 ENCOUNTER — TRANSFERRED RECORDS (OUTPATIENT)
Dept: HEALTH INFORMATION MANAGEMENT | Facility: CLINIC | Age: 86
End: 2021-01-28

## 2021-01-28 ENCOUNTER — ANTICOAGULATION THERAPY VISIT (OUTPATIENT)
Dept: FAMILY MEDICINE | Facility: CLINIC | Age: 86
End: 2021-01-28

## 2021-01-28 DIAGNOSIS — Z79.01 LONG TERM CURRENT USE OF ANTICOAGULANT THERAPY: ICD-10-CM

## 2021-01-28 DIAGNOSIS — I48.0 PAROXYSMAL ATRIAL FIBRILLATION (H): ICD-10-CM

## 2021-01-28 DIAGNOSIS — I48.11 LONGSTANDING PERSISTENT ATRIAL FIBRILLATION (H): Primary | ICD-10-CM

## 2021-01-28 DIAGNOSIS — I48.11 LONGSTANDING PERSISTENT ATRIAL FIBRILLATION (H): ICD-10-CM

## 2021-01-28 LAB — INR PPP: 2.7 (ref 0.9–1.1)

## 2021-01-28 NOTE — PROGRESS NOTES
ANTICOAGULATION MANAGEMENT      Ashlie Brantley due for annual renewal of referral to anticoagulation monitoring. Order pended for your review and signature.      ANTICOAGULATION SUMMARY      Warfarin indication(s)     Atrial fibrillation    Heart valve present?  Bioprosthetic AVR       Current goal range   INR: 2.0-3.0     Goal appropriate for indication? Yes, INR 2-3 appropriate for hx of DVT, PE, hypercoagulable state, Afib, LVAD, or bileaflet AVR without risk factors     Current duration of therapy Indefinite/long term therapy   Time in Therapeutic Range (TTR)  (Goal > 60%) 50 %       Office visit with referring provider's group within last year yes on 11/09/2020       Tiburcio Smyth RN

## 2021-01-28 NOTE — PROGRESS NOTES
ANTICOAGULATION MANAGEMENT     Patient Name:  Ashlie Brantley  Date:  2021    ASSESSMENT /SUBJECTIVE:    Today's INR result of 2.7 is therapeutic. Goal INR of 2.0-3.0      Warfarin dose taken: Warfarin taken as instructed    Diet: No new diet changes affecting INR    Medication changes/ interactions: No new medications/supplements affecting INR    Previous INR: Supratherapeutic     S/S of bleeding or thromboembolism: No    New injury or illness: No    Upcoming surgery, procedure or cardioversion: No    Additional findings: None      PLAN:    Telephone call with  Mariela regarding INR result and instructed:     Warfarin Dosing Instructions: Continue your current warfarin dose 2.5 mg Sun/Tue/Thu and 5 mg ROW    Instructed patient to follow up no later than: 2 weeks  Patient to recheck with home meter    Education provided: Target INR goal and significance of current INR result      Mariela verbalizes understanding and agrees to warfarin dosing plan.    Instructed to call the Anticoagulation Clinic for any changes, questions or concerns. (#[phone])        Kerri Brink RN      OBJECTIVE:  Recent labs: (last 7 days)     21   INR 2.7*         No question data found.  Anticoagulation Summary  As of 2021    INR goal:  2.0-3.0   TTR:  49.1 % (1 y)   INR used for dosin.7 (2021)   Warfarin maintenance plan:  2.5 mg (5 mg x 0.5) every Sun, Tue, Thu; 5 mg (5 mg x 1) all other days   Full warfarin instructions:  2.5 mg every Sun, Tue, Thu; 5 mg all other days   Weekly warfarin total:  27.5 mg   No change documented:  Kerri Brink RN   Plan last modified:  Claudia Maher RN (2020)   Next INR check:  2021   Priority:  High   Target end date:  Indefinite    Indications    Atrial fibrillation (H) [I48.91] [I48.91]  Long term current use of anticoagulant therapy [Z79.01]  Longstanding persistent atrial fibrillation (H) [I48.11]             Anticoagulation Episode Summary     INR  check location:      Preferred lab:  EXTERNAL LAB    Send INR reminders to:  AGGIE FRANCIS    Comments:  Call Lisa with any questions this is patients Home Care RN: [phone] // [facility]avi Home Monitoring Patient      Anticoagulation Care Providers     Provider Role Specialty Phone number    Anay Miner NP Referring Nurse Practitioner - Family [phone]

## 2021-02-11 ENCOUNTER — ANTICOAGULATION THERAPY VISIT (OUTPATIENT)
Dept: FAMILY MEDICINE | Facility: CLINIC | Age: 86
End: 2021-02-11

## 2021-02-11 ENCOUNTER — TRANSFERRED RECORDS (OUTPATIENT)
Dept: HEALTH INFORMATION MANAGEMENT | Facility: CLINIC | Age: 86
End: 2021-02-11

## 2021-02-11 DIAGNOSIS — I48.11 LONGSTANDING PERSISTENT ATRIAL FIBRILLATION (H): ICD-10-CM

## 2021-02-11 DIAGNOSIS — Z79.01 LONG TERM CURRENT USE OF ANTICOAGULANT THERAPY: ICD-10-CM

## 2021-02-11 DIAGNOSIS — I48.0 PAROXYSMAL ATRIAL FIBRILLATION (H): ICD-10-CM

## 2021-02-11 LAB — INR PPP: 2.6 (ref 0.9–1.1)

## 2021-02-11 NOTE — PROGRESS NOTES
ANTICOAGULATION MANAGEMENT     Patient Name:  Ashlie Brantley  Date:  2021    ASSESSMENT /SUBJECTIVE:    Today's INR result of 2.6 is therapeutic. Goal INR of 2.0-3.0      Warfarin dose taken: Warfarin taken as instructed    Diet: No new diet changes affecting INR    Medication changes/ interactions: No new medications/supplements affecting INR    Previous INR: Therapeutic     S/S of bleeding or thromboembolism: No    New injury or illness: No    Upcoming surgery, procedure or cardioversion: No    Additional findings: None      PLAN:    Telephone call with  Ginny Daughter regarding INR result and instructed:     Warfarin Dosing Instructions: Continue your current warfarin dose 2.5 mg every Sun, Tue, Thu; 5 mg all other days    Instructed patient to follow up no later than: 2 weeks  Patient to recheck with home meter    Education provided: Monitoring for bleeding signs and symptoms and Monitoring for clotting signs and symptoms      Dejuanhine verbalizes understanding and agrees to warfarin dosing plan.    Instructed to call the Anticoagulation Clinic for any changes, questions or concerns. (#[phone])        Luz Allen RN      OBJECTIVE:  Recent labs: (last 7 days)     21   INR 2.6*         No question data found.  Anticoagulation Summary  As of 2021    INR goal:  2.0-3.0   TTR:  51.5 % (1 y)   INR used for dosin.6 (2021)   Warfarin maintenance plan:  2.5 mg (5 mg x 0.5) every Sun, Tue, Thu; 5 mg (5 mg x 1) all other days   Full warfarin instructions:  2.5 mg every Sun, Tue, Thu; 5 mg all other days   Weekly warfarin total:  27.5 mg   No change documented:  Luz Allen RN   Plan last modified:  Claudia Maher RN (2020)   Next INR check:  2021   Priority:  High   Target end date:  Indefinite    Indications    Atrial fibrillation (H) [I48.91] [I48.91]  Long term current use of anticoagulant therapy [Z79.01]  Longstanding persistent atrial fibrillation (H)  [I48.11]             Anticoagulation Episode Summary     INR check location:      Preferred lab:  EXTERNAL LAB    Send INR reminders to:  AGGIE FRANCIS    Comments:  Call Lisa with any questions this is patients Home Care RN: [phone] // [facility] Monitoring Patient      Anticoagulation Care Providers     Provider Role Specialty Phone number    Anay Miner NP Referring Nurse Practitioner - Family [phone]

## 2021-02-15 DIAGNOSIS — J84.10 FIBROSIS, LUNG (H): ICD-10-CM

## 2021-02-15 RX ORDER — CODEINE SULFATE 30 MG/1
TABLET ORAL
Qty: 60 TABLET | Refills: 0 | Status: SHIPPED | OUTPATIENT
Start: 2021-02-19 | End: 2021-03-22

## 2021-02-15 NOTE — TELEPHONE ENCOUNTER
[facility] faxed Controlled Substance Reminder re:     codeine 30 MG tablet        Last Written Prescription Date:  1/23/2021  Last Fill Quantity: 60 tablet,   # refills: 0  Last Office Visit: 2/1/21 lynda/ CORBIN Miner    Future Office visit:       Routing refill request to provider for review/approval because:  Drug not on the [facility], [facility] or [facility] refill protocol or controlled substance

## 2021-02-22 DIAGNOSIS — N32.81 OAB (OVERACTIVE BLADDER): ICD-10-CM

## 2021-02-22 RX ORDER — TOLTERODINE 4 MG/1
4 CAPSULE, EXTENDED RELEASE ORAL DAILY
Qty: 90 CAPSULE | Refills: 1 | Status: SHIPPED | OUTPATIENT
Start: 2021-02-22 | End: 2022-01-01

## 2021-02-22 NOTE — TELEPHONE ENCOUNTER
Requested Prescriptions   Pending Prescriptions Disp Refills     tolterodine ER (DETROL LA) 4 MG 24 hr capsule 60 capsule 0     Sig: Take 1 capsule (4 mg) by mouth daily       There is no refill protocol information for this order        Last office visit: Visit date not found with prescribing provider:  Dr. Warner   Future Office Visit:          Denise Behrendt  Specialty CSS

## 2021-02-25 ENCOUNTER — TRANSFERRED RECORDS (OUTPATIENT)
Dept: HEALTH INFORMATION MANAGEMENT | Facility: CLINIC | Age: 86
End: 2021-02-25

## 2021-02-25 ENCOUNTER — ANTICOAGULATION THERAPY VISIT (OUTPATIENT)
Dept: FAMILY MEDICINE | Facility: CLINIC | Age: 86
End: 2021-02-25

## 2021-02-25 DIAGNOSIS — I48.0 PAROXYSMAL ATRIAL FIBRILLATION (H): ICD-10-CM

## 2021-02-25 DIAGNOSIS — I48.11 LONGSTANDING PERSISTENT ATRIAL FIBRILLATION (H): ICD-10-CM

## 2021-02-25 DIAGNOSIS — Z79.01 LONG TERM CURRENT USE OF ANTICOAGULANT THERAPY: ICD-10-CM

## 2021-02-25 LAB — INR PPP: 2 (ref 0.9–1.1)

## 2021-02-25 NOTE — PROGRESS NOTES
ANTICOAGULATION MANAGEMENT     Patient Name:  Ashlie Brantley  Date:  2021    ASSESSMENT /SUBJECTIVE:    Today's INR result of 2.0 is therapeutic. Goal INR of 2.0-3.0      Warfarin dose taken: Warfarin taken as instructed    Diet: No new diet changes affecting INR    Medication changes/ interactions: No new medications/supplements affecting INR    Previous INR: Therapeutic     S/S of bleeding or thromboembolism: No    New injury or illness: No    Upcoming surgery, procedure or cardioversion: No    Additional findings: None      PLAN:    Telephone call with  Mariela regarding INR result and instructed:     Warfarin Dosing Instructions: Continue your current warfarin dose 2.5 mg every Sun, Tue, Thu; 5 mg all other days    Instructed patient to follow up no later than: 2 weeks  Patient to recheck with home meter    Education provided: Please call back if any changes to your diet, medications or how you've been taking warfarin      Mariela verbalizes understanding and agrees to warfarin dosing plan.    Instructed to call the Anticoagulation Clinic for any changes, questions or concerns. (#[phone])        Luz Allen RN      OBJECTIVE:  Recent labs: (last 7 days)     21   INR 2.0*         No question data found.  Anticoagulation Summary  As of 2021    INR goal:  2.0-3.0   TTR:  55.4 % (1 y)   INR used for dosin.0 (2021)   Warfarin maintenance plan:  2.5 mg (5 mg x 0.5) every Sun, Tue, Thu; 5 mg (5 mg x 1) all other days   Full warfarin instructions:  2.5 mg every Sun, Tue, Thu; 5 mg all other days   Weekly warfarin total:  27.5 mg   No change documented:  Luz Allen RN   Plan last modified:  Claudia Maher, RN (2020)   Next INR check:  3/11/2021   Priority:  High   Target end date:  Indefinite    Indications    Atrial fibrillation (H) [I48.91] [I48.91]  Long term current use of anticoagulant therapy [Z79.01]  Longstanding persistent atrial fibrillation (H)  [I48.11]             Anticoagulation Episode Summary     INR check location:      Preferred lab:  EXTERNAL LAB    Send INR reminders to:  AGGIE FRANCIS    Comments:  Call Lisa with any questions this is patients Home Care RN: [phone] // [facility] Monitoring Patient      Anticoagulation Care Providers     Provider Role Specialty Phone number    Anay Miner NP Referring Nurse Practitioner - Family [phone]

## 2021-02-26 ENCOUNTER — TRANSFERRED RECORDS (OUTPATIENT)
Dept: HEALTH INFORMATION MANAGEMENT | Facility: CLINIC | Age: 86
End: 2021-02-26

## 2021-03-12 ENCOUNTER — ANTICOAGULATION THERAPY VISIT (OUTPATIENT)
Dept: FAMILY MEDICINE | Facility: CLINIC | Age: 86
End: 2021-03-12

## 2021-03-12 ENCOUNTER — TRANSFERRED RECORDS (OUTPATIENT)
Dept: HEALTH INFORMATION MANAGEMENT | Facility: CLINIC | Age: 86
End: 2021-03-12

## 2021-03-12 DIAGNOSIS — I48.0 PAROXYSMAL ATRIAL FIBRILLATION (H): ICD-10-CM

## 2021-03-12 DIAGNOSIS — I48.11 LONGSTANDING PERSISTENT ATRIAL FIBRILLATION (H): ICD-10-CM

## 2021-03-12 DIAGNOSIS — Z79.01 LONG TERM CURRENT USE OF ANTICOAGULANT THERAPY: ICD-10-CM

## 2021-03-12 LAB — INR PPP: 3.2 (ref 0.9–1.1)

## 2021-03-12 NOTE — PROGRESS NOTES
ANTICOAGULATION MANAGEMENT     Patient Name:  Ashlie Brantley  Date:  3/12/2021    ASSESSMENT /SUBJECTIVE:    Today's INR result of 3.2 is supratherapeutic. Goal INR of 2.0-3.0      Warfarin dose taken: Warfarin taken as instructed    Diet: No new diet changes affecting INR    Medication changes/ interactions: No new medications/supplements affecting INR    Previous INR: Therapeutic     S/S of bleeding or thromboembolism: No    New injury or illness: No. Received both COVID vaccines.     Upcoming surgery, procedure or cardioversion: No    Additional findings: None. No clear factors to explain elevated INR.      PLAN:    Telephone call with  Daughter Mariela regarding INR result and instructed:     Warfarin Dosing Instructions: Continue your current warfarin dose 2.5 mg every Sun, Tue, Thu; 5mg all other days    Instructed patient to follow up no later than: 2 weeks  Patient to recheck with home meter    Education provided: Target INR goal and significance of current INR result      Mariela verbalizes understanding and agrees to warfarin dosing plan.    Instructed to call the Anticoagulation Clinic for any changes, questions or concerns. (#[phone])        Tiburcio Smyth RN      OBJECTIVE:  Recent labs: (last 7 days)     03/12/21   INR 3.2*         No question data found.  Anticoagulation Summary  As of 3/12/2021    INR goal:  2.0-3.0   TTR:  58.7 % (1 y)   INR used for dosing:  3.2 (3/12/2021)   Warfarin maintenance plan:  2.5 mg (5 mg x 0.5) every Sun, Tue, Thu; 5 mg (5 mg x 1) all other days   Full warfarin instructions:  2.5 mg every Sun, Tue, Thu; 5 mg all other days   Weekly warfarin total:  27.5 mg   No change documented:  Tiburcio Smyth RN   Plan last modified:  Claudia Maher RN (12/17/2020)   Next INR check:  3/26/2021   Priority:  High   Target end date:  Indefinite    Indications    Atrial fibrillation (H) [I48.91] [I48.91]  Long term current use of anticoagulant therapy  [Z79.01]  Longstanding persistent atrial fibrillation (H) [I48.11]             Anticoagulation Episode Summary     INR check location:      Preferred lab:  EXTERNAL LAB    Send INR reminders to:  AGGIE FRANCIS    Comments:  Call Lisa with any questions this is patients Home Care RN: [phone] // [facility] Monitoring Patient      Anticoagulation Care Providers     Provider Role Specialty Phone number    Anay Miner NP Referring Nurse Practitioner - Family [phone]

## 2021-03-16 ENCOUNTER — TELEPHONE (OUTPATIENT)
Dept: PULMONOLOGY | Facility: CLINIC | Age: 86
End: 2021-03-16

## 2021-03-16 NOTE — TELEPHONE ENCOUNTER
Spoke with patient's daughter, Mariela, about scheduling an appointment with Dr. Ball as it looks like she is due for a follow up visit near the end of April. Appt was able to be scheduled and details confirmed with daughter.

## 2021-03-22 ENCOUNTER — TELEPHONE (OUTPATIENT)
Dept: FAMILY MEDICINE | Facility: CLINIC | Age: 86
End: 2021-03-22

## 2021-03-22 DIAGNOSIS — E03.4 HYPOTHYROIDISM DUE TO ACQUIRED ATROPHY OF THYROID: ICD-10-CM

## 2021-03-22 DIAGNOSIS — J84.10 FIBROSIS, LUNG (H): ICD-10-CM

## 2021-03-22 DIAGNOSIS — E78.5 HYPERLIPIDEMIA LDL GOAL <130: Primary | ICD-10-CM

## 2021-03-22 DIAGNOSIS — Z79.891 LONG TERM (CURRENT) USE OF OPIATE ANALGESIC: ICD-10-CM

## 2021-03-22 RX ORDER — LEVOTHYROXINE SODIUM 88 UG/1
88 TABLET ORAL DAILY
Qty: 90 TABLET | Refills: 0 | Status: SHIPPED | OUTPATIENT
Start: 2021-03-22 | End: 2021-04-16

## 2021-03-22 RX ORDER — CODEINE SULFATE 30 MG/1
TABLET ORAL
Qty: 60 TABLET | Refills: 0 | Status: SHIPPED | OUTPATIENT
Start: 2021-03-22 | End: 2021-04-16

## 2021-03-22 NOTE — TELEPHONE ENCOUNTER
Refills sent.    Patient is due for fasting labs and urine drug screen.  She can do a lab only visit for that, unless she wants to do a Medicare annual visit.    Anay Miner, DNP, APRN, CNP

## 2021-03-23 NOTE — TELEPHONE ENCOUNTER
[facility] message sent to patient with the information below. Will postpone the encounter for a follow-up.   Mónica Gomez CMA ([facility])

## 2021-03-25 ENCOUNTER — ANTICOAGULATION THERAPY VISIT (OUTPATIENT)
Dept: FAMILY MEDICINE | Facility: CLINIC | Age: 86
End: 2021-03-25

## 2021-03-25 ENCOUNTER — TRANSFERRED RECORDS (OUTPATIENT)
Dept: HEALTH INFORMATION MANAGEMENT | Facility: CLINIC | Age: 86
End: 2021-03-25

## 2021-03-25 DIAGNOSIS — I48.11 LONGSTANDING PERSISTENT ATRIAL FIBRILLATION (H): ICD-10-CM

## 2021-03-25 DIAGNOSIS — Z79.01 LONG TERM CURRENT USE OF ANTICOAGULANT THERAPY: ICD-10-CM

## 2021-03-25 DIAGNOSIS — I48.0 PAROXYSMAL ATRIAL FIBRILLATION (H): ICD-10-CM

## 2021-03-25 LAB — INR PPP: 2.1 (ref 0.9–1.1)

## 2021-03-25 NOTE — PROGRESS NOTES
ANTICOAGULATION MANAGEMENT     Patient Name:  Ashlie Brantley  Date:  3/25/2021    ASSESSMENT /SUBJECTIVE:    Today's INR result of 2.1 is therapeutic. Goal INR of 2.0-3.0      Warfarin dose taken: Warfarin taken as instructed    Diet: No new diet changes affecting INR    Medication changes/ interactions: No new medications/supplements affecting INR    Previous INR: Supratherapeutic     S/S of bleeding or thromboembolism: No    New injury or illness: No    Upcoming surgery, procedure or cardioversion: No    Additional findings: None      PLAN:    Telephone call with  Mariela regarding INR result and instructed:     Warfarin Dosing Instructions: Continue your current warfarin dose 2.5mg every Sun, Tue, & Thu; 5mg all other days of the week.     Instructed patient to follow up no later than: 2 weeks  Patient to recheck with home meter    Education provided: Target INR goal and significance of current INR result and Importance of therapeutic range      Mariela verbalizes understanding and agrees to warfarin dosing plan.    Instructed to call the Anticoagulation Clinic for any changes, questions or concerns. (#[phone])        Gabriel Cohen RN      OBJECTIVE:  Recent labs: (last 7 days)     21   INR 2.1*         No question data found.  Anticoagulation Summary  As of 3/25/2021    INR goal:  2.0-3.0   TTR:  60.2 % (1 y)   INR used for dosin.1 (3/25/2021)   Warfarin maintenance plan:  2.5 mg (5 mg x 0.5) every Sun, Tue, Thu; 5 mg (5 mg x 1) all other days   Full warfarin instructions:  2.5 mg every Sun, Tue, Thu; 5 mg all other days   Weekly warfarin total:  27.5 mg   Plan last modified:  Claudia Maher RN (2020)   Next INR check:  2021   Priority:  High   Target end date:  Indefinite    Indications    Atrial fibrillation (H) [I48.91] [I48.91]  Long term current use of anticoagulant therapy [Z79.01]  Longstanding persistent atrial fibrillation (H) [I48.11]             Anticoagulation  Episode Summary     INR check location:      Preferred lab:  EXTERNAL LAB    Send INR reminders to:  AGGIE FRANCIS    Comments:  Call Lisa with any questions this is patients Home Care RN: [phone] // [facility] Monitoring Patient      Anticoagulation Care Providers     Provider Role Specialty Phone number    Anay Miner, NP Referring Nurse Practitioner - Family [phone]

## 2021-04-08 ENCOUNTER — ANTICOAGULATION THERAPY VISIT (OUTPATIENT)
Dept: FAMILY MEDICINE | Facility: CLINIC | Age: 86
End: 2021-04-08

## 2021-04-08 ENCOUNTER — TRANSFERRED RECORDS (OUTPATIENT)
Dept: HEALTH INFORMATION MANAGEMENT | Facility: CLINIC | Age: 86
End: 2021-04-08

## 2021-04-08 DIAGNOSIS — Z79.01 LONG TERM CURRENT USE OF ANTICOAGULANT THERAPY: ICD-10-CM

## 2021-04-08 DIAGNOSIS — I48.0 PAROXYSMAL ATRIAL FIBRILLATION (H): ICD-10-CM

## 2021-04-08 DIAGNOSIS — I48.11 LONGSTANDING PERSISTENT ATRIAL FIBRILLATION (H): ICD-10-CM

## 2021-04-08 LAB — INR PPP: 2.3 (ref 0.9–1.1)

## 2021-04-08 NOTE — PROGRESS NOTES
ANTICOAGULATION MANAGEMENT     Patient Name:  Ashlie Brantley  Date:  2021    ASSESSMENT /SUBJECTIVE:    Today's INR result of 2.3 is therapeutic. Goal INR of 2.0-3.0      Warfarin dose taken: Warfarin taken as instructed    Diet: No new diet changes affecting INR    Medication changes/ interactions: No new medications/supplements affecting INR    Previous INR: Therapeutic     S/S of bleeding or thromboembolism: No    New injury or illness: No. Both received COVID vaccines.    Upcoming surgery, procedure or cardioversion: No    Additional findings: None      PLAN:    Telephone call with  Daughter Mariela regarding INR result and instructed:     Warfarin Dosing Instructions: Continue your current warfarin dose 2.5mg every Sun, Tue, Thu; 5mg all other days    Instructed patient to follow up no later than: 2 weeks  Patient to recheck with home meter    Education provided: Target INR goal and significance of current INR result      Mariela verbalizes understanding and agrees to warfarin dosing plan.    Instructed to call the Anticoagulation Clinic for any changes, questions or concerns. (#[phone])        Tiburcio Smyth RN      OBJECTIVE:  Recent labs: (last 7 days)     21   INR 2.3*         No question data found.  Anticoagulation Summary  As of 2021    INR goal:  2.0-3.0   TTR:  61.4 % (1 y)   INR used for dosin.3 (2021)   Warfarin maintenance plan:  2.5 mg (5 mg x 0.5) every Sun, Tue, Thu; 5 mg (5 mg x 1) all other days   Full warfarin instructions:  2.5 mg every Sun, Tue, Thu; 5 mg all other days   Weekly warfarin total:  27.5 mg   Plan last modified:  Claudia Maher RN (2020)   Next INR check:     Priority:  High   Target end date:  Indefinite    Indications    Atrial fibrillation (H) [I48.91] [I48.91]  Long term current use of anticoagulant therapy [Z79.01]  Longstanding persistent atrial fibrillation (H) [I48.11]             Anticoagulation Episode Summary     INR check  location:      Preferred lab:  EXTERNAL LAB    Send INR reminders to:  AGGIE FRANCIS    Comments:  Call Lisa with any questions this is patients Home Care RN: [phone] // [facility] Monitoring Patient      Anticoagulation Care Providers     Provider Role Specialty Phone number    Anay Miner, NP Referring Nurse Practitioner - Family [phone]

## 2021-04-11 ENCOUNTER — HEALTH MAINTENANCE LETTER (OUTPATIENT)
Age: 86
End: 2021-04-11

## 2021-04-16 ENCOUNTER — OFFICE VISIT (OUTPATIENT)
Dept: FAMILY MEDICINE | Facility: CLINIC | Age: 86
End: 2021-04-16
Payer: COMMERCIAL

## 2021-04-16 VITALS
HEIGHT: 58 IN | OXYGEN SATURATION: 97 % | BODY MASS INDEX: 29.14 KG/M2 | TEMPERATURE: 97.7 F | DIASTOLIC BLOOD PRESSURE: 60 MMHG | SYSTOLIC BLOOD PRESSURE: 110 MMHG | HEART RATE: 55 BPM | RESPIRATION RATE: 15 BRPM | WEIGHT: 138.8 LBS

## 2021-04-16 DIAGNOSIS — Z79.891 LONG TERM (CURRENT) USE OF OPIATE ANALGESIC: ICD-10-CM

## 2021-04-16 DIAGNOSIS — J84.10 FIBROSIS, LUNG (H): ICD-10-CM

## 2021-04-16 DIAGNOSIS — L29.9 EAR ITCHING: ICD-10-CM

## 2021-04-16 DIAGNOSIS — E78.5 HYPERLIPIDEMIA LDL GOAL <130: ICD-10-CM

## 2021-04-16 DIAGNOSIS — Z79.01 LONG TERM CURRENT USE OF ANTICOAGULANT THERAPY: ICD-10-CM

## 2021-04-16 DIAGNOSIS — E03.4 HYPOTHYROIDISM DUE TO ACQUIRED ATROPHY OF THYROID: Primary | ICD-10-CM

## 2021-04-16 DIAGNOSIS — I48.19 PERSISTENT ATRIAL FIBRILLATION (H): ICD-10-CM

## 2021-04-16 DIAGNOSIS — I10 HYPERTENSION GOAL BP (BLOOD PRESSURE) < 140/90: ICD-10-CM

## 2021-04-16 PROBLEM — E66.01 MORBID OBESITY (H): Status: ACTIVE | Noted: 2021-04-16

## 2021-04-16 LAB
AMPHETAMINES UR QL: NOT DETECTED NG/ML
ANION GAP SERPL CALCULATED.3IONS-SCNC: 3 MMOL/L (ref 3–14)
BARBITURATES UR QL SCN: NOT DETECTED NG/ML
BENZODIAZ UR QL SCN: NOT DETECTED NG/ML
BUN SERPL-MCNC: 21 MG/DL (ref 7–30)
BUPRENORPHINE UR QL: NOT DETECTED NG/ML
CALCIUM SERPL-MCNC: 9.3 MG/DL (ref 8.5–10.1)
CANNABINOIDS UR QL: NOT DETECTED NG/ML
CHLORIDE SERPL-SCNC: 102 MMOL/L (ref 94–109)
CHOLEST SERPL-MCNC: 115 MG/DL
CO2 SERPL-SCNC: 35 MMOL/L (ref 20–32)
COCAINE UR QL SCN: NOT DETECTED NG/ML
CREAT SERPL-MCNC: 0.87 MG/DL (ref 0.52–1.04)
D-METHAMPHET UR QL: NOT DETECTED NG/ML
GFR SERPL CREATININE-BSD FRML MDRD: 59 ML/MIN/{1.73_M2}
GLUCOSE SERPL-MCNC: 98 MG/DL (ref 70–99)
HDLC SERPL-MCNC: 50 MG/DL
LDLC SERPL CALC-MCNC: 46 MG/DL
METHADONE UR QL SCN: NOT DETECTED NG/ML
NONHDLC SERPL-MCNC: 65 MG/DL
OPIATES UR QL SCN: ABNORMAL NG/ML
OXYCODONE UR QL SCN: NOT DETECTED NG/ML
PCP UR QL SCN: NOT DETECTED NG/ML
POTASSIUM SERPL-SCNC: 4.1 MMOL/L (ref 3.4–5.3)
PROPOXYPH UR QL: NOT DETECTED NG/ML
SODIUM SERPL-SCNC: 140 MMOL/L (ref 133–144)
TRICYCLICS UR QL SCN: NOT DETECTED NG/ML
TRIGL SERPL-MCNC: 97 MG/DL
TSH SERPL DL<=0.005 MIU/L-ACNC: 0.61 MU/L (ref 0.4–4)

## 2021-04-16 PROCEDURE — 99214 OFFICE O/P EST MOD 30 MIN: CPT | Performed by: NURSE PRACTITIONER

## 2021-04-16 PROCEDURE — 84443 ASSAY THYROID STIM HORMONE: CPT | Performed by: NURSE PRACTITIONER

## 2021-04-16 PROCEDURE — 80306 DRUG TEST PRSMV INSTRMNT: CPT | Performed by: NURSE PRACTITIONER

## 2021-04-16 PROCEDURE — 80061 LIPID PANEL: CPT | Performed by: NURSE PRACTITIONER

## 2021-04-16 PROCEDURE — 80048 BASIC METABOLIC PNL TOTAL CA: CPT | Performed by: NURSE PRACTITIONER

## 2021-04-16 PROCEDURE — 36415 COLL VENOUS BLD VENIPUNCTURE: CPT | Performed by: NURSE PRACTITIONER

## 2021-04-16 RX ORDER — LEVOTHYROXINE SODIUM 88 UG/1
88 TABLET ORAL DAILY
Qty: 90 TABLET | Refills: 3 | Status: SHIPPED | OUTPATIENT
Start: 2021-04-16

## 2021-04-16 RX ORDER — WARFARIN SODIUM 5 MG/1
TABLET ORAL
Qty: 90 TABLET | Refills: 3 | Status: SHIPPED | OUTPATIENT
Start: 2021-04-16

## 2021-04-16 RX ORDER — LOSARTAN POTASSIUM 25 MG/1
1 TABLET ORAL DAILY
COMMUNITY
Start: 2021-02-19

## 2021-04-16 RX ORDER — LOSARTAN POTASSIUM 50 MG/1
50 TABLET ORAL DAILY
Status: CANCELLED | OUTPATIENT
Start: 2021-04-16

## 2021-04-16 RX ORDER — CODEINE SULFATE 30 MG/1
TABLET ORAL
Qty: 60 TABLET | Refills: 0 | Status: SHIPPED | OUTPATIENT
Start: 2021-04-22 | End: 2021-04-21

## 2021-04-16 RX ORDER — METOPROLOL TARTRATE 50 MG
50 TABLET ORAL 2 TIMES DAILY
Status: CANCELLED | OUTPATIENT
Start: 2021-04-16

## 2021-04-16 ASSESSMENT — MIFFLIN-ST. JEOR: SCORE: 939.34

## 2021-04-16 NOTE — PROGRESS NOTES
"    Assessment & Plan     Hypothyroidism due to acquired atrophy of thyroid  Chronic, stable, continue current treatment.  Check labs today.  - levothyroxine (SYNTHROID/LEVOTHROID) 88 MCG tablet; Take 1 tablet (88 mcg) by mouth daily  - **TSH with free T4 reflex FUTURE anytime    Hypertension goal BP (blood pressure) < 140/90  Chronic, stable, continue current treatment as prescribed by Cardiologist -metoprolol 50 mg twice daily and losartan 25 mg daily  - Basic metabolic panel  (Ca, Cl, CO2, Creat, Gluc, K, Na, BUN)    Persistent atrial fibrillation (H)  Long-term anticoagulation  Chronic, stable, continue current treatment.   - warfarin ANTICOAGULANT (COUMADIN) 5 MG tablet; Take as directed by [facility]. Current dose is 5 mg mon tue wed thur and sat 2.5 mg all other days.  - ANTICOAGULATION CLINIC REFERRAL    Long term (current) use of opiate analgesic     - Drug Abuse Screen Panel 13, Urine (Pain Care Package)    Hyperlipidemia LDL goal <130    - Lipid panel reflex to direct LDL Fasting    Fibrosis, lung (H)    - codeine 30 MG tablet; TAKE TWO TABLETS BY MOUTH EVERY NIGHT AT BEDTIME  - Drug Abuse Screen Panel 13, Urine (Pain Care Package)    Ear itching  May use mineral oil or hydrogen peroxide to the ears twice weekly to assist with clearing of earwax.               BMI:   Estimated body mass index is 29.01 kg/m  as calculated from the following:    Height as of this encounter: 1.473 m (4' 10\").    Weight as of this encounter: 63 kg (138 lb 12.8 oz).       Return in about 6 months (around 10/16/2021) for Chronic disease follow-up.    Anay Miner NP  [facility]    Chris Dailey is a 89 year old who presents for the following health issues     HPI     Hypertension Follow-up      Do you check your blood pressure regularly outside of the clinic? No     Are you following a low salt diet? No    Are your blood pressures ever more than 140 on the top number (systolic) OR more   than 90 " "on the bottom number (diastolic), for example 140/90? No    Hypothyroidism Follow-up      Since last visit, patient describes the following symptoms: Weight stable, no hair loss, no skin changes, no constipation, no loose stools      How many servings of fruits and vegetables do you eat daily?  2-3    On average, how many sweetened beverages do you drink each day (Examples: soda, juice, sweet tea, etc.  Do NOT count diet or artificially sweetened beverages)?   Creamer in coffee     How many days per week do you exercise enough to make your heart beat faster? 7    How many minutes a day do you exercise enough to make your heart beat faster? 30 - 60  How many days per week do you miss taking your medication? 1    What makes it hard for you to take your medications?  remembering to take    She complains of itching in the ears.  Allergies?    She continues to be on warfarin for atrial fibrillation.  She is doing home INR checks and has been stable.  She is managed with anticoagulation clinic.    She has an upcoming appointment with her pulmonologist in 4/2021 for her idiopathic pulmonary fibrosis.  She remains on continuous oxygen 3 L nasal cannula.  She continues to take 2 tablets of codeine at bedtime that has been effective to help her cough.    She continues to see urology for overactive bladder and was prescribed oxybutynin.  She has a follow-up with urology this summer.    Review of Systems   Constitutional, HEENT, cardiovascular, pulmonary, gi and gu systems are negative, except as otherwise noted.      Objective    /60 (BP Location: Right arm)   Pulse 55   Temp 97.7  F (36.5  C) (Oral)   Resp 15   Ht 1.473 m (4' 10\")   Wt 63 kg (138 lb 12.8 oz)   SpO2 97%   BMI 29.01 kg/m    Body mass index is 29.01 kg/m .  Physical Exam   GENERAL: healthy, alert and no distress  HENT: ear canals and TM's normal, nose and mouth without ulcers or lesions  RESP: crackles right base, she is on 3L NC  CV: bradycardia, " irregularly irregular rhythm, normal S1 S2, no S3 or S4 and no murmur, click or rub  PSYCH: mentation appears normal, affect normal/bright

## 2021-04-21 ENCOUNTER — TELEPHONE (OUTPATIENT)
Dept: FAMILY MEDICINE | Facility: CLINIC | Age: 86
End: 2021-04-21

## 2021-04-21 DIAGNOSIS — J84.10 FIBROSIS, LUNG (H): ICD-10-CM

## 2021-04-21 RX ORDER — CODEINE SULFATE 30 MG/1
TABLET ORAL
Qty: 60 TABLET | Refills: 0 | Status: SHIPPED | OUTPATIENT
Start: 2021-04-21 | End: 2021-01-01

## 2021-04-21 NOTE — TELEPHONE ENCOUNTER
Reviewed [place]  and last fill was 3/24/21 so patient is requesting 1 day early refill.  Okay to fill today and I went in Rx stating this.    Anay Miner, DNP, APRN, CNP

## 2021-04-21 NOTE — TELEPHONE ENCOUNTER
Routing to PCP    RX pended if PCP willing    Patient daughter requesting early refill on Codeine.  Patient is out.  Due to be filled tomorrow    Marcelino Fregoso, RN, BSN, PHN  [facility]

## 2021-04-21 NOTE — TELEPHONE ENCOUNTER
Mariela (daughter of patient). Patient needs refill on codeine 30 MG tablet. Patient is out of medication. Requesting approval to refill early. Please advise.

## 2021-04-22 ENCOUNTER — ANTICOAGULATION THERAPY VISIT (OUTPATIENT)
Dept: FAMILY MEDICINE | Facility: CLINIC | Age: 86
End: 2021-04-22

## 2021-04-22 ENCOUNTER — TRANSFERRED RECORDS (OUTPATIENT)
Dept: HEALTH INFORMATION MANAGEMENT | Facility: CLINIC | Age: 86
End: 2021-04-22

## 2021-04-22 ENCOUNTER — TELEPHONE (OUTPATIENT)
Dept: FAMILY MEDICINE | Facility: CLINIC | Age: 86
End: 2021-04-22

## 2021-04-22 DIAGNOSIS — I48.0 PAROXYSMAL ATRIAL FIBRILLATION (H): ICD-10-CM

## 2021-04-22 DIAGNOSIS — Z79.01 LONG TERM CURRENT USE OF ANTICOAGULANT THERAPY: ICD-10-CM

## 2021-04-22 DIAGNOSIS — Z79.891 LONG TERM (CURRENT) USE OF OPIATE ANALGESIC: ICD-10-CM

## 2021-04-22 DIAGNOSIS — I48.11 LONGSTANDING PERSISTENT ATRIAL FIBRILLATION (H): ICD-10-CM

## 2021-04-22 DIAGNOSIS — I48.19 PERSISTENT ATRIAL FIBRILLATION (H): ICD-10-CM

## 2021-04-22 LAB — INR PPP: 2.6 (ref 0.9–1.1)

## 2021-04-22 RX ORDER — WARFARIN SODIUM 2.5 MG/1
TABLET ORAL
Qty: 60 TABLET | Refills: 1 | Status: SHIPPED | OUTPATIENT
Start: 2021-04-22 | End: 2022-01-01

## 2021-04-22 NOTE — PROGRESS NOTES
Pt's daughter Ashlie called requesting a refill of warfarin 2.5 mg tablets.Sent to pharmacy. Pt also has 5 mg tablets as well which was refilled on 4/16/21

## 2021-04-22 NOTE — PROGRESS NOTES
ANTICOAGULATION MANAGEMENT     Patient Name:  Ashlie Brantley  Date:  2021    ASSESSMENT /SUBJECTIVE:    Today's INR result of 2.6 is therapeutic. Goal INR of 2.0-3.0      Warfarin dose taken: Warfarin taken as instructed    Diet: No new diet changes affecting INR    Medication changes/ interactions: No new medications/supplements affecting INR    Previous INR: Therapeutic     S/S of bleeding or thromboembolism: No    New injury or illness: No    Upcoming surgery, procedure or cardioversion: No    Additional findings: None      PLAN:    Telephone call with  Mariela, pura, regarding INR result and instructed:     Warfarin Dosing Instructions: Continue your current warfarin dose 2.5 mg on sun/tues/thurs and 5 mg all other days    Instructed patient to follow up no later than: 2 weeks  Patient to recheck with home meter    Education provided: None required      Mariela, pura, verbalizes understanding and agrees to warfarin dosing plan.    Instructed to call the Anticoagulation Clinic for any changes, questions or concerns. (#[phone])        Lyla Robbins RN      OBJECTIVE:  Recent labs: (last 7 days)     21   INR 2.6*         No question data found.  Anticoagulation Summary  As of 2021    INR goal:  2.0-3.0   TTR:  64.5 % (1 y)   INR used for dosin.6 (2021)   Warfarin maintenance plan:  2.5 mg (5 mg x 0.5) every Sun, Tue, Thu; 5 mg (5 mg x 1) all other days   Full warfarin instructions:  2.5 mg every Sun, Tue, Thu; 5 mg all other days   Weekly warfarin total:  27.5 mg   No change documented:  Luz Allen RN   Plan last modified:  Claudia Maher, RN (2020)   Next INR check:  2021   Priority:  High   Target end date:  Indefinite    Indications    Atrial fibrillation (H) [I48.91] [I48.91]  Long term current use of anticoagulant therapy [Z79.01]  Longstanding persistent atrial fibrillation (H) [I48.11]  Persistent atrial fibrillation (H) [I48.19]  Long term  (current) use of opiate analgesic [Z79.891]             Anticoagulation Episode Summary     INR check location:      Preferred lab:  EXTERNAL LAB    Send INR reminders to:  AGGIE FRANCIS    Comments:  Call Lisa with any questions this is patients Home Care RN: [phone] // [facility] Monitoring Patient      Anticoagulation Care Providers     Provider Role Specialty Phone number    Anay Miner NP Referring Nurse Practitioner - Family [phone]

## 2021-04-28 ENCOUNTER — OFFICE VISIT (OUTPATIENT)
Dept: PULMONOLOGY | Facility: CLINIC | Age: 86
End: 2021-04-28
Attending: INTERNAL MEDICINE
Payer: COMMERCIAL

## 2021-04-28 VITALS
BODY MASS INDEX: 29.05 KG/M2 | HEART RATE: 53 BPM | OXYGEN SATURATION: 98 % | WEIGHT: 139 LBS | SYSTOLIC BLOOD PRESSURE: 134 MMHG | RESPIRATION RATE: 18 BRPM | DIASTOLIC BLOOD PRESSURE: 55 MMHG

## 2021-04-28 DIAGNOSIS — J84.9 ILD (INTERSTITIAL LUNG DISEASE) (H): Primary | ICD-10-CM

## 2021-04-28 PROCEDURE — 99212 OFFICE O/P EST SF 10 MIN: CPT | Mod: GC | Performed by: STUDENT IN AN ORGANIZED HEALTH CARE EDUCATION/TRAINING PROGRAM

## 2021-04-28 PROCEDURE — G0463 HOSPITAL OUTPT CLINIC VISIT: HCPCS

## 2021-04-28 ASSESSMENT — PAIN SCALES - GENERAL: PAINLEVEL: NO PAIN (0)

## 2021-04-28 NOTE — NURSING NOTE
Chief Complaint   Patient presents with     RECHECK     9 month follow up      Medications reviewed and updated.  Vitals taken  Minnie Davis CMA

## 2021-04-28 NOTE — PROGRESS NOTES
[facility]    Pulmonary, Allergy, Critical Care and Sleep Medicine    Follow-up Note  4/28/2021    Assessment and Recommendations:    Ashlie Brantley is a 89 year old female with a history of ILD (NSIP) on 2L O2, Pulmonary HTN, HFrEF (EF 40-45% 8/2019), Aortic Stenosis s/p TAVR 12/2019, Afib on warfarin, embolic CVA, and Vocal Cord Dystonia who presents for follow up.     Chronic Hypoxic Hypercapnic Respiratory Failure  NSIP  Pulmonary Hypertension  Incidental diagnosis following a URI in 2011, further ILD workup negative, never on therapy. Oxygen use since 2018 with bedtime codeine to help control nocturnal cough. Slow decline in PFTs with increase in dyspnea on exertion over time. Last CT 2019 with likely progression of underlying fibrosis since prior imaging in 2014. Echo this winter showing an increase in pulmonary pressures compared to prior, and agree with Cardiology that this could drive an increase in dyspnea. Long discussion today on parameters for pulse and oxygen levels on her sat probe. Discussed low readings, especially in combination with symptoms, should prompt a call to the Pulmonary clinic or her Cardiologist.   - Continue current therapies  - Has received her Covid vaccination     Follow up in Pulmonary clinic in 6 months    Seen and discussed with Dr. Agustin Ball MD PhD  Pulmonary and Critical Care Fellow   Pager [phone]       Subjective, Interval history:   History taken from patient and chart review.     Last seen in Pulmonary clinic July 2020 at which point was a little more short of breath than previously and was using her oxygen more (mostly just at night and with exertion). Chronic nocturnal cough that was unchanged. Helped patient obtain an concentrator. This past year continued to follow with Cardiology. Echo in January 2021 with PA pressure of 51 and moderate RV enlargement with moderately reduced systolic function.    Today accompanied in  "clinic by her daughter. Reports using oxygen continuously since last summer, on 3L. Having a concentrator has made a big difference as it is easier for her to move around and spend time outside. Goes on short walks and sits on her deck. Reports cough symptoms stable, does bring up thick \"chunky\" sputum at times and will use a neb to help with clearance. Codeine still helping a night.    Objective:   Medications:  Current Outpatient Medications   Medication     acetaminophen (TYLENOL) 500 MG tablet     albuterol (PROVENTIL) (2.5 MG/3ML) 0.083% neb solution     codeine 30 MG tablet     famotidine (PEPCID) 20 MG tablet     furosemide (LASIX) 20 MG tablet     GUAIFENESIN PO     levothyroxine (SYNTHROID/LEVOTHROID) 88 MCG tablet     losartan (COZAAR) 25 MG tablet     losartan (COZAAR) 50 MG tablet     metoprolol tartrate (LOPRESSOR) 50 MG tablet     neomycin-polymyxin-dexamethasone (MAXITROL) 3.5-72557-9.1 ophthalmic ointment     order for DME     order for DME     order for DME     oxybutynin (DITROPAN) 5 MG tablet     oxybutynin ER (DITROPAN-XL) 5 MG 24 hr tablet     polyethylene glycol (MIRALAX) 17 GM/Dose powder     Sennosides (SENOKOT PO)     simvastatin (ZOCOR) 40 MG tablet     tolterodine ER (DETROL LA) 4 MG 24 hr capsule     warfarin ANTICOAGULANT (COUMADIN) 2.5 MG tablet     warfarin ANTICOAGULANT (COUMADIN) 5 MG tablet     No current facility-administered medications for this visit.      Physical Exam:  /55   Pulse 53   Resp 18   Wt 63 kg (139 lb)   SpO2 98%   BMI 29.05 kg/m      General: Sitting up, NAD  HEENT: anicteric, limited voice, whispers select words  Chest: Crackles in bilateral bases, no wheezing  Cardiac: Irregularly irregular rhythm, normal rate  Extremities: No LE Edema, no cyanosis or clubbing  Neuro: A&Ox3, no focal deficits   Skin: no rash noted    Labs and imaging: All laboratory and imaging data personally reviewed no new testing today.          "

## 2021-04-28 NOTE — PATIENT INSTRUCTIONS
Try to stay active    Watch out for drops in your oxygen saturation or your pulse. Let your Cardiologist know if your pulse is dropping into the low 40s or below or if you get dizzy or lightheaded    Let the Pulmonary clinic know if your oxygen number is dropping into the 80s or if you start noticing changes in the cough

## 2021-04-28 NOTE — LETTER
4/28/2021         RE: Ashlie Brantley  [address] [place] [address]        Dear Colleague,    Thank you for referring your patient, Ashlie Brantley, to the [facility] FOR LUNG [facility] AND [facility]. Please see a copy of my visit note below.    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     Chronic Hypoxic Hypercapnic Respiratory Failure  NSIP  Pulmonary Hypertension  Incidental diagnosis following a URI in 2011, further ILD workup negative, never on therapy. Oxygen use since 2018 with bedtime codeine to help control nocturnal cough. Slow decline in PFTs with increase in dyspnea on exertion over time. Last CT 2019 with likely progression of underlying fibrosis since prior imaging in 2014. Echo this winter showing an increase in pulmonary pressures compared to prior, and agree with Cardiology that this could drive an increase in dyspnea. Long discussion today on parameters for pulse and oxygen levels on her sat probe. Discussed low readings, especially in combination with symptoms, should prompt a call to the Pulmonary clinic or her Cardiologist.   - Continue current therapies  - Has received her Covid vaccination     Follow up in Pulmonary clinic in 6 months    Seen and discussed with Dr. Agustin Ball MD PhD  Pulmonary and Critical Care Fellow   Pager [phone]       Subjective, Interval history:   History taken from patient and chart review.     Last seen in Pulmonary clinic July 2020 at which point was a little more short of breath than previously and was using her oxygen more (mostly just at night  "and with exertion). Chronic nocturnal cough that was unchanged. Helped patient obtain an concentrator. This past year continued to follow with Cardiology. Echo in January 2021 with PA pressure of 51 and moderate RV enlargement with moderately reduced systolic function.    Today accompanied in clinic by her daughter. Reports using oxygen continuously since last summer, on 3L. Having a concentrator has made a big difference as it is easier for her to move around and spend time outside. Goes on short walks and sits on her deck. Reports cough symptoms stable, does bring up thick \"chunky\" sputum at times and will use a neb to help with clearance. Codeine still helping a night.    Objective:   Medications:  Current Outpatient Medications   Medication     acetaminophen (TYLENOL) 500 MG tablet     albuterol (PROVENTIL) (2.5 MG/3ML) 0.083% neb solution     codeine 30 MG tablet     famotidine (PEPCID) 20 MG tablet     furosemide (LASIX) 20 MG tablet     GUAIFENESIN PO     levothyroxine (SYNTHROID/LEVOTHROID) 88 MCG tablet     losartan (COZAAR) 25 MG tablet     losartan (COZAAR) 50 MG tablet     metoprolol tartrate (LOPRESSOR) 50 MG tablet     neomycin-polymyxin-dexamethasone (MAXITROL) 3.5-93672-0.1 ophthalmic ointment     order for DME     order for DME     order for DME     oxybutynin (DITROPAN) 5 MG tablet     oxybutynin ER (DITROPAN-XL) 5 MG 24 hr tablet     polyethylene glycol (MIRALAX) 17 GM/Dose powder     Sennosides (SENOKOT PO)     simvastatin (ZOCOR) 40 MG tablet     tolterodine ER (DETROL LA) 4 MG 24 hr capsule     warfarin ANTICOAGULANT (COUMADIN) 2.5 MG tablet     warfarin ANTICOAGULANT (COUMADIN) 5 MG tablet     No current facility-administered medications for this visit.      Physical Exam:  /55   Pulse 53   Resp 18   Wt 63 kg (139 lb)   SpO2 98%   BMI 29.05 kg/m      General: Sitting up, NAD  HEENT: anicteric, limited voice, whispers select words  Chest: Crackles in bilateral bases, no " wheezing  Cardiac: Irregularly irregular rhythm, normal rate  Extremities: No LE Edema, no cyanosis or clubbing  Neuro: A&Ox3, no focal deficits   Skin: no rash noted    Labs and imaging: All laboratory and imaging data personally reviewed no new testing today.    Attestation signed by Irina Velez MD at 5/4/2021  2:07 PM:  Pulmonary Attending Staff:  I have discussed Ms. Brantley's case with Dr. Ball; reviewed the patient's available imaging and PFT data and met with her.  I agree with the findings, assessment and recommendations as outlined below by Dr. Ball.        Irina Velez MD  #[id number]  Patient seen 4/28/2021      Again, thank you for allowing me to participate in the care of your patient.        Sincerely,        Marge Ball MD

## 2021-05-06 ENCOUNTER — ANTICOAGULATION THERAPY VISIT (OUTPATIENT)
Dept: FAMILY MEDICINE | Facility: CLINIC | Age: 86
End: 2021-05-06

## 2021-05-06 ENCOUNTER — TRANSFERRED RECORDS (OUTPATIENT)
Dept: HEALTH INFORMATION MANAGEMENT | Facility: CLINIC | Age: 86
End: 2021-05-06

## 2021-05-06 DIAGNOSIS — I48.0 PAROXYSMAL ATRIAL FIBRILLATION (H): ICD-10-CM

## 2021-05-06 DIAGNOSIS — Z79.01 LONG TERM CURRENT USE OF ANTICOAGULANT THERAPY: ICD-10-CM

## 2021-05-06 DIAGNOSIS — Z79.891 LONG TERM (CURRENT) USE OF OPIATE ANALGESIC: ICD-10-CM

## 2021-05-06 DIAGNOSIS — I48.11 LONGSTANDING PERSISTENT ATRIAL FIBRILLATION (H): ICD-10-CM

## 2021-05-06 DIAGNOSIS — I48.19 PERSISTENT ATRIAL FIBRILLATION (H): ICD-10-CM

## 2021-05-06 LAB — INR PPP: 3.4 (ref 0.9–1.1)

## 2021-05-06 NOTE — PROGRESS NOTES
ANTICOAGULATION MANAGEMENT     Patient Name:  Ashlie Brantley  Date:  5/6/2021    ASSESSMENT /SUBJECTIVE:    Today's INR result of 3.4 is supratherapeutic. Goal INR of 2.0-3.0      Warfarin dose taken: Warfarin taken as instructed    Diet: No new diet changes affecting INR    Medication changes/ interactions: No new medications/supplements affecting INR    Previous INR: Therapeutic     S/S of bleeding or thromboembolism: No    New injury or illness: No    Upcoming surgery, procedure or cardioversion: No    Additional findings: None      PLAN:    Telephone call with  Mariela regarding INR result and instructed:     Warfarin Dosing Instructions: Hold x 1 then continue your current warfarin dose of 2.5 mg every Sun, Tue, Thu; 5 mg all other days    Instructed patient to follow up no later than: 2 weeks  Patient to recheck with home meter    Education provided: Please call back if any changes to your diet, medications or how you've been taking warfarin and Importance of consistent vitamin K intake      Mariela verbalizes understanding and agrees to warfarin dosing plan.    Instructed to call the Anticoagulation Clinic for any changes, questions or concerns. (#[phone])        Luz Allen RN      OBJECTIVE:  Recent labs: (last 7 days)     05/06/21   INR 3.4*         No question data found.  Anticoagulation Summary  As of 5/6/2021    INR goal:  2.0-3.0   TTR:  66.4 % (1 y)   INR used for dosing:  3.4 (5/6/2021)   Warfarin maintenance plan:  2.5 mg (5 mg x 0.5) every Sun, Tue, Thu; 5 mg (5 mg x 1) all other days   Full warfarin instructions:  5/6: Hold; Otherwise 2.5 mg every Sun, Tue, Thu; 5 mg all other days   Weekly warfarin total:  27.5 mg   Plan last modified:  Claudia Maher RN (12/17/2020)   Next INR check:  5/20/2021   Priority:  Maintenance   Target end date:  Indefinite    Indications    Atrial fibrillation (H) [I48.91] [I48.91]  Long term current use of anticoagulant therapy  [Z79.01]  Longstanding persistent atrial fibrillation (H) [I48.11]  Persistent atrial fibrillation (H) [I48.19]  Long term (current) use of opiate analgesic [Z79.891]             Anticoagulation Episode Summary     INR check location:      Preferred lab:  EXTERNAL LAB    Send INR reminders to:  AGGIE FRANCIS    Comments:  Call Lisa with any questions this is patients Home Care RN: [phone] // [facility] Monitoring Patient      Anticoagulation Care Providers     Provider Role Specialty Phone number    Anay Miner NP Referring Nurse Practitioner - Family [phone]

## 2021-05-12 NOTE — MR AVS SNAPSHOT
After Visit Summary   10/24/2018    Ashlie Brantley    MRN: [id number]           Patient Information     Date Of Birth          2/24/1931        Visit Information        Provider Department      10/24/2018 11:00 AM NE RN [facility]        Today's Diagnoses     Ear bleeding, right           Follow-ups after your visit        Your next 10 appointments already scheduled     Oct 26, 2018  1:00 PM CDT   [facility]    [address] [place] [address]   [phone]            Oct 29, 2018  2:20 PM CDT   [facility]ad [facility]   [facility]    [address] [place] [address]   [phone]            Nov 09, 2018  3:00 PM CST   PFT VISIT with UC PFL D   [facility] Pulmonary Function Testing ([facility])    [address] [place] [address]   [phone]            Nov 09, 2018  3:30 PM CST   Six Minute Walk with UC PFL 6 MINUTE WALK 1   [facility] Pulmonary Function Testing ([facility])    [address] [place] [address]   [phone]            Nov 09, 2018  4:20 PM CST   (Arrive by 4:05 PM)   Return Visit with Noemi Genao MD   [facility] ([facility])    [address] [place] [address]   [phone]            Feb 15, 2019 12:30 PM CST   PFT VISIT with UC PFL A   [facility] Pulmonary Function Testing ([facility])    [address] [place] [address]   [phone]            Feb 15, 2019  1:00 PM CST   (Arrive by 12:45 PM)   Return Visit with Noemi Genao MD   [facility] ([facility])    [address] [place] [address]  [address]  [address]  [facility] [address] [phone]              Who to contact     If you have questions or need follow up information about today's clinic visit or your schedule please contact [facility] directly at [phone].  Normal or non-critical lab and imaging results will be communicated to you by MyChart, letter or phone within 4 business days after the clinic has received the results. If you do not hear from us within 7 days, please contact the clinic through MyChart or phone. If you have a critical or abnormal lab result, we will notify you by phone as soon as possible.  Submit refill requests through [employer] or call your pharmacy and they will forward the refill request to us. Please allow 3 business days for your refill to be completed.          Additional Information About Your Visit        MyChart Information     [employer] gives you secure access to your electronic health record. If you see a primary care provider, you can also send messages to your care team and make appointments. If you have questions, please call your primary care clinic.  If you do not have a primary care provider, please call [phone] and they will assist you.        Care EveryWhere ID     This is your Care EveryWhere ID. This could be used by other organizations to access your [facility] medical records  [id number]        Your Vitals Were     Pulse Temperature                64 99.1  F (37.3  C) (Oral)           Blood Pressure from Last 3 Encounters:   10/24/18 94/64   10/15/18 106/58   09/07/18 96/62    Weight from Last 3 Encounters:   10/15/18 150 lb (68 kg)   09/07/18 144 lb (65.3 kg)   08/17/18 147 lb 8 oz (66.9 kg)              Today, you had the following     No orders found for display       Primary Care Provider Office Phone # Fax #    Tad Mendoza [phone] [address] [facility] [address]        Equal Access to Services     JUNIOR MURPHY : Jose  Detail Level: Zone viktor Mead, wadominicda lualmaadaha, qaybta kaalnahomy hodge, giovani abilio liudmilatylor arguetamichele haimgenoveva lavaruntylor renzo. So [facility] [phone].    ATENCIÓN: Si habla vicañol, tiene a leon disposición servicios gratuitos de asistencia lingüística. Lynette al [phone].    We comply with applicable federal civil rights laws and [place] laws. We do not discriminate on the basis of race, color, national origin, age, disability, sex, sexual orientation, or gender identity.            Thank you!     Thank you for choosing [facility]  for your care. Our goal is always to provide you with excellent care. Hearing back from our patients is one way we can continue to improve our services. Please take a few minutes to complete the written survey that you may receive in the mail after your visit with us. Thank you!             Your Updated Medication List - Protect others around you: Learn how to safely use, store and throw away your medicines at www.disposemymeds.org.          This list is accurate as of 10/24/18 11:00 AM.  Always use your most recent med list.                   Brand Name Dispense Instructions for use Diagnosis    * albuterol (2.5 MG/3ML) 0.083% neb solution     3 mL    Take 1 vial (2.5 mg) by nebulization every 6 hours as needed for shortness of breath / dyspnea or wheezing    Acute bronchospasm       * albuterol (2.5 MG/3ML) 0.083% neb solution     1 Box    Take 1 vial (2.5 mg) by nebulization every 6 hours as needed for shortness of breath / dyspnea or wheezing    Pneumonia due to infectious organism, unspecified laterality, unspecified part of lung       aspirin 81 MG tablet     1 Tab    ONE DAILY    HTN (hypertension), Elevated cholesterol       calcium + D 600-200 MG-UNIT Tabs   Generic drug:  calcium carbonate-vitamin D     0    ONE TABLET TWICE A DAY WITH MEALS    Osteopenia       cephALEXin 500 MG capsule    KEFLEX    20 capsule    Take 1 capsule (500 mg) by mouth 2 times daily    Pneumonia  due to infectious organism, unspecified laterality, unspecified part of lung       codeine 30 MG tablet     60 tablet    Take 1 tablet (30 mg) by mouth 2 times daily May fill on or after 2/8/18    Fibrosis, lung (H)       furosemide 20 MG tablet    LASIX     Take 20 mg by mouth        levothyroxine 88 MCG tablet    SYNTHROID    90 tablet    Take 1 tablet (88 mcg) by mouth daily    Hypothyroidism due to acquired atrophy of thyroid       losartan 100 MG tablet    COZAAR    90 tablet    Take 1 tablet (100 mg) by mouth daily    Hypertension goal BP (blood pressure) < 140/90       metoprolol tartrate 25 MG tablet    LOPRESSOR     Take 25 mg by mouth 2 times daily        neomycin-polymyxin-dexamethasone 3.5-66328-1.1 Oint ophthalmic ointment    MAXITROL    1 Tube    Place 1 Application into both eyes At Bedtime Apply a small squirt into each eye at bedtime    Dermatochalasis, unspecified laterality       OMEGA-3 COMPLEX PO      1 capsule daily        order for DME     1 each    Equipment being ordered: Oxygen 2 liters NC at HS    Oxygen decrease, IPF (idiopathic pulmonary fibrosis) (H)       * order for DME     1 Units    Equipment being ordered: Please provide night time oxygen at 2L/min via nasal canula    IPF (idiopathic pulmonary fibrosis) (H), Hypoxia       * order for DME     1 Device    Equipment being ordered: Oxygen 2 liters NC at night time    IPF (idiopathic pulmonary fibrosis) (H)       predniSONE 20 MG tablet    DELTASONE    15 tablet    Take 3 tablets (60 mg) by mouth daily    Acute gouty arthritis       simvastatin 40 MG tablet    ZOCOR    90 tablet    Take 1 tablet (40 mg) by mouth At Bedtime    Hyperlipidemia LDL goal <130       tolterodine 4 MG 24 hr capsule    DETROL LA    30 capsule    Take 1 capsule (4 mg) by mouth daily    Overactive bladder       traZODone 50 MG tablet    DESYREL    30 tablet    Take 1 tablet (50 mg) by mouth nightly as needed for sleep    Other insomnia       * vitamin D3 1000  units Caps      1 CAPSULE DAILY        * vitamin D 2000 units tablet     0    1 tablet daily    Osteopenia       VITAMIN E COMPLEX PO      Take  by mouth.        * warfarin 5 MG tablet    COUMADIN    90 tablet    Take as directed by [facility]. Current dose is 7.5 mg on Sunday and 5 mg the rest of the week.    Persistent atrial fibrillation (H)       * warfarin 7.5 MG tablet    COUMADIN    14 tablet    Take 7.5 mg every Sunday. 5mg ROW    Atrial fibrillation, unspecified type (H), Long-term (current) use of anticoagulants       * Notice:  This list has 8 medication(s) that are the same as other medications prescribed for you. Read the directions carefully, and ask your doctor or other care provider to review them with you.

## 2021-05-20 NOTE — PROGRESS NOTES
ANTICOAGULATION MANAGEMENT     Patient Name:  Ashlie Brantley  Date:  2021    ASSESSMENT /SUBJECTIVE:    Today's INR result of 1.5 is subtherapeutic. Goal INR of 2.0-3.0      Warfarin dose taken: Missed dose(s) may be affecting INR    Diet: No new diet changes affecting INR    Medication changes/ interactions: No new medications/supplements affecting INR    Previous INR: Supratherapeutic        recently had surgery so Mariela states she hasn't been as focused on [facility] stuff (like making sure she takes her meds).    PLAN:    Telephone call with  Daughter Mariela regarding INR result and instructed:     Warfarin Dosing Instructions: Take 5mg tonight then continue your current warfarin dose of 2.5mg every Sun, Tue, thu; 5mg all other days    Instructed patient to follow up no later than: 1 week  Patient to recheck with home meter    Education provided: Target INR goal and significance of current INR result      Mariela verbalizes understanding and agrees to warfarin dosing plan.    Instructed to call the Anticoagulation Clinic for any changes, questions or concerns. (#[phone])        Tiburcio Smyth RN      OBJECTIVE:  Recent labs: (last 7 days)     21   INR 1.5*         No question data found.  Anticoagulation Summary  As of 2021    INR goal:  2.0-3.0   TTR:  65.5 % (1 y)   INR used for dosin.5 (2021)   Warfarin maintenance plan:  2.5 mg (5 mg x 0.5) every Sun, Tue, Thu; 5 mg (5 mg x 1) all other days   Full warfarin instructions:  2.5 mg every Sun, Tue, Thu; 5 mg all other days   Weekly warfarin total:  27.5 mg   Plan last modified:  Claudia Maher RN (2020)   Next INR check:     Priority:  Maintenance   Target end date:  Indefinite    Indications    Atrial fibrillation (H) [I48.91] [I48.91]  Long term current use of anticoagulant therapy [Z79.01]  Longstanding persistent atrial fibrillation (H) [I48.11]  Persistent atrial fibrillation (H)  [I48.19]  Long term (current) use of opiate analgesic [Z79.891]             Anticoagulation Episode Summary     INR check location:      Preferred lab:  EXTERNAL LAB    Send INR reminders to:  AGGIE FRANCIS    Comments:  Call Lisa with any questions this is patients Home Care RN: [phone] // [facility] Home Monitoring Patient      Anticoagulation Care Providers     Provider Role Specialty Phone number    Anay Miner, NP Referring Nurse Practitioner - Family [phone]

## 2021-05-27 NOTE — PROGRESS NOTES
ANTICOAGULATION MANAGEMENT     Patient Name:  Ashlie Brantley  Date:  2021    ASSESSMENT /SUBJECTIVE:    Today's INR result of 1.7 is subtherapeutic. Goal INR of 2.0-3.0      Warfarin dose taken: Warfarin taken as instructed    Diet: diet has been disrupted for the last week or so, patient's  had surgery last week and they have been having more prepared meals and food brought in from friends and not eating their usual foods. Mariela feels that things are settling down and they will be going back to baseline foods/diet going forward    Medication changes/ interactions: No new medications/supplements affecting INR    Previous INR: Subtherapeutic     S/S of bleeding or thromboembolism: No    New injury or illness: No    Upcoming surgery, procedure or cardioversion: No    Additional findings: INR noted to be supratherapeutic at this maintenance dose a month ago and globally quite stable in months prior at this dose.       PLAN:    Telephone call with  daughter Mariela regarding INR result and instructed:     Warfarin Dosing Instructions: Boost today Thur  take 5mg then continue your current warfarin dose of      2.5 mg every Sun, Tue, Thu; 5 mg all other days         Instructed patient to follow up no later than: 10 days  Patient to recheck with home meter    Education provided: Importance of consistent vitamin K intake, Target INR goal and significance of current INR result, Monitoring for bleeding signs and symptoms and Monitoring for clotting signs and symptoms      Mariela verbalizes understanding and agrees to warfarin dosing plan.    Instructed to call the Anticoagulation Clinic for any changes, questions or concerns. (#[phone])        Claudia Maher RN      OBJECTIVE:  Recent labs: (last 7 days)     21   INR 1.7*         No question data found.  Anticoagulation Summary  As of 2021    INR goal:  2.0-3.0   TTR:  65.1 % (1 y)   INR used for dosin.7 (2021)    Warfarin maintenance plan:  2.5 mg (5 mg x 0.5) every Sun, Tue, Thu; 5 mg (5 mg x 1) all other days   Full warfarin instructions:  5/27: 5 mg; Otherwise 2.5 mg every Sun, Tue, Thu; 5 mg all other days   Weekly warfarin total:  27.5 mg   Plan last modified:  Claudia Maher RN (5/27/2021)   Next INR check:  6/8/2021   Priority:  Maintenance   Target end date:  Indefinite    Indications    Atrial fibrillation (H) [I48.91] [I48.91]  Long term current use of anticoagulant therapy [Z79.01]  Longstanding persistent atrial fibrillation (H) [I48.11]  Persistent atrial fibrillation (H) [I48.19]  Long term (current) use of opiate analgesic [Z79.891]             Anticoagulation Episode Summary     INR check location:      Preferred lab:  EXTERNAL LAB    Send INR reminders to:  AGGIE FRANCIS    Comments:        Anticoagulation Care Providers     Provider Role Specialty Phone number    Anay Miner NP Referring Nurse Practitioner - Family [phone]

## 2021-05-27 NOTE — PROGRESS NOTES
Incoming fax from [employer] home monitoring company    Date of INR 5/27    INR result 1.7

## 2021-06-09 NOTE — PROGRESS NOTES
ANTICOAGULATION MANAGEMENT     Patient Name:  Ashlie Brantley  Date:  2021    ASSESSMENT /SUBJECTIVE:    Today's INR result of 2.5 is therapeutic. Goal INR of 2.0-3.0      Warfarin dose taken: Warfarin taken as instructed    Diet: No new diet changes affecting INR    Medication changes/ interactions: No new medications/supplements affecting INR    Previous INR: Subtherapeutic     S/S of bleeding or thromboembolism: No    New injury or illness: No    Upcoming surgery, procedure or cardioversion: No    Additional findings: None      PLAN:    Warfarin Dosing Instructions: Continue your current warfarin dose 2.5mg every Sun, Tue, Thu; 5mg all other days    Instructed patient to follow up no later than: 2 weeks  Patient to recheck with home meter    Education provided: Target INR goal and significance of current INR result    Telephone call with  Mariela whom verbalizes understanding and agrees to plan    Instructed to call the Anticoagulation Clinic for any changes, questions or concerns. (#[phone])        Tiburcio Smyth RN      OBJECTIVE:  Recent labs: (last 7 days)     21   INR 2.5*         No question data found.  Anticoagulation Summary  As of 2021    INR goal:  2.0-3.0   TTR:  65.4 % (1 y)   INR used for dosin.5 (2021)   Warfarin maintenance plan:  2.5 mg (5 mg x 0.5) every Sun, Tue, Thu; 5 mg (5 mg x 1) all other days   Full warfarin instructions:  2.5 mg every Sun, Tue, Thu; 5 mg all other days   Weekly warfarin total:  27.5 mg   No change documented:  Tiburcio Smyth RN   Plan last modified:  Claudia Maher RN (2021)   Next INR check:  2021   Priority:  Maintenance   Target end date:  Indefinite    Indications    Atrial fibrillation (H) [I48.91] [I48.91]  Long term current use of anticoagulant therapy [Z79.01]  Longstanding persistent atrial fibrillation (H) [I48.11]  Persistent atrial fibrillation (H) [I48.19]  Long term (current) use of opiate analgesic  [Z79.891]             Anticoagulation Episode Summary     INR check location:      Preferred lab:  EXTERNAL LAB    Send INR reminders to:  AGGIE FRANCIS    Comments:        Anticoagulation Care Providers     Provider Role Specialty Phone number    Anay Miner, NP Referring Nurse Practitioner - Family [phone]

## 2021-06-22 NOTE — TELEPHONE ENCOUNTER
Routing refill request to provider for review/approval because:  Drug not on the [facility] refill protocol     Pending Prescriptions:                       Disp   Refills    codeine 30 MG tablet                       60 tab*0        Sig: TAKE TWO TABLETS BY MOUTH EVERY NIGHT AT BEDTIME      Patient out of medication    Liliana Cornejo RN

## 2021-06-22 NOTE — TELEPHONE ENCOUNTER
Routing refill request to provider for review/approval because:  Drug not on the [facility] refill protocol           Pending Prescriptions:                       Disp   Refills    codeine 30 MG tablet                       60 tab*0        Sig: TAKE TWO TABLETS BY MOUTH EVERY NIGHT AT BEDTIME        Toñito Garcia RN

## 2021-06-24 NOTE — PROGRESS NOTES
ANTICOAGULATION MANAGEMENT     Ashlie Brantley 89 year old female is on warfarin with therapeutic INR result. (Goal INR 2.0-3.0)    Recent labs: (last 7 days)     06/24/21   INR 2.3*       ASSESSMENT     Source(s): Patient/Caregiver Call       Warfarin doses taken: Warfarin taken as instructed    Diet: No new diet changes identified    New illness, injury, or hospitalization: No    Medication/supplement changes: None noted    Signs or symptoms of bleeding or clotting: No    Previous INR: Therapeutic last 2(+) visits    Additional findings: None     PLAN     Recommended plan for no diet, medication or health factor changes affecting INR     Dosing Instructions: Continue your current warfarin dose with next INR in 2 weeks       Summary  As of 6/24/2021    Full warfarin instructions:  2.5 mg every Sun, Tue, Thu; 5 mg all other days   Next INR check:  7/8/2021             Telephone call with Mariela pt's daughter, whom verbalizes understanding and agrees to plan    Patient to recheck with home meter    Education provided: Please call back if any changes to your diet, medications or how you've been taking warfarin, Monitoring for bleeding signs and symptoms, Monitoring for clotting signs and symptoms and Importance of notifying clinic for changes in medications; a sooner lab recheck maybe needed.    Plan made per [facility] anticoagulation protocol    Maddie Hall, RN  Anticoagulation Clinic  6/24/2021    _______________________________________________________________________     Anticoagulation Episode Summary     Current INR goal:  2.0-3.0   TTR:  65.4 % (1 y)   Target end date:  Indefinite   Send INR reminders to:  AGGIE FRANCIS    Indications    Atrial fibrillation (H) [I48.91] [I48.91]  Long term current use of anticoagulant therapy [Z79.01]  Longstanding persistent atrial fibrillation (H) [I48.11]  Persistent atrial fibrillation (H) [I48.19]  Long term (current) use of opiate analgesic [Z79.891]            Comments:           Anticoagulation Care Providers     Provider Role Specialty Phone number    Anay Miner, NP Referring Nurse Practitioner - Family [phone]

## 2021-06-24 NOTE — PROGRESS NOTES
Incoming fax from [employer] home monitoring company    Date of INR 6/24    INR result 2.3

## 2021-07-08 NOTE — PROGRESS NOTES
ANTICOAGULATION MANAGEMENT     Ashlie Brantley 89 year old female is on warfarin with supratherapeutic INR result. (Goal INR 2.0-3.0)    Recent labs: (last 7 days)     07/08/21   INR 3.5*       ASSESSMENT     Source(s): Chart Review and Patient/Caregiver Call       Warfarin doses taken: Warfarin taken as instructed    Diet: Temp diet changes due to family member passing    New illness, injury, or hospitalization: No    Medication/supplement changes: None noted    Signs or symptoms of bleeding or clotting: No    Previous INR: Therapeutic last 2(+) visits    Additional findings: None     PLAN     Recommended plan for temporary change(s) affecting INR     Dosing Instructions: Hold dose then continue your current warfarin dose with next INR in 2 weeks       Summary  As of 7/8/2021    Full warfarin instructions:  7/8: Hold; Otherwise 2.5 mg every Sun, Tue, Thu; 5 mg all other days   Next INR check:  7/22/2021             Telephone call with  Mariela/daughter who verbalizes understanding and agrees to plan    Patient to recheck with home meter    Education provided: Please call back if any changes to your diet, medications or how you've been taking warfarin    Plan made per [facility] anticoagulation protocol    Luz Allen, RN  Anticoagulation Clinic  7/8/2021    _______________________________________________________________________     Anticoagulation Episode Summary     Current INR goal:  2.0-3.0   TTR:  63.8 % (1 y)   Target end date:  Indefinite   Send INR reminders to:  [facility]    Indications    Atrial fibrillation (H) [I48.91] [I48.91]  Long term current use of anticoagulant therapy [Z79.01]  Longstanding persistent atrial fibrillation (H) [I48.11]  Persistent atrial fibrillation (H) [I48.19]  Long term (current) use of opiate analgesic [Z79.891]           Comments:           Anticoagulation Care Providers     Provider Role Specialty Phone number    Anay Miner NP Referring Nurse Practitioner -  Family [phone]

## 2021-07-16 NOTE — TELEPHONE ENCOUNTER
Routing refill request to provider for review/approval because:  Drug not on the [facility] refill protocol     Dawn Clarke RN

## 2021-07-23 NOTE — PROGRESS NOTES
ANTICOAGULATION MANAGEMENT     Ashlie Brantley 89 year old female is on warfarin with supratherapeutic INR result. (Goal INR 2.0-3.0)    Recent labs: (last 7 days)     07/22/21  0802   INR 4.1*       ASSESSMENT     Source(s): Patient/Caregiver Call       Warfarin doses taken: Warfarin taken as instructed    Diet: Patient does not have a consistent diet, family cooks and drops food off for her    New illness, injury, or hospitalization: No    Medication/supplement changes: None noted    Signs or symptoms of bleeding or clotting: No    Previous INR: Supratherapeutic    Additional findings: None     PLAN     Recommended plan for temporary change(s) affecting INR     Dosing Instructions:  Decrease your warfarin dose (9.1% change) with next INR in 1 week       Summary  As of 7/23/2021    Full warfarin instructions:  2.5 mg every Sun, Tue, Thu; 5 mg all other days   Next INR check:  7/30/2021             Telephone call with  Mariela, daughter, who verbalizes understanding and agrees to plan    Patient to recheck with home meter    Education provided: Monitoring for bleeding signs and symptoms    Plan made per [facility] anticoagulation protocol    Lyla Robbins, RN  Anticoagulation Clinic  7/23/2021    _______________________________________________________________________     Anticoagulation Episode Summary     Current INR goal:  2.0-3.0   TTR:  59.8 % (1 y)   Target end date:  Indefinite   Send INR reminders to:  [facility]    Indications    Atrial fibrillation (H) [I48.91] [I48.91]  Long term current use of anticoagulant therapy [Z79.01]  Longstanding persistent atrial fibrillation (H) [I48.11]  Persistent atrial fibrillation (H) [I48.19]  Long term (current) use of opiate analgesic [Z79.891]           Comments:           Anticoagulation Care Providers     Provider Role Specialty Phone number    Anay Miner NP Referring Nurse Practitioner - Family [phone]        Incoming fax from [employer] home monitoring  company    Date of INR 7/22    INR result 4.1

## 2021-07-30 NOTE — PROGRESS NOTES
ANTICOAGULATION MANAGEMENT     Ashlie Brantley 89 year old female is on warfarin with therapeutic INR result. (Goal INR 2.0-3.0)    Recent labs: (last 7 days)     07/30/21  1439   INR 2.7       ASSESSMENT     Source(s): Chart Review and Patient/Caregiver Call       Warfarin doses taken: Warfarin taken as instructed    Diet: Pt and daughter wrote down green intake. pt has been consitent with intake.     New illness, injury, or hospitalization: No    Medication/supplement changes: None noted    Signs or symptoms of bleeding or clotting: No    Previous INR: Supratherapeutic    Additional findings: None     PLAN     Recommended plan for no diet, medication or health factor changes affecting INR     Dosing Instructions: Continue your current warfarin dose with next INR in 1 week. Would like one more INR Recheck in range. Then can go to 2 weeks.    Summary  As of 7/30/2021    Full warfarin instructions:  5 mg every Mon, Wed, Fri; 2.5 mg all other days   Next INR check:               Telephone call with  Mariela pt daughter who verbalizes understanding and agrees to plan and who agrees to plan and repeated back plan correctly    Patient to recheck with home meter    Education provided: Please call back if any changes to your diet, medications or how you've been taking warfarin and Importance of consistent vitamin K intake    Plan made per [facility] anticoagulation protocol    Raine Campbell, RN  Anticoagulation Clinic  7/30/2021    _______________________________________________________________________     Anticoagulation Episode Summary     Current INR goal:  2.0-3.0   TTR:  58.1 % (1 y)   Target end date:  Indefinite   Send INR reminders to:  AGG[facility] FRANCIS    Indications    Atrial fibrillation (H) [I48.91] [I48.91]  Long term current use of anticoagulant therapy [Z79.01]  Longstanding persistent atrial fibrillation (H) [I48.11]  Persistent atrial fibrillation (H) [I48.19]  Long term (current) use of opiate analgesic  [L41.772]           Comments:           Anticoagulation Care Providers     Provider Role Specialty Phone number    Anay Miner, NP Referring Nurse Practitioner - Family [phone]

## 2021-07-30 NOTE — PROGRESS NOTES
Incoming fax    Receieved on 07/30/21    From [employer]   INR 2.7       Deann Grant RN, BSN, PHN

## 2021-08-05 NOTE — PROGRESS NOTES
ANTICOAGULATION MANAGEMENT     Ashlie Brantley 89 year old female is on warfarin with supratherapeutic INR result. (Goal INR 2.0-3.0)    Recent labs: (last 7 days)     08/05/21  1454   INR 3.6*       ASSESSMENT     Source(s): Chart Review and Patient/Caregiver Call       Warfarin doses taken: Warfarin taken as instructed    Diet: No new diet changes identified    New illness, injury, or hospitalization: No    Medication/supplement changes: None noted    Signs or symptoms of bleeding or clotting: No    Previous INR: Therapeutic last visit; previously outside of goal range    Additional findings: None     PLAN     Recommended plan for no diet, medication or health factor changes affecting INR     Dosing Instructions: Hold dose then Decrease your warfarin dose (10% change) with next INR in 1 week       Summary  As of 8/5/2021    Full warfarin instructions:  8/5: Hold; Otherwise 5 mg every Mon, Fri; 2.5 mg all other days   Next INR check:  8/12/2021             Telephone call with Corrina who verbalizes understanding and agrees to plan    Patient to recheck with home meter    Education provided: Target INR goal and significance of current INR result    Plan made per [facility] anticoagulation protocol    Kerri Brink RN  Anticoagulation Clinic  8/5/2021    _______________________________________________________________________     Anticoagulation Episode Summary     Current INR goal:  2.0-3.0   TTR:  57.0 % (1 y)   Target end date:  Indefinite   Send INR reminders to:  [facility]    Indications    Atrial fibrillation (H) [I48.91] [I48.91]  Long term current use of anticoagulant therapy [Z79.01]  Longstanding persistent atrial fibrillation (H) [I48.11]  Persistent atrial fibrillation (H) [I48.19]  Long term (current) use of opiate analgesic [Z79.891]           Comments:           Anticoagulation Care Providers     Provider Role Specialty Phone number    Anay Miner NP Referring Nurse Practitioner - Family  [phone]

## 2021-08-16 NOTE — PROGRESS NOTES
ANTICOAGULATION MANAGEMENT     Ashlie Brantley 89 year old female is on warfarin with subtherapeutic INR result. (Goal INR 2.0-3.0)    Recent labs: (last 7 days)     08/14/21  1326   INR 1.8*       ASSESSMENT     Source(s): Chart Review and Patient/Caregiver Call       Warfarin doses taken: Warfarin taken as instructed    Diet: No new diet changes identified    New illness, injury, or hospitalization: No    Medication/supplement changes: None noted    Signs or symptoms of bleeding or clotting: No    Previous INR: Supratherapeutic    Additional findings: None     PLAN     Recommended plan for no diet, medication or health factor changes affecting INR     Dosing Instructions: Continue your current warfarin dose with next INR in 1 week       Summary  As of 8/16/2021    Full warfarin instructions:  5 mg every Mon, Fri; 2.5 mg all other days   Next INR check:  8/20/2021             Telephone call with  Mariela who verbalizes understanding and agrees to plan    Patient to recheck with home meter    Education provided: Please call back if any changes to your diet, medications or how you've been taking warfarin, Monitoring for bleeding signs and symptoms and Monitoring for clotting signs and symptoms    Plan made per [facility] anticoagulation protocol    Luz Allen, RN  Anticoagulation Clinic  8/16/2021    _______________________________________________________________________     Anticoagulation Episode Summary     Current INR goal:  2.0-3.0   TTR:  58.2 % (1 y)   Target end date:  Indefinite   Send INR reminders to:  [facility]    Indications    Atrial fibrillation (H) [I48.91] [I48.91]  Long term current use of anticoagulant therapy [Z79.01]  Longstanding persistent atrial fibrillation (H) [I48.11]  Persistent atrial fibrillation (H) [I48.19]  Long term (current) use of opiate analgesic [Z79.891]           Comments:           Anticoagulation Care Providers     Provider Role Specialty Phone number    Clemenciadebcinthya  Anay JASSO NP Referring Nurse Practitioner - Family [phone]

## 2021-08-20 NOTE — CONFIDENTIAL NOTE
ANTICOAGULATION MANAGEMENT     Ashlie Brantley 89 year old female is on warfarin with therapeutic INR result. (Goal INR 2.0-3.0)    Recent labs: (last 7 days)     08/20/21  1304   INR 2.6*       ASSESSMENT     Source(s): Patient/Caregiver Call       Warfarin doses taken: Warfarin taken as instructed    Diet: No new diet changes identified    New illness, injury, or hospitalization: No    Medication/supplement changes: None noted    Signs or symptoms of bleeding or clotting: No    Previous INR: Subtherapeutic    Additional findings: None     PLAN     Recommended plan for no diet, medication or health factor changes affecting INR     Dosing Instructions: Continue your current warfarin dose with next INR in 1 week       Summary  As of 8/20/2021    Full warfarin instructions:  5 mg every Mon, Fri; 2.5 mg all other days   Next INR check:  8/27/2021             Telephone call with daughter Mariela   who verbalizes understanding and agrees to plan    Patient to recheck with home meter    Education provided: Target INR goal and significance of current INR result    Plan made per [facility] anticoagulation protocol    Tiburcio Smyth RN  Anticoagulation Clinic  8/20/2021    _______________________________________________________________________     Anticoagulation Episode Summary     Current INR goal:  2.0-3.0   TTR:  59.1 % (1 y)   Target end date:  Indefinite   Send INR reminders to:  [facility] FRANC[facility]    Indications    Atrial fibrillation (H) [I48.91] [I48.91]  Long term current use of anticoagulant therapy [Z79.01]  Longstanding persistent atrial fibrillation (H) [I48.11]  Persistent atrial fibrillation (H) [I48.19]  Long term (current) use of opiate analgesic [Z79.891]           Comments:           Anticoagulation Care Providers     Provider Role Specialty Phone number    Anay Miner NP Referring Nurse Practitioner - Family [phone]

## 2021-08-24 NOTE — TELEPHONE ENCOUNTER
Codeine      Last Written Prescription Date:  1.2.17  Last Fill Quantity: 60,   # refills: 3 months of hardcopies  Last Office Visit with [facility], [facility] or [facility] prescribing provider: 12.30.16  Future Office visit:       Routing refill request to provider for review/approval because:  Drug not on the [facility], [facility] or [facility] refill protocol or controlled substance    Please bring hardcopy to [facility] once signed.     Dory Guo PharmD, [facility]  Pharmacist Manager   [facility]  [phone]           Nasalis-Muscle-Based Myocutaneous Island Pedicle Flap Text: Using a #15 blade, an incision was made around the donor flap to the level of the nasalis muscle. Wide lateral undermining was then performed in both the subcutaneous plane above the nasalis muscle, and in a submuscular plane just above periosteum. This allowed the formation of a free nasalis muscle axial pedicle (based on the angular artery) which was still attached to the actual cutaneous flap, increasing its mobility and vascular viability. Hemostasis was obtained with pinpoint electrocoagulation. The flap was mobilized into position and the pivotal anchor points positioned and stabilized with buried interrupted sutures. Subcutaneous and dermal tissues were closed in a multilayered fashion with sutures. Tissue redundancies were excised, and the epidermal edges were apposed without significant tension and sutured with sutures.

## 2021-08-27 NOTE — PROGRESS NOTES
ANTICOAGULATION MANAGEMENT     Ashlie Brantley 89 year old female is on warfarin with therapeutic INR result. (Goal INR 2.0-3.0)    Recent labs: (last 7 days)     08/27/21  0000   INR 2.5       ASSESSMENT     Source(s): Chart Review and left message with dosing instructions on vm for sobia Trujillo doses taken: Warfarin taken as instructed    Diet: No new diet changes identified    New illness, injury, or hospitalization: No    Medication/supplement changes: None noted    Signs or symptoms of bleeding or clotting: No    Previous INR: Therapeutic last 2(+) visits    Additional findings: None     PLAN     Recommended plan for no diet, medication or health factor changes affecting INR     Dosing Instructions: Continue your current warfarin dose with next INR in 1 week       Summary  As of 8/27/2021    Full warfarin instructions:  5 mg every Mon, Fri; 2.5 mg all other days   Next INR check:  9/3/2021             Telephone call with  devan jose juan vm left  Detailed voice message left for  daughter with dosing instructions and follow up date.     Patient to recheck with home meter    Education provided: Please call back if any changes to your diet, medications or how you've been taking warfarin    Plan made per [facility] anticoagulation protocol    Lizy Mercado, RN  Anticoagulation Clinic  8/27/2021    _______________________________________________________________________     Anticoagulation Episode Summary     Current INR goal:  2.0-3.0   TTR:  61.0 % (1 y)   Target end date:  Indefinite   Send INR reminders to:  AGGIE FRANCIS    Indications    Atrial fibrillation (H) [I48.91] [I48.91]  Long term current use of anticoagulant therapy [Z79.01]  Longstanding persistent atrial fibrillation (H) [I48.11]  Persistent atrial fibrillation (H) [I48.19]  Long term (current) use of opiate analgesic [Z79.891]           Comments:           Anticoagulation Care Providers     Provider Role Specialty Phone number     Anay Mienr, NP Referring Nurse Practitioner - Family [phone]

## 2021-08-27 NOTE — PROGRESS NOTES
Incoming fax from [facility]  Date 8/27/2021  Inr result 2.4  Gale Reeves Rn  [facility]

## 2021-09-03 NOTE — PROGRESS NOTES
ANTICOAGULATION MANAGEMENT     Ashlie Brantley 89 year old female is on warfarin with subtherapeutic INR result. (Goal INR 2.0-3.0)    Recent labs: (last 7 days)     09/03/21  1618   INR 1.5*       ASSESSMENT     Source(s): Chart Review and Patient/Caregiver Call       Warfarin doses taken: Warfarin taken as instructed    Diet: No new diet changes identified    New illness, injury, or hospitalization: No    Medication/supplement changes: missed one day of morning meds    Signs or symptoms of bleeding or clotting: No    Previous INR: Therapeutic last 2(+) visits    Additional findings: None     PLAN     Recommended plan for no diet, medication or health factor changes affecting INR     Dosing Instructions: Booster dose then continue your current warfarin dose with next INR in 1 week       Summary  As of 9/3/2021    Full warfarin instructions:  9/3: 7.5 mg; Otherwise 5 mg every Mon, Fri; 2.5 mg all other days   Next INR check:  9/10/2021             Telephone call with  dipesh ramsay who verbalizes understanding and agrees to plan    Patient to recheck with home meter    Education provided: Contact [phone]  with any changes, questions or concerns.     Plan made per [facility] anticoagulation protocol    Deann Thomason RN  Anticoagulation Clinic  9/3/2021    _______________________________________________________________________     Anticoagulation Episode Summary     Current INR goal:  2.0-3.0   TTR:  61.9 % (1 y)   Target end date:  Indefinite   Send INR reminders to:  BRIEAG KASDAGOBERTO    Indications    Atrial fibrillation (H) [I48.91] [I48.91]  Long term current use of anticoagulant therapy [Z79.01]  Longstanding persistent atrial fibrillation (H) [I48.11]  Persistent atrial fibrillation (H) [I48.19]  Long term (current) use of opiate analgesic [Z79.891]           Comments:           Anticoagulation Care Providers     Provider Role Specialty Phone number    Anay Miner NP Referring Nurse Practitioner -  Family [phone]          Deann Grant, RN, BSN, PHN  Anticoagulation Nurse  [phone]

## 2021-09-10 NOTE — PROGRESS NOTES
Incoming fax    Receieved on 09/10/21    From [employer]   INR 1.2       Deann Grant RN, BSN, PHN

## 2021-09-10 NOTE — PROGRESS NOTES
ANTICOAGULATION MANAGEMENT     Ashlie Brantley 89 year old female is on warfarin with subtherapeutic INR result. (Goal INR 2.0-3.0)    Recent labs: (last 7 days)     09/10/21  1622   INR 1.2*       ASSESSMENT     Source(s): Patient/Caregiver Call       Warfarin doses taken: Warfarin taken as instructed    Diet: No new diet changes identified    New illness, injury, or hospitalization: No    Medication/supplement changes: None noted    Signs or symptoms of bleeding or clotting: No    Previous INR: Subtherapeutic    Additional findings: None. No findings to explain subtherapeutic INR.     PLAN     Recommended plan for no diet, medication or health factor changes affecting INR     Dosing Instructions: Booster dose then Increase your warfarin dose (11% change) with next INR in 5 days       Summary  As of 9/10/2021    Full warfarin instructions:  9/10: 7.5 mg; 9/11: 7.5 mg; Otherwise 5 mg every Mon, Wed, Sat; 2.5 mg all other days   Next INR check:  9/15/2021             Telephone call with  Mariela who verbalizes understanding and agrees to plan    Patient to recheck with home meter    Education provided: Goal range and significance of current result    Plan made per [facility] anticoagulation protocol    Tiburcio Smyth RN  Anticoagulation Clinic  9/10/2021    _______________________________________________________________________     Anticoagulation Episode Summary     Current INR goal:  2.0-3.0   TTR:  60.8 % (1 y)   Target end date:  Indefinite   Send INR reminders to:  [facility]    Indications    Atrial fibrillation (H) [I48.91] [I48.91]  Long term current use of anticoagulant therapy [Z79.01]  Longstanding persistent atrial fibrillation (H) [I48.11]  Persistent atrial fibrillation (H) [I48.19]  Long term (current) use of opiate analgesic [Z79.891]           Comments:           Anticoagulation Care Providers     Provider Role Specialty Phone number    Anay Miner NP Referring Nurse Practitioner - Family  [phone]

## 2021-09-15 NOTE — PROGRESS NOTES
ANTICOAGULATION MANAGEMENT     Ashlie Brantley 89 year old female is on warfarin with therapeutic INR result. (Goal INR 2.0-3.0)    Recent labs: (last 7 days)     09/15/21  1316   INR 2.0*       ASSESSMENT     Source(s): Chart Review and Patient/Caregiver Call       Warfarin doses taken: Warfarin taken as instructed    Diet: No new diet changes identified    New illness, injury, or hospitalization: No    Medication/supplement changes: None noted    Signs or symptoms of bleeding or clotting: No    Previous INR: Subtherapeutic    Additional findings: None     PLAN     Recommended plan for no diet, medication or health factor changes affecting INR     Dosing Instructions: Continue your current warfarin dose with next INR in 1 week       Summary  As of 9/15/2021    Full warfarin instructions:  5 mg every Mon, Wed, Sat; 2.5 mg all other days   Next INR check:               Telephone call with  Mariela who verbalizes understanding and agrees to plan    Patient to recheck with home meter    Education provided: Please call back if any changes to your diet, medications or how you've been taking warfarin    Plan made per [facility] anticoagulation protocol    Lyla Robbins RN  Anticoagulation Clinic  9/15/2021    _______________________________________________________________________     Anticoagulation Episode Summary     Current INR goal:  2.0-3.0   TTR:  59.9 % (1 y)   Target end date:  Indefinite   Send INR reminders to:  AGGIE FRANCIS    Indications    Atrial fibrillation (H) [I48.91] [I48.91]  Long term current use of anticoagulant therapy [Z79.01]  Longstanding persistent atrial fibrillation (H) [I48.11]  Persistent atrial fibrillation (H) [I48.19]  Long term (current) use of opiate analgesic [Z79.891]           Comments:           Anticoagulation Care Providers     Provider Role Specialty Phone number    Anay Miner NP Referring Nurse Practitioner - Family [phone]        Incoming fax from [employer]  monitoring company    Date of INR 9/15    INR result 2.0

## 2021-09-22 NOTE — PROGRESS NOTES
ANTICOAGULATION MANAGEMENT     Ashlie Brantley 89 year old female is on warfarin with subtherapeutic INR result. (Goal INR 2.0-3.0)    Recent labs: (last 7 days)     09/22/21  1527   INR 1.9*       ASSESSMENT     Source(s): Chart Review and Patient/Caregiver Call       Warfarin doses taken: Warfarin taken as instructed    Diet: No new diet changes identified    New illness, injury, or hospitalization: No    Medication/supplement changes: None noted    Signs or symptoms of bleeding or clotting: No    Previous INR: Therapeutic last visit; previously outside of goal range    Additional findings: None     PLAN     Recommended plan for no diet, medication or health factor changes affecting INR     Dosing Instructions: Continue your current warfarin dose with next INR in 1 week       Summary  As of 9/22/2021    Full warfarin instructions:  5 mg every Mon, Wed, Sat; 2.5 mg all other days   Next INR check:               Telephone call with  Mariela who verbalizes understanding and agrees to plan    Patient to recheck with home meter    Education provided: Please call back if any changes to your diet, medications or how you've been taking warfarin    Plan made per [facility] anticoagulation protocol    Luz Allen, RN  Anticoagulation Clinic  9/22/2021    _______________________________________________________________________     Anticoagulation Episode Summary     Current INR goal:  2.0-3.0   TTR:  59.0 % (1 y)   Target end date:  Indefinite   Send INR reminders to:  [facility]    Indications    Atrial fibrillation (H) [I48.91] [I48.91]  Long term current use of anticoagulant therapy [Z79.01]  Longstanding persistent atrial fibrillation (H) [I48.11]  Persistent atrial fibrillation (H) [I48.19]  Long term (current) use of opiate analgesic [Z79.891]           Comments:           Anticoagulation Care Providers     Provider Role Specialty Phone number    Anay Miner NP Referring Nurse Practitioner - Family  [phone]

## 2021-10-05 NOTE — TELEPHONE ENCOUNTER
Spoke with daughter, Mariela, regarding scheduling a follow up appt with Dr. Ball. Offered her next available, with opening tomorrow and unable to make that appt. Next available was December but daughter did not want the late appt. Offered to do a video visit instead, but daughter states she does not see the point of a video visit. She requested a [facility] appt, but informed her Dr. Ball's schedule was not available that far out as she is only here once a month. Daughter was somewhat frustrated that provider is not available more often, so offered a different provider. Requested a female attending, as she did not want a fellow, and appt with Dr. Lorenzana offered. Appt scheduled and details confirmed with pt's daughter

## 2021-10-06 NOTE — PROGRESS NOTES
ANTICOAGULATION     Ashlie Brantley is overdue for INR check.      Mychart sent    Lyla Robbins RN

## 2021-10-10 NOTE — TELEPHONE ENCOUNTER
"Last script for #60 was done on 9/21 - takes \"2 at bedtime.\" She's too early for a refill.    René Allen, MPAS, PA-C   "

## 2021-10-11 NOTE — TELEPHONE ENCOUNTER
Routing to RN. Please un pend order and close encounter.  Called patient and spoke with daughter and relayed Yuan Allen message below.    RUSTY Westbrook  [facility]

## 2021-10-13 NOTE — PROGRESS NOTES
ANTICOAGULATION     Ashlie Brantley is overdue for INR check.      Left message  reminding patient to check INR with their home meter and call results to the home monitoring company as soon as possible.     Lyla Robbins RN

## 2021-10-13 NOTE — PROGRESS NOTES
ANTICOAGULATION MANAGEMENT     Ashlie Brantley 89 year old female is on warfarin with therapeutic INR result. (Goal INR 2.0-3.0)    Recent labs: (last 7 days)     10/13/21  1530   INR 2.4       ASSESSMENT     Source(s): Chart Review and Patient/Caregiver Call       Warfarin doses taken: Warfarin taken as instructed    Diet: No new diet changes identified    New illness, injury, or hospitalization: No    Medication/supplement changes: None noted    Signs or symptoms of bleeding or clotting: No    Previous INR: Subtherapeutic    Additional findings: flu shot and booster vaccine 10/13, feeling good today      PLAN     Recommended plan for temporary change(s) affecting INR     Dosing Instructions: Continue your current warfarin dose with next INR in 2 weeks       Summary  As of 10/13/2021    Full warfarin instructions:  5 mg every Mon, Wed, Sat; 2.5 mg all other days   Next INR check:  10/27/2021             Telephone call with  daughter sobia  who verbalizes understanding and agrees to plan    Patient to recheck with home meter    Education provided: Contact [phone]  with any changes, questions or concerns.     Plan made per [facility] anticoagulation protocol    Deann Thomason RN  Anticoagulation Clinic  10/13/2021    _______________________________________________________________________     Anticoagulation Episode Summary     Current INR goal:  2.0-3.0   TTR:  60.7 % (1 y)   Target end date:  Indefinite   Send INR reminders to:  AGGIE FRANCIS    Indications    Atrial fibrillation (H) [I48.91] [I48.91]  Long term current use of anticoagulant therapy [Z79.01]  Longstanding persistent atrial fibrillation (H) [I48.11]  Persistent atrial fibrillation (H) [I48.19]  Long term (current) use of opiate analgesic [Z79.891]           Comments:           Anticoagulation Care Providers     Provider Role Specialty Phone number    Anay Miner NP Referring Nurse Practitioner - Family [phone]          Deann Grant  RN, BSN, PHN  Anticoagulation Nurse  [phone]

## 2021-10-28 NOTE — PROGRESS NOTES
ANTICOAGULATION MANAGEMENT     Ashlie Brantley 89 year old female is on warfarin with supratherapeutic INR result. (Goal INR 2.0-3.0)    Recent labs: (last 7 days)     10/28/21  1539   INR 4.1*       ASSESSMENT     Source(s): Patient/Caregiver Call       Warfarin doses taken: Warfarin taken as instructed    Diet: Decreased greens/vitamin K in diet; plans to resume previous intake    New illness, injury, or hospitalization: No    Medication/supplement changes: None noted    Signs or symptoms of bleeding or clotting: No    Previous INR: Therapeutic last visit; previously outside of goal range    Additional findings: None     PLAN     Recommended plan for temporary change(s) affecting INR     Dosing Instructions: Hold dose then continue your current warfarin dose with next INR in 1 week       Summary  As of 10/28/2021    Full warfarin instructions:  10/28: Hold; Otherwise 5 mg every Mon, Wed, Sat; 2.5 mg all other days   Next INR check:  11/4/2021             Telephone call with  Mariela who verbalizes understanding and agrees to plan    Patient to recheck with home meter    Education provided: Impact of vitamin K foods on INR and Goal range and significance of current result    Plan made per [facility] anticoagulation protocol    Tiburcio Smyth RN  Anticoagulation Clinic  10/28/2021    _______________________________________________________________________     Anticoagulation Episode Summary     Current INR goal:  2.0-3.0   TTR:  62.2 % (1 y)   Target end date:  Indefinite   Send INR reminders to:  [facility]    Indications    Atrial fibrillation (H) [I48.91] [I48.91]  Long term current use of anticoagulant therapy [Z79.01]  Longstanding persistent atrial fibrillation (H) [I48.11]  Persistent atrial fibrillation (H) [I48.19]  Long term (current) use of opiate analgesic [Z79.891]           Comments:           Anticoagulation Care Providers     Provider Role Specialty Phone number    Anay Miner NP Referring  Nurse Practitioner - Family [phone]

## 2021-11-05 NOTE — PROGRESS NOTES
ANTICOAGULATION MANAGEMENT     Ashlie Brantley 89 year old female is on warfarin with supratherapeutic INR result. (Goal INR 2.0-3.0)    Recent labs: (last 7 days)     11/05/21  0000   INR 3.5*       ASSESSMENT     Source(s): Chart Review and Patient/Caregiver Call       Warfarin doses taken: Warfarin taken as instructed    Diet: will start a log that will monitor on greens intake for other family members to be aware and patient will keep intake per  routine ~ made aware that patient can eat what she wants and dose can be adjusted based on her INR result.    New illness, injury, or hospitalization: No    Medication/supplement changes: None noted    Signs or symptoms of bleeding or clotting: No    Previous INR: Supratherapeutic    Additional findings: None     PLAN     Recommended plan for no diet, medication or health factor changes affecting INR     Dosing Instructions: Hold dose then Decrease your warfarin dose (10% change) with next INR in 1 week       Summary  As of 11/5/2021    Full warfarin instructions:  11/5: Hold; Otherwise 5 mg every Wed, Sat; 2.5 mg all other days   Next INR check:  11/12/2021             Telephone call with  patient's dtrNathan Sierra who verbalizes understanding and agrees to plan    Patient to recheck with home meter    Education provided: Importance of consistent vitamin K intake, Impact of vitamin K foods on INR, Vitamin K content of foods, Goal range and significance of current result and Importance of therapeutic range    Plan made per [facility] anticoagulation protocol    Naomi Navarrete RN  Anticoagulation Clinic  11/5/2021    _______________________________________________________________________     Anticoagulation Episode Summary     Current INR goal:  2.0-3.0   TTR:  62.3 % (1 y)   Target end date:  Indefinite   Send INR reminders to:  AGGIE FRANCIS    Indications    Atrial fibrillation (H) [I48.91] [I48.91]  Long term current use of anticoagulant therapy [Z79.01]  Longstanding  persistent atrial fibrillation (H) [I48.11]  Persistent atrial fibrillation (H) [I48.19]  Long term (current) use of opiate analgesic [Z79.891]           Comments:           Anticoagulation Care Providers     Provider Role Specialty Phone number    Anay Miner NP Referring Nurse Practitioner - Family [phone]

## 2021-11-11 NOTE — PROGRESS NOTES
ANTICOAGULATION MANAGEMENT     Ashlie Brantley 89 year old female is on warfarin with supratherapeutic INR result. (Goal INR 2.0-3.0)    Recent labs: (last 7 days)     11/11/21  1532   INR 1.9*       ASSESSMENT     Source(s): Chart Review and Patient/Caregiver Call       Warfarin doses taken: Warfarin taken as instructed    Diet: taking in greens 3-4x weekly    New illness, injury, or hospitalization: No    Medication/supplement changes: None noted    Signs or symptoms of bleeding or clotting: No    Previous INR: Supratherapeutic    Additional findings: None     PLAN     Recommended plan for no diet, medication or health factor changes affecting INR     Dosing Instructions: Continue your current warfarin dose with next INR in 1 week       Summary  As of 11/11/2021    Full warfarin instructions:  5 mg every Wed, Sat; 2.5 mg all other days   Next INR check:  11/18/2021             Telephone call with Mariela who verbalizes understanding and agrees to plan    Patient to recheck with home meter    Education provided: Impact of vitamin K foods on INR and Goal range and significance of current result    Plan made per [facility] anticoagulation protocol    Marry Mcginnis RN  Anticoagulation Clinic  11/11/2021    _______________________________________________________________________     Anticoagulation Episode Summary     Current INR goal:  2.0-3.0   TTR:  62.0 % (1 y)   Target end date:  Indefinite   Send INR reminders to:  [facility]    Indications    Atrial fibrillation (H) [I48.91] [I48.91]  Long term current use of anticoagulant therapy [Z79.01]  Longstanding persistent atrial fibrillation (H) [I48.11]  Persistent atrial fibrillation (H) [I48.19]  Long term (current) use of opiate analgesic [Z79.891]           Comments:           Anticoagulation Care Providers     Provider Role Specialty Phone number    Anay Miner NP Referring Nurse Practitioner - Family [phone]

## 2021-11-11 NOTE — PROGRESS NOTES
.Incoming fax from [employer] home monitoring [employer]    Date of result 11/11/21    INR result 1.9

## 2021-11-17 NOTE — PROGRESS NOTES
Reason for Visit  Follow up for chronic respiratory failure  Pulmonary HPI  Ashlie Brantley is a 89 year old female with a history of ILD (NSIP) on 2L O2, Pulmonary HTN, HFrEF (EF 40-45% 8/2019), Aortic Stenosis s/p TAVR 12/2019, Afib on warfarin, embolic CVA, and Vocal Cord Dystonia who presents for follow up.     Last seen on 4/2021 at clinic. Accompanied by her daughter to this appointment. Daughter points out that she needs oxygen all the time. On 3L oxygen. But has been been maintaining baseline functional status. Uses a walker to get around. Can tolerate short walking distance and generally oxygen needs don't go up. Having a concentrator has made a big difference as it is easier for her to move around and spend time outside. Contiues to cough but codiene at night has been helpful.     Undergoing home eval to get the help she and her  need at home. Worried about COVID. But had all her COVID shots and flu shot already.          Current Outpatient Medications   Medication     acetaminophen (TYLENOL) 500 MG tablet     albuterol (PROVENTIL) (2.5 MG/3ML) 0.083% neb solution     codeine 30 MG tablet     famotidine (PEPCID) 20 MG tablet     furosemide (LASIX) 20 MG tablet     GUAIFENESIN PO     levothyroxine (SYNTHROID/LEVOTHROID) 88 MCG tablet     losartan (COZAAR) 25 MG tablet     losartan (COZAAR) 50 MG tablet     metoprolol tartrate (LOPRESSOR) 50 MG tablet     neomycin-polymyxin-dexamethasone (MAXITROL) 3.5-94854-6.1 ophthalmic ointment     order for DME     order for DME     order for DME     oxybutynin (DITROPAN) 5 MG tablet     oxybutynin ER (DITROPAN-XL) 5 MG 24 hr tablet     polyethylene glycol (MIRALAX) 17 GM/Dose powder     Sennosides (SENOKOT PO)     simvastatin (ZOCOR) 40 MG tablet     tolterodine ER (DETROL LA) 4 MG 24 hr capsule     tolterodine ER (DETROL LA) 4 MG 24 hr capsule     warfarin ANTICOAGULANT (COUMADIN) 2.5 MG tablet     warfarin ANTICOAGULANT (COUMADIN) 5 MG tablet     No current  facility-administered medications for this visit.     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     sees [facility]     Cataract     IOL both     DJD (degenerative joint disease)     KNEES     Elevated cholesterol      GERD (gastroesophageal reflux disease)      Hoarseness      HTN (hypertension)      Hypothyroid      Laryngeal dystonia      LVH (left ventricular hypertrophy) due to hypertensive disease      Osteopenia     MILD     Urinary stress incontinence        Past Surgical History:   Procedure Laterality Date     C SHOULDER SURG PROC UNLISTED       C STOMACH SURGERY PROCEDURE UNLISTED       C TOTAL KNEE ARTHROPLASTY      LT 1998  /  RT 2001     CATARACT IOL, RT/LT       HC REMOVAL GALLBLADDER       HERNIA REPAIR, INGUINAL RT/LT      RT     HYSTERECTOMY, CERVIX STATUS UNKNOWN      DUB     PHACOEMULSIFICATION WITH STANDARD INTRAOCULAR LENS IMPLANT  Rt 6/11/09, Lt 8/3/09    both eyes Dr. Barnett     ROTATOR CUFF REPAIR RT/LT      RT     TRANSCATHETER AORTIC-VALVE REPLACEMENT  12/11/2019    TRANSCATHETER AORTIC VALVE REPLACEMENT, RIGHT FEMORAL APPROACH.  Surgeon:  Dr. Antonietta DAVISON NONSPECIFIC PROCEDURE      BACK SURGERY     MADELYNC NONSPECIFIC PROCEDURE      VOCAL CORD POLYP       Social History     Socioeconomic History     Marital status:      Spouse name: Not on file     Number of children: 6     Years of education: Not on file     Highest education level: Not on file   Occupational History     Employer: RETIRED   Tobacco Use     Smoking status: Never Smoker     Smokeless tobacco: Never Used   Substance and Sexual Activity     Alcohol use: Yes     Alcohol/week: 0.0 standard drinks     Comment: 1-2 drinks/month     Drug use: No     Sexual activity: Not Currently     Partners: Male   Other Topics Concern      Service No     Blood Transfusions No     Caffeine Concern No     Occupational Exposure No     Hobby Hazards  No     Sleep Concern No     Stress Concern No     Weight Concern Yes     Special Diet No     Back Care No     Exercise Yes     Comment: a litttle     Bike Helmet Not Asked     Seat Belt Yes     Self-Exams Yes     Parent/sibling w/ CABG, MI or angioplasty before 65F 55M? No   Social History Narrative     Not on file     Social Determinants of Health     Financial Resource Strain: Not on file   Food Insecurity: Not on file   Transportation Needs: Not on file   Physical Activity: Not on file   Stress: Not on file   Social Connections: Not on file   Intimate Partner Violence: Not on file   Housing Stability: Not on file       ROS Pulmonary  A complete ROS was otherwise negative except as noted in the HPI.  BP (!) 176/62 (BP Location: Right arm, Patient Position: Chair)   Pulse 55   Wt 59.4 kg (131 lb)   SpO2 98%   BMI 27.38 kg/m    Exam:   GENERAL APPEARANCE: elderly, well nourished, alert, and in no apparent distress.  EYES: PERRL, EOMI  HENT: on NC  NECK: supple, no masses, no thyromegaly.  RESP: normal percussion, good air flow throughout.  No crackles. No rhonchi. ++wheezes.  CV: Normal S1, S2, regular rhythm, normal rate. No murmur.  No rub. No gallop. No LE edema.   MS: extremities normal. No clubbing. No cyanosis.  SKIN: no rash on limited exam  NEURO: Mentation intact, speech normal, normal strength and tone, normal gait and stance  PSYCH: mentation appears normal. and affect normal/bright  Results:  Recent Results (from the past 168 hour(s))   INR (External Result)    Collection Time: 11/11/21  3:32 PM   Result Value Ref Range    INR (External) 1.9 (A) 0.9 - 1.1       Assessment and plan:  Chronic Hypoxic Hypercapnic Respiratory Failure  NSIP  Pulmonary Hypertension  Chronic respiratory failure multifactorial: ILD, pulmonary HTN, aortic stenosis s/p TAVR.  Incidental diagnosis following a URI in 2011, further ILD workup negative, never on therapy. Oxygen use since 2018 with bedtime codeine to help control  nocturnal cough. Last CT 2019 with likely progression of underlying fibrosis since prior imaging in 2014. Echo on 01/ 2021 showing an increase in pulmonary pressures compared to prior. Overall, doing well since the last visit.  - Continue current therapies  -Albuterol as needed for wheezing.  - Has received her Covid vaccination     Follow up in Pulmonary clinic in 6 months     Seen and discussed with Dr. Lorenzana

## 2021-11-17 NOTE — LETTER
11/17/2021    RE: Ashlie Brantley  [address] [place] [address]    Dear Colleague,    Thank you for referring your patient, Ashlie Brantley, to the [facility] [place]. Please see a copy of my visit note below.    Reason for Visit  Follow up for chronic respiratory failure  Pulmonary HPI  Ashlie Brantley is a 89 year old female with a history of ILD (NSIP) on 2L O2, Pulmonary HTN, HFrEF (EF 40-45% 8/2019), Aortic Stenosis s/p TAVR 12/2019, Afib on warfarin, embolic CVA, and Vocal Cord Dystonia who presents for follow up.     Last seen on 4/2021 at clinic. Accompanied by her daughter to this appointment. Daughter points out that she needs oxygen all the time. On 3L oxygen. But has been been maintaining baseline functional status. Uses a walker to get around. Can tolerate short walking distance and generally oxygen needs don't go up. Having a concentrator has made a big difference as it is easier for her to move around and spend time outside. Contiues to cough but codiene at night has been helpful.     Undergoing home eval to get the help she and her  need at home. Worried about COVID. But had all her COVID shots and flu shot already.          Current Outpatient Medications   Medication     acetaminophen (TYLENOL) 500 MG tablet     albuterol (PROVENTIL) (2.5 MG/3ML) 0.083% neb solution     codeine 30 MG tablet     famotidine (PEPCID) 20 MG tablet     furosemide (LASIX) 20 MG tablet     GUAIFENESIN PO     levothyroxine (SYNTHROID/LEVOTHROID) 88 MCG tablet     losartan (COZAAR) 25 MG tablet     losartan (COZAAR) 50 MG tablet     metoprolol tartrate (LOPRESSOR) 50 MG tablet     neomycin-polymyxin-dexamethasone (MAXITROL) 3.5-14308-9.1 ophthalmic ointment     order for DME     order for DME     order for DME     oxybutynin (DITROPAN) 5 MG tablet     oxybutynin ER (DITROPAN-XL) 5 MG 24 hr tablet     polyethylene glycol (MIRALAX) 17 GM/Dose  powder     Sennosides (SENOKOT PO)     simvastatin (ZOCOR) 40 MG tablet     tolterodine ER (DETROL LA) 4 MG 24 hr capsule     tolterodine ER (DETROL LA) 4 MG 24 hr capsule     warfarin ANTICOAGULANT (COUMADIN) 2.5 MG tablet     warfarin ANTICOAGULANT (COUMADIN) 5 MG tablet     No current facility-administered medications for this visit.     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     sees [facility]     Cataract     IOL both     DJD (degenerative joint disease)     KNEES     Elevated cholesterol      GERD (gastroesophageal reflux disease)      Hoarseness      HTN (hypertension)      Hypothyroid      Laryngeal dystonia      LVH (left ventricular hypertrophy) due to hypertensive disease      Osteopenia     MILD     Urinary stress incontinence        Past Surgical History:   Procedure Laterality Date     C SHOULDER SURG PROC UNLISTED       C STOMACH SURGERY PROCEDURE UNLISTED       C TOTAL KNEE ARTHROPLASTY      LT 1998  /  RT 2001     CATARACT IOL, RT/LT       HC REMOVAL GALLBLADDER       HERNIA REPAIR, INGUINAL RT/LT      RT     HYSTERECTOMY, CERVIX STATUS UNKNOWN      DUB     PHACOEMULSIFICATION WITH STANDARD INTRAOCULAR LENS IMPLANT  Rt 6/11/09, Lt 8/3/09    both eyes Dr. Barnett     ROTATOR CUFF REPAIR RT/LT      RT     TRANSCATHETER AORTIC-VALVE REPLACEMENT  12/11/2019    TRANSCATHETER AORTIC VALVE REPLACEMENT, RIGHT FEMORAL APPROACH.  Surgeon:  Dr. Antonietta DAVISON NONSPECIFIC PROCEDURE      BACK SURGERY     SAMAN NONSPECIFIC PROCEDURE      VOCAL CORD POLYP       Social History     Socioeconomic History     Marital status:      Spouse name: Not on file     Number of children: 6     Years of education: Not on file     Highest education level: Not on file   Occupational History     Employer: RETIRED   Tobacco Use     Smoking status: Never Smoker     Smokeless tobacco: Never Used   Substance and Sexual Activity     Alcohol use:  Yes     Alcohol/week: 0.0 standard drinks     Comment: 1-2 drinks/month     Drug use: No     Sexual activity: Not Currently     Partners: Male   Other Topics Concern      Service No     Blood Transfusions No     Caffeine Concern No     Occupational Exposure No     Hobby Hazards No     Sleep Concern No     Stress Concern No     Weight Concern Yes     Special Diet No     Back Care No     Exercise Yes     Comment: a litttle     Bike Helmet Not Asked     Seat Belt Yes     Self-Exams Yes     Parent/sibling w/ CABG, MI or angioplasty before 65F 55M? No   Social History Narrative     Not on file     Social Determinants of Health     Financial Resource Strain: Not on file   Food Insecurity: Not on file   Transportation Needs: Not on file   Physical Activity: Not on file   Stress: Not on file   Social Connections: Not on file   Intimate Partner Violence: Not on file   Housing Stability: Not on file       ROS Pulmonary  A complete ROS was otherwise negative except as noted in the HPI.  BP (!) 176/62 (BP Location: Right arm, Patient Position: Chair)   Pulse 55   Wt 59.4 kg (131 lb)   SpO2 98%   BMI 27.38 kg/m    Exam:   GENERAL APPEARANCE: elderly, well nourished, alert, and in no apparent distress.  EYES: PERRL, EOMI  HENT: on NC  NECK: supple, no masses, no thyromegaly.  RESP: normal percussion, good air flow throughout.  No crackles. No rhonchi. ++wheezes.  CV: Normal S1, S2, regular rhythm, normal rate. No murmur.  No rub. No gallop. No LE edema.   MS: extremities normal. No clubbing. No cyanosis.  SKIN: no rash on limited exam  NEURO: Mentation intact, speech normal, normal strength and tone, normal gait and stance  PSYCH: mentation appears normal. and affect normal/bright  Results:  Recent Results (from the past 168 hour(s))   INR (External Result)    Collection Time: 11/11/21  3:32 PM   Result Value Ref Range    INR (External) 1.9 (A) 0.9 - 1.1       Assessment and plan:  Chronic Hypoxic Hypercapnic  Respiratory Failure  NSIP  Pulmonary Hypertension  Chronic respiratory failure multifactorial: ILD, pulmonary HTN, aortic stenosis s/p TAVR.  Incidental diagnosis following a URI in 2011, further ILD workup negative, never on therapy. Oxygen use since 2018 with bedtime codeine to help control nocturnal cough. Last CT 2019 with likely progression of underlying fibrosis since prior imaging in 2014. Echo on 01/ 2021 showing an increase in pulmonary pressures compared to prior. Overall, doing well since the last visit.  - Continue current therapies  -Albuterol as needed for wheezing.  - Has received her Covid vaccination     Follow up in Pulmonary clinic in 6 months     Seen and discussed with Dr. Lorenzana            Attestation signed by Amy Lorenzana MD at 11/23/2021  4:27 PM:  Physician Attestation   I, Amy Lorenzana MD, saw this patient with the resident and agree with the resident's findings and plan of care as documented in the note.      I personally reviewed vital signs, medications, relevant images, and labs    Amy Lorenzana MD  Date of Service (when I saw the patient): 11/17/21

## 2021-11-17 NOTE — NURSING NOTE
Chief Complaint   Patient presents with     Follow Up     Follow up appointment      Vitals were taken and medications were reconciled.     Fani JUAREZA  11:17 AM

## 2021-11-18 NOTE — PROGRESS NOTES
Anticoagulation Management    Unable to reach Mariela today.    Today's INR result of 3.3 is supratherapeutic (goal INR of 2.0-3.0).  Result received from: Home Monitor    Follow up required to confirm warfarin dose taken and assess for changes    Left voicemail asking daughter to call the clinic back      Anticoagulation clinic to follow up    Naomi Emmanuel RN

## 2021-11-18 NOTE — TELEPHONE ENCOUNTER
Reason for Call:  INR follow up    Detailed comments: Daughter Mariela is returning call to Naomi from today, please call again. Thank you.     Phone Number Patient can be reached at: Other phone number:  [phone]    Best Time: between 5 pm and 6 pm today or after noon tomorrow    Can we leave a detailed message on this number? YES    Call taken on 11/18/2021 at 3:30 PM by Annamaria Badillo

## 2021-11-18 NOTE — PROGRESS NOTES
Current Eye Medications:  Maxtrol at bedtime both eyes prn for dryness     Subjective:  Here for complete eye exam today. Doesn't feel the left eye is as good as it was before. Anytime she gets up in the night to use restroom, she sees little black floaters moving around. Got glasses two years ago.     Objective:  See Ophthalmology Exam.      Assessment:  Ashlie Brantley is a 89 year old female who presents with:   Encounter Diagnoses   Name Primary?     Examination of eyes and vision      Myopia of both eyes with regular astigmatism and presbyopia        CRAO (central retinal artery occlusion), right      Pseudophakia, OU      PVD (posterior vitreous detachment), bilateral      Stable eye exam.      Plan:  Continue maxitrol at bedtime as needed     Glasses prescription given     Sarah Ryan MD  [phone]

## 2021-11-18 NOTE — PROGRESS NOTES
ANTICOAGULATION MANAGEMENT     Ashlie Brantley 89 year old female is on warfarin with supratherapeutic INR result. (Goal INR 2.0-3.0)    Recent labs: (last 7 days)     11/18/21  1504   INR 3.3*       ASSESSMENT     Source(s): Chart Review and Patient/Caregiver Call       Warfarin doses taken: Warfarin taken as instructed    Diet: No new diet changes identified    New illness, injury, or hospitalization: No    Medication/supplement changes: None noted    Signs or symptoms of bleeding or clotting: No    Previous INR: Subtherapeutic    Additional findings: None     PLAN     Recommended plan for no diet, medication or health factor changes affecting INR     Dosing Instructions:  Decrease your warfarin dose (11.1% change) with next INR in 1 week       Summary  As of 11/18/2021    Full warfarin instructions:  5 mg every Wed, Sat; 2.5 mg all other days   Next INR check:               Telephone call with  daughter, Mariela who verbalizes understanding and agrees to plan    Easyaula message sent to patient as well per daughter's request.     Patient to recheck with home meter    Education provided: Please call back if any changes to your diet, medications or how you've been taking warfarin, Importance of consistent vitamin K intake and Monitoring for bleeding signs and symptoms    Plan made per [facility] anticoagulation protocol    Naomi Emmanuel RN  Anticoagulation Clinic  11/18/2021    _______________________________________________________________________     Anticoagulation Episode Summary     Current INR goal:  2.0-3.0   TTR:  61.4 % (1 y)   Target end date:  Indefinite   Send INR reminders to:  AGGIE FRANCIS    Indications    Atrial fibrillation (H) [I48.91] [I48.91]  Long term current use of anticoagulant therapy [Z79.01]  Longstanding persistent atrial fibrillation (H) [I48.11]  Persistent atrial fibrillation (H) [I48.19]  Long term (current) use of opiate analgesic [Z79.891]           Comments:            Anticoagulation Care Providers     Provider Role Specialty Phone number    Anay Miner, NP Referring Nurse Practitioner - Family [phone]

## 2021-11-18 NOTE — PATIENT INSTRUCTIONS
Continue maxitrol at bedtime as needed     Glasses prescription given     Sarah Ryan MD  [phone]

## 2021-11-18 NOTE — LETTER
11/18/2021         RE: Ashlie Brantley  [address] [place] [address]        Dear Colleague,    Thank you for referring your patient, Ashlie Brantley, to the [facility]. Please see a copy of my visit note below.     Current Eye Medications:  Maxtrol at bedtime both eyes prn for dryness     Subjective:  Here for complete eye exam today. Doesn't feel the left eye is as good as it was before. Anytime she gets up in the night to use restroom, she sees little black floaters moving around. Got glasses two years ago.     Objective:  See Ophthalmology Exam.      Assessment:  Ashlie Brantley is a 89 year old female who presents with:   Encounter Diagnoses   Name Primary?     Examination of eyes and vision      Myopia of both eyes with regular astigmatism and presbyopia        CRAO (central retinal artery occlusion), right      Pseudophakia, OU      PVD (posterior vitreous detachment), bilateral      Stable eye exam.      Plan:  Continue maxitrol at bedtime as needed     Glasses prescription given     Sarah Ryan MD  [phone]               Again, thank you for allowing me to participate in the care of your patient.        Sincerely,        Sarah Ryan MD

## 2021-11-24 NOTE — PROGRESS NOTES
ANTICOAGULATION MANAGEMENT     Ashlie Brantley 89 year old female is on warfarin with therapeutic INR result. (Goal INR 2.0-3.0)    Recent labs: (last 7 days)     11/24/21  1257   INR 2.8*       ASSESSMENT     Source(s): Chart Review and Patient/Caregiver Call       Warfarin doses taken: Warfarin taken as instructed    Diet: No new diet changes identified    New illness, injury, or hospitalization: No    Medication/supplement changes: None noted    Signs or symptoms of bleeding or clotting: No    Previous INR: Supratherapeutic    Additional findings: None     PLAN     Recommended plan for no diet, medication or health factor changes affecting INR     Dosing Instructions: Continue your current warfarin dose with next INR in 2 weeks       Summary  As of 11/24/2021    Full warfarin instructions:  5 mg every Wed; 2.5 mg all other days   Next INR check:  12/8/2021             Telephone call with  daughter, Mariela, who verbalizes understanding and agrees to plan    Patient to recheck with home meter    Education provided: Please call back if any changes to your diet, medications or how you've been taking warfarin, Importance of consistent vitamin K intake and Contact [phone]  with any changes, questions or concerns.     Plan made per [facility] anticoagulation protocol    Liz Alvarez RN  Anticoagulation Clinic  11/24/2021    _______________________________________________________________________     Anticoagulation Episode Summary     Current INR goal:  2.0-3.0   TTR:  60.5 % (1 y)   Target end date:  Indefinite   Send INR reminders to:  BRIEAG PENNY    Indications    Atrial fibrillation (H) [I48.91] [I48.91]  Long term current use of anticoagulant therapy [Z79.01]  Longstanding persistent atrial fibrillation (H) [I48.11]  Persistent atrial fibrillation (H) [I48.19]  Long term (current) use of opiate analgesic [Z79.891]           Comments:           Anticoagulation Care Providers     Provider Role Specialty  Phone number    Anay Miner, NP Referring Nurse Practitioner - Family [phone]

## 2021-11-24 NOTE — PROGRESS NOTES
Incoming fax from [employer] home monitoring company    Date of result 11/24    INR result 2.8

## 2021-12-01 PROBLEM — E66.01 MORBID OBESITY (H): Status: RESOLVED | Noted: 2021-04-16 | Resolved: 2021-01-01

## 2021-12-01 NOTE — PROGRESS NOTES
"Ashlie is a 89 year old who is being evaluated via a billable video visit.      How would you like to obtain your AVS? MyChart  If the video visit is dropped, the invitation should be resent by: Other e-mail: [email or web address]  Will anyone else be joining your video visit?       Video Start Time: 11:00 am    Assessment & Plan     IPF (idiopathic pulmonary fibrosis) (H)  Chronic, stable  - benzonatate (TESSALON) 200 MG capsule; Take 1 capsule (200 mg) by mouth 3 times daily as needed for cough  - HOME CARE NURSING REFERRAL  - Care Coordination Referral; Future    Cough    - benzonatate (TESSALON) 200 MG capsule; Take 1 capsule (200 mg) by mouth 3 times daily as needed for cough    Acute bronchospasm  Refill provided  - albuterol (PROVENTIL) (2.5 MG/3ML) 0.083% neb solution; Take 1 vial (2.5 mg) by nebulization every 6 hours as needed for shortness of breath / dyspnea or wheezing    On home oxygen therapy  stable    Hypertension goal BP (blood pressure) < 140/90  Chronic, stable, continue current treatment.   - HOME CARE NURSING REFERRAL  - Care Coordination Referral; Future    Paroxysmal atrial fibrillation (H)  Chronic, stable, continue current treatment.   - HOME CARE NURSING REFERRAL  - Care Coordination Referral; Future    Long term current use of anticoagulant therapy due to h/o atrial fibrillation.  Managed with anticoagulation clinic.    S/p TAVR (transcatheter aortic valve replacement), bioprosthetic  Followed by Cardiology, stable    Generalized muscle weakness    - HOME CARE NURSING REFERRAL  - Care Coordination Referral; Future             BMI:   Estimated body mass index is 27.38 kg/m  as calculated from the following:    Height as of 4/16/21: 1.473 m (4' 10\").    Weight as of 11/17/21: 59.4 kg (131 lb).       Return in about 3 months (around 3/1/2022) for Routine Visit.    Anay Miner NP  [facility]    Chris Dailey is a 89 year old who presents for the following " health issues     HPI     Discuss home care, does not have agency choosen yet.  Children take turns bringing meals.  Eating well.  Uses a walker.    For the past 5 nights has had a cough that wakes her up.  Has known pulmonary fibrosis and on home oxygen.  She uses codeine at night chronically.  Has been using Albuterol nebulizer during the day.  No fever, no loss of taste or smell.  Has had loose stools the last couple days, more urgent stool.      Review of Systems   Constitutional, HEENT, cardiovascular, pulmonary, gi and gu systems are negative, except as otherwise noted.      Objective           Vitals:  No vitals were obtained today due to virtual visit.    Physical Exam   GENERAL: Healthy, alert and no distress  EYES: Eyes grossly normal to inspection.  No discharge or erythema, or obvious scleral/conjunctival abnormalities.  RESP: No audible wheeze, or visible cyanosis.  No visible retractions or increased work of breathing.  Episode of repeated coughing.   SKIN: Visible skin clear. No significant rash, abnormal pigmentation or lesions.  PSYCH: Mentation appears normal, affect normal/bright, judgement and insight intact, normal speech and appearance well-groomed.            Video-Visit Details    Type of service:  Video Visit    Video End Time:11:38 AM    Originating Location (pt. Location): Home    Distant Location (provider location):  [facility]     Platform used for Video Visit: [employer]

## 2021-12-02 NOTE — PROGRESS NOTES
Clinic Care Coordination Contact  [facility]/Voicemail       Clinical Data: Care Coordinator Outreach  Outreach attempted x 1. Left message on patient's daughter's voicemail with call back information and requested return call.    Plan: CHW will try to reach patient again in 1-2 business days.    Notes:  -schedule with SW (NE)  -patient and  looking for meal assistance options    Della Luis  Community Health Worker   [facility]  Care Coordination  [facility] [place], Maria Fernanda Hendrix, [facility]  [email or web address]   Office: [phone]

## 2021-12-02 NOTE — LETTER
[facility] CARE COORDINATION  [address] [address]    December 3, 2021    Ashlie Brantley  [address] [place] [address]      Dear Ashlie,    I am a clinic community health worker who works with Anay Miner NP at [facility]. I have been trying to reach you recently to introduce Clinic Care Coordination and to see if there was anything I could assist you with.  Below is a description of clinic care coordination and how I can further assist you.      The clinic care coordination team is made up of a registered nurse,  and community health worker who understand the health care system. The goal of clinic care coordination is to help you manage your health and improve access to the health care system in the most efficient manner. The team can assist you in meeting your health care goals by providing education, coordinating services, strengthening the communication among your providers and supporting you with any resource needs.    Please feel free to contact me at [phone] with any questions or concerns. We are focused on providing you with the highest-quality healthcare experience possible and that all starts with you.     Sincerely,     Della Luis  Community Health Worker   [facility]  Care Coordination  [facility], [place], [facility]  [email or web address]   Office: [phone]

## 2021-12-03 NOTE — PROGRESS NOTES
Clinic Care Coordination Contact  [facility]/Voicemail       Clinical Data: CHW Outreach  Outreach attempted x 2. Left message on patient's voicemail with call back information and requested return call.    Plan: CHW will send unable to contact letter with care coordinator contact information via mSpoke. CHW will do no further outreaches at this time.    Della Luis  Community Health Worker   [facility]  Care Coordination  [facility], [facility]  [email or web address]   Office: [phone]

## 2021-12-03 NOTE — TELEPHONE ENCOUNTER
Called Tyesha back.  Ok for start of care till 12/5/21 - this is when family has availability.        Liliana Cornejo RN

## 2021-12-03 NOTE — TELEPHONE ENCOUNTER
Calling to get verbal home care orders for:    Start of care for PT starting 12-5-21    Please call ok to lm on vm

## 2021-12-08 NOTE — PROGRESS NOTES
Received a fax INR result for Ashlie from [facility]    INR result dated 12/8/21 is 2.5    Noe Hall RN, BSN  Anticoagulation Clinic

## 2021-12-08 NOTE — PROGRESS NOTES
ANTICOAGULATION MANAGEMENT     Ashlie Brantley 89 year old female is on warfarin with therapeutic INR result. (Goal INR 2.0-3.0)    Recent labs: (last 7 days)     12/08/21  0000   INR 2.5*       ASSESSMENT     Source(s): Chart Review and Patient/Caregiver Call       Warfarin doses taken: More warfarin taken than planned which may be affecting INR    Diet: No new diet changes identified    New illness, injury, or hospitalization: No    Medication/supplement changes: None noted    Signs or symptoms of bleeding or clotting: No    Previous INR: Therapeutic last visit; previously outside of goal range    Additional findings: Started PT this week.  PT will be on Mon/Fri most weeks     PLAN     Recommended plan for no diet, medication or health factor changes affecting INR     Dosing Instructions: Continue your current warfarin dose with next INR in 2 weeks.  Changed dose to reflect how patient has been receiving warfarin which is a 12.5% dose increase      Summary  As of 12/8/2021    Full warfarin instructions:  5 mg every Mon, Sat; 2.5 mg all other days   Next INR check:  12/22/2021             Telephone call with Mariela who agrees to plan and repeated back plan correctly    Patient to recheck with home meter    Education provided: Goal range and significance of current result, Importance of therapeutic range, Importance of following up at instructed interval and Importance of taking warfarin as instructed    Plan made per [facility] anticoagulation protocol    Noemi Whitten, RN  Anticoagulation Clinic  12/8/2021    _______________________________________________________________________     Anticoagulation Episode Summary     Current INR goal:  2.0-3.0   TTR:  62.4 % (1 y)   Target end date:  Indefinite   Send INR reminders to:  AGGIE FRANCIS    Indications    Atrial fibrillation (H) [I48.91] [I48.91]  Long term current use of anticoagulant therapy [Z79.01]  Longstanding persistent atrial fibrillation (H)  [I48.11]  Persistent atrial fibrillation (H) [I48.19]  Long term (current) use of opiate analgesic [Z79.891]           Comments:           Anticoagulation Care Providers     Provider Role Specialty Phone number    Anay Miner NP Referring Nurse Practitioner - Family [phone]

## 2021-12-22 NOTE — PROGRESS NOTES
Incoming fax from [email or web address]    Date of result 12/22    INR result 2.6

## 2021-12-22 NOTE — PROGRESS NOTES
ANTICOAGULATION MANAGEMENT     Ashlie Brantley 89 year old female is on warfarin with therapeutic INR result. (Goal INR 2.0-3.0)    Recent labs: (last 7 days)     12/22/21  1340   INR 2.6*       ASSESSMENT     Source(s): Chart Review and Patient/Caregiver Call       Warfarin doses taken: Warfarin taken as instructed    Diet: No new diet changes identified    New illness, injury, or hospitalization: No    Medication/supplement changes: None noted    Signs or symptoms of bleeding or clotting: No    Previous INR: Therapeutic last 2(+) visits    Additional findings: None     PLAN     Recommended plan for no diet, medication or health factor changes affecting INR     Dosing Instructions: Continue your current warfarin dose with next INR in 2 weeks       Summary  As of 12/22/2021    Full warfarin instructions:  5 mg every Mon, Sat; 2.5 mg all other days   Next INR check:  1/5/2022             Telephone call with  Mariela who verbalizes understanding and agrees to plan    Patient to recheck with home meter    Education provided: Goal range and significance of current result and Importance of therapeutic range    Plan made per [facility] anticoagulation protocol    Gabriel Cohen RN  Anticoagulation Clinic  12/22/2021    _______________________________________________________________________     Anticoagulation Episode Summary     Current INR goal:  2.0-3.0   TTR:  65.8 % (1 y)   Target end date:  Indefinite   Send INR reminders to:  BRIE PENNY    Indications    Atrial fibrillation (H) [I48.91] [I48.91]  Long term current use of anticoagulant therapy [Z79.01]  Longstanding persistent atrial fibrillation (H) [I48.11]  Persistent atrial fibrillation (H) [I48.19]  Long term (current) use of opiate analgesic [Z79.891]           Comments:           Anticoagulation Care Providers     Provider Role Specialty Phone number    Anay Miner NP Referring Nurse Practitioner - Family [phone]

## 2022-01-01 ENCOUNTER — DOCUMENTATION ONLY (OUTPATIENT)
Dept: ANTICOAGULATION | Facility: CLINIC | Age: 87
End: 2022-01-01
Payer: COMMERCIAL

## 2022-01-01 ENCOUNTER — TELEPHONE (OUTPATIENT)
Dept: FAMILY MEDICINE | Facility: CLINIC | Age: 87
End: 2022-01-01
Payer: COMMERCIAL

## 2022-01-01 ENCOUNTER — HEALTH MAINTENANCE LETTER (OUTPATIENT)
Age: 87
End: 2022-01-01

## 2022-01-01 ENCOUNTER — ANTICOAGULATION THERAPY VISIT (OUTPATIENT)
Dept: FAMILY MEDICINE | Facility: CLINIC | Age: 87
End: 2022-01-01

## 2022-01-01 ENCOUNTER — ANTICOAGULATION THERAPY VISIT (OUTPATIENT)
Dept: ANTICOAGULATION | Facility: CLINIC | Age: 87
End: 2022-01-01
Payer: COMMERCIAL

## 2022-01-01 ENCOUNTER — ANTICOAGULATION THERAPY VISIT (OUTPATIENT)
Dept: FAMILY MEDICINE | Facility: CLINIC | Age: 87
End: 2022-01-01
Payer: COMMERCIAL

## 2022-01-01 ENCOUNTER — TRANSFERRED RECORDS (OUTPATIENT)
Dept: HEALTH INFORMATION MANAGEMENT | Facility: CLINIC | Age: 87
End: 2022-01-01
Payer: COMMERCIAL

## 2022-01-01 ENCOUNTER — TELEPHONE (OUTPATIENT)
Dept: FAMILY MEDICINE | Facility: CLINIC | Age: 87
End: 2022-01-01

## 2022-01-01 ENCOUNTER — OFFICE VISIT (OUTPATIENT)
Dept: FAMILY MEDICINE | Facility: CLINIC | Age: 87
End: 2022-01-01
Payer: COMMERCIAL

## 2022-01-01 ENCOUNTER — TELEPHONE (OUTPATIENT)
Dept: ANTICOAGULATION | Facility: CLINIC | Age: 87
End: 2022-01-01

## 2022-01-01 ENCOUNTER — DOCUMENTATION ONLY (OUTPATIENT)
Dept: FAMILY MEDICINE | Facility: CLINIC | Age: 87
End: 2022-01-01
Payer: COMMERCIAL

## 2022-01-01 ENCOUNTER — LAB (OUTPATIENT)
Dept: LAB | Facility: CLINIC | Age: 87
End: 2022-01-01
Payer: COMMERCIAL

## 2022-01-01 ENCOUNTER — OFFICE VISIT (OUTPATIENT)
Dept: UROLOGY | Facility: CLINIC | Age: 87
End: 2022-01-01
Payer: COMMERCIAL

## 2022-01-01 VITALS
RESPIRATION RATE: 15 BRPM | BODY MASS INDEX: 27.71 KG/M2 | OXYGEN SATURATION: 94 % | SYSTOLIC BLOOD PRESSURE: 110 MMHG | WEIGHT: 132 LBS | HEART RATE: 55 BPM | TEMPERATURE: 96.5 F | HEIGHT: 58 IN | DIASTOLIC BLOOD PRESSURE: 70 MMHG

## 2022-01-01 VITALS — SYSTOLIC BLOOD PRESSURE: 107 MMHG | DIASTOLIC BLOOD PRESSURE: 51 MMHG | HEART RATE: 53 BPM | OXYGEN SATURATION: 98 %

## 2022-01-01 DIAGNOSIS — Z95.3 S/P TAVR (TRANSCATHETER AORTIC VALVE REPLACEMENT), BIOPROSTHETIC: ICD-10-CM

## 2022-01-01 DIAGNOSIS — Z79.891 LONG TERM (CURRENT) USE OF OPIATE ANALGESIC: ICD-10-CM

## 2022-01-01 DIAGNOSIS — I48.21 PERMANENT ATRIAL FIBRILLATION (H): ICD-10-CM

## 2022-01-01 DIAGNOSIS — I48.11 LONGSTANDING PERSISTENT ATRIAL FIBRILLATION (H): ICD-10-CM

## 2022-01-01 DIAGNOSIS — Z79.01 LONG TERM CURRENT USE OF ANTICOAGULANT THERAPY: ICD-10-CM

## 2022-01-01 DIAGNOSIS — Z23 HIGH PRIORITY FOR 2019-NCOV VACCINE: ICD-10-CM

## 2022-01-01 DIAGNOSIS — N32.81 OAB (OVERACTIVE BLADDER): Primary | ICD-10-CM

## 2022-01-01 DIAGNOSIS — E03.4 HYPOTHYROIDISM DUE TO ACQUIRED ATROPHY OF THYROID: ICD-10-CM

## 2022-01-01 DIAGNOSIS — I48.19 PERSISTENT ATRIAL FIBRILLATION (H): ICD-10-CM

## 2022-01-01 DIAGNOSIS — Z79.01 LONG TERM CURRENT USE OF ANTICOAGULANT THERAPY: Primary | ICD-10-CM

## 2022-01-01 DIAGNOSIS — J84.10 FIBROSIS, LUNG (H): ICD-10-CM

## 2022-01-01 DIAGNOSIS — I48.21 PERMANENT ATRIAL FIBRILLATION (H): Primary | ICD-10-CM

## 2022-01-01 DIAGNOSIS — I10 HYPERTENSION GOAL BP (BLOOD PRESSURE) < 140/90: ICD-10-CM

## 2022-01-01 DIAGNOSIS — I48.11 LONGSTANDING PERSISTENT ATRIAL FIBRILLATION (H): Primary | ICD-10-CM

## 2022-01-01 DIAGNOSIS — J84.112 IPF (IDIOPATHIC PULMONARY FIBROSIS) (H): ICD-10-CM

## 2022-01-01 DIAGNOSIS — I48.0 PAROXYSMAL ATRIAL FIBRILLATION (H): ICD-10-CM

## 2022-01-01 DIAGNOSIS — I48.91 ATRIAL FIBRILLATION (H): Primary | ICD-10-CM

## 2022-01-01 DIAGNOSIS — N32.81 OAB (OVERACTIVE BLADDER): ICD-10-CM

## 2022-01-01 DIAGNOSIS — R19.5 LOOSE STOOLS: ICD-10-CM

## 2022-01-01 DIAGNOSIS — E78.5 HYPERLIPIDEMIA LDL GOAL <130: ICD-10-CM

## 2022-01-01 DIAGNOSIS — J96.12 CHRONIC RESPIRATORY FAILURE WITH HYPERCAPNIA (H): ICD-10-CM

## 2022-01-01 DIAGNOSIS — I27.20 PULMONARY HYPERTENSION (H): ICD-10-CM

## 2022-01-01 LAB
AMPHETAMINES UR QL: NOT DETECTED
ANION GAP SERPL CALCULATED.3IONS-SCNC: 11 MMOL/L (ref 3–14)
BARBITURATES UR QL SCN: NOT DETECTED
BENZODIAZ UR QL SCN: NOT DETECTED
BUN SERPL-MCNC: 63 MG/DL (ref 7–30)
BUPRENORPHINE UR QL: NOT DETECTED
CALCIUM SERPL-MCNC: 8.9 MG/DL (ref 8.5–10.1)
CANNABINOIDS UR QL: NOT DETECTED
CHLORIDE BLD-SCNC: 102 MMOL/L (ref 94–109)
CHOLEST SERPL-MCNC: 67 MG/DL
CO2 SERPL-SCNC: 25 MMOL/L (ref 20–32)
COCAINE UR QL SCN: NOT DETECTED
CREAT SERPL-MCNC: 2.87 MG/DL (ref 0.52–1.04)
D-METHAMPHET UR QL: NOT DETECTED
ERYTHROCYTE [DISTWIDTH] IN BLOOD BY AUTOMATED COUNT: 16.1 % (ref 10–15)
FASTING STATUS PATIENT QL REPORTED: YES
GFR SERPL CREATININE-BSD FRML MDRD: 15 ML/MIN/1.73M2
GLUCOSE BLD-MCNC: 69 MG/DL (ref 70–99)
HCT VFR BLD AUTO: 33.7 % (ref 35–47)
HDLC SERPL-MCNC: 25 MG/DL
HGB BLD-MCNC: 11.1 G/DL (ref 11.7–15.7)
INR (EXTERNAL): 4 (ref 2–3)
INR BLD: 5 (ref 0.9–1.1)
INR BLD: >8 (ref 0.9–1.1)
INR HOME MONITORING: 1.8 (ref 2–3)
INR HOME MONITORING: 2.1 (ref 2–3)
INR HOME MONITORING: 2.2 (ref 2–3)
INR HOME MONITORING: 2.5 (ref 2–3)
INR HOME MONITORING: 2.7 (ref 2–3)
INR HOME MONITORING: 3.2 (ref 2–3)
INR HOME MONITORING: 3.4 (ref 2–3)
INR HOME MONITORING: 3.6 (ref 2–3)
INR HOME MONITORING: 3.7 (ref 2–3)
INR HOME MONITORING: 3.9 (ref 2–3)
INR HOME MONITORING: 4 (ref 2–3)
INR HOME MONITORING: 5.2 (ref 2–3)
INR HOME MONITORING: 6 (ref 2–3)
INR HOME MONITORING: 6.1 (ref 2–3)
INR HOME MONITORING: 7.8 (ref 2–3)
INR PPP: 6.89 (ref 0.85–1.15)
LDLC SERPL CALC-MCNC: 28 MG/DL
MCH RBC QN AUTO: 31.4 PG (ref 26.5–33)
MCHC RBC AUTO-ENTMCNC: 32.9 G/DL (ref 31.5–36.5)
MCV RBC AUTO: 95 FL (ref 78–100)
METHADONE UR QL SCN: NOT DETECTED
NONHDLC SERPL-MCNC: 42 MG/DL
OPIATES UR QL SCN: DETECTED
OXYCODONE UR QL SCN: NOT DETECTED
PCP UR QL SCN: NOT DETECTED
PLATELET # BLD AUTO: 236 10E3/UL (ref 150–450)
POTASSIUM BLD-SCNC: 4.1 MMOL/L (ref 3.4–5.3)
PROPOXYPH UR QL: NOT DETECTED
RBC # BLD AUTO: 3.54 10E6/UL (ref 3.8–5.2)
SODIUM SERPL-SCNC: 138 MMOL/L (ref 133–144)
T4 FREE SERPL-MCNC: 1.29 NG/DL (ref 0.76–1.46)
TRICYCLICS UR QL SCN: NOT DETECTED
TRIGL SERPL-MCNC: 68 MG/DL
TSH SERPL DL<=0.005 MIU/L-ACNC: 5.22 MU/L (ref 0.4–4)
WBC # BLD AUTO: 14.8 10E3/UL (ref 4–11)

## 2022-01-01 PROCEDURE — 84443 ASSAY THYROID STIM HORMONE: CPT | Performed by: NURSE PRACTITIONER

## 2022-01-01 PROCEDURE — 0054A COVID-19,PF,PFIZER (12+ YRS): CPT | Performed by: NURSE PRACTITIONER

## 2022-01-01 PROCEDURE — 85027 COMPLETE CBC AUTOMATED: CPT | Performed by: NURSE PRACTITIONER

## 2022-01-01 PROCEDURE — 85610 PROTHROMBIN TIME: CPT | Performed by: NURSE PRACTITIONER

## 2022-01-01 PROCEDURE — 80061 LIPID PANEL: CPT | Performed by: NURSE PRACTITIONER

## 2022-01-01 PROCEDURE — 85610 PROTHROMBIN TIME: CPT

## 2022-01-01 PROCEDURE — 80306 DRUG TEST PRSMV INSTRMNT: CPT | Performed by: NURSE PRACTITIONER

## 2022-01-01 PROCEDURE — 36415 COLL VENOUS BLD VENIPUNCTURE: CPT | Performed by: NURSE PRACTITIONER

## 2022-01-01 PROCEDURE — 91305 COVID-19,PF,PFIZER (12+ YRS): CPT | Performed by: NURSE PRACTITIONER

## 2022-01-01 PROCEDURE — 36416 COLLJ CAPILLARY BLOOD SPEC: CPT

## 2022-01-01 PROCEDURE — 84439 ASSAY OF FREE THYROXINE: CPT | Performed by: NURSE PRACTITIONER

## 2022-01-01 PROCEDURE — 99214 OFFICE O/P EST MOD 30 MIN: CPT | Mod: 25 | Performed by: NURSE PRACTITIONER

## 2022-01-01 PROCEDURE — 99213 OFFICE O/P EST LOW 20 MIN: CPT | Performed by: UROLOGY

## 2022-01-01 PROCEDURE — 80048 BASIC METABOLIC PNL TOTAL CA: CPT | Performed by: NURSE PRACTITIONER

## 2022-01-01 RX ORDER — CODEINE SULFATE 30 MG/1
60 TABLET ORAL AT BEDTIME
Qty: 60 TABLET | Refills: 0 | Status: SHIPPED | OUTPATIENT
Start: 2022-01-01 | End: 2022-01-01

## 2022-01-01 RX ORDER — DIGOXIN 125 MCG
1 TABLET ORAL DAILY
COMMUNITY
Start: 2021-01-01

## 2022-01-01 RX ORDER — CODEINE SULFATE 30 MG/1
60 TABLET ORAL AT BEDTIME
Qty: 60 TABLET | Refills: 0 | Status: SHIPPED | OUTPATIENT
Start: 2022-01-01

## 2022-01-01 RX ORDER — WARFARIN SODIUM 1 MG/1
1 TABLET ORAL DAILY
Qty: 125 TABLET | Refills: 1 | Status: SHIPPED | OUTPATIENT
Start: 2022-01-01

## 2022-01-01 RX ORDER — CODEINE SULFATE 30 MG/1
60 TABLET ORAL AT BEDTIME
Qty: 60 TABLET | Refills: 0 | OUTPATIENT
Start: 2022-01-01

## 2022-01-01 RX ORDER — TOLTERODINE 4 MG/1
4 CAPSULE, EXTENDED RELEASE ORAL DAILY
Qty: 90 CAPSULE | Refills: 0 | Status: SHIPPED | OUTPATIENT
Start: 2022-01-01

## 2022-01-01 RX ORDER — WARFARIN SODIUM 2.5 MG/1
TABLET ORAL
Qty: 60 TABLET | Refills: 1 | Status: SHIPPED | OUTPATIENT
Start: 2022-01-01

## 2022-01-01 ASSESSMENT — PAIN SCALES - GENERAL: PAINLEVEL: NO PAIN (0)

## 2022-01-03 NOTE — TELEPHONE ENCOUNTER
Called cely back.    Gave verbal order for continuation of OT services as listed below      Liliana Cornejo RN

## 2022-01-03 NOTE — TELEPHONE ENCOUNTER
Pam from OT [facility] is calling for verbal orders    Continue OT 1x a week for 1 week, 2x a week for 1 week, 1x a week for 2 weeks for breathing, sob, and energy conservation    [phone] ok to abigail Dunaway/  [place]

## 2022-01-04 NOTE — TELEPHONE ENCOUNTER
Forms received from [employer] [facility]/ [employer] on Discipline - Order #[id number] (order date: 1/3/22) for Anay Miner NP.  Forms placed in provider 'sign me' folder.  Please fax forms to [phone] after completion.    René Aguilera RT (R) (ARRT)  Radiology Dept

## 2022-01-05 NOTE — PROGRESS NOTES
ANTICOAGULATION MANAGEMENT     Ashlie Brantley 89 year old female is on warfarin with therapeutic INR result. (Goal INR 2.0-3.0)    Recent labs: (last 7 days)     01/05/22  0000   INR 2.10       ASSESSMENT     Source(s): Chart Review and Patient/Caregiver Call       Warfarin doses taken: Warfarin taken as instructed    Diet: No new diet changes identified    New illness, injury, or hospitalization: No    Medication/supplement changes: None noted    Signs or symptoms of bleeding or clotting: No    Previous INR: Therapeutic last 2(+) visits    Additional findings: None     PLAN     Recommended plan for no diet, medication or health factor changes affecting INR     Dosing Instructions: Continue your current warfarin dose with next INR in 2 weeks       Summary  As of 1/5/2022    Full warfarin instructions:  5 mg every Mon, Sat; 2.5 mg all other days   Next INR check:  1/19/2022             Detailed voice message left for  Mariela daughter with dosing instructions and follow up date.     Patient to recheck with home meter    Education provided: Please call back if any changes to your diet, medications or how you've been taking warfarin    Plan made per [facility] anticoagulation protocol    Luz Allen, RN  Anticoagulation Clinic  1/5/2022    _______________________________________________________________________     Anticoagulation Episode Summary     Current INR goal:  2.0-3.0   TTR:  65.8 % (1 y)   Target end date:  Indefinite   Send INR reminders to:  [facility]    Indications    Atrial fibrillation (H) [I48.91] [I48.91]  Long term current use of anticoagulant therapy [Z79.01]  Longstanding persistent atrial fibrillation (H) [I48.11]  Persistent atrial fibrillation (H) [I48.19]  Long term (current) use of opiate analgesic [Z79.891]           Comments:           Anticoagulation Care Providers     Provider Role Specialty Phone number    Anay Miner NP Referring Nurse Practitioner - Family [phone]

## 2022-01-19 NOTE — TELEPHONE ENCOUNTER
Called Simon and gave verbal okay for discharge of PT and OT SN orders per protocol.     Dawn Clarke RN

## 2022-01-19 NOTE — TELEPHONE ENCOUNTER
Simon PT from [facility] is calling for verbal orders    OK to complete PT discharge visit week of 1/24/22 ; patient met goals.     Due to staffing issues, [facility] cannot complete OT visits as ordered-FYI   Patient will miss 2 OT visits, but Simon think's it is alright because the patient is doing well .    [phone] ok to abigail Dunaway/  [place]

## 2022-01-20 NOTE — TELEPHONE ENCOUNTER
[facility]/ Discharge from Agency/ Order Date: 1/20/2022/ Order # [id number] for Anay Miner NP.  Forms placed in provider 'sign me' folder.  Please fax forms to [phone] after completion.    SUSHIL HARRIS (R)  Radiology Department

## 2022-01-21 NOTE — TELEPHONE ENCOUNTER
Routing to [facility] provider pool [facility].    Pt calling for refill of her Codeine. Rx pending approval    Dawn Clarke RN

## 2022-01-21 NOTE — PROGRESS NOTES
ANTICOAGULATION MANAGEMENT     Ashlie Brantley 89 year old female is on warfarin with supratherapeutic INR result. (Goal INR 2.0-3.0)    Recent labs: (last 7 days)     01/21/22  0000   INR 3.20*       ASSESSMENT     Source(s): Chart Review and Patient/Caregiver Call       Warfarin doses taken: Warfarin taken as instructed    Diet: Decreased greens/vitamin K in diet; plans to resume previous intake    New illness, injury, or hospitalization: No    Medication/supplement changes: None noted    Signs or symptoms of bleeding or clotting: No    Previous INR: Therapeutic last 2(+) visits    Additional findings: None     PLAN     Recommended plan for temporary change(s) affecting INR     Dosing Instructions: Continue your current warfarin dose with next INR in 2 weeks       Summary  As of 1/21/2022    Full warfarin instructions:  5 mg every Mon, Sat; 2.5 mg all other days   Next INR check:               Telephone call with  Mariela who verbalizes understanding and agrees to plan and who agrees to plan and repeated back plan correctly    Patient to recheck with home meter    Education provided: Please call back if any changes to your diet, medications or how you've been taking warfarin, Importance of consistent vitamin K intake and Contact [phone]  with any changes, questions or concerns.     Plan made per [facility] anticoagulation protocol    Raine Campbell, RN  Anticoagulation Clinic  1/21/2022    _______________________________________________________________________     Anticoagulation Episode Summary     Current INR goal:  2.0-3.0   TTR:  66.7 % (1 y)   Target end date:  Indefinite   Send INR reminders to:  AGGIE FRANCIS    Indications    Atrial fibrillation (H) [I48.91] [I48.91]  Long term current use of anticoagulant therapy [Z79.01]  Longstanding persistent atrial fibrillation (H) [I48.11]  Persistent atrial fibrillation (H) [I48.19]  Long term (current) use of opiate analgesic [Z79.891]           Comments:            Anticoagulation Care Providers     Provider Role Specialty Phone number    Anay Miner, NP Referring Nurse Practitioner - Family [phone]

## 2022-02-04 NOTE — PROGRESS NOTES
ANTICOAGULATION MANAGEMENT     Ashlie Brantley 89 year old female is on warfarin with therapeutic INR result. (Goal INR 2.0-3.0)    Recent labs: (last 7 days)     02/04/22  0000   INR 2.50       ASSESSMENT     Source(s): Chart Review and Patient/Caregiver Call       Warfarin doses taken: Warfarin taken differently, but did not change total weekly dose    Diet: No new diet changes identified    New illness, injury, or hospitalization: No    Medication/supplement changes: None noted    Signs or symptoms of bleeding or clotting: No    Previous INR: Supratherapeutic    Additional findings: None     PLAN     Recommended plan for no diet, medication or health factor changes affecting INR     Dosing Instructions: Continue your current warfarin dose with next INR in 2 weeks. Changed calendar to reflect how patient has been taking warfarin.  No change in dose.      Summary  As of 2/4/2022    Full warfarin instructions:  5 mg every Mon, Fri; 2.5 mg all other days   Next INR check:  2/18/2022             Telephone call with  daughter, Mariela who verbalizes understanding and agrees to plan    Patient to recheck with home meter    Education provided: Please call back if any changes to your diet, medications or how you've been taking warfarin    Plan made per [facility] anticoagulation protocol    Noemi Whitten, RN  Anticoagulation Clinic  2/4/2022    _______________________________________________________________________     Anticoagulation Episode Summary     Current INR goal:  2.0-3.0   TTR:  65.6 % (1 y)   Target end date:  Indefinite   Send INR reminders to:  AGGIE FRANCIS    Indications    Atrial fibrillation (H) [I48.91] [I48.91]  Long term current use of anticoagulant therapy [Z79.01]  Longstanding persistent atrial fibrillation (H) [I48.11]  Persistent atrial fibrillation (H) [I48.19]  Long term (current) use of opiate analgesic [Z79.891]           Comments:           Anticoagulation Care Providers     Provider Role  Specialty Phone number    Anay Miner, NP Referring Nurse Practitioner - Family [phone]

## 2022-02-17 NOTE — PROGRESS NOTES
ANTICOAGULATION MANAGEMENT     Ashlie Brantley 89 year old female is on warfarin with therapeutic INR result. (Goal INR 2.0-3.0)    Recent labs: (last 7 days)     02/17/22  0000   INR 2.20       ASSESSMENT     Source(s): Chart Review and Patient/Caregiver Call            PLAN     Recommended plan for no diet, medication or health factor changes affecting INR     Dosing Instructions: Continue your current warfarin dose with next INR in 2 weeks       Summary  As of 2/17/2022    Full warfarin instructions:  5 mg every Mon, Fri; 2.5 mg all other days   Next INR check:  3/3/2022             Detailed voice message left for  Mariela, daughter, with dosing instructions and follow up date.     Patient to recheck with home meter    Education provided: Please call back if any changes to your diet, medications or how you've been taking warfarin    Plan made per [facility] anticoagulation protocol    Lyla Robbins RN  Anticoagulation Clinic  2/17/2022    _______________________________________________________________________     Anticoagulation Episode Summary     Current INR goal:  2.0-3.0   TTR:  65.6 % (1 y)   Target end date:  Indefinite   Send INR reminders to:  AGGIE FRANCIS    Indications    Atrial fibrillation (H) [I48.91] [I48.91]  Long term current use of anticoagulant therapy [Z79.01]  Longstanding persistent atrial fibrillation (H) [I48.11]  Persistent atrial fibrillation (H) [I48.19]  Long term (current) use of opiate analgesic [Z79.891]           Comments:           Anticoagulation Care Providers     Provider Role Specialty Phone number    Anay Miner NP Referring Nurse Practitioner - Family [phone]

## 2022-02-18 NOTE — TELEPHONE ENCOUNTER
PDMP reviewed. Will refilll one month of rx while her PCP is out of the office.     Kim Andrews DO

## 2022-03-03 NOTE — PROGRESS NOTES
ANTICOAGULATION MANAGEMENT     Ashlie Brantley 89 year old female is on warfarin with supratherapeutic INR result. (Goal INR 2.0-3.0)    Recent labs: (last 7 days)     03/03/22  0000   INR 3.70*       ASSESSMENT       Source(s): Chart Review and Patient/Caregiver Call       Warfarin doses taken: Warfarin taken as instructed    Diet: Decreased greens/vitamin K in diet; plans to resume previous intake    New illness, injury, or hospitalization: No    Medication/supplement changes: None noted    Signs or symptoms of bleeding or clotting: No    Previous INR: Therapeutic last 2(+) visits    Additional findings: None       PLAN     Recommended plan for temporary change(s) affecting INR     Dosing Instructions: Hold dose then continue your current warfarin dose with next INR in 1 week       Summary  As of 3/3/2022    Full warfarin instructions:  3/3: Hold; Otherwise 5 mg every Mon, Fri; 2.5 mg all other days   Next INR check:  3/10/2022             Telephone call with  Mariela, daughter, who verbalizes understanding and agrees to plan    Patient to recheck with home meter    Education provided: Please call back if any changes to your diet, medications or how you've been taking warfarin    Plan made per [facility] anticoagulation protocol    Lyla Robbins, RN  Anticoagulation Clinic  3/3/2022    _______________________________________________________________________     Anticoagulation Episode Summary     Current INR goal:  2.0-3.0   TTR:  63.9 % (1 y)   Target end date:  Indefinite   Send INR reminders to:  [facility]    Indications    Atrial fibrillation (H) [I48.91] [I48.91]  Long term current use of anticoagulant therapy [Z79.01]  Longstanding persistent atrial fibrillation (H) [I48.11]  Persistent atrial fibrillation (H) [I48.19]  Long term (current) use of opiate analgesic [Z79.891]           Comments:           Anticoagulation Care Providers     Provider Role Specialty Phone number    Anay Miner, ANAI  Referring Nurse Practitioner - Family [phone]

## 2022-03-21 NOTE — PROGRESS NOTES
ANTICOAGULATION MANAGEMENT     Ashlie Brantley 89 year old female is on warfarin with supratherapeutic INR result. (Goal INR 2.0-3.0)    Recent labs: (last 7 days)     03/19/22  0000   INR 5.20*       ASSESSMENT       Source(s): Chart Review and Patient/Caregiver Call       Warfarin doses taken: Warfarin recently held for Supratherapeutic INR on 3/19 which may be affecting INR    Diet: Decreased greens/vitamin K in diet; plans to resume previous intake less greens but not enough to warrant continued elevated INR    New illness, injury, or hospitalization: Patient states she is not feeling well in general. Daughter states no diarrhea, no vomiting, no cold or congestion. Patient has an Echo coming up per daughter    Medication/supplement changes: None noted    Signs or symptoms of bleeding or clotting: No    Previous INR: Supratherapeutic    Additional findings: None       PLAN     Recommended plan for no diet, medication or health factor changes affecting INR     Dosing Instructions: Hold dose then Decrease your warfarin dose (12.5% change) with next INR in 3 days       Summary  As of 3/21/2022    Full warfarin instructions:  3/21: Hold; Otherwise 2.5 mg every day   Next INR check:  3/24/2022             Telephone call with Mariela who verbalizes understanding and agrees to plan and who agrees to plan and repeated back plan correctly    Patient to recheck with home meter    Education provided: Please call back if any changes to your diet, medications or how you've been taking warfarin and Contact [phone]  with any changes, questions or concerns.     Plan made per [facility] anticoagulation protocol    Raine Campbell, RN  Anticoagulation Clinic  3/21/2022    _______________________________________________________________________     Anticoagulation Episode Summary     Current INR goal:  2.0-3.0   TTR:  60.6 % (1 y)   Target end date:  Indefinite   Send INR reminders to:  AGGIE Durbin    Atrial  fibrillation (H) [I48.91] [I48.91]  Long term current use of anticoagulant therapy [Z79.01]  Longstanding persistent atrial fibrillation (H) [I48.11]  Persistent atrial fibrillation (H) [I48.19]  Long term (current) use of opiate analgesic [Z79.891]           Comments:           Anticoagulation Care Providers     Provider Role Specialty Phone number    Anay Miner NP Referring Nurse Practitioner - Family [phone]

## 2022-03-24 NOTE — PROGRESS NOTES
ANTICOAGULATION MANAGEMENT     Ashlie Brantley 89 year old female is on warfarin with subtherapeutic INR result. (Goal INR 2.0-3.0)    Recent labs: (last 7 days)     03/24/22  0000   INR 1.80*       ASSESSMENT       Source(s): Chart Review and Patient/Caregiver Call       Warfarin doses taken: Warfarin taken as instructed    Diet: No new diet changes identified    New illness, injury, or hospitalization: Yes: Patient is feeling better overall    Medication/supplement changes: None noted    Signs or symptoms of bleeding or clotting: No    Previous INR: Supratherapeutic    Additional findings: None       PLAN     Recommended plan for temporary change(s) affecting INR     Dosing Instructions: Continue your current warfarin dose with next INR in 1 week       Summary  As of 3/24/2022    Full warfarin instructions:  2.5 mg every day   Next INR check:               Telephone call with daughtersobia, who verbalizes understanding and agrees to plan    Patient to recheck with home meter    Education provided: Please call back if any changes to your diet, medications or how you've been taking warfarin    Plan made per [facility] anticoagulation protocol    Lyla Robbins RN  Anticoagulation Clinic  3/24/2022    _______________________________________________________________________     Anticoagulation Episode Summary     Current INR goal:  2.0-3.0   TTR:  59.7 % (1 y)   Target end date:  Indefinite   Send INR reminders to:  AGGIE FRANCIS    Indications    Atrial fibrillation (H) [I48.91] [I48.91]  Long term current use of anticoagulant therapy [Z79.01]  Longstanding persistent atrial fibrillation (H) [I48.11]  Persistent atrial fibrillation (H) [I48.19]  Long term (current) use of opiate analgesic [Z79.891]           Comments:           Anticoagulation Care Providers     Provider Role Specialty Phone number    Anay Miner NP Referring Nurse Practitioner - Family [phone]

## 2022-03-31 NOTE — TELEPHONE ENCOUNTER
Received call from [facility] lab - POC INR >8.0.  olesya venous and sending out STAT. [facility] team made aware. Maddie Hall, BSN, RN

## 2022-03-31 NOTE — TELEPHONE ENCOUNTER
Reason for Call:  Other call back    Detailed comments: Pt's daughter calling with concerns about INR finger pricking vs blood draw.    INR levels concern due to fluctuation.    Phone Number Patient can be reached at: Cell number on file:    Telephone Information:   Mobile [phone]       Best Time: Anytime    Can we leave a detailed message on this number? YES    Call taken on 3/31/2022 at 3:32 PM by Emelia Jackman

## 2022-03-31 NOTE — TELEPHONE ENCOUNTER
Spoke with Daughter, Mariela, who reports she had home meter tested and it was >8. She has an appt in lab today at 4pm for patient to have venous drawn to confirm result. Advised daughter to hold warfarin until that test comes back as its late in the day so may not result until tomorrow. Daughter verbalized understanding.    Lyla Robbins RN

## 2022-04-01 NOTE — TELEPHONE ENCOUNTER
See [facility] encounter for details.  INR Result already addressed.  Luz Allen, RN  Anticoagulation Nurse - [facility], [place]

## 2022-04-01 NOTE — TELEPHONE ENCOUNTER
Got a page from lab - INR is 6.89.  Reviewed previous message. Seems like INR team is aware.   Called daughter - patient is doing ok.   Off and on chills. No diarrhea but has to go to bathroom frequently to pass stool. No signs or concern of bleeding or occult bleeding.   Per daughter INR team is aware and they will reach out and does not need immediate action right now. Will not take coumadin tonight.

## 2022-04-01 NOTE — TELEPHONE ENCOUNTER
.Reason for Call:  INR    Who is calling?  [facility]    Phone number:  [phone] option 4    Fax number:  n/a    Name of caller: Nydia    INR Value:  7.8    Are there any other concerns:  Not asked/other than the high value    Can we leave a detailed message on this number? Not Applicable    Phone number patient can be reached at: Home number on file [phone] (home)      Call taken on 4/1/2022 at 3:50 PM by Francine eFrrer

## 2022-04-01 NOTE — PROGRESS NOTES
ANTICOAGULATION MANAGEMENT     Ashlie Brantley 89 year old female is on warfarin with supratherapeutic INR result. (Goal INR 2.0-3.0)    Recent labs: (last 7 days)     04/01/22  0000   INR 7.80*       ASSESSMENT       Source(s): Chart Review and Patient/Caregiver Call       Warfarin doses taken: Warfarin recently held for Supra INR which may be affecting INR    Diet: Changed from Home Cooked meals to Processed foods     New illness, injury, or hospitalization: No    Medication/supplement changes: None noted    Signs or symptoms of bleeding or clotting: No    Previous INR: Supratherapeutic    Additional findings: Stools twice per day but same consistancy as baseline. Other sister home now for spring summer and patien eating more processed foods rather than home cooked meals. No overall changes otherwise       PLAN     Recommended plan for ongoing change(s) affecting INR     Dosing Instructions: Hold dose x 3 days with next INR in 3 days       Summary  As of 4/1/2022    Full warfarin instructions:  4/1: Hold; 4/2: Hold; 4/3: Hold; Otherwise 1.25 mg every Sun; 2.5 mg all other days   Next INR check:  4/4/2022             Telephone call with  Mariela Daughter who agrees to plan and repeated back plan correctly    Patient to recheck with home meter    Education provided: Please call back if any changes to your diet, medications or how you've been taking warfarin, Monitoring for bleeding signs and symptoms and Monitoring for clotting signs and symptoms    Plan made per [facility] anticoagulation protocol    Luz Allen, RN  Anticoagulation Clinic  4/1/2022    _______________________________________________________________________     Anticoagulation Episode Summary     Current INR goal:  2.0-3.0   TTR:  58.0 % (1 y)   Target end date:  Indefinite   Send INR reminders to:  AGGIE FRANCIS    Indications    Atrial fibrillation (H) [I48.91] [I48.91]  Long term current use of anticoagulant therapy [Z79.01]  Longstanding  persistent atrial fibrillation (H) [I48.11]  Persistent atrial fibrillation (H) [I48.19]  Long term (current) use of opiate analgesic [Z79.891]           Comments:           Anticoagulation Care Providers     Provider Role Specialty Phone number    Anay Miner NP Referring Nurse Practitioner - Family [phone]

## 2022-04-01 NOTE — TELEPHONE ENCOUNTER
Spoke with daughter Mariela, daughter confirms warfarin was held last night 3/31/22.     Mariela will recheck INR this afternoon and wait for call from [facility] RN with dosing for this weekend.     Deann Grant, RN, BSN, PHN  Anticoagulation Nurse  [phone]

## 2022-04-01 NOTE — TELEPHONE ENCOUNTER
See note from provider:    Zeferino Diaz MD 12 hours ago (8:00 PM)     SK       Got a page from lab - INR is 6.89.  Reviewed previous message. Seems like INR team is aware.   Called daughter - patient is doing ok.   Off and on chills. No diarrhea but has to go to bathroom frequently to pass stool. No signs or concern of bleeding or occult bleeding.   Per daughter INR team is aware and they will reach out and does not need immediate action right now. Will not take coumadin tonight.

## 2022-04-01 NOTE — TELEPHONE ENCOUNTER
See anticoag encounter from 04/01/22     Deann Grant, RN, BSN, PHN  Anticoagulation Nurse  [phone] i

## 2022-04-05 NOTE — TELEPHONE ENCOUNTER
Patient was looked up in [facility] and there are not indications for concern regarding use of controlled substances on record at this time.     Refill sent.    Anay Miner, DNP, APRN, CNP

## 2022-04-05 NOTE — TELEPHONE ENCOUNTER
Patient's daughter calling reporting that patient is experiencing a lot of gas, and yesterday the patient pooped her pants.     [phone] please call Mariela to discuss    Patient daughter would like to talk to a nurse regarding the symptoms and what OTC methods can be used.     Consent to communicate is on file.     Hayley Medrano-  Efra Prince

## 2022-04-05 NOTE — PROGRESS NOTES
ANTICOAGULATION     Ashlie Brantley is overdue for INR check.      Left message  reminding patient to check INR with their home meter and call results to the home monitoring company as soon as possible.     Luz Allen RN

## 2022-04-05 NOTE — PROGRESS NOTES
ANTICOAGULATION MANAGEMENT     Ashlie Brantley 89 year old female is on warfarin with supratherapeutic INR result. (Goal INR 2.0-3.0)    Recent labs: (last 7 days)     04/05/22  0000   INR 3.90*       ASSESSMENT       Source(s): Chart Review and Patient/Caregiver Call       Warfarin doses taken: Pt/Family held warfarin last night. Has not had any since 03/30/2022.    Diet: No new diet changes identified    New illness, injury, or hospitalization: No illness except pt reports having lots of gas and an episode of incontinence. Daughter states it is not diarrhea.    Medication/supplement changes: None noted    Signs or symptoms of bleeding or clotting: No    Previous INR: Supratherapeutic    Additional findings: None       PLAN     Recommended plan for no diet, medication or health factor changes affecting INR     Dosing Instructions: hold dose then continue your current warfarin dose with next INR in 1 day       Summary  As of 4/5/2022    Full warfarin instructions:  4/5: Hold; Otherwise 1.25 mg every Sun; 2.5 mg all other days   Next INR check:  4/6/2022             Telephone call with  Mariela who verbalizes understanding and agrees to plan    Patient to recheck with home meter    Education provided: Goal range and significance of current result    Plan made per [facility] anticoagulation protocol    Tiburcio Smyth RN  Anticoagulation Clinic  4/5/2022    _______________________________________________________________________     Anticoagulation Episode Summary     Current INR goal:  2.0-3.0   TTR:  56.9 % (1 y)   Target end date:  Indefinite   Send INR reminders to:  [facility]    Indications    Atrial fibrillation (H) [I48.91] [I48.91]  Long term current use of anticoagulant therapy [Z79.01]  Longstanding persistent atrial fibrillation (H) [I48.11]  Persistent atrial fibrillation (H) [I48.19]  Long term (current) use of opiate analgesic [Z79.891]           Comments:           Anticoagulation Care Providers      Provider Role Specialty Phone number    Anay Miner, NP Referring Nurse Practitioner - Family [phone]

## 2022-04-05 NOTE — TELEPHONE ENCOUNTER
I refilled this one month ago while her PCP was out of the office. Will send to PCP to approve.     Kim Andrews, DO

## 2022-04-05 NOTE — TELEPHONE ENCOUNTER
Routing to PCP to review/advise    Patient experiencing increased gas.    Waht OTC medication would you recommend.    Daughter concerned regarding what patient can take OTC and would like PCP recommendations due to medical history and medications patient is on.      RN received call from daughter.    Daughter wanting PCP input as to best medication for patient to take.    Marcelino Fregoso RN, BSN, PHN  [facility]

## 2022-04-06 NOTE — TELEPHONE ENCOUNTER
RN spoke with Mariela.    Patients stools are normal.  Main concern is gas.    RN spoke with Anay Miner.    Advised to try  Simethicone.    RN called and updated daughter.    Advised Simethicone can be purchased over the counter.      Daughter verbalized understanding    Marcelino Fregoso RN, BSN, PHN  [facility]

## 2022-04-06 NOTE — TELEPHONE ENCOUNTER
Reviewed message.  And I know that this patient does have both Miralax and senokot on file to use as needed for constipation.    Would advise to assess how her stool is- it is hard, soft, loose?  But if she is having more loose stool (and if she is not taking either the Miralax or senokot that could contribute to this), a fiber supplement like Metamucil/psyllium husk can help to promote formed stools.  Metamucil/psyllium husk is okay to take with Warfarin without interaction.    Anay Miner, DNP, APRN, CNP

## 2022-04-06 NOTE — PROGRESS NOTES
"ANTICOAGULATION MANAGEMENT     Ashlie Brantley 89 year old female is on warfarin with supratherapeutic INR result. (Goal INR 2.0-3.0)    Recent labs: (last 7 days)     04/06/22  0000   INR 3.40*       ASSESSMENT       Source(s): Chart Review and Patient/Caregiver Call       Warfarin doses taken: Warfarin taken as instructed    Diet: No new diet changes identified    New illness, injury, or hospitalization: Daughter reports \"she's really gassy, not diarrhea\"    Medication/supplement changes: None noted    Signs or symptoms of bleeding or clotting: No    Previous INR: Supratherapeutic    Additional findings: None       PLAN     Recommended plan for no diet, medication or health factor changes affecting INR     Dosing Instructions: partial hold then continue your current warfarin dose with next INR in 1 week       Summary  As of 4/6/2022    Full warfarin instructions:  4/6: 1.25 mg; Otherwise 1.25 mg every Sun, Wed; 2.5 mg all other days   Next INR check:  4/13/2022             Telephone call with  Mariela, daughter, who verbalizes understanding and agrees to plan    Patient to recheck with home meter    Education provided: Please call back if any changes to your diet, medications or how you've been taking warfarin    Plan made per [facility] anticoagulation protocol    Lyla Robbins RN  Anticoagulation Clinic  4/6/2022    _______________________________________________________________________     Anticoagulation Episode Summary     Current INR goal:  2.0-3.0   TTR:  56.6 % (1 y)   Target end date:  Indefinite   Send INR reminders to:  [facility]    Indications    Atrial fibrillation (H) [I48.91] [I48.91]  Long term current use of anticoagulant therapy [Z79.01]  Longstanding persistent atrial fibrillation (H) [I48.11]  Persistent atrial fibrillation (H) [I48.19]  Long term (current) use of opiate analgesic [Z79.891]           Comments:           Anticoagulation Care Providers     Provider Role Specialty Phone " number    Anay Miner, NP Referring Nurse Practitioner - Family [phone]

## 2022-04-15 NOTE — PROGRESS NOTES
ANTICOAGULATION MANAGEMENT     Ashlie Brantley 89 year old female is on warfarin with supratherapeutic INR result. (Goal INR 2.0-3.0)    Recent labs: (last 7 days)     04/15/22  0000   INR 6.00*       ASSESSMENT       Source(s): Chart Review    Previous INR was Supratherapeutic    Medication, diet, health changes since last INR chart reviewed; none identified           PLAN     Unable to reach Mariela (daughter) today.    Left message to hold all doses this weekend. Request call back for assessment.    Follow up required to discuss out of range result  and My chart message sent as well    Noemi Whitten, RN  Anticoagulation Clinic  4/15/2022

## 2022-04-15 NOTE — PROGRESS NOTES
ANTICOAGULATION MANAGEMENT     Ashlie Brantley 89 year old female is on warfarin with supratherapeutic INR result. (Goal INR 2.0-3.0)    Recent labs: (last 7 days)     04/15/22  0000   INR 6.00*       ASSESSMENT       Source(s): Chart Review    Previous INR was Supratherapeutic    Medication, diet, health changes since last INR chart reviewed; none identified           PLAN     Unable to reach Mariela today.    Left message to HOLD this weekend. Request call back for assessment. Directed pt to go to ED if any s/sx of bleeding.    Follow up required to confirm warfarin dose taken and assess for changes    Tiburcio Smyth RN  Anticoagulation Clinic  4/15/2022

## 2022-04-18 NOTE — PROGRESS NOTES
"ANTICOAGULATION MANAGEMENT     Ashlie Brantley 89 year old female is on warfarin with supratherapeutic INR result. (Goal INR 2.0-3.0)    Recent labs: (last 7 days)     04/18/22  0000   INR 3.60*       ASSESSMENT       Source(s): Chart Review and Patient/Caregiver Call       Warfarin doses taken: Less warfarin taken than planned which may be affecting INR however, patient is still elevated    Diet: No new diet changes identified    New illness, injury, or hospitalization: No, but daughter states the patient complains of being very \"gassy\"    Medication/supplement changes: None noted    Signs or symptoms of bleeding or clotting: No    Previous INR: Supratherapeutic    Additional findings: per granddaughter patient has lost maybe 5 lbs. from the past 6 months       PLAN     Recommended plan for ongoing change(s) affecting INR     Dosing Instructions: Hold today if unable to get to pharmacy for 1 mg tablets.  Otherwise take 1.5 mg on Sunday and 1 mg all the other days of the week. with next INR in 1 week       Summary  As of 4/18/2022    Full warfarin instructions:  4/18: Hold; Otherwise 1.5 mg every Sun; 1 mg all other days   Next INR check:  4/25/2022             Telephone call with  Daughter Mariela and granddaughter Maddi who agrees to plan and repeated back plan correctly    Patient to recheck with home meter    Education provided: Goal range and significance of current result, Importance of therapeutic range, Importance of following up at instructed interval and Importance of taking warfarin as instructed    Plan made with [facility] Pharmacist Bridgett Whitten, RN  Anticoagulation Clinic  4/18/2022    _______________________________________________________________________     Anticoagulation Episode Summary     Current INR goal:  2.0-3.0   TTR:  53.0 % (1 y)   Target end date:  Indefinite   Send INR reminders to:  AGGIE FRANCIS    Indications    Long term current use of anticoagulant " therapy [Z79.01]  Longstanding persistent atrial fibrillation (H) [I48.11]  S/p TAVR (transcatheter aortic valve replacement)  bioprosthetic [Z95.3]  Persistent atrial fibrillation (H) [I48.19]  Long term (current) use of opiate analgesic [Z79.891]           Comments:           Anticoagulation Care Providers     Provider Role Specialty Phone number    Anay Miner NP Referring Nurse Practitioner - Family [phone]

## 2022-04-18 NOTE — PROGRESS NOTES
"ANTICOAGULATION MANAGEMENT     Ashlie Brantley 89 year old female is on warfarin with supratherapeutic INR result. (Goal INR 2.0-3.0)    Recent labs: (last 7 days)     04/15/22  0000   INR 6.00*       ASSESSMENT       Source(s): Chart Review and Patient/Caregiver Call       Warfarin doses taken: Less warfarin taken than planned which may be affecting INR  Per daugther she states she was told to give a \"half tablet\" of the 2.5 mg tablet every day.    Diet: Daughter states they haven't been \"doing any thing different\".  Daughter states she cooks all of the patient's meals and they eat a lot of vegetables and meat.    New illness, injury, or hospitalization: No    Medication/supplement changes: None noted    Signs or symptoms of bleeding or clotting: No    Previous INR: Supratherapeutic    Additional findings: Daugther wanted to know if there's something else patient can take \"because this warfarin is ridiculous\".  Daughter explained patient has had a TAVR so she is not a candidate for a DOAC.  Advised daughter she would need to speak with PCP regarding warfarin or alternative and discuss the risks v benefits.     Requested daughter recheck INR today.       PLAN     Recommended plan for no diet, medication or health factor changes affecting INR     Dosing Instructions: Recheck INR today with next INR in 1 day       Summary  As of 4/15/2022    Full warfarin instructions:  4/15: Hold; 4/16: Hold; 4/17: Hold; Otherwise 1.25 mg every Sun; 2.5 mg all other days   Next INR check:  4/18/2022             Telephone call with  Mariela who verbalizes understanding and agrees to plan    Patient to recheck with home meter    Education provided: Importance of consistent vitamin K intake, Impact of vitamin K foods on INR and Vitamin K content of foods    Plan made per [facility] anticoagulation protocol    Noemi Whitten, RN  Anticoagulation Clinic  4/18/2022    _______________________________________________________________________ "     Anticoagulation Episode Summary     Current INR goal:  2.0-3.0   TTR:  53.4 % (1 y)   Target end date:  Indefinite   Send INR reminders to:  AGGIE FRANCIS    Indications    Long term current use of anticoagulant therapy [Z79.01]  Longstanding persistent atrial fibrillation (H) [I48.11]  S/p TAVR (transcatheter aortic valve replacement)  bioprosthetic [Z95.3]  Persistent atrial fibrillation (H) [I48.19]  Long term (current) use of opiate analgesic [Z79.891]           Comments:           Anticoagulation Care Providers     Provider Role Specialty Phone number    Anay Miner NP Referring Nurse Practitioner - Family [phone]

## 2022-04-20 NOTE — PROGRESS NOTES
ANTICOAGULATION MANAGEMENT      Ashlie Brantley due for annual renewal of referral to anticoagulation monitoring. Order pended for your review and signature.      ANTICOAGULATION SUMMARY      Warfarin indication(s)     Heart Valve Replacement    Heart valve present?  Bioprosthetic AVR       Current goal range   INR: 2.0-3.0     Goal appropriate for indication? Yes, INR 2-3 appropriate for hx of DVT, PE, hypercoagulable state, Afib, LVAD, or bileaflet AVR without risk factors     Current duration of therapy Indefinite/long term therapy   Time in Therapeutic Range (TTR)  (Goal > 60%) 53%       Office visit with referring provider's group within last year yes on 12/1/2021       Marry Mcginnis RN

## 2022-04-21 PROBLEM — I48.21 PERMANENT ATRIAL FIBRILLATION (H): Status: ACTIVE | Noted: 2022-01-01

## 2022-04-21 NOTE — PROGRESS NOTES
Routing to care team to schedule face to face visit with PCP.  See note below.  ACN is unable to schedule OV.    ANA BreauxN, RN  Anticoagulation Clinic

## 2022-04-21 NOTE — PROGRESS NOTES
Annual INR referral reviewed and signed.    And I see that INR has been therapeutic 53%.    Please have patient schedule a face to face visit with me to follow up.    I would like to see if we can make a plan to do some venous specimen collections over the next couple months just to validate if her home INR monitor is accurately reflecting the INR level.    Anay Miner, DNP, APRN, CNP

## 2022-04-25 NOTE — PROGRESS NOTES
ANTICOAGULATION MANAGEMENT     Ashlie Brantley 89 year old female is on warfarin with therapeutic INR result. (Goal INR 2.0-3.0)    Recent labs: (last 7 days)     04/25/22  0000   INR 2.70       ASSESSMENT       Source(s): Chart Review and Patient/Caregiver Call       Warfarin doses taken: Warfarin taken as instructed    Diet: No new diet changes identified    New illness, injury, or hospitalization: No    Medication/supplement changes: None noted    Signs or symptoms of bleeding or clotting: No    Previous INR: Supratherapeutic    Additional findings: Patient had some ham for Easter and did edema around ankles.  Patient elevated feet and drank a lot of water and the edema resolved     Patient does have an appointment with provider next week       PLAN     Recommended plan for no diet, medication or health factor changes affecting INR     Dosing Instructions: continue your current warfarin dose with next INR in 1 week       Summary  As of 4/25/2022    Full warfarin instructions:  1.5 mg every Sun; 1 mg all other days   Next INR check:  5/3/2022             Telephone call with  daughter, Mariela who verbalizes understanding and agrees to plan    Check at provider office visit    Education provided: Please call back if any changes to your diet, medications or how you've been taking warfarin    Plan made per [facility] anticoagulation protocol    Noemi Whitten, RN  Anticoagulation Clinic  4/25/2022    _______________________________________________________________________     Anticoagulation Episode Summary     Current INR goal:  2.0-3.0   TTR:  51.8 % (1 y)   Target end date:  Indefinite   Send INR reminders to:  AGGIE FRANCIS    Indications    Long term current use of anticoagulant therapy [Z79.01]  Longstanding persistent atrial fibrillation (H) [I48.11]  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

## 2022-04-28 NOTE — PROGRESS NOTES
F/u OAB wet, 250 bladder cap, BETO, s/p sling, Detrol LA4, lasix 20  (Presents with dtr #4)     Last seen 6/16/21     Compliant with LA4 and Dit XL; still on lasix 20 BID     Control is about the same; still wearing pads day and nite. Voids about q 2 hrs during the day. Denies dysuria, gross hematuria, frequency (unless she is on the lasix).     Denies UTI's     BM's are better now after some issues with fecal urge, fecal urge incontinence and flatus.      Current Outpatient Medications   Medication     acetaminophen (TYLENOL) 500 MG tablet     albuterol (PROVENTIL) (2.5 MG/3ML) 0.083% neb solution     benzonatate (TESSALON) 200 MG capsule     codeine 30 MG tablet     famotidine (PEPCID) 20 MG tablet     furosemide (LASIX) 20 MG tablet     GUAIFENESIN PO     levothyroxine (SYNTHROID/LEVOTHROID) 88 MCG tablet     losartan (COZAAR) 25 MG tablet     losartan (COZAAR) 50 MG tablet     metoprolol tartrate (LOPRESSOR) 50 MG tablet     neomycin-polymyxin-dexamethasone (MAXITROL) 3.5-57647-8.1 ophthalmic ointment     order for DME     order for DME     order for DME     oxybutynin (DITROPAN) 5 MG tablet     oxybutynin ER (DITROPAN-XL) 5 MG 24 hr tablet     polyethylene glycol (MIRALAX) 17 GM/Dose powder     Sennosides (SENOKOT PO)     simvastatin (ZOCOR) 40 MG tablet     tolterodine ER (DETROL LA) 4 MG 24 hr capsule     tolterodine ER (DETROL LA) 4 MG 24 hr capsule     warfarin ANTICOAGULANT (COUMADIN) 1 MG tablet     warfarin ANTICOAGULANT (COUMADIN) 2.5 MG tablet     warfarin ANTICOAGULANT (COUMADIN) 5 MG tablet     No current facility-administered medications for this visit.       (unable to provide urine specimen today)      IMP:  1. OAB wet, stable  2. Advanced maturity, lasix, other medical issues      PLAN:  1. Discussed situation with patient in detail.  2. Stop oxybutinin (discussed rationale in detail)  3. Continue Detrol LA4  4. RTC 1 yr or sooner prn  5. Total time spent in reviewing patient's previous records,  discussion with patient and documentation: 20 minutes

## 2022-05-03 PROBLEM — I48.11 LONGSTANDING PERSISTENT ATRIAL FIBRILLATION (H): Status: ACTIVE | Noted: 2020-03-09

## 2022-05-03 PROBLEM — I27.20 PULMONARY HYPERTENSION (H): Status: ACTIVE | Noted: 2022-01-01

## 2022-05-03 PROBLEM — J96.12 CHRONIC RESPIRATORY FAILURE WITH HYPERCAPNIA (H): Status: ACTIVE | Noted: 2022-01-01

## 2022-05-03 NOTE — PROGRESS NOTES
ANTICOAGULATION MANAGEMENT     Ashlie Brantley 89 year old female is on warfarin with supratherapeutic INR result. (Goal INR 2.0-3.0)    Recent labs: (last 7 days)     05/03/22  1214   INR 5.0*       ASSESSMENT       Source(s): Chart Review    Previous INR was Therapeutic last visit; previously outside of goal range    Medication, diet, health changes since last INR chart reviewed; none identified           PLAN     Unable to reach Mariela today.    LVM to hold warfarin the next 2 days and reduce to 0.5 mg on Thursday-recheck INR on Friday 5/6    Follow up required to confirm warfarin dose taken and assess for changes, discuss out of range result  and discuss dosing instructions and confirm understanding of instructions    Raine Campbell, RN  Anticoagulation Clinic  5/3/2022

## 2022-05-03 NOTE — PROGRESS NOTES
Assessment & Plan     Longstanding persistent atrial fibrillation (H)    - BASIC METABOLIC PANEL; Future  - Med Therapy Management Referral  - INR; Future  - CBC with platelets    S/p TAVR (transcatheter aortic valve replacement), bioprosthetic  Known issue that I take into account for their medical decisions, no current exacerbations or new concerns.     Long term current use of anticoagulant therapy  Recommend getting venous specimen collections today and monthly for the next 2 months (and doing home INR check on same day as well) just to validate if her home INR monitor is accurately reflecting the INR level.    - INR; Future    IPF (idiopathic pulmonary fibrosis) (H)  Known issue that I take into account for their medical decisions, no current exacerbations or new concerns.     Hypertension goal BP (blood pressure) < 140/90  Chronic, stable, continue current treatment.   - BASIC METABOLIC PANEL    Hyperlipidemia LDL goal <130  Chronic, stable, continue current treatment.   - Lipid panel reflex to direct LDL Fasting    Hypothyroidism due to acquired atrophy of thyroid  Chronic, stable, continue current treatment.   - TSH WITH FREE T4 REFLEX    High priority for 2019-nCoV vaccine    - COVID-19,PF,PFIZER (12+ Yrs GRAY LABEL)    Pulmonary hypertension (H)  Known issue that I take into account for their medical decisions, no current exacerbations or new concerns.     Chronic respiratory failure with hypercapnia (H)  Known issue that I take into account for their medical decisions, no current exacerbations or new concerns.     Long term (current) use of opiate analgesic  - Drug Abuse Screen Panel 13, Urine (Pain Care Package) - lab collect    Loose stools  Fluctuating.  Okay to use over the counter psyllium to help bulk up stools.      Addendum:  Labs returned and noted 5/6/22 with kidney function is much higher than her normal.  Due to acute kidney injury/rapidly changing kidney function/creatinine recommend she  "gets evaluated in the ED and patient/family notified and did go into [facility] ED.       BMI:   Estimated body mass index is 27.59 kg/m  as calculated from the following:    Height as of this encounter: 1.473 m (4' 10\").    Weight as of this encounter: 59.9 kg (132 lb).           Return in about 6 months (around 11/3/2022) for Routine Visit.    Anay Miner NP  [facility]    Chris Dailey is a 89 year old who presents for the following health issues     History of Present Illness       Reason for visit:  General and INR check  Symptom onset:  More than a month  Symptoms include:  Sparactic inr and loose stools  Symptom progression:  Staying the same  Had these symptoms before:  Yes  Has tried/received treatment for these symptoms:  Yes  Previous treatment was successful:  Yes  Prior treatment description:  Meds over the counter  What makes it worse:  Na  What makes it better:  Na    She eats 2-3 servings of fruits and vegetables daily.She consumes 0 sweetened beverage(s) daily.She exercises with enough effort to increase her heart rate 9 or less minutes per day.  She exercises with enough effort to increase her heart rate 3 or less days per week.   She is taking medications regularly.     Concern - loose stools for a couple months   Onset: couple months   Description: loose watery stools   Intensity: moderate  Progression of Symptoms:  same  Accompanying Signs & Symptoms: can't pass gas, because of stools   Previous history of similar problem: no  Precipitating factors:        Worsened by: not sure   Alleviating factors:        Improved by: simethacone   Therapies tried and outcome: elsaethoconaudrey helpsa    Discuss INR labs, goal 2-3    [facility] home care.  Did home Physical Therapy for 4-6 weeks    Home INR monitoring.  More recent INR monitoring has been high and low.  Thinking it was food related.  Prefers to hold the Lasix on days she is going out- wonders " "if this could be affecting her INR.  Home INRs reviewed and appears to recently been in therapeutic range about 53% of the time.  I would like to see if we can make a plan to do some venous specimen collections over the next couple months just to validate if her home INR monitor is accurately reflecting the INR level.    Review of Systems   Constitutional, HEENT, cardiovascular, pulmonary, gi and gu systems are negative, except as otherwise noted.  Recent cough- using Albuterol nebs      Objective    /70 (BP Location: Right arm)   Pulse 55   Temp (!) 96.5  F (35.8  C) (Tympanic)   Resp 15   Ht 1.473 m (4' 10\")   Wt 59.9 kg (132 lb)   SpO2 94%   BMI 27.59 kg/m    Body mass index is 27.59 kg/m .  Physical Exam   GENERAL: alert and no distress  RESP: crackles in bilateral bases posterior, faint wheeze posterior  CV: irregularly irregular rhythm, normal S1 S2, no S3 or S4 and no murmur, click or rub  Extremities:  1-2+ edema BLE  PSYCH: mentation appears normal, affect normal/bright    Results for orders placed or performed in visit on 05/03/22   Drug Abuse Screen Panel 13, Urine (Pain Care Package) - lab collect     Status: Abnormal   Result Value Ref Range    Cannabinoids (47-wqj-3-carboxy-9-THC) Not Detected Not Detected, Indeterminate    Phencyclidine Not Detected Not Detected, Indeterminate    Cocaine (Benzoylecgonine) Not Detected Not Detected, Indeterminate    Methamphetamine (d-Methamphetamine) Not Detected Not Detected, Indeterminate    Opiates (Morphine) Detected (A) Not Detected, Indeterminate    Amphetamine (d-Amphetamine) Not Detected Not Detected, Indeterminate    Benzodiazepines (Nordiazepam) Not Detected Not Detected, Indeterminate    Tricyclic Antidepressants (Desipramine) Not Detected Not Detected, Indeterminate    Methadone Not Detected Not Detected, Indeterminate    Barbiturates (Butalbital) Not Detected Not Detected, Indeterminate    Oxycodone Not Detected Not Detected, Indeterminate    " Propoxyphene (Norpropoxyphene) Not Detected Not Detected, Indeterminate    Buprenorphine Not Detected Not Detected, Indeterminate   CBC with platelets     Status: Abnormal   Result Value Ref Range    WBC Count 14.8 (H) 4.0 - 11.0 10e3/uL    RBC Count 3.54 (L) 3.80 - 5.20 10e6/uL    Hemoglobin 11.1 (L) 11.7 - 15.7 g/dL    Hematocrit 33.7 (L) 35.0 - 47.0 %    MCV 95 78 - 100 fL    MCH 31.4 26.5 - 33.0 pg    MCHC 32.9 31.5 - 36.5 g/dL    RDW 16.1 (H) 10.0 - 15.0 %    Platelet Count 236 150 - 450 10e3/uL   INR point of care     Status: Abnormal   Result Value Ref Range    INR 5.0 (H) 0.9 - 1.1    Narrative    This test is intended for monitoring Coumadin therapy. Results are not accurate in patients with prolonged INR due to factor deficiency.

## 2022-05-03 NOTE — PROGRESS NOTES
"ANTICOAGULATION MANAGEMENT     Ashlie Brantley 89 year old female is on warfarin with supratherapeutic INR result. (Goal INR 2.0-3.0)    Recent labs: (last 7 days)     05/03/22  1214   INR 5.0*       ASSESSMENT       Source(s): Chart Review and Patient/Caregiver Call       Warfarin doses taken: Warfarin taken as instructed    Diet: No new diet changes identified    New illness, injury, or hospitalization: Edema in BLE; patient saw PCP today will be getting ECHO per daughter    Medication/supplement changes: None noted    Signs or symptoms of bleeding or clotting: patient has bruising from where  \"knicked her\" daughter watching area--not growing currently    Previous INR: Therapeutic last visit; previously outside of goal range    Additional findings: None       PLAN     Recommended plan for no diet, medication or health factor changes affecting INR     Dosing Instructions: hold 2 doses then decrease your warfarin dose (13.3% change) with next INR in 3 days       Summary  As of 5/3/2022    Full warfarin instructions:  5/3: Hold; 5/4: Hold; Otherwise 0.5 mg every Thu; 1 mg all other days   Next INR check:  5/6/2022             Telephone call with  Mariela who verbalizes understanding and agrees to plan and who agrees to plan and repeated back plan correctly    Patient to recheck with home meter    Education provided: Please call back if any changes to your diet, medications or how you've been taking warfarin, Monitoring for bleeding signs and symptoms and When to seek medical attention/emergency care    Plan made per [facility] anticoagulation protocol    Raine Campbell, RN  Anticoagulation Clinic  5/3/2022    _______________________________________________________________________     Anticoagulation Episode Summary     Current INR goal:  2.0-3.0   TTR:  51.0 % (1 y)   Target end date:  Indefinite   Send INR reminders to:  AGGIE FRANCIS    Indications    Long term current use of anticoagulant therapy " [Z79.01]  Longstanding persistent atrial fibrillation (H) [I48.11]  S/p TAVR (transcatheter aortic valve replacement)  bioprosthetic [Z95.3]  Persistent atrial fibrillation (H) [I48.19]  Long term (current) use of opiate analgesic [Z79.891]  Permanent atrial fibrillation (H) [I48.21]           Comments:           Anticoagulation Care Providers     Provider Role Specialty Phone number    Anay Miner NP Referring Nurse Practitioner - Family [phone]

## 2022-05-05 NOTE — PROGRESS NOTES
"ANTICOAGULATION MANAGEMENT     Ashlie Brantley 89 year old female is on warfarin with supratherapeutic INR result. (Goal INR 2.0-3.0)    Recent labs: (last 7 days)     05/05/22  0000   INR 6.10*       ASSESSMENT       Source(s): Chart Review and Patient/Caregiver Call       Warfarin doses taken: Patient has been holding since Tuesday    Diet: daughter states she has been having a hard time getting the patient to eat.  Patient only had a banana this morning and has been complaing of thirst but also states she has been eating greens, it was just today before her appointment she had a hard time getting her to eat.    New illness, injury, or hospitalization: No, but patient complained her legs \"hurt\" when daughter was transferring her to the wheelchair for her ECHO today    Medication/supplement changes: None noted    Signs or symptoms of bleeding or clotting: No: hematoma is resolved and a bruise remains.  The bruise did go outside the margins slightly    Previous INR: Supratherapeutic    Additional findings: Daughter states the patient has been sending \"weird texts\" and also [facility] her [facility]     Daughter is also very concerned because the patient has been a lot more tired than usual.  Advised daughter to have a UA checked for possible UTI.       PLAN     Recommended plan for no diet, medication or health factor changes affecting INR     Dosing Instructions: Hold today and recheck tomorrow with next INR in 1 day       Summary  As of 5/5/2022    Full warfarin instructions:  5/5: Hold; 5/6: Hold; 5/7: Hold; 5/8: Hold; Otherwise 0.5 mg every Sun, Wed, Fri; 1 mg all other days   Next INR check:  5/8/2022             Telephone call with  pura Sierra who verbalizes understanding and agrees to plan    Patient to recheck with home meter    Education provided: Monitoring for bleeding signs and symptoms and When to seek medical attention/emergency care    Plan made with [facility] Pharmacist " Joyce Whitten, RN  Anticoagulation Clinic  5/5/2022    _______________________________________________________________________     Anticoagulation Episode Summary     Current INR goal:  2.0-3.0   TTR:  51.1 % (1 y)   Target end date:  Indefinite   Send INR reminders to:  AGGIE FRANCIS    Indications    Long term current use of anticoagulant therapy [Z79.01]  Longstanding persistent atrial fibrillation (H) [I48.11]  S/p TAVR (transcatheter aortic valve replacement)  bioprosthetic [Z95.3]  Long term (current) use of opiate analgesic [Z79.891]           Comments:           Anticoagulation Care Providers     Provider Role Specialty Phone number    Anay Miner NP Referring Nurse Practitioner - Family [phone]

## 2022-05-05 NOTE — TELEPHONE ENCOUNTER
Reviewed labs and her kidney function is much higher than her normal.    And noted anticoagulation RN note from yesterday stating patient c/o decreased appetite, fatigue, not feeling well.    Due to acute kidney injury/rapidly changing kidney function/creatinine, she likely has dehydration will need IV fluids and her age, recommend she gets evaluated in the ED today.    Anay Miner, DNP, APRN, CNP

## 2022-05-06 NOTE — TELEPHONE ENCOUNTER
Daughter calling about patient as advised by INR nurse. Provider message below given to daughter. Daughter will take patient to ER now.     Liz NGUYEN RN  [facility]

## 2022-05-06 NOTE — PROGRESS NOTES
PCP sent message she is having patient evaluated in ED.    ANA BreauxN, RN  Anticoagulation Clinic

## 2022-05-11 ENCOUNTER — TELEPHONE (OUTPATIENT)
Dept: FAMILY MEDICINE | Facility: CLINIC | Age: 87
End: 2022-05-11
Payer: COMMERCIAL

## 2022-05-11 ENCOUNTER — ANTICOAGULATION THERAPY VISIT (OUTPATIENT)
Dept: FAMILY MEDICINE | Facility: CLINIC | Age: 87
End: 2022-05-11
Payer: COMMERCIAL

## 2022-05-11 NOTE — PROGRESS NOTES
Message received from PCP that patient has passed away. Will discharge pt from [facility] and resolve the episode.

## 2022-05-11 NOTE — TELEPHONE ENCOUNTER
Was informed that patient has passed away.  Writer filled out the appropriate paperwork and faxed to notify [facility] of the patient being .     Hayley Medrano-  Efra Prince

## 2023-07-13 NOTE — MR AVS SNAPSHOT
After Visit Summary   9/18/2017    Ashlie Brantley    MRN: [id number]           Patient Information     Date Of Birth          2/24/1931        Visit Information        Provider Department      9/18/2017 11:20 AM Carie Manley MD [facility]        Today's Diagnoses     Fibrosis, lung (H)    -  1    Urinary incontinence, unspecified type        Anxiety        Hypertension goal BP (blood pressure) < 140/90        Hypothyroidism due to acquired atrophy of thyroid        Hyperlipidemia LDL goal <130           Follow-ups after your visit        Additional Services     UROLOGY ADULT REFERRAL       Your provider has referred you to: Dr. Yuan Warner, FMG: [facility] [phone]   [email or web address]    Please be aware that coverage of these services is subject to the terms and limitations of your health insurance plan.  Call member services at your health plan with any benefit or coverage questions.      Please bring the following with you to your appointment:    (1) Any X-Rays, CTs or MRIs which have been performed.  Contact the facility where they were done to arrange for  prior to your scheduled appointment.    (2) List of current medications  (3) This referral request   (4) Any documents/labs given to you for this referral                  Your next 10 appointments already scheduled     Dec 13, 2017  8:45 AM CST   LAB with [facility]   [facility]    [address] [place] [id number] [phone]           Patient must bring picture ID. Patient should be prepared to give a urine specimen  Please do not eat 10-12 hours before your appointment if you are coming in fasting for labs on lipids, cholesterol, or glucose (sugar). Pregnant women should follow their Care Team instructions. Water with medications is okay. Do not drink coffee or other fluids. If you have  concerns about taking  your medications, please ask at office or if scheduling via [employer], send a message by clicking on Secure Messaging, Message Your Care Team.            Dec 20, 2017  8:20 AM CST   [facility]YSICAL with [facility]   [facility]    [address] [place] [id number] [phone]              Future tests that were ordered for you today     Open Future Orders        Priority Expected Expires Ordered    **Lipid panel reflex to direct LDL FUTURE anytime Routine 9/18/2017 9/18/2018 9/18/2017    **Basic metabolic panel FUTURE anytime Routine 9/18/2017 9/18/2018 9/18/2017    **TSH with free T4 reflex FUTURE anytime Routine 9/18/2017 9/18/2018 9/18/2017            Who to contact     If you have questions or need follow up information about today's clinic visit or your schedule please contact [facility] directly at [phone].  Normal or non-critical lab and imaging results will be communicated to you by "[employer]hart, letter or phone within 4 business days after the clinic has received the results. If you do not hear from us within 7 days, please contact the clinic through [employer] or phone. If you have a critical or abnormal lab result, we will notify you by phone as soon as possible.  Submit refill requests through [employer] or call your pharmacy and they will forward the refill request to us. Please allow 3 business days for your refill to be completed.          Additional Information About Your Visit        [employer] Information     [employer] gives you secure access to your electronic health record. If you see a primary care provider, you can also send messages to your care team and make appointments. If you have questions, please call your primary care clinic.  If you do not have a primary care provider, please call [phone] and they will assist you.        Care EveryWhere ID     This is your Care EveryWhere ID.  "This could be used by other organizations to access your Prim medical records  [id number]        Your Vitals Were     Pulse Temperature Height BMI (Body Mass Index)          82 98.6  F (37  C) (Oral) 4' 10.54\" (1.487 m) 31.18 kg/m2         Blood Pressure from Last 3 Encounters:   09/18/17 135/74   12/30/16 126/76   12/21/16 128/76    Weight from Last 3 Encounters:   09/18/17 152 lb (68.9 kg)   08/15/17 149 lb (67.6 kg)   12/30/16 157 lb (71.2 kg)              We Performed the Following     UROLOGY ADULT REFERRAL          Today's Medication Changes          These changes are accurate as of: 9/18/17  4:34 PM.  If you have any questions, ask your nurse or doctor.               These medicines have changed or have updated prescriptions.        Dose/Directions    * codeine 30 MG tablet   This may have changed:  additional instructions   Used for:  Fibrosis, lung (H)   Changed by:  Carie Manley MD        Dose:  30 mg   Start taking on:  10/11/2017   Take 1 tablet (30 mg) by mouth 2 times daily May fill on or after 10/11/17   Quantity:  60 tablet   Refills:  0       * codeine 30 MG tablet   This may have changed:  additional instructions   Used for:  Fibrosis, lung (H)   Changed by:  Carie Manley MD        Dose:  30 mg   Start taking on:  11/10/2017   Take 1 tablet (30 mg) by mouth 2 times daily May fill on or after 11/10/17   Quantity:  60 tablet   Refills:  0       * codeine 30 MG tablet   This may have changed:  additional instructions   Used for:  Fibrosis, lung (H)   Changed by:  Carie Manley MD        Dose:  30 mg   Start taking on:  12/10/2017   Take 1 tablet (30 mg) by mouth 2 times daily May fill on or after 12/10/17   Quantity:  60 tablet   Refills:  0       * Notice:  This list has 3 medication(s) that are the same as other medications prescribed for you. Read the directions carefully, and ask your doctor or other care provider to review them with you.         Where to get your " medicines      Some of these will need a paper prescription and others can be bought over the counter.  Ask your nurse if you have questions.     Bring a paper prescription for each of these medications     codeine 30 MG tablet    codeine 30 MG tablet    codeine 30 MG tablet                Primary Care Provider Office Phone # Fax #    Carie Manley [phone]       [address] [facility] [address]        Equal Access to Services     JUNIOR MURPHY : Hadii aad ku hadasho Soomaali, waaxda luqadaha, qaybta kaalmada adeegyada, waxay idiin hayaan adeeg khvandanash laAliyafartun . So [facility] [phone].    ATENCIÓN: Si glenny vang, tiene a leon disposición servicios gratuitos de asistencia lingüística. Llame al [phone].    We comply with applicable federal civil rights laws and [place] laws. We do not discriminate on the basis of race, color, national origin, age, disability sex, sexual orientation or gender identity.            Thank you!     Thank you for choosing [facility]  for your care. Our goal is always to provide you with excellent care. Hearing back from our patients is one way we can continue to improve our services. Please take a few minutes to complete the written survey that you may receive in the mail after your visit with us. Thank you!             Your Updated Medication List - Protect others around you: Learn how to safely use, store and throw away your medicines at www.disposemymeds.org.          This list is accurate as of: 9/18/17  4:34 PM.  Always use your most recent med list.                   Brand Name Dispense Instructions for use Diagnosis    * albuterol (2.5 MG/3ML) 0.083% neb solution     1 Box    Take 1 vial (2.5 mg) by nebulization every 6 hours as needed for shortness of breath / dyspnea or wheezing    IPF (idiopathic pulmonary fibrosis) (H), Cough       * albuterol 108 (90 BASE) MCG/ACT Inhaler    PROAIR HFA/PROVENTIL HFA/VENTOLIN HFA    1  Inhaler    Inhale 2 puffs into the lungs every 6 hours as needed for shortness of breath / dyspnea    Fibrosis, lung (H)       aspirin 81 MG tablet     1 Tab    ONE DAILY    HTN (hypertension), Elevated cholesterol       BOTOX 100 UNITS injection   Generic drug:  botulinum toxin type A      Inject into the muscle once        calcium + D 600-200 MG-UNIT Tabs   Generic drug:  calcium carbonate-vitamin D     0    ONE TABLET TWICE A DAY WITH MEALS    Osteopenia       * codeine 30 MG tablet   Start taking on:  10/11/2017     60 tablet    Take 1 tablet (30 mg) by mouth 2 times daily May fill on or after 10/11/17    Fibrosis, lung (H)       * codeine 30 MG tablet   Start taking on:  11/10/2017     60 tablet    Take 1 tablet (30 mg) by mouth 2 times daily May fill on or after 11/10/17    Fibrosis, lung (H)       * codeine 30 MG tablet   Start taking on:  12/10/2017     60 tablet    Take 1 tablet (30 mg) by mouth 2 times daily May fill on or after 12/10/17    Fibrosis, lung (H)       levothyroxine 88 MCG tablet    SYNTHROID    90 tablet    Take 1 tablet (88 mcg) by mouth daily    Hypothyroidism due to acquired atrophy of thyroid       losartan 100 MG tablet    COZAAR    90 tablet    Take 1 tablet (100 mg) by mouth daily    Hypertension goal BP (blood pressure) < 140/90       neomycin-polymyxin-dexamethasone [id number] Oint ophthalmic ointment    MAXITROL    1 Tube    Place 1 Application into both eyes At Bedtime Apply a small squirt into each eye at bedtime    Dermatochalasis, unspecified laterality       OMEGA-3 COMPLEX PO      1 capsule daily        order for DME     1 each    Equipment being ordered: Oxygen 2 liters NC at HS    Oxygen decrease, IPF (idiopathic pulmonary fibrosis) (H)       * order for DME     1 Units    Equipment being ordered: Please provide night time oxygen at 2L/min via nasal canula    IPF (idiopathic pulmonary fibrosis) (H), Hypoxia       * order for DME     1 Device    Equipment being ordered:  Oxygen 2 liters NC at night time    IPF (idiopathic pulmonary fibrosis) (H)       simvastatin 40 MG tablet    ZOCOR    90 tablet    Take 1 tablet (40 mg) by mouth At Bedtime    Hyperlipidemia LDL goal <130       sulfamethoxazole-trimethoprim 800-160 MG per tablet    BACTRIM DS/SEPTRA DS    6 tablet    Take 1 tablet by mouth 2 times daily    Acute cystitis without hematuria       * vitamin D3 1000 UNITS Caps      1 CAPSULE DAILY        * vitamin D 2000 UNITS tablet     0    1 tablet daily    Osteopenia       VITAMIN E COMPLEX PO      Take  by mouth.        * Notice:  This list has 9 medication(s) that are the same as other medications prescribed for you. Read the directions carefully, and ask your doctor or other care provider to review them with you.       Olumiant Pregnancy And Lactation Text: Based on animal studies, Olumiant may cause embryo-fetal harm when administered to pregnant women.  The medication should not be used in pregnancy.  Breastfeeding is not recommended during treatment.

## 2024-12-17 NOTE — PROGRESS NOTES
ANTICOAGULATION MANAGEMENT     Patient Name:  Ashlie Brantley  Date:  2020    ASSESSMENT /SUBJECTIVE:    Today's INR result of 2.5 is therapeutic. Goal INR of 2.0-3.0      Warfarin dose taken: Warfarin taken as previously instructed    Diet: No new diet changes affecting INR    Medication changes/ interactions: No new medications/supplements affecting INR    Previous INR: Subtherapeutic     S/S of bleeding or thromboembolism: No    New injury or illness: No    Upcoming surgery, procedure or cardioversion: No    Additional findings: None      PLAN:    Spoke with lamar Gonzalez care, regarding INR result and instructed:     Warfarin Dosing Instructions: Continue your current warfarin dose 5 mg on mon/thur/sat and 2.5 mg all other days    Instructed patient to follow up no later than: 2 weeks  Orders given to  Homecare nurse/facility to recheck    Education provided: None required      lamar Gonzalez care, verbalizes understanding and agrees to warfarin dosing plan.    Instructed to call the Anticoagulation Clinic for any changes, questions or concerns. (#[phone])        Lyla Robbins RN      OBJECTIVE:  Recent labs: (last 7 days)     20   INR 2.5*         No question data found.  Anticoagulation Summary  As of 2020    INR goal:   2.0-3.0   TTR:   57.0 % (1 y)   INR used for dosin.5 (2020)   Warfarin maintenance plan:   5 mg (5 mg x 1) every Mon, Thu, Sat; 2.5 mg (5 mg x 0.5) all other days   Full warfarin instructions:   5 mg every Mon, Thu, Sat; 2.5 mg all other days   Weekly warfarin total:   25 mg   Plan last modified:   Claudia Maher RN (2020)   Next INR check:   2020   Priority:   High   Target end date:   Indefinite    Indications    Atrial fibrillation (H) [I48.91] [I48.91]  Long term current use of anticoagulant therapy [Z79.01]  Longstanding persistent atrial fibrillation (H) [I48.11]             Anticoagulation Episode Summary     INR check location:       Preferred  lab:       Send INR reminders to:   AGGIE FRANCIS    Comments:   Call Lisa with any questions this is patients Home Care RN: [phone]      Anticoagulation Care Providers     Provider Role Specialty Phone number    Anay Miner NP Referring Nurse Practitioner - Family [phone]            n/a

## 2025-06-26 NOTE — TELEPHONE ENCOUNTER
Okay for refill per review of Problem list  Script printed in PCP absence.    Katarina De La Rosa, MPAS, PA-C     [FreeTextEntry1] : Left Venaseal GSV [FreeTextEntry2] : Chronic Venous Insufficiency  [FreeTextEntry3] : Venaseal Left GSV   Procedure: Duplex ultrasound was used to map out the insufficient saphenous vein, and access was determined and marked on the overlying skin. The depth and diameter of the vein(s) to be treated was documented. The patient was placed supine on the procedure table and the leg was prepped and draped using sterile technique.  Ultrasound guidance was again used to localize the access site. 1% lidocaine was injected as a local anesthetic in the subcutaneous tissues at the target location in the GSV in the lower leg. Using ultrasound guidance, access was gained at this location with the 19 gauge thin walled access needle and followed by introduction of a short guidewire, location confirmed with ultrasound. A small, 3 mm incision was made at the access site to allow for introduction and placement of the 7 Fr x7cm introducer/dilator. The dilator and guidewire were removed. The 0.035 guidewire from the Venaseal kit was then introduced and positioned at the saphenofemoral junction using ultrasound guidance. The 80 cm 7 Fr introducer sheath/dilator was positioned 5cm from the saphenofemoral junction. The guidewire and dilator were removed, and the remaining sheath was flushed with sterile saline, with the syringe remaining in place prior to the next steps.  The cyanoacrylate adhesive was precisely primed into the 5 F delivery catheter and this catheter/syringe combination was attached within the dispenser gun. This assembly was introduced through the 7 F sheath and positioned 5 cm caudal of the saphenofemoral junction under ultrasound guidance. The steps from the IFU were followed for dispensing amounts, locations and compression times, e.g 2 aliquots proximally with 3 minutes of compression, and 1 aliquot every 3cm distally with 30 sec of compression along the course of the vessel (insert specific language per physician preference). Following the last injection and compression sequence, the catheter and introducer sheath were pulled out from the access site. Hemostasis was achieved with manual compression and an adhesive bandage was applied to the incision. Ultrasound confirmed complete coaptation and closure of the treated segments of the GSV, and the absence of any DVT at the saphenofemoral junction.  The drapes were removed and the patient cleaned and prepared for discharge. Post op ultrasound check is scheduled for 48- 72 hours and the patient was given written post-op instructions.